# Patient Record
Sex: FEMALE | Race: WHITE | NOT HISPANIC OR LATINO | Employment: FULL TIME | URBAN - METROPOLITAN AREA
[De-identification: names, ages, dates, MRNs, and addresses within clinical notes are randomized per-mention and may not be internally consistent; named-entity substitution may affect disease eponyms.]

---

## 2017-01-03 ENCOUNTER — OFFICE VISIT (OUTPATIENT)
Dept: FAMILY MEDICINE CLINIC | Facility: CLINIC | Age: 22
End: 2017-01-03

## 2017-01-03 VITALS
SYSTOLIC BLOOD PRESSURE: 110 MMHG | HEART RATE: 80 BPM | WEIGHT: 227.4 LBS | BODY MASS INDEX: 38.82 KG/M2 | HEIGHT: 64 IN | TEMPERATURE: 98.3 F | OXYGEN SATURATION: 98 % | DIASTOLIC BLOOD PRESSURE: 86 MMHG

## 2017-01-03 DIAGNOSIS — J01.90 ACUTE NON-RECURRENT SINUSITIS, UNSPECIFIED LOCATION: Primary | ICD-10-CM

## 2017-01-03 PROCEDURE — 99213 OFFICE O/P EST LOW 20 MIN: CPT | Performed by: NURSE PRACTITIONER

## 2017-01-03 RX ORDER — CIPROFLOXACIN 500 MG/1
500 TABLET, FILM COATED ORAL 2 TIMES DAILY
Qty: 14 TABLET | Refills: 0 | Status: SHIPPED | OUTPATIENT
Start: 2017-01-03 | End: 2017-01-10

## 2017-01-03 NOTE — MR AVS SNAPSHOT
Lavonne Rocha   1/3/2017 10:00 AM   Office Visit    Dept Phone:  [phone]   Encounter #:  [id number]    Provider:  BRAD Groves   Department:  [facility] INTERNAL MEDICINE                Your Full Care Plan              Today's Medication Changes          These changes are accurate as of: 1/3/17 10:18 AM.  If you have any questions, ask your nurse or doctor.               New Medication(s)Ordered:     ciprofloxacin 500 MG tablet   Commonly known as:  CIPRO   Take 1 tablet by mouth 2 (Two) Times a Day for 7 days.   Started by:  BRAD Groves         Stop taking medication(s)listed here:     phentermine 37.5 MG capsule   Stopped by:  BRAD Groves           topiramate 50 MG tablet   Commonly known as:  TOPAMAX   Stopped by:  BRAD Groves           traMADol 50 MG tablet   Commonly known as:  ULTRAM   Stopped by:  BRAD Groves                Where to Get Your Medications      These medications were sent to [facility] [address] [place] [phone] [place] [phone]   [address] [facility] [address]     Phone:  [phone]     ciprofloxacin 500 MG tablet                  Your Updated Medication List          This list is accurate as of: 1/3/17 10:18 AM.  Always use your most recent med list.                ciprofloxacin 500 MG tablet   Commonly known as:  CIPRO   Take 1 tablet by mouth 2 (Two) Times a Day for 7 days.       cyclobenzaprine 10 MG tablet   Commonly known as:  FLEXERIL   Take 1 tablet by mouth every night.       fexofenadine 180 MG tablet   Commonly known as:  ALLEGRA   Take 1 tablet by mouth daily.       gabapentin 800 MG tablet   Commonly known as:  NEURONTIN   Take 1 tablet by mouth 3 (three) times a day.       ibuprofen 800 MG tablet   Commonly known as:  ADVIL,MOTRIN   TAKE ONE TABLET BY MOUTH THREE TIMES DAILY       montelukast 10 MG tablet   Commonly known  as:  SINGULAIR   Take 1 tablet by mouth Every Night.       norgestimate-ethinyl estradiol 0.25-35 MG-MCG per tablet   Commonly known as:  ORTHO-CYCLEN (28)   Take 28 tablets by mouth Daily.       omeprazole 40 MG capsule   Commonly known as:  priLOSEC   TAKE ONE CAPSULE BY MOUTH EVERY DAY               You Were Diagnosed With        Codes Comments    Acute non-recurrent sinusitis, unspecified location    -  Primary ICD-10-CM: J01.90  ICD-9-CM: 461.9       Instructions     None    Patient Instructions History      Upcoming Appointments     Visit Type Date Time Department    ACUTE           1/3/2017 10:00 AM [facility] [place]    ANNUAL 1/9/2017  9:30 AM [facility]    FOLLOW UP 1/16/2017  5:00 PM [facility]    FOLLOW UP 3/1/2017  8:45 AM [facility]UROSURG[facility]      MyChart Signup     Our records indicate that you have an active UatsdinScope 5 account.    You can view your After Visit Summary by going to Plutora and logging in with your Starvine username and password.  If you don't have a Starvine username and password but a parent or guardian has access to your record, the parent or guardian should login with their own Starvine username and password and access your record to view the After Visit Summary.    If you have questions, you can email [email or web address] or call [phone] to talk to our Starvine staff.  Remember, Starvine is NOT to be used for urgent needs.  For medical emergencies, dial 911.               Other Info from Your Visit           Your Appointments     Jan 09, 2017  9:30 AM EST   Annual with Ursula Mixon MD   [facility] OB GYN (--)    [address] [address] [phone]            Jan 16, 2017  5:00 PM EST   Follow Up with Nickolas Lr DO   [facility] FAMILY MEDICINE (--)    [address] [facility] [address]  [facility] [address] [phone]           Arrive 15 minutes  "prior to appointment.            Mar 01, 2017  8:45 AM EST   Follow Up with Adalid Levy MD   [facility] NEUROSURGERY (--)    [address]  [facility] [address] [phone]           Arrive 15 minutes prior to appointment.              Allergies     Penicillins  Rash      Reason for Visit     Cough x 2 weeks    URI     Wheezing At night    Shortness of Breath           Vital Signs     Blood Pressure Pulse Temperature Height Weight Oxygen Saturation    110/86 (BP Location: Left arm, Patient Position: Sitting, Cuff Size: Adult) 80 98.3 °F (36.8 °C) (Oral) 64\" (162.6 cm) 227 lb 6.4 oz (103 kg) 98%    Breastfeeding? Body Mass Index Smoking Status             No 39.03 kg/m2 Former Smoker         Problems and Diagnoses Noted     Acute non-recurrent sinusitis, unspecified location    -  Primary        "

## 2017-01-03 NOTE — PROGRESS NOTES
Subjective   Lavonne Rocha is a 21 y.o. female who presents today for:    Cough; URI; Wheezing (At night); and Shortness of Breath    URI    This is a new problem. The current episode started 1 to 4 weeks ago (started 2 weeks ago). The problem has been waxing and waning. There has been no fever. Associated symptoms include congestion (nasal congestion), coughing (worse at night), headaches, rhinorrhea, sinus pain (face and teeth hurt) and wheezing. Pertinent negatives include no plugged ear sensation or sore throat. She has tried NSAIDs and decongestant for the symptoms. The treatment provided mild relief.      I have reviewed the patient's medical history in detail and updated the computerized patient record.    Ms. Rocha  reports that she quit smoking about 6 months ago. Her smoking use included Cigarettes. She started smoking about 7 years ago. She has a 3.00 pack-year smoking history. She has never used smokeless tobacco. She reports that she drinks alcohol. She reports that she does not use illicit drugs.         Current Outpatient Prescriptions:   •  cyclobenzaprine (FLEXERIL) 10 MG tablet, Take 1 tablet by mouth every night., Disp: 30 tablet, Rfl: 2  •  fexofenadine (ALLEGRA) 180 MG tablet, Take 1 tablet by mouth daily., Disp: 30 tablet, Rfl: 5  •  gabapentin (NEURONTIN) 800 MG tablet, Take 1 tablet by mouth 3 (three) times a day., Disp: 90 tablet, Rfl: 2  •  ibuprofen (ADVIL,MOTRIN) 800 MG tablet, TAKE ONE TABLET BY MOUTH THREE TIMES DAILY, Disp: 90 tablet, Rfl: 4  •  montelukast (SINGULAIR) 10 MG tablet, Take 1 tablet by mouth Every Night., Disp: 30 tablet, Rfl: 11  •  omeprazole (PriLOSEC) 40 MG capsule, TAKE ONE CAPSULE BY MOUTH EVERY DAY, Disp: 30 capsule, Rfl: 4  •  norgestimate-ethinyl estradiol (ORTHO-CYCLEN, 28,) 0.25-35 MG-MCG per tablet, Take 28 tablets by mouth Daily., Disp: 1 package, Rfl: 12      The following portions of the patient's history were reviewed and updated as appropriate: allergies,  "current medications, past social history and problem list.    Review of Systems   Constitutional: Positive for fatigue. Negative for chills and fever.   HENT: Positive for congestion (nasal congestion), postnasal drip, rhinorrhea and sinus pressure (c/o headache and her teeth hurt). Negative for sore throat.    Respiratory: Positive for cough (worse at night), shortness of breath and wheezing.    Musculoskeletal: Positive for myalgias. Negative for arthralgias.   Neurological: Positive for headaches.         Objective   Vitals:    01/03/17 0957   BP: 110/86   BP Location: Left arm   Patient Position: Sitting   Cuff Size: Adult   Pulse: 80   Temp: 98.3 °F (36.8 °C)   TempSrc: Oral   SpO2: 98%   Weight: 227 lb 6.4 oz (103 kg)   Height: 64\" (162.6 cm)     Physical Exam   Constitutional: She is oriented to person, place, and time. She appears well-developed and well-nourished.   HENT:   Right Ear: External ear normal.   Left Ear: External ear normal.   Nose: Mucosal edema (turbinates are red and swollen) and rhinorrhea (clear) present. Right sinus exhibits maxillary sinus tenderness and frontal sinus tenderness. Left sinus exhibits maxillary sinus tenderness and frontal sinus tenderness.   Mouth/Throat: Oropharynx is clear and moist.   Neck: Normal range of motion. Neck supple.   Cardiovascular: Normal rate, regular rhythm, normal heart sounds and intact distal pulses.    Pulmonary/Chest: Effort normal and breath sounds normal. No respiratory distress. She has no wheezes. She has no rales.   Lymphadenopathy:     She has no cervical adenopathy.   Neurological: She is alert and oriented to person, place, and time.   Skin: Skin is warm and dry.   Psychiatric:   No acute distress   Vitals reviewed.        Assessment/Plan   Lavonne was seen today for cough, uri, wheezing and shortness of breath.    Diagnoses and all orders for this visit:    Acute non-recurrent sinusitis, unspecified location  -     ciprofloxacin (CIPRO) 500 " MG tablet; Take 1 tablet by mouth 2 (Two) Times a Day for 7 days.    1. Acute sinusitis. Start Ciprofloxacin 500 mg twice a day for 7 days. You may take Mucinex DM for your cough and Sudafed for your sinus congestion.  2. Follow up as needed.

## 2017-01-09 ENCOUNTER — OFFICE VISIT (OUTPATIENT)
Dept: OBSTETRICS AND GYNECOLOGY | Facility: CLINIC | Age: 22
End: 2017-01-09

## 2017-01-09 VITALS
HEIGHT: 64 IN | WEIGHT: 232 LBS | DIASTOLIC BLOOD PRESSURE: 76 MMHG | SYSTOLIC BLOOD PRESSURE: 124 MMHG | BODY MASS INDEX: 39.61 KG/M2

## 2017-01-09 DIAGNOSIS — N94.6 DYSMENORRHEA: ICD-10-CM

## 2017-01-09 DIAGNOSIS — L70.8 OTHER ACNE: ICD-10-CM

## 2017-01-09 DIAGNOSIS — Z13.9 SCREENING: ICD-10-CM

## 2017-01-09 DIAGNOSIS — Z01.419 ENCOUNTER FOR GYNECOLOGICAL EXAMINATION WITHOUT ABNORMAL FINDING: Primary | ICD-10-CM

## 2017-01-09 DIAGNOSIS — Z72.0 TOBACCO USE: ICD-10-CM

## 2017-01-09 DIAGNOSIS — Z71.6 ENCOUNTER FOR COUNSELING FOR TOBACCO USE DISORDER: ICD-10-CM

## 2017-01-09 DIAGNOSIS — Z12.4 ROUTINE CERVICAL SMEAR: ICD-10-CM

## 2017-01-09 DIAGNOSIS — E28.2 PCOS (POLYCYSTIC OVARIAN SYNDROME): ICD-10-CM

## 2017-01-09 LAB
BILIRUB BLD-MCNC: NEGATIVE MG/DL
CLARITY, POC: CLEAR
COLOR UR: YELLOW
GLUCOSE UR STRIP-MCNC: NEGATIVE MG/DL
KETONES UR QL: NEGATIVE
LEUKOCYTE EST, POC: NEGATIVE
NITRITE UR-MCNC: NEGATIVE MG/ML
PH UR: 5.5 [PH] (ref 5–8)
PROT UR STRIP-MCNC: NEGATIVE MG/DL
RBC # UR STRIP: NEGATIVE /UL
SP GR UR: 1.03 (ref 1–1.03)
UROBILINOGEN UR QL: NORMAL

## 2017-01-09 PROCEDURE — 99395 PREV VISIT EST AGE 18-39: CPT | Performed by: OBSTETRICS & GYNECOLOGY

## 2017-01-09 PROCEDURE — 81002 URINALYSIS NONAUTO W/O SCOPE: CPT | Performed by: OBSTETRICS & GYNECOLOGY

## 2017-01-09 NOTE — MR AVS SNAPSHOT
Lavonne Rocha   1/9/2017 9:30 AM   Office Visit    Dept Phone:  [phone]   Encounter #:  [id number]    Provider:  Ursula Mixon MD   Department:  [facility] OB GYN                Your Full Care Plan              Today's Medication Changes          These changes are accurate as of: 1/9/17 10:35 AM.  If you have any questions, ask your nurse or doctor.               Stop taking medication(s)listed here:     cyclobenzaprine 10 MG tablet   Commonly known as:  FLEXERIL   Stopped by:  Ursula Mixon MD                      Your Updated Medication List          This list is accurate as of: 1/9/17 10:35 AM.  Always use your most recent med list.                ciprofloxacin 500 MG tablet   Commonly known as:  CIPRO   Take 1 tablet by mouth 2 (Two) Times a Day for 7 days.       fexofenadine 180 MG tablet   Commonly known as:  ALLEGRA   Take 1 tablet by mouth daily.       gabapentin 800 MG tablet   Commonly known as:  NEURONTIN   Take 1 tablet by mouth 3 (three) times a day.       ibuprofen 800 MG tablet   Commonly known as:  ADVIL,MOTRIN   TAKE ONE TABLET BY MOUTH THREE TIMES DAILY       montelukast 10 MG tablet   Commonly known as:  SINGULAIR   Take 1 tablet by mouth Every Night.       norgestimate-ethinyl estradiol 0.25-35 MG-MCG per tablet   Commonly known as:  ORTHO-CYCLEN (28)   Take 28 tablets by mouth Daily.       omeprazole 40 MG capsule   Commonly known as:  priLOSEC   TAKE ONE CAPSULE BY MOUTH EVERY DAY               We Performed the Following     IGP, Rfx Aptima HPV ASCU     POC Urinalysis Dipstick       You Were Diagnosed With        Codes Comments    Encounter for gynecological examination without abnormal finding    -  Primary ICD-10-CM: Z01.419  ICD-9-CM: V72.31     Routine cervical smear     ICD-10-CM: Z12.4  ICD-9-CM: V76.2     Screening     ICD-10-CM: Z13.9  ICD-9-CM: V82.9     PCOS (polycystic ovarian syndrome)     ICD-10-CM: E28.2  ICD-9-CM: 256.4        Dysmenorrhea     ICD-10-CM: N94.6  ICD-9-CM: 625.3     Other acne     ICD-10-CM: L70.8  ICD-9-CM: 706.1     Tobacco use     ICD-10-CM: Z72.0  ICD-9-CM: 305.1     Encounter for counseling for tobacco use disorder     ICD-10-CM: Z71.6  ICD-9-CM: V65.49       Instructions     None    Patient Instructions History      Upcoming Appointments     Visit Type Date Time Department    ANNUAL 1/9/2017  9:30 AM [facility]    FOLLOW UP 1/16/2017  5:00 PM [facility] [address]    FOLLOW UP 3/1/2017  8:45 AM [facility] NEUROSURGERY [facility]    GYN FOLLOW UP 4/12/2017  9:45 AM [facility]      MyChart Signup     Our records indicate that you have an active SikhSpark Mobile account.    You can view your After Visit Summary by going to Haivision and logging in with your Wochit username and password.  If you don't have a Wochit username and password but a parent or guardian has access to your record, the parent or guardian should login with their own Wochit username and password and access your record to view the After Visit Summary.    If you have questions, you can email [email or web address] or call [phone] to talk to our Wochit staff.  Remember, Wochit is NOT to be used for urgent needs.  For medical emergencies, dial 911.               Other Info from Your Visit           Your Appointments     Jan 16, 2017  5:00 PM EST   Follow Up with Nickolas Lr DO   [facility] FAMILY MEDICINE (--)    [address] [facility] [address]  [facility] [address] [phone]           Arrive 15 minutes prior to appointment.            Mar 01, 2017  8:45 AM EST   Follow Up with Adalid Levy MD   [facility] NEUROSURGERY (--)    [address] [facility] [place] [address] [phone]           Arrive 15 minutes prior to appointment.            Apr 12, 2017  9:45 AM EDT   GYN FOLLOW UP with Ursula Mixon MD   [facility] OB GYN (--)     "[address] [address] [phone]              Allergies     Penicillins  Rash      Reason for Visit     Gynecologic Exam annual/ diagnosed with PCOS recently/states has some questions about PCOS/ last pap 9/24/15    Contraception pt states was given BC by primary care dr but wanted to wait and see if she should take it      Vital Signs     Blood Pressure Height Weight Last Menstrual Period Breastfeeding? Body Mass Index    124/76 64\" (162.6 cm) 232 lb (105 kg) 12/26/2016 No 39.82 kg/m2    Smoking Status                   Former Smoker           Problems and Diagnoses Noted     Painful menstruation    PCOS (polycystic ovarian syndrome)    Encounter for routine gynecological examination    -  Primary    Routine cervical smear        Screening        Other acne        Tobacco use        Encounter for counseling for tobacco use disorder          Results     POC Urinalysis Dipstick      Component Value Standard Range & Units    Color Yellow Yellow, Straw, Dark Yellow, Ce    Clarity, UA Clear Clear    Glucose, UA Negative Negative, 1000 mg/dL (3+) mg/dL    Bilirubin Negative Negative    Ketones, UA Negative Negative    Specific Gravity  1.030 1.005 - 1.030    Blood, UA Negative Negative    pH, Urine 5.5 5.0 - 8.0    Protein, POC Negative Negative mg/dL    Urobilinogen, UA Normal Normal    Leukocytes Negative Negative    Nitrite, UA Negative Negative                    "

## 2017-01-09 NOTE — PROGRESS NOTES
"Subjective   Lavonne Rocha is a 21 y.o. female annual/ diagnosed with PCOS recently/states has some questions about PCOS/ last pap 9/24/15, pt states was given BC by primary care dr but wanted to wait and see if she should take it - encouraged to start Sprintec - reassured about fertility due to regular periods - patient most bothered by excessive hair growth on face and acne -  Reports painful periods and hopes BC will help with these     History of Present Illness    The following portions of the patient's history were reviewed and updated as appropriate: allergies, current medications, past family history, past medical history, past social history, past surgical history and problem list.    Review of Systems   Constitutional: Negative for chills, fever and unexpected weight change.   Gastrointestinal: Negative for abdominal distention and abdominal pain.   Endocrine:        Acne  Facial hair growth    Genitourinary: Positive for menstrual problem. Negative for dysuria, pelvic pain, vaginal bleeding, vaginal discharge and vaginal pain.   All other systems reviewed and are negative.    Visit Vitals   • /76   • Ht 64\" (162.6 cm)   • Wt 232 lb (105 kg)   • LMP 12/26/2016   • Breastfeeding No   • BMI 39.82 kg/m2       Objective   Physical Exam   Constitutional: She is oriented to person, place, and time. She appears well-developed and well-nourished.   Neck: Normal range of motion. Neck supple. No thyromegaly present.   Cardiovascular: Normal rate and regular rhythm.    Pulmonary/Chest: Effort normal and breath sounds normal. Right breast exhibits no mass, no nipple discharge, no skin change and no tenderness. Left breast exhibits no mass, no nipple discharge, no skin change and no tenderness.   Abdominal: Soft. Bowel sounds are normal. She exhibits no distension. There is no tenderness.   Genitourinary: Vagina normal and uterus normal. Pelvic exam was performed with patient supine. Uterus is not tender. Cervix " exhibits no friability. Right adnexum displays no mass and no tenderness. Left adnexum displays no mass and no tenderness. No vaginal discharge found.   Musculoskeletal: Normal range of motion. She exhibits no edema.   Neurological: She is alert and oriented to person, place, and time.   Skin: Skin is warm and dry. No rash noted.   Psychiatric: She has a normal mood and affect. Her behavior is normal.   Nursing note and vitals reviewed.        Assessment/Plan   Lavonne was seen today for gynecologic exam and contraception.    Diagnoses and all orders for this visit:    Encounter for gynecological examination without abnormal finding    Routine cervical smear  -     IGP, Rfx Aptima HPV ASCU    Screening  -     POC Urinalysis Dipstick    PCOS (polycystic ovarian syndrome)    Dysmenorrhea    Other acne    Tobacco use    Encounter for counseling for tobacco use disorder

## 2017-01-10 LAB
CONV .: NORMAL
CYTOLOGIST CVX/VAG CYTO: NORMAL
CYTOLOGY CVX/VAG DOC THIN PREP: NORMAL
DX ICD CODE: NORMAL
HIV 1 & 2 AB SER-IMP: NORMAL
OTHER STN SPEC: NORMAL
PATH REPORT.FINAL DX SPEC: NORMAL
STAT OF ADQ CVX/VAG CYTO-IMP: NORMAL

## 2017-01-13 ENCOUNTER — TELEPHONE (OUTPATIENT)
Dept: OBSTETRICS AND GYNECOLOGY | Facility: CLINIC | Age: 22
End: 2017-01-13

## 2017-01-13 RX ORDER — GABAPENTIN 800 MG/1
TABLET ORAL
Qty: 90 TABLET | Status: CANCELLED | OUTPATIENT
Start: 2017-01-13

## 2017-01-13 RX ORDER — GABAPENTIN 800 MG/1
TABLET ORAL
Qty: 90 TABLET | OUTPATIENT
Start: 2017-01-13

## 2017-01-13 NOTE — TELEPHONE ENCOUNTER
----- Message from Ursula Mixon MD sent at 1/11/2017 12:11 PM EST -----  Please call patient and notify of normal results of pap smear

## 2017-01-17 RX ORDER — GABAPENTIN 800 MG/1
800 TABLET ORAL 3 TIMES DAILY
Qty: 90 TABLET | Refills: 2 | Status: SHIPPED | OUTPATIENT
Start: 2017-01-17 | End: 2017-01-19 | Stop reason: SDUPTHER

## 2017-01-19 RX ORDER — GABAPENTIN 800 MG/1
800 TABLET ORAL 3 TIMES DAILY
Qty: 90 TABLET | Refills: 2 | Status: SHIPPED | OUTPATIENT
Start: 2017-01-19 | End: 2017-08-09 | Stop reason: SDUPTHER

## 2017-02-20 ENCOUNTER — OFFICE VISIT (OUTPATIENT)
Dept: FAMILY MEDICINE CLINIC | Facility: CLINIC | Age: 22
End: 2017-02-20

## 2017-02-20 VITALS
HEIGHT: 64 IN | HEART RATE: 80 BPM | WEIGHT: 232 LBS | DIASTOLIC BLOOD PRESSURE: 82 MMHG | BODY MASS INDEX: 39.61 KG/M2 | SYSTOLIC BLOOD PRESSURE: 126 MMHG | OXYGEN SATURATION: 97 %

## 2017-02-20 DIAGNOSIS — M54.41 ACUTE MIDLINE LOW BACK PAIN WITH BILATERAL SCIATICA: Primary | ICD-10-CM

## 2017-02-20 DIAGNOSIS — M54.42 ACUTE MIDLINE LOW BACK PAIN WITH BILATERAL SCIATICA: Primary | ICD-10-CM

## 2017-02-20 PROCEDURE — 96372 THER/PROPH/DIAG INJ SC/IM: CPT | Performed by: NURSE PRACTITIONER

## 2017-02-20 PROCEDURE — 99214 OFFICE O/P EST MOD 30 MIN: CPT | Performed by: NURSE PRACTITIONER

## 2017-02-20 RX ORDER — HYDROCODONE BITARTRATE AND ACETAMINOPHEN 5; 325 MG/1; MG/1
1 TABLET ORAL EVERY 8 HOURS PRN
Qty: 30 TABLET | Refills: 0 | Status: SHIPPED | OUTPATIENT
Start: 2017-02-20 | End: 2017-03-02

## 2017-02-20 RX ORDER — CYCLOBENZAPRINE HCL 10 MG
10 TABLET ORAL 3 TIMES DAILY PRN
COMMUNITY
End: 2017-04-17 | Stop reason: SDUPTHER

## 2017-02-20 NOTE — PROGRESS NOTES
Subjective   Lavonne Rocha is a 21 y.o. female who presents today for:    Back Pain (Lower back and Hip pain)    HPI Comments: Has appointments in with Dr. Thomas in March concerning her back and Dr. Ware concerning nerve pain. She is having a flare up right now which is affecting her ADLs. She was going to pain management for spinal injections the last one 3 months ago. She has not been back to pain management because she cannot afford the cost of the injections. She also reports that Tramadol did not help with the pain in the past.    Back Pain   This is a new (but this is a chronic issue that started when she was 12 years old) problem. The current episode started yesterday. The problem occurs constantly (has had flare ups in the past last one was about 3 to 4 weeks ago). The problem has been rapidly worsening since onset. The pain is present in the lumbar spine. The quality of the pain is described as stabbing and shooting (a lot of pressure). Radiates to: radiates down both hips and buttocks. The pain is at a severity of 10/10. The pain is severe. The pain is the same all the time. The symptoms are aggravated by bending, standing, sitting, twisting, lying down and position. Associated symptoms include weakness (in both legs). Pertinent negatives include no headaches, numbness, paresis, paresthesias, pelvic pain or tingling. She has tried heat, ice, muscle relaxant and NSAIDs (gabapentin) for the symptoms. The treatment provided no relief.      I have reviewed the patient's medical history in detail and updated the computerized patient record.    Ms. Rocha  reports that she quit smoking about 8 months ago. Her smoking use included Cigarettes. She started smoking about 7 years ago. She has a 3.00 pack-year smoking history. She has never used smokeless tobacco. She reports that she drinks alcohol. She reports that she does not use illicit drugs.         Current Outpatient Prescriptions:   •  cyclobenzaprine  (FLEXERIL) 10 MG tablet, Take 10 mg by mouth 3 (Three) Times a Day As Needed for muscle spasms., Disp: , Rfl:   •  fexofenadine (ALLEGRA) 180 MG tablet, Take 1 tablet by mouth daily., Disp: 30 tablet, Rfl: 5  •  gabapentin (NEURONTIN) 800 MG tablet, Take 1 tablet by mouth 3 (Three) Times a Day., Disp: 90 tablet, Rfl: 2  •  ibuprofen (ADVIL,MOTRIN) 800 MG tablet, TAKE ONE TABLET BY MOUTH THREE TIMES DAILY, Disp: 90 tablet, Rfl: 4  •  montelukast (SINGULAIR) 10 MG tablet, Take 1 tablet by mouth Every Night., Disp: 30 tablet, Rfl: 11  •  norgestimate-ethinyl estradiol (ORTHO-CYCLEN, 28,) 0.25-35 MG-MCG per tablet, Take 28 tablets by mouth Daily., Disp: 1 package, Rfl: 12  •  omeprazole (PriLOSEC) 40 MG capsule, TAKE ONE CAPSULE BY MOUTH EVERY DAY, Disp: 30 capsule, Rfl: 4  •  HYDROcodone-acetaminophen (NORCO) 5-325 MG per tablet, Take 1 tablet by mouth Every 8 (Eight) Hours As Needed for severe pain (7-10) for up to 10 days., Disp: 30 tablet, Rfl: 0  •  PredniSONE (DELTASONE) 10 MG (48) tablet pack, Take as directed, Disp: 1 each, Rfl: 0    Current Facility-Administered Medications:   •  methylPREDNISolone sodium succinate (SOLU-Medrol) injection 40 mg, 40 mg, Intravenous, Once, BRAD Groves      The following portions of the patient's history were reviewed and updated as appropriate: allergies, current medications, past social history and problem list.    Review of Systems   Constitutional: Positive for activity change (is unable to walk).   Respiratory: Negative.    Cardiovascular: Negative.    Genitourinary: Negative for pelvic pain.   Musculoskeletal: Positive for back pain (Lower).   Skin: Negative.    Neurological: Positive for weakness (in both legs). Negative for tingling, numbness, headaches and paresthesias.   Psychiatric/Behavioral: Negative.          Objective   Vitals:    02/20/17 1310   BP: 126/82   BP Location: Right arm   Patient Position: Sitting   Cuff Size: Adult   Pulse: 80   SpO2: 97%  "  Weight: 232 lb (105 kg)   Height: 64\" (162.6 cm)     Physical Exam   Constitutional: She is oriented to person, place, and time. She appears well-developed and well-nourished.   Cardiovascular: Normal rate, regular rhythm, normal heart sounds and intact distal pulses.    Pulmonary/Chest: Effort normal and breath sounds normal.   Musculoskeletal:        Lumbar back: She exhibits decreased range of motion, tenderness, bony tenderness, swelling, edema, pain and spasm.   She is walking bent over at the waist and can not stand up straight.    Neurological: She is alert and oriented to person, place, and time.   Skin: Skin is warm and dry.   Vitals reviewed.        Assessment/Plan   Lavonne was seen today for back pain.    Diagnoses and all orders for this visit:    Acute midline low back pain with bilateral sciatica  -     HYDROcodone-acetaminophen (NORCO) 5-325 MG per tablet; Take 1 tablet by mouth Every 8 (Eight) Hours As Needed for severe pain (7-10) for up to 10 days.  -     [id number] 7+Oxycodone-Bund  -     methylPREDNISolone sodium succinate (SOLU-Medrol) injection 40 mg; Infuse 1 mL into a venous catheter 1 (One) Time.  -     PredniSONE (DELTASONE) 10 MG (48) tablet pack; Take as directed    1. Acute midline low back pain with sciatica. I have reviewed her RAHEEL report, she has signed a controlled substance agreement with me and provided a urine for drug screen. I have prescribed her Hydrocodone/acetaminophen 5/325 mg every eight hours as needed for pain. She also receiving Methylprednisolone 40 mg IM today and is to start a prednisone dose pack.  2. She is to follow up with Dr.s Thomas and Chaz as scheduled in March.   3. Follow up as needed.  "

## 2017-02-21 LAB
AMPHETAMINES UR QL SCN: NEGATIVE NG/ML
BARBITURATES UR QL SCN: NEGATIVE NG/ML
BENZODIAZ UR QL: NEGATIVE NG/ML
BZE UR QL: NEGATIVE NG/ML
CANNABINOIDS UR QL SCN: NEGATIVE NG/ML
OPIATES UR QL SCN: NEGATIVE NG/ML
OXYCODONE+OXYMORPHONE UR QL SCN: NEGATIVE NG/ML
PCP UR QL: NEGATIVE NG/ML

## 2017-02-21 RX ORDER — METHYLPREDNISOLONE SODIUM SUCCINATE 40 MG/ML
40 INJECTION, POWDER, LYOPHILIZED, FOR SOLUTION INTRAMUSCULAR; INTRAVENOUS ONCE
Status: COMPLETED | OUTPATIENT
Start: 2017-02-21 | End: 2017-02-21

## 2017-02-21 RX ORDER — PREDNISONE 10 MG/1
TABLET ORAL
Qty: 1 EACH | Refills: 0 | Status: SHIPPED | OUTPATIENT
Start: 2017-02-21 | End: 2017-04-10

## 2017-02-21 RX ADMIN — METHYLPREDNISOLONE SODIUM SUCCINATE 40 MG: 40 INJECTION, POWDER, LYOPHILIZED, FOR SOLUTION INTRAMUSCULAR; INTRAVENOUS at 09:36

## 2017-03-13 ENCOUNTER — TRANSCRIBE ORDERS (OUTPATIENT)
Dept: ADMINISTRATIVE | Facility: HOSPITAL | Age: 22
End: 2017-03-13

## 2017-03-13 DIAGNOSIS — G89.29 CHRONIC LOW BACK PAIN WITHOUT SCIATICA, UNSPECIFIED BACK PAIN LATERALITY: Primary | ICD-10-CM

## 2017-03-13 DIAGNOSIS — M54.50 CHRONIC LOW BACK PAIN WITHOUT SCIATICA, UNSPECIFIED BACK PAIN LATERALITY: Primary | ICD-10-CM

## 2017-03-14 ENCOUNTER — HOSPITAL ENCOUNTER (OUTPATIENT)
Dept: MRI IMAGING | Facility: HOSPITAL | Age: 22
Discharge: HOME | End: 2017-03-14
Admitting: ORTHOPAEDIC SURGERY

## 2017-03-14 DIAGNOSIS — M54.50 CHRONIC LOW BACK PAIN WITHOUT SCIATICA, UNSPECIFIED BACK PAIN LATERALITY: ICD-10-CM

## 2017-03-14 DIAGNOSIS — G89.29 CHRONIC LOW BACK PAIN WITHOUT SCIATICA, UNSPECIFIED BACK PAIN LATERALITY: ICD-10-CM

## 2017-03-14 PROCEDURE — 72148 MRI LUMBAR SPINE W/O DYE: CPT

## 2017-04-10 ENCOUNTER — OFFICE VISIT (OUTPATIENT)
Dept: FAMILY MEDICINE CLINIC | Facility: CLINIC | Age: 22
End: 2017-04-10

## 2017-04-10 VITALS
HEART RATE: 84 BPM | BODY MASS INDEX: 39.61 KG/M2 | WEIGHT: 232 LBS | TEMPERATURE: 98 F | SYSTOLIC BLOOD PRESSURE: 144 MMHG | OXYGEN SATURATION: 98 % | HEIGHT: 64 IN | DIASTOLIC BLOOD PRESSURE: 70 MMHG

## 2017-04-10 DIAGNOSIS — F41.9 ANXIETY: Primary | ICD-10-CM

## 2017-04-10 PROCEDURE — 99213 OFFICE O/P EST LOW 20 MIN: CPT | Performed by: FAMILY MEDICINE

## 2017-04-10 RX ORDER — HYDROCODONE BITARTRATE AND ACETAMINOPHEN 5; 325 MG/1; MG/1
1 TABLET ORAL EVERY 6 HOURS
COMMUNITY
End: 2017-09-08

## 2017-04-10 NOTE — PROGRESS NOTES
Subjective   Lavonne Rocha is a 21 y.o. female. Presents today for   Chief Complaint   Patient presents with   • Anxiety     pt would like treatment for anxiety.   • Contraception     pt would like to discuss changing her birth control, she feels like it may be causing her anxiety to worsen.       Anxiety   Presents for initial visit. Onset was 1 to 6 months ago (Has always had, but worse past few months.). The problem has been gradually worsening. Symptoms include excessive worry, irritability and nervous/anxious behavior. Patient reports no insomnia or panic. Symptoms occur constantly. The severity of symptoms is moderate. Exacerbated by: 1 cup of coffee in am. The quality of sleep is good. Nighttime awakenings: none.     There are no known risk factors. (No prior treatment for anxiety) Past treatments include nothing. The treatment provided no relief. Compliance with prior treatments has been good.     Was started on BC for PCOS and abnormal heavy periods.  Still has acne and hot flashes, but overall likes OCP.  Saw gyn early January when started.  Likes overall.    Taking gabapentin only at night now.  Doesn't notice difference decreasing dose.    Review of Systems   Constitutional: Positive for irritability.   Psychiatric/Behavioral: The patient is nervous/anxious. The patient does not have insomnia.        The following portions of the patient's history were reviewed and updated as appropriate: allergies, current medications, past medical history and problem list.    Patient Active Problem List   Diagnosis   • Bulging lumbar disc   • Chronic pain   • Gastroesophageal reflux disease   • Hyperesthesia   • Joint stiffness   • Fibromyalgia   • Fatigue   • Dizziness   • Degeneration of intervertebral disc of lumbar region   • Lumbar facet arthropathy   • Lumbar radiculitis   • Chronic bilateral low back pain with sciatica   • PCOS (polycystic ovarian syndrome)   • Dysmenorrhea       Allergies   Allergen Reactions   •  "Penicillins Rash       Current Outpatient Prescriptions on File Prior to Visit   Medication Sig Dispense Refill   • cyclobenzaprine (FLEXERIL) 10 MG tablet Take 10 mg by mouth 3 (Three) Times a Day As Needed for muscle spasms.     • fexofenadine (ALLEGRA) 180 MG tablet Take 1 tablet by mouth daily. 30 tablet 5   • ibuprofen (ADVIL,MOTRIN) 800 MG tablet TAKE ONE TABLET BY MOUTH THREE TIMES DAILY 90 tablet 4   • montelukast (SINGULAIR) 10 MG tablet Take 1 tablet by mouth Every Night. 30 tablet 11   • norgestimate-ethinyl estradiol (ORTHO-CYCLEN, 28,) 0.25-35 MG-MCG per tablet Take 28 tablets by mouth Daily. 1 package 12   • omeprazole (PriLOSEC) 40 MG capsule TAKE ONE CAPSULE BY MOUTH EVERY DAY 30 capsule 4   • [DISCONTINUED] PredniSONE (DELTASONE) 10 MG (48) tablet pack Take as directed 1 each 0   • gabapentin (NEURONTIN) 800 MG tablet Take 1 tablet by mouth 3 (Three) Times a Day. (Patient taking differently: Take 800 mg by mouth Daily.) 90 tablet 2     No current facility-administered medications on file prior to visit.        Objective   Vitals:    04/10/17 1732   BP: 144/70   BP Location: Right arm   Patient Position: Sitting   Cuff Size: Large Adult   Pulse: 84   Temp: 98 °F (36.7 °C)   TempSrc: Oral   SpO2: 98%   Weight: 232 lb (105 kg)   Height: 64\" (162.6 cm)       Physical Exam   Constitutional: She is oriented to person, place, and time. She appears well-developed and well-nourished.   Neurological: She is alert and oriented to person, place, and time.   Skin: Skin is warm and dry.   Psychiatric: She has a normal mood and affect. Her behavior is normal.   Nursing note and vitals reviewed.      Assessment/Plan   Lavonne was seen today for anxiety and contraception.    Diagnoses and all orders for this visit:    Anxiety  -     sertraline (ZOLOFT) 50 MG tablet; 1/2 po daily x 14 days, then 1 po daily    -patient doing well with ocp and d/w continuing.  Will continue for now.  Will try treating anxiety, warned " starting SSRI may notice more anxiety.       -Follow up: 2 months       Current Outpatient Prescriptions:   •  cyclobenzaprine (FLEXERIL) 10 MG tablet, Take 10 mg by mouth 3 (Three) Times a Day As Needed for muscle spasms., Disp: , Rfl:   •  fexofenadine (ALLEGRA) 180 MG tablet, Take 1 tablet by mouth daily., Disp: 30 tablet, Rfl: 5  •  HYDROcodone-acetaminophen (NORCO) 5-325 MG per tablet, Take 1 tablet by mouth Every 6 (Six) Hours., Disp: , Rfl:   •  ibuprofen (ADVIL,MOTRIN) 800 MG tablet, TAKE ONE TABLET BY MOUTH THREE TIMES DAILY, Disp: 90 tablet, Rfl: 4  •  montelukast (SINGULAIR) 10 MG tablet, Take 1 tablet by mouth Every Night., Disp: 30 tablet, Rfl: 11  •  norgestimate-ethinyl estradiol (ORTHO-CYCLEN, 28,) 0.25-35 MG-MCG per tablet, Take 28 tablets by mouth Daily., Disp: 1 package, Rfl: 12  •  omeprazole (PriLOSEC) 40 MG capsule, TAKE ONE CAPSULE BY MOUTH EVERY DAY, Disp: 30 capsule, Rfl: 4  •  gabapentin (NEURONTIN) 800 MG tablet, Take 1 tablet by mouth 3 (Three) Times a Day. (Patient taking differently: Take 800 mg by mouth Daily.), Disp: 90 tablet, Rfl: 2  •  sertraline (ZOLOFT) 50 MG tablet, 1/2 po daily x 14 days, then 1 po daily, Disp: 30 tablet, Rfl: 5

## 2017-04-17 RX ORDER — GABAPENTIN 800 MG/1
TABLET ORAL
Qty: 90 TABLET | Refills: 3 | Status: SHIPPED | OUTPATIENT
Start: 2017-04-17 | End: 2017-08-09

## 2017-04-17 RX ORDER — CYCLOBENZAPRINE HCL 10 MG
TABLET ORAL
Qty: 30 TABLET | Refills: 2 | Status: SHIPPED | OUTPATIENT
Start: 2017-04-17 | End: 2017-07-14 | Stop reason: SDUPTHER

## 2017-05-11 ENCOUNTER — TELEPHONE (OUTPATIENT)
Dept: FAMILY MEDICINE CLINIC | Facility: CLINIC | Age: 22
End: 2017-05-11

## 2017-05-12 RX ORDER — SERTRALINE HYDROCHLORIDE 100 MG/1
100 TABLET, FILM COATED ORAL DAILY
Qty: 30 TABLET | Refills: 5 | Status: SHIPPED | OUTPATIENT
Start: 2017-05-12 | End: 2017-09-20

## 2017-06-15 RX ORDER — IBUPROFEN 800 MG/1
TABLET ORAL
Qty: 90 TABLET | Refills: 0 | Status: SHIPPED | OUTPATIENT
Start: 2017-06-15 | End: 2017-08-09 | Stop reason: SDUPTHER

## 2017-07-14 RX ORDER — CYCLOBENZAPRINE HCL 10 MG
TABLET ORAL
Qty: 30 TABLET | Refills: 1 | Status: SHIPPED | OUTPATIENT
Start: 2017-07-14 | End: 2017-09-07 | Stop reason: SDUPTHER

## 2017-08-08 ENCOUNTER — TELEPHONE (OUTPATIENT)
Dept: FAMILY MEDICINE CLINIC | Facility: CLINIC | Age: 22
End: 2017-08-08

## 2017-08-09 RX ORDER — IBUPROFEN 800 MG/1
TABLET ORAL
Qty: 90 TABLET | Refills: 1 | Status: SHIPPED | OUTPATIENT
Start: 2017-08-09 | End: 2017-09-07 | Stop reason: SDUPTHER

## 2017-08-09 RX ORDER — GABAPENTIN 800 MG/1
800 TABLET ORAL DAILY
Qty: 30 TABLET | Refills: 5 | OUTPATIENT
Start: 2017-08-09 | End: 2017-09-20

## 2017-08-31 ENCOUNTER — TELEPHONE (OUTPATIENT)
Dept: FAMILY MEDICINE CLINIC | Facility: CLINIC | Age: 22
End: 2017-08-31

## 2017-08-31 RX ORDER — NORGESTIMATE AND ETHINYL ESTRADIOL 0.25-0.035
1 KIT ORAL DAILY
Qty: 28 TABLET | Refills: 12 | Status: SHIPPED | OUTPATIENT
Start: 2017-08-31 | End: 2017-09-20

## 2017-09-01 ENCOUNTER — TELEPHONE (OUTPATIENT)
Dept: FAMILY MEDICINE CLINIC | Facility: CLINIC | Age: 22
End: 2017-09-01

## 2017-09-01 DIAGNOSIS — E28.2 PCOS (POLYCYSTIC OVARIAN SYNDROME): Primary | ICD-10-CM

## 2017-09-01 RX ORDER — DESOGESTREL AND ETHINYL ESTRADIOL 21-5 (28)
1 KIT ORAL DAILY
Qty: 28 TABLET | Refills: 12 | Status: SHIPPED | OUTPATIENT
Start: 2017-09-01 | End: 2017-09-20

## 2017-09-01 NOTE — TELEPHONE ENCOUNTER
Pt said she went to get her birth control yesterday and you were supposed to be sending in something different then what she was on.  The pharmacy told her that you sent in the same birth control that she was on.  Pt asked if you will send in something different for her.

## 2017-09-08 ENCOUNTER — OFFICE VISIT (OUTPATIENT)
Dept: FAMILY MEDICINE CLINIC | Facility: CLINIC | Age: 22
End: 2017-09-08

## 2017-09-08 VITALS
BODY MASS INDEX: 40.12 KG/M2 | DIASTOLIC BLOOD PRESSURE: 72 MMHG | HEIGHT: 64 IN | HEART RATE: 89 BPM | TEMPERATURE: 98.4 F | SYSTOLIC BLOOD PRESSURE: 156 MMHG | WEIGHT: 235 LBS | OXYGEN SATURATION: 95 %

## 2017-09-08 DIAGNOSIS — J01.10 ACUTE FRONTAL SINUSITIS, RECURRENCE NOT SPECIFIED: Primary | ICD-10-CM

## 2017-09-08 LAB
EXPIRATION DATE: NORMAL
FLUAV AG NPH QL: NORMAL
FLUBV AG NPH QL: NORMAL
INTERNAL CONTROL: NORMAL
Lab: NORMAL

## 2017-09-08 PROCEDURE — 99213 OFFICE O/P EST LOW 20 MIN: CPT | Performed by: NURSE PRACTITIONER

## 2017-09-08 PROCEDURE — 87804 INFLUENZA ASSAY W/OPTIC: CPT | Performed by: NURSE PRACTITIONER

## 2017-09-08 RX ORDER — LORATADINE 10 MG/1
10 TABLET ORAL DAILY
COMMUNITY
End: 2017-09-20

## 2017-09-08 RX ORDER — IBUPROFEN 800 MG/1
TABLET ORAL
Qty: 90 TABLET | Refills: 1 | Status: SHIPPED | OUTPATIENT
Start: 2017-09-08 | End: 2017-09-20

## 2017-09-08 RX ORDER — AZITHROMYCIN 250 MG/1
TABLET, FILM COATED ORAL
Qty: 6 TABLET | Refills: 0 | Status: SHIPPED | OUTPATIENT
Start: 2017-09-08 | End: 2017-09-20

## 2017-09-08 RX ORDER — FLUTICASONE PROPIONATE 50 MCG
2 SPRAY, SUSPENSION (ML) NASAL DAILY
COMMUNITY
End: 2017-09-20

## 2017-09-08 RX ORDER — CYCLOBENZAPRINE HCL 10 MG
TABLET ORAL
Qty: 30 TABLET | Refills: 1 | Status: SHIPPED | OUTPATIENT
Start: 2017-09-08 | End: 2017-09-20

## 2017-09-08 NOTE — PROGRESS NOTES
Subjective   Lavonne Rocha is a 21 y.o. female. She is here for congestion, SOB, nasal congestion, headache. She started having body aches about one week ago but the nasal congestion, headaches etc started on Wed. She had a low grade temp last night. She does have productive cough of green colored sputum. Greenish nasal drainage.     Sinusitis   This is a new problem. The current episode started in the past 7 days. The problem has been gradually worsening since onset. Associated symptoms include chills, congestion, coughing, headaches, shortness of breath and swollen glands. Pertinent negatives include no ear pain, sneezing or sore throat. Treatments tried: Allergy pill, Flonase, Ibuprofen,         The following portions of the patient's history were reviewed and updated as appropriate: allergies, current medications, past medical history, past social history, past surgical history and problem list.    Review of Systems   Constitutional: Positive for appetite change, chills, fatigue and fever.   HENT: Positive for congestion, postnasal drip and rhinorrhea. Negative for ear pain, sneezing and sore throat.    Respiratory: Positive for cough, chest tightness, shortness of breath and wheezing (occassional).    Cardiovascular: Negative.    Neurological: Positive for headaches.       Objective   Physical Exam   Constitutional: Vital signs are normal. She appears well-developed and well-nourished. She is cooperative.   HENT:   Right Ear: Hearing normal. A middle ear effusion is present.   Left Ear: Hearing normal. A middle ear effusion is present.   Nose: Mucosal edema and rhinorrhea present. Right sinus exhibits frontal sinus tenderness. Left sinus exhibits frontal sinus tenderness.   Mouth/Throat: Uvula is midline and mucous membranes are normal. Normal dentition.   Cardiovascular: Normal rate, regular rhythm, normal heart sounds and intact distal pulses.    Pulmonary/Chest: Effort normal and breath sounds normal.  "  Neurological: She is alert.   Nursing note and vitals reviewed.    Vitals:    09/08/17 1346   BP: 156/72   Pulse: 89   Temp: 98.4 °F (36.9 °C)   TempSrc: Oral   SpO2: 95%   Weight: 235 lb (107 kg)   Height: 64\" (162.6 cm)     Results for orders placed or performed in visit on 09/08/17   POCT Influenza A/B   Result Value Ref Range    Rapid Influenza A Ag neg     Rapid Influenza B Ag neg     Internal Control Passed Passed    Lot Number [id number]     Expiration Date 2018/07/16          Assessment/Plan   Diagnoses and all orders for this visit:    Acute frontal sinusitis, recurrence not specified  -     HYDROcod Polst-CPM Polst ER (TUSSIONEX PENNKINETIC ER) 10-8 MG/5ML ER suspension; Take 5 mL by mouth Every 12 (Twelve) Hours As Needed for Cough.  -     azithromycin (ZITHROMAX) 250 MG tablet; Take 2 tablets the first day, then 1 tablet daily for 4 days.    Will cover with antibiotics now since started running a fever and green drainage.  Lots of fluids  Continue Claritin and Flonase  RTC if not improved           "

## 2017-09-13 ENCOUNTER — TELEPHONE (OUTPATIENT)
Dept: FAMILY MEDICINE CLINIC | Facility: CLINIC | Age: 22
End: 2017-09-13

## 2017-09-13 NOTE — TELEPHONE ENCOUNTER
She just found out she is pregnant, she does not want to take the tessalon perles.     I can her the information about Zoloft, she now wants to know about Gabapentin.

## 2017-09-13 NOTE — TELEPHONE ENCOUNTER
Change sertraline to 50mg take 1 po daily x 7 days then 1/2 po daily x 7 days, then stop.  Should get off quickly, but let me know if not doing well.

## 2017-09-13 NOTE — TELEPHONE ENCOUNTER
No, do not take the Tessalon Perles. She can still take the plain antihistamines but nothing with pseudoephedrine in it.

## 2017-09-13 NOTE — TELEPHONE ENCOUNTER
Check with Trupti, I gave her instructions to tell patient as follows:    Change sertraline to 50mg take 1 po daily x 7 days then 1/2 po daily x 7 days, then stop.  Should get off quickly, but let me know if not doing well.

## 2017-09-20 ENCOUNTER — PROCEDURE VISIT (OUTPATIENT)
Dept: OBSTETRICS AND GYNECOLOGY | Facility: CLINIC | Age: 22
End: 2017-09-20

## 2017-09-20 ENCOUNTER — OFFICE VISIT (OUTPATIENT)
Dept: OBSTETRICS AND GYNECOLOGY | Facility: CLINIC | Age: 22
End: 2017-09-20

## 2017-09-20 VITALS
WEIGHT: 236.6 LBS | BODY MASS INDEX: 40.39 KG/M2 | DIASTOLIC BLOOD PRESSURE: 92 MMHG | SYSTOLIC BLOOD PRESSURE: 178 MMHG | HEIGHT: 64 IN

## 2017-09-20 DIAGNOSIS — E66.9 OBESITY, UNSPECIFIED OBESITY SEVERITY, UNSPECIFIED OBESITY TYPE: ICD-10-CM

## 2017-09-20 DIAGNOSIS — B96.1 KLEBSIELLA CYSTITIS: ICD-10-CM

## 2017-09-20 DIAGNOSIS — N30.90 KLEBSIELLA CYSTITIS: ICD-10-CM

## 2017-09-20 DIAGNOSIS — O36.80X0 ENCOUNTER TO DETERMINE FETAL VIABILITY OF PREGNANCY, NOT APPLICABLE OR UNSPECIFIED FETUS: Primary | ICD-10-CM

## 2017-09-20 DIAGNOSIS — Z13.9 SCREENING: ICD-10-CM

## 2017-09-20 DIAGNOSIS — Z32.00 ENCOUNTER FOR CONFIRMATION OF PREGNANCY TEST RESULT WITH PHYSICAL EXAMINATION: Primary | ICD-10-CM

## 2017-09-20 LAB
BILIRUB BLD-MCNC: NEGATIVE MG/DL
CLARITY, POC: CLEAR
COLOR UR: YELLOW
GLUCOSE UR STRIP-MCNC: NEGATIVE MG/DL
KETONES UR QL: NEGATIVE
LEUKOCYTE EST, POC: NEGATIVE
NITRITE UR-MCNC: NEGATIVE MG/ML
PH UR: 6.5 [PH] (ref 5–8)
PROT UR STRIP-MCNC: NEGATIVE MG/DL
RBC # UR STRIP: NEGATIVE /UL
SP GR UR: 1.02 (ref 1–1.03)
UROBILINOGEN UR QL: NORMAL

## 2017-09-20 PROCEDURE — 81002 URINALYSIS NONAUTO W/O SCOPE: CPT | Performed by: OBSTETRICS & GYNECOLOGY

## 2017-09-20 PROCEDURE — 76817 TRANSVAGINAL US OBSTETRIC: CPT | Performed by: OBSTETRICS & GYNECOLOGY

## 2017-09-20 PROCEDURE — 99213 OFFICE O/P EST LOW 20 MIN: CPT | Performed by: OBSTETRICS & GYNECOLOGY

## 2017-09-20 RX ORDER — RANITIDINE 150 MG/1
150 TABLET ORAL 2 TIMES DAILY
COMMUNITY
End: 2017-10-18

## 2017-09-20 RX ORDER — PRENATAL VIT/IRON FUM/FOLIC AC 27MG-0.8MG
1 TABLET ORAL DAILY
COMMUNITY
End: 2017-10-18

## 2017-09-20 NOTE — PROGRESS NOTES
"Subjective   Lavonne Rocha is a 22 y.o. female who presents for confirmation of pregnancy -  +hpt, lmp 7/7/17 eben 04/13/2018. Per ultrasound today eben 05/12/2018 6w4d. Pt is up to date on pap 01/09/2017 Negative  History of Present Illness    The following portions of the patient's history were reviewed and updated as appropriate: allergies, current medications, past family history, past medical history, past social history, past surgical history and problem list.    Review of Systems   Constitutional: Negative for chills, fatigue and fever.   Gastrointestinal: Negative for abdominal distention and abdominal pain.   Genitourinary: Negative for dyspareunia, dysuria, menstrual problem, pelvic pain, vaginal bleeding, vaginal discharge and vaginal pain.   All other systems reviewed and are negative.  /92  Ht 64\" (162.6 cm)  Wt 236 lb 9.6 oz (107 kg)  LMP 07/07/2017 (Approximate)  Breastfeeding? No  BMI 40.61 kg/m2      Objective   Physical Exam   Constitutional: She is oriented to person, place, and time. She appears well-developed and well-nourished.   HENT:   Head: Normocephalic and atraumatic.   Pulmonary/Chest: Effort normal.   Abdominal: Soft. She exhibits no distension. There is no tenderness.   Genitourinary: Vagina normal and uterus normal. Pelvic exam was performed with patient supine. Uterus is not enlarged and not tender. Cervix exhibits no discharge and no friability. Right adnexum displays no mass and no tenderness. Left adnexum displays no mass and no tenderness. No vaginal discharge found.   Neurological: She is alert and oriented to person, place, and time.   Skin: Skin is warm and dry.   Psychiatric: She has a normal mood and affect. Her behavior is normal.   Nursing note and vitals reviewed.        Assessment/Plan   Lavonne was seen today for follow-up.    Diagnoses and all orders for this visit:    Encounter for confirmation of pregnancy test result with physical examination  -     ABO / Rh  -  "    Antibody Screen  -     CBC & Differential  -     Hgb. Frac. Profile  -     Hepatitis B Surface Antigen  -     Hepatitis C Antibody  -     HIV-1 / O / 2 Ag / Antibody 4th Generation  -     RPR  -     Rubella Antibody, IgG  -     Urine Culture  -     Varicella Zoster Antibody, IgG  -     NuSwab VG+    Screening  -     POC Urinalysis Dipstick

## 2017-09-23 LAB
ABO GROUP BLD: NORMAL
BACTERIA UR CULT: ABNORMAL
BACTERIA UR CULT: ABNORMAL
BASOPHILS # BLD AUTO: 0.02 10*3/MM3 (ref 0–0.2)
BASOPHILS NFR BLD AUTO: 0.2 % (ref 0–1.5)
BLD GP AB SCN SERPL QL: NEGATIVE
EOSINOPHIL # BLD AUTO: 0.37 10*3/MM3 (ref 0–0.7)
EOSINOPHIL NFR BLD AUTO: 3.3 % (ref 0.3–6.2)
ERYTHROCYTE [DISTWIDTH] IN BLOOD BY AUTOMATED COUNT: 13 % (ref 11.7–13)
HBV SURFACE AG SERPL QL IA: NEGATIVE
HCT VFR BLD AUTO: 40.2 % (ref 35.6–45.5)
HCV AB S/CO SERPL IA: <0.1 S/CO RATIO (ref 0–0.9)
HGB A MFR BLD: 97.8 % (ref 94–98)
HGB A2 MFR BLD COLUMN CHROM: 2.2 % (ref 0.7–3.1)
HGB BLD-MCNC: 13.8 G/DL (ref 11.9–15.5)
HGB C MFR BLD: 0 %
HGB F MFR BLD: 0 % (ref 0–2)
HGB FRACT BLD-IMP: NORMAL
HGB S BLD QL SOLY: NEGATIVE
HGB S MFR BLD: 0 %
HIV 1+2 AB+HIV1 P24 AG SERPL QL IA: NON REACTIVE
IMM GRANULOCYTES # BLD: 0.03 10*3/MM3 (ref 0–0.03)
IMM GRANULOCYTES NFR BLD: 0.3 % (ref 0–0.5)
LYMPHOCYTES # BLD AUTO: 2.35 10*3/MM3 (ref 0.9–4.8)
LYMPHOCYTES NFR BLD AUTO: 20.8 % (ref 19.6–45.3)
MCH RBC QN AUTO: 29.8 PG (ref 26.9–32)
MCHC RBC AUTO-ENTMCNC: 34.3 G/DL (ref 32.4–36.3)
MCV RBC AUTO: 86.8 FL (ref 80.5–98.2)
MONOCYTES # BLD AUTO: 0.9 10*3/MM3 (ref 0.2–1.2)
MONOCYTES NFR BLD AUTO: 8 % (ref 5–12)
NEUTROPHILS # BLD AUTO: 7.65 10*3/MM3 (ref 1.9–8.1)
NEUTROPHILS NFR BLD AUTO: 67.4 % (ref 42.7–76)
OTHER ANTIBIOTIC SUSC ISLT: ABNORMAL
PLATELET # BLD AUTO: 176 10*3/MM3 (ref 140–500)
RBC # BLD AUTO: 4.63 10*6/MM3 (ref 3.9–5.2)
RH BLD: POSITIVE
RPR SER QL: NORMAL
RUBV IGG SERPL IA-ACNC: 3.28 INDEX
VZV IGG SER IA-ACNC: 840 INDEX
WBC # BLD AUTO: 11.32 10*3/MM3 (ref 4.5–10.7)

## 2017-09-25 ENCOUNTER — TELEPHONE (OUTPATIENT)
Dept: OBSTETRICS AND GYNECOLOGY | Facility: CLINIC | Age: 22
End: 2017-09-25

## 2017-09-25 PROBLEM — B96.1 KLEBSIELLA CYSTITIS: Status: ACTIVE | Noted: 2017-09-25

## 2017-09-25 PROBLEM — N30.90 KLEBSIELLA CYSTITIS: Status: ACTIVE | Noted: 2017-09-25

## 2017-09-25 LAB
A VAGINAE DNA VAG QL NAA+PROBE: NORMAL SCORE
BVAB2 DNA VAG QL NAA+PROBE: NORMAL SCORE
C ALBICANS DNA VAG QL NAA+PROBE: NEGATIVE
C GLABRATA DNA VAG QL NAA+PROBE: NEGATIVE
C TRACH RRNA SPEC QL NAA+PROBE: NEGATIVE
MEGA1 DNA VAG QL NAA+PROBE: NORMAL SCORE
N GONORRHOEA RRNA SPEC QL NAA+PROBE: NEGATIVE
T VAGINALIS RRNA SPEC QL NAA+PROBE: NEGATIVE

## 2017-09-25 RX ORDER — CEPHALEXIN 500 MG/1
500 CAPSULE ORAL 4 TIMES DAILY
Qty: 28 CAPSULE | Refills: 0 | Status: SHIPPED | OUTPATIENT
Start: 2017-09-25 | End: 2017-10-10

## 2017-09-25 NOTE — TELEPHONE ENCOUNTER
Pt Just saw urine culture results on my chart and is concerned ,also states she has vaginal itching now and has not had that before. Please advise

## 2017-09-25 NOTE — PROGRESS NOTES
Please call patient and notify of normal results of prenatal panel, except for bacteria in urine  -rx sent to pharmacy

## 2017-09-25 NOTE — TELEPHONE ENCOUNTER
----- Message from Ursula Mixon MD sent at 9/25/2017  1:15 PM EDT -----  Please call patient and notify of normal results of prenatal panel, except for bacteria in urine  -rx sent to pharmacy

## 2017-10-04 ENCOUNTER — HOSPITAL ENCOUNTER (EMERGENCY)
Facility: HOSPITAL | Age: 22
Discharge: HOME | End: 2017-10-04
Attending: EMERGENCY MEDICINE | Admitting: EMERGENCY MEDICINE

## 2017-10-04 VITALS
RESPIRATION RATE: 16 BRPM | SYSTOLIC BLOOD PRESSURE: 123 MMHG | HEART RATE: 73 BPM | DIASTOLIC BLOOD PRESSURE: 75 MMHG | TEMPERATURE: 99.1 F | WEIGHT: 230 LBS | BODY MASS INDEX: 39.27 KG/M2 | OXYGEN SATURATION: 100 % | HEIGHT: 64 IN

## 2017-10-04 DIAGNOSIS — R10.2 SUPRAPUBIC ABDOMINAL PAIN: Primary | ICD-10-CM

## 2017-10-04 DIAGNOSIS — Z34.91 FIRST TRIMESTER PREGNANCY: ICD-10-CM

## 2017-10-04 LAB
CLUE CELLS SPEC QL WET PREP: NORMAL
HYDATID CYST SPEC WET PREP: NORMAL
KOH PREP NAIL: NORMAL
T VAGINALIS SPEC QL WET PREP: NORMAL
WBC SPEC QL WET PREP: NORMAL
YEAST GENITAL QL WET PREP: NORMAL

## 2017-10-04 PROCEDURE — 87220 TISSUE EXAM FOR FUNGI: CPT | Performed by: EMERGENCY MEDICINE

## 2017-10-04 PROCEDURE — 99284 EMERGENCY DEPT VISIT MOD MDM: CPT

## 2017-10-04 PROCEDURE — 87491 CHLMYD TRACH DNA AMP PROBE: CPT | Performed by: EMERGENCY MEDICINE

## 2017-10-04 PROCEDURE — 87210 SMEAR WET MOUNT SALINE/INK: CPT | Performed by: EMERGENCY MEDICINE

## 2017-10-04 PROCEDURE — 87591 N.GONORRHOEAE DNA AMP PROB: CPT | Performed by: EMERGENCY MEDICINE

## 2017-10-04 NOTE — ED NOTES
Pt to ED for pelvic cramping and cloudy vaginal discharge onset last night, states 8weeks pregnant, advised by OBGYN Dr. Mixon to come to ED. Pt denies abd pain or urinary complaints. States she was recently dx with a UTI and last dose of Keflex is due today. Pt in NAD, skin p/w/d     Irina Nielson RN  10/04/17 0909

## 2017-10-04 NOTE — ED TRIAGE NOTES
Pt reports lower abd pain with vaginal discharge that started last night. 8 weeks pregnant and advised by OB to be seen in ER.

## 2017-10-04 NOTE — ED PROVIDER NOTES
EMERGENCY DEPARTMENT ENCOUNTER    CHIEF COMPLAINT  Chief Complaint: abd cramping  History given by: patient  History limited by: nothing  Room Number:   PMD: Nickolas Lr DO  OB/GYN- Dr. Mixon    HPI:  Pt is a 22 y.o. pregnant female who presents complaining of suprapubic abd pain cramping, which began last night. Pt states that she developed a sharp, severe cramping and vaginal discharge this morning. Pt denies urinary symptoms and vaginal bleeding or itching. Pt states that she called her OB/GYN and was told to come to the ED for further evaluation. Pt states that she is currently taking Keflex for an UTI and states that she will be 9 weeks pregnant on 10/7/17. Pt states that she has had a transvaginal US that showed an IUP. LNMP- 2017 and  Ab0    Duration:  One day  Onset: gradual  Timing: intermittent  Location: suprapubic abdomen  Radiation: none  Quality: original cramping but became sharp today  Intensity/Severity: moderate  Progression: worsening  Associated Symptoms: vaginal discharge  Aggravating Factors: none  Alleviating Factors: none  Previous Episodes: Pt denies similar symptoms previously.  Treatment before arrival: Pt states that she called her OB/GYN and was told to come to the ED for further evaluation.     PAST MEDICAL HISTORY  Active Ambulatory Problems     Diagnosis Date Noted   • Bulging lumbar disc 02/15/2016   • Chronic pain 02/15/2016   • Gastroesophageal reflux disease 02/15/2016   • Hyperesthesia 02/15/2016   • Joint stiffness 02/15/2016   • Fibromyalgia 02/15/2016   • Fatigue 02/15/2016   • Dizziness 02/15/2016   • Degeneration of intervertebral disc of lumbar region 02/15/2016   • Lumbar facet arthropathy 2016   • Lumbar radiculitis 2016   • Chronic bilateral low back pain with sciatica 10/26/2016   • PCOS (polycystic ovarian syndrome) 2017   • Dysmenorrhea 2017   • Klebsiella cystitis 2017     Resolved Ambulatory Problems     Diagnosis  Date Noted   • Acute otitis media 02/15/2016   • Depression 02/15/2016   • Tick bite 02/15/2016   • Vertigo 02/15/2016   • Diplopia 02/15/2016     Past Medical History:   Diagnosis Date   • Arthritis    • Chronic bilateral low back pain with sciatica    • GERD (gastroesophageal reflux disease)    • Klebsiella cystitis 9/25/2017   • Neuromuscular disorder    • Ovarian cyst    • Polycystic ovary syndrome        PAST SURGICAL HISTORY  Past Surgical History:   Procedure Laterality Date   • WISDOM TOOTH EXTRACTION         FAMILY HISTORY  Family History   Problem Relation Age of Onset   • Diabetes Maternal Grandfather    • Diabetes Paternal Grandmother    • Arthritis Paternal Grandfather    • Breast cancer Neg Hx    • Ovarian cancer Neg Hx    • Uterine cancer Neg Hx    • Colon cancer Neg Hx    • Deep vein thrombosis Neg Hx    • Pulmonary embolism Neg Hx        SOCIAL HISTORY  Social History     Social History   • Marital status: Single     Spouse name: N/A   • Number of children: N/A   • Years of education: N/A     Occupational History   • Not on file.     Social History Main Topics   • Smoking status: Former Smoker     Packs/day: 0.50     Years: 6.00     Types: Cigarettes     Start date: 2010     Quit date: 6/7/2016   • Smokeless tobacco: Never Used   • Alcohol use No      Comment: OCCASIONAL   • Drug use: No   • Sexual activity: Yes     Partners: Male     Birth control/ protection: None     Other Topics Concern   • Not on file     Social History Narrative    GYN patient 2015       ALLERGIES  Penicillins    REVIEW OF SYSTEMS  Review of Systems   Constitutional: Negative for chills and fever.   HENT: Negative for sore throat.    Respiratory: Negative for cough.    Gastrointestinal: Positive for abdominal pain (suprapubic). Negative for nausea and vomiting.   Genitourinary: Positive for vaginal discharge. Negative for dysuria and vaginal bleeding.   Musculoskeletal: Negative for back pain.   Psychiatric/Behavioral: The  patient is not nervous/anxious.        PHYSICAL EXAM  ED Triage Vitals   Temp Heart Rate Resp BP SpO2   10/04/17 0901 10/04/17 0901 10/04/17 0901 10/04/17 0926 10/04/17 0901   99.1 °F (37.3 °C) 91 17 139/70 100 %      Temp src Heart Rate Source Patient Position BP Location FiO2 (%)   10/04/17 0901 10/04/17 0901 10/04/17 0926 -- --   Tympanic Monitor Lying         Physical Exam   Constitutional: She is oriented to person, place, and time and well-developed, well-nourished, and in no distress.   Eyes: EOM are normal.   Neck: Normal range of motion.   Cardiovascular: Normal rate and regular rhythm.    Pulmonary/Chest: Effort normal and breath sounds normal. No respiratory distress.   Abdominal: There is no tenderness. There is no rebound, no guarding and no CVA tenderness.   obese   Genitourinary: Vaginal discharge (small amount of white discharge) found.   Genitourinary Comments: Female chaperone present for  exam.    Neurological: She is alert and oriented to person, place, and time. She has normal sensation and normal strength.   Skin: Skin is warm and dry.   Psychiatric: Affect normal.   Nursing note and vitals reviewed.      LAB RESULTS  Lab Results (last 24 hours)     Procedure Component Value Units Date/Time    JENNIFER Prep - Swab, Vagina [[id number]]  (Normal) Collected:  10/04/17 1042    Specimen:  Swab from Vagina Updated:  10/04/17 1054     KOH Prep No yeast or hyphal elements seen    Wet Prep, Genital - Swab, Vagina [[id number]]  (Normal) Collected:  10/04/17 1042    Specimen:  Swab from Vagina Updated:  10/04/17 1054     YEAST No yeast seen     HYPHAL ELEMENTS No Hyphal elements seen     WBC'S No WBC's seen     Clue Cells, Wet Prep No Clue cells seen     Trichomonas, Wet Prep No Trichomonas seen    Chlamydia trachomatis, Neisseria gonorrhoeae, PCR - Swab, Vagina [[id number]] Collected:  10/04/17 1043    Specimen:  Swab from Vagina Updated:  10/04/17 1046          I ordered the above labs and reviewed the  results    PROCEDURES  Procedures      PROGRESS AND CONSULTS  ED Course     1002- D/w pt and family that the pt's pain could be due to her uterus expanding as her pregnancy progresses but that I will perform a pelvic exam and bedside abd US for further evaluation. Pt and family agree with the plan and all questions were addressed.    1006- Ordered vaginal swabs for further evaluation.    1036- Rechecked pt. Pt is resting comfortably. Pt's NMF=962 on bedside US. Performed pelvic exam with female chaperone present.    1123- Rechecked pt. Pt is resting comfortably. Notified pt of her unremarkable lab results and that she will receive a call if her vaginal cultures are abnormal. D/w pt that her pain is most likely due to round ligament pain from her pregnancy. Discussed the plan to discharge the pt home with instructions to follow up with her OB/GYN. Pt agrees with the plan and all questions were addressed.    MEDICAL DECISION MAKING  Results were reviewed/discussed with the patient and they were also made aware of online access. Pt also made aware that some labs, such as cultures, will not be resulted during ER visit and follow up with PMD is necessary.     MDM  Number of Diagnoses or Management Options     Amount and/or Complexity of Data Reviewed  Clinical lab tests: ordered and reviewed (Pt's KOH prep and wet prep are unremarkable)  Decide to obtain previous medical records or to obtain history from someone other than the patient: yes  Review and summarize past medical records: yes (Pt's urine culture from 9-20-17 grew out Klebsiella pneumoniae)    Patient Progress  Patient progress: stable         DIAGNOSIS  Final diagnoses:   Suprapubic abdominal pain   First trimester pregnancy       DISPOSITION  DISCHARGE    Patient discharged in stable condition.    Reviewed implications of results, diagnosis, meds, responsibility to follow up, warning signs and symptoms of possible worsening, potential complications and  reasons to return to ER, including fever, worsening pain or any concerns.    Patient/Family voiced understanding of above instructions.    Discussed plan for discharge, as there is no emergent indication for admission.  Pt/family is agreeable and understands need for follow up and repeat testing.  Pt is aware that discharge does not mean that nothing is wrong but it indicates no emergency is present that requires admission and they must continue care with follow-up as given below or physician of their choice.     FOLLOW-UP  Ursula Mixon MD  [address] [place], [place] [id number] [phone]    Schedule an appointment as soon as possible for a visit           Medication List      Notice     No changes were made to your prescriptions during this visit.            Latest Documented Vital Signs:  As of 11:41 AM  BP- 139/70 HR- 91 Temp- 99.1 °F (37.3 °C) (Tympanic) O2 sat- 100%    --  Documentation assistance provided by slim Patton for Dr. Chang.  Information recorded by the scribe was done at my direction and has been verified and validated by me.     Padmaja Patton  10/04/17 1141       Shahzad Chang MD  10/05/17 4423

## 2017-10-05 ENCOUNTER — TELEPHONE (OUTPATIENT)
Dept: SOCIAL WORK | Facility: HOSPITAL | Age: 22
End: 2017-10-05

## 2017-10-05 LAB
C TRACH RRNA SPEC DONR QL NAA+PROBE: NEGATIVE
N GONORRHOEA DNA SPEC QL NAA+PROBE: NEGATIVE

## 2017-10-05 NOTE — TELEPHONE ENCOUNTER
ED f/u phone call: states she is feeling better, and has f/u maikol't w/ OB/GYN next week. No questions/concerns

## 2017-10-10 ENCOUNTER — PROCEDURE VISIT (OUTPATIENT)
Dept: OBSTETRICS AND GYNECOLOGY | Facility: CLINIC | Age: 22
End: 2017-10-10

## 2017-10-10 ENCOUNTER — INITIAL PRENATAL (OUTPATIENT)
Dept: OBSTETRICS AND GYNECOLOGY | Facility: CLINIC | Age: 22
End: 2017-10-10

## 2017-10-10 VITALS — BODY MASS INDEX: 40.34 KG/M2 | WEIGHT: 235 LBS | DIASTOLIC BLOOD PRESSURE: 84 MMHG | SYSTOLIC BLOOD PRESSURE: 126 MMHG

## 2017-10-10 DIAGNOSIS — K21.9 GASTROESOPHAGEAL REFLUX DISEASE WITHOUT ESOPHAGITIS: ICD-10-CM

## 2017-10-10 DIAGNOSIS — O02.1 MISSED AB: ICD-10-CM

## 2017-10-10 DIAGNOSIS — N30.90 KLEBSIELLA CYSTITIS: ICD-10-CM

## 2017-10-10 DIAGNOSIS — M51.36 DEGENERATION OF INTERVERTEBRAL DISC OF LUMBAR REGION: ICD-10-CM

## 2017-10-10 DIAGNOSIS — B96.1 KLEBSIELLA CYSTITIS: ICD-10-CM

## 2017-10-10 DIAGNOSIS — O36.80X0 ENCOUNTER TO DETERMINE FETAL VIABILITY OF PREGNANCY, SINGLE OR UNSPECIFIED FETUS: Primary | ICD-10-CM

## 2017-10-10 DIAGNOSIS — Z13.89 SCREENING FOR BLOOD OR PROTEIN IN URINE: ICD-10-CM

## 2017-10-10 DIAGNOSIS — M54.40 CHRONIC BILATERAL LOW BACK PAIN WITH SCIATICA, SCIATICA LATERALITY UNSPECIFIED: ICD-10-CM

## 2017-10-10 DIAGNOSIS — Z34.01 ENCOUNTER FOR PRENATAL CARE IN FIRST TRIMESTER OF FIRST PREGNANCY: Primary | ICD-10-CM

## 2017-10-10 DIAGNOSIS — E28.2 PCOS (POLYCYSTIC OVARIAN SYNDROME): ICD-10-CM

## 2017-10-10 DIAGNOSIS — G89.29 CHRONIC BILATERAL LOW BACK PAIN WITH SCIATICA, SCIATICA LATERALITY UNSPECIFIED: ICD-10-CM

## 2017-10-10 PROBLEM — N94.6 DYSMENORRHEA: Status: RESOLVED | Noted: 2017-01-09 | Resolved: 2017-10-10

## 2017-10-10 LAB
BILIRUB BLD-MCNC: NEGATIVE MG/DL
CLARITY, POC: CLEAR
COLOR UR: YELLOW
GLUCOSE UR STRIP-MCNC: NEGATIVE MG/DL
KETONES UR QL: NEGATIVE
LEUKOCYTE EST, POC: NEGATIVE
NITRITE UR-MCNC: NEGATIVE MG/ML
PH UR: 7 [PH] (ref 5–8)
PROT UR STRIP-MCNC: ABNORMAL MG/DL
RBC # UR STRIP: NEGATIVE /UL
SP GR UR: 1.02 (ref 1–1.03)
UROBILINOGEN UR QL: NORMAL

## 2017-10-10 PROCEDURE — 76817 TRANSVAGINAL US OBSTETRIC: CPT | Performed by: OBSTETRICS & GYNECOLOGY

## 2017-10-10 PROCEDURE — 81002 URINALYSIS NONAUTO W/O SCOPE: CPT | Performed by: OBSTETRICS & GYNECOLOGY

## 2017-10-10 PROCEDURE — 99213 OFFICE O/P EST LOW 20 MIN: CPT | Performed by: OBSTETRICS & GYNECOLOGY

## 2017-10-10 RX ORDER — SODIUM CHLORIDE 0.9 % (FLUSH) 0.9 %
1-10 SYRINGE (ML) INJECTION AS NEEDED
Status: CANCELLED | OUTPATIENT
Start: 2017-10-12

## 2017-10-10 NOTE — PROGRESS NOTES
Cc:  No c/o.  Will get flu vaccine at work. Went to [facility] last week for cramping, U/S and pelvic exam normal.rlr  OB intake done - prenatal labs reviewed   Klebsiella UTI - s/p tx - resend urine culture   Missed AB on US today   All options given including medical mgmt, observation or D&C   Patient opts for D&C - R/B/A discussed including risks of perforation, infection, bleeding   Strict bleeding/fever warnings given to patient

## 2017-10-11 ENCOUNTER — OFFICE VISIT (OUTPATIENT)
Dept: OBSTETRICS AND GYNECOLOGY | Facility: CLINIC | Age: 22
End: 2017-10-11

## 2017-10-11 ENCOUNTER — PROCEDURE VISIT (OUTPATIENT)
Dept: OBSTETRICS AND GYNECOLOGY | Facility: CLINIC | Age: 22
End: 2017-10-11

## 2017-10-11 VITALS
WEIGHT: 235 LBS | SYSTOLIC BLOOD PRESSURE: 134 MMHG | BODY MASS INDEX: 40.12 KG/M2 | HEIGHT: 64 IN | DIASTOLIC BLOOD PRESSURE: 88 MMHG

## 2017-10-11 DIAGNOSIS — O02.1 MISSED ABORTION: Primary | ICD-10-CM

## 2017-10-11 DIAGNOSIS — O02.1 MISSED AB: ICD-10-CM

## 2017-10-11 DIAGNOSIS — Z30.011 ENCOUNTER FOR INITIAL PRESCRIPTION OF CONTRACEPTIVE PILLS: ICD-10-CM

## 2017-10-11 PROCEDURE — 99213 OFFICE O/P EST LOW 20 MIN: CPT | Performed by: OBSTETRICS & GYNECOLOGY

## 2017-10-11 PROCEDURE — 76817 TRANSVAGINAL US OBSTETRIC: CPT | Performed by: OBSTETRICS & GYNECOLOGY

## 2017-10-11 NOTE — H&P
[facility]  History & Physical    10/11/2017    Patient: Lavonne Rocha          MR#:[id number]    Chief complaint:  Missed ab    Subjective     Patient is a 22 y.o. female  at 10w1d presents D & C after being diagnosed with a missed Ab with  second confirmation US.  The patient reports no bleeding or cramping.     Prenatal care complicated by items listed in the problem list below    Patient Active Problem List   Diagnosis   • Bulging lumbar disc   • Chronic pain   • Hyperesthesia   • Joint stiffness   • Fibromyalgia   • Degeneration of intervertebral disc of lumbar region   • Lumbar facet arthropathy   • Lumbar radiculitis   • Chronic bilateral low back pain with sciatica   • PCOS (polycystic ovarian syndrome)   • Klebsiella cystitis   • Missed    • Missed ab       Past Medical History:   Diagnosis Date   • Arthritis    • Chronic bilateral low back pain with sciatica    • GERD (gastroesophageal reflux disease)    • Klebsiella cystitis 2017   • Ovarian cyst    • Polycystic ovary syndrome        Past Surgical History:   Procedure Laterality Date   • WISDOM TOOTH EXTRACTION         Obstetric History:  OB History      Para Term  AB Living    1 0 0 0 0 0    SAB TAB Ectopic Multiple Live Births    0 0 0 0         Obstetric Comments    9.20.17 - Viable IUP at 6-4 weeks - EVA 18 - [facility]   10.10.17 - Missed AB at 9-3 weeks - [facility]          Menstrual History:     Patient's last menstrual period was 2017 (lmp unknown).       #: 1, Date: None, Sex: None, Weight: None, GA: None, Delivery: None, Apgar1: None, Apgar5: None, Living: None, Birth Comments: None      Family History   Problem Relation Age of Onset   • Diabetes Maternal Grandfather    • Diabetes Paternal Grandmother    • Arthritis Paternal Grandfather    • Breast cancer Neg Hx    • Ovarian cancer Neg Hx    • Uterine cancer Neg Hx    • Colon cancer Neg Hx    • Deep vein thrombosis Neg Hx    • Pulmonary embolism Neg  Hx    • Malig Hyperthermia Neg Hx        Social History   Substance Use Topics   • Smoking status: Former Smoker     Packs/day: 0.50     Years: 6.00     Types: Cigarettes     Start date: 2010     Quit date: 6/7/2016   • Smokeless tobacco: Never Used   • Alcohol use No       Penicillins    No current facility-administered medications for this encounter.     Current Outpatient Prescriptions:   •  Acetaminophen (TYLENOL 8 HOUR PO), Take 1 tablet by mouth As Needed., Disp: , Rfl:   •  DiphenhydrAMINE HCl (BENADRYL ALLERGY PO), Take 25 mg by mouth As Needed., Disp: , Rfl:   •  Prenatal Vit-Fe Fumarate-FA (PRENATAL VITAMIN 27-0.8) 27-0.8 MG tablet tablet, Take 1 tablet by mouth Daily., Disp: , Rfl:   •  raNITIdine (ZANTAC) 150 MG tablet, Take 150 mg by mouth 2 (Two) Times a Day., Disp: , Rfl:     Review of Systems  Review of Systems   Constitutional: Negative.    Respiratory: Negative.    Cardiovascular: Negative.    Gastrointestinal: Negative.    Genitourinary: Negative.    Psychiatric/Behavioral: Negative.        Objective     Vital Signs  BP: (134)/(88) 134/88    Physical Exam:  Physical Exam   Constitutional: She is oriented to person, place, and time. She appears well-developed and well-nourished.   Cardiovascular: Normal rate and regular rhythm.    Pulmonary/Chest: Effort normal and breath sounds normal.   Abdominal: Soft. Bowel sounds are normal. She exhibits no distension and no mass. There is no tenderness.   Genitourinary: Vagina normal and uterus normal.   Neurological: She is alert and oriented to person, place, and time.   Psychiatric: She has a normal mood and affect. Her behavior is normal. Judgment and thought content normal.   Nursing note and vitals reviewed.      Labs:  pending    Assessment/Plan     1. Intrauterine Pregnancy at 10w1d  2.  Missed AB  3.  Rh positive         Principal Problem:    Missed ab      Reviewed the surgical procedure with patient.  I discussed the risks including but not limited  to bleeding, infection and damage to internal organs.  Understanding of the procedure is voiced.     Yomi Telles MD  10/11/17  7:40 PM      Patient Care Team:  Nickolas Lr DO as PCP - General  Nickolas Lr DO as PCP - Family Medicine  Jimmie Thomas MD as Consulting Physician (Orthopedic Surgery)  Jason Brady MD PhD as Consulting Physician (Neurosurgery)

## 2017-10-11 NOTE — PROGRESS NOTES
[facility]  Ultrasound Note     10/11/2017    Patient:  Lavonne Rocha      MR#:[id number]    22 y.o.      Patient Active Problem List   Diagnosis   • Bulging lumbar disc   • Chronic pain   • Hyperesthesia   • Joint stiffness   • Fibromyalgia   • Degeneration of intervertebral disc of lumbar region   • Lumbar facet arthropathy   • Lumbar radiculitis   • Chronic bilateral low back pain with sciatica   • PCOS (polycystic ovarian syndrome)   • Klebsiella cystitis   • Missed    • Missed ab       [See the scanned report in the media tab for more details]    Impression    1.  Intrauterine pregnancy at 10w1d  2.  Nonviable fetus-missed AB    Relevant comparison data available:  [x]            Yomi Telles MD  10/11/2017 3:27 PM

## 2017-10-11 NOTE — PROGRESS NOTES
[facility]     10/12/2017      Patient:  Lavonne Rocha   MR#:[id number]    Office note    CC: Missed AB follow-up    Subjective     History of Present Illness  22 y.o. female  at 9w4d presents for confirmation of missed AB diagnosed on previous ultrasound.  The patient is scheduled tomorrow for dilatation and curettage and has concerns about proceeding.  Ultrasound is repeated today and shows an intrauterine gestation measuring 10 weeks and 1 day with no cardiac motion.    Discussed with the couple at length that miscarriage is quite common in pregnancy as high as 20% and her particular presentation is most common.  Advised that there is no urgency in proceeding with the dilatation and curettage but the symptoms when occur can be quite prominent.  These entities are placed low on the triage list for emergent evaluation as well.        Patient Active Problem List   Diagnosis   • Bulging lumbar disc   • Chronic pain   • Hyperesthesia   • Joint stiffness   • Fibromyalgia   • Degeneration of intervertebral disc of lumbar region   • Lumbar facet arthropathy   • Lumbar radiculitis   • Chronic bilateral low back pain with sciatica   • PCOS (polycystic ovarian syndrome)   • Klebsiella cystitis   • Missed    • Missed ab       Past Medical History:   Diagnosis Date   • Arthritis    • Chronic bilateral low back pain with sciatica    • GERD (gastroesophageal reflux disease)    • Klebsiella cystitis 2017   • Polycystic ovary syndrome        Past Surgical History:   Procedure Laterality Date   • WISDOM TOOTH EXTRACTION         Obstetric History:  OB History      Para Term  AB Living    1 0 0 0 0 0    SAB TAB Ectopic Multiple Live Births    0 0 0 0         Obstetric Comments    9.20.17 - Viable IUP at 6-4 weeks - EVA 18 - [facility]   10.10.17 - Missed AB at 9-3 weeks - [facility]          Menstrual History:     Patient's last menstrual period was 2017 (lmp unknown).       #: 1, Date:  None, Sex: None, Weight: None, GA: None, Delivery: None, Apgar1: None, Apgar5: None, Living: None, Birth Comments: None      Family History   Problem Relation Age of Onset   • Diabetes Maternal Grandfather    • Diabetes Paternal Grandmother    • Arthritis Paternal Grandfather    • Breast cancer Neg Hx    • Ovarian cancer Neg Hx    • Uterine cancer Neg Hx    • Colon cancer Neg Hx    • Deep vein thrombosis Neg Hx    • Pulmonary embolism Neg Hx    • Malig Hyperthermia Neg Hx        Social History   Substance Use Topics   • Smoking status: Former Smoker     Packs/day: 0.50     Years: 6.00     Types: Cigarettes     Start date: 2010     Quit date: 6/7/2016   • Smokeless tobacco: Never Used   • Alcohol use No       Penicillins    No current facility-administered medications for this visit.     Current Outpatient Prescriptions:   •  norethindrone-ethinyl estradiol FE (MICROGESTIN FE 1/20) 1-20 MG-MCG per tablet, Take 1 tablet by mouth Daily., Disp: 28 tablet, Rfl: 12    Facility-Administered Medications Ordered in Other Visits:   •  lactated ringers infusion, 9 mL/hr, Intravenous, Continuous, Karina Jama MD, Last Rate: 9 mL/hr at 10/12/17 0658, 9 mL/hr at 10/12/17 0658  •  midazolam (VERSED) injection 1 mg, 1 mg, Intravenous, Q5 Min PRN **OR** midazolam (VERSED) injection 2 mg, 2 mg, Intravenous, Q5 Min PRN, Karina Jama MD, 2 mg at 10/12/17 0658  •  sodium chloride 0.9 % flush 1-10 mL, 1-10 mL, Intravenous, PRN, Ursula Mixon MD  •  sodium chloride 0.9 % flush 1-10 mL, 1-10 mL, Intravenous, PRN, Karina Jama MD    The following portions of the patient's history were reviewed and updated as appropriate: allergies, current medications, past family history, past medical history, past social history, past surgical history and problem list.    Review of Systems    BP Readings from Last 3 Encounters:   10/12/17 142/90   10/11/17 134/88   10/10/17 126/84      Wt Readings from Last 3 Encounters:   10/12/17 234 lb 1  "oz (106 kg)   10/11/17 235 lb (107 kg)   10/10/17 235 lb (107 kg)      BMI: Estimated body mass index is 40.34 kg/(m^2) as calculated from the following:    Height as of this encounter: 64\" (162.6 cm).    Weight as of this encounter: 235 lb (107 kg).  BSA: Estimated body surface area is 2.1 meters squared as calculated from the following:    Height as of this encounter: 64\" (162.6 cm).    Weight as of this encounter: 235 lb (107 kg).    Objective   Physical Exam   Constitutional: She is oriented to person, place, and time. She appears well-developed and well-nourished.   Cardiovascular: Normal rate and regular rhythm.    Pulmonary/Chest: Effort normal and breath sounds normal.   Abdominal: Soft. Bowel sounds are normal. She exhibits no distension and no mass. There is no tenderness.   Genitourinary: Vagina normal and uterus normal.   Genitourinary Comments: No bleeding    Neurological: She is alert and oriented to person, place, and time.   Psychiatric: She has a normal mood and affect. Her behavior is normal. Judgment and thought content normal.   Nursing note and vitals reviewed.        Assessment/Plan     Lavonne was seen today for follow-up.    Diagnoses and all orders for this visit:    Missed   - D & C scheduled for tomorrow  - O Positive     Encounter for initial prescription of contraceptive pills    Other orders  -     norethindrone-ethinyl estradiol FE (MICROGESTIN FE ) 1-20 MG-MCG per tablet; Take 1 tablet by mouth Daily.        Return in about 1 week (around 10/18/2017), or postop check .        Yomi Telles MD   10/12/2017 7:16 AM  "

## 2017-10-12 ENCOUNTER — ANESTHESIA (OUTPATIENT)
Dept: PERIOP | Facility: HOSPITAL | Age: 22
End: 2017-10-12

## 2017-10-12 ENCOUNTER — ANESTHESIA EVENT (OUTPATIENT)
Dept: PERIOP | Facility: HOSPITAL | Age: 22
End: 2017-10-12

## 2017-10-12 ENCOUNTER — HOSPITAL ENCOUNTER (OUTPATIENT)
Facility: HOSPITAL | Age: 22
Discharge: HOME | End: 2017-10-12
Attending: OBSTETRICS & GYNECOLOGY | Admitting: OBSTETRICS & GYNECOLOGY

## 2017-10-12 VITALS
RESPIRATION RATE: 16 BRPM | DIASTOLIC BLOOD PRESSURE: 83 MMHG | TEMPERATURE: 98 F | BODY MASS INDEX: 39.96 KG/M2 | HEART RATE: 79 BPM | HEIGHT: 64 IN | OXYGEN SATURATION: 97 % | SYSTOLIC BLOOD PRESSURE: 146 MMHG | WEIGHT: 234.06 LBS

## 2017-10-12 DIAGNOSIS — O02.1 MISSED AB: ICD-10-CM

## 2017-10-12 LAB
ABO GROUP BLD: NORMAL
BACTERIA UR CULT: NO GROWTH
BACTERIA UR CULT: NORMAL
BLD GP AB SCN SERPL QL: NEGATIVE
DEPRECATED RDW RBC AUTO: 41.6 FL (ref 37–54)
ERYTHROCYTE [DISTWIDTH] IN BLOOD BY AUTOMATED COUNT: 13 % (ref 11.7–13)
HCT VFR BLD AUTO: 39.6 % (ref 35.6–45.5)
HGB BLD-MCNC: 13.2 G/DL (ref 11.9–15.5)
MCH RBC QN AUTO: 29.5 PG (ref 26.9–32)
MCHC RBC AUTO-ENTMCNC: 33.3 G/DL (ref 32.4–36.3)
MCV RBC AUTO: 88.6 FL (ref 80.5–98.2)
PLATELET # BLD AUTO: 181 10*3/MM3 (ref 140–500)
PMV BLD AUTO: 13.1 FL (ref 6–12)
RBC # BLD AUTO: 4.47 10*6/MM3 (ref 3.9–5.2)
RH BLD: POSITIVE
WBC NRBC COR # BLD: 9.8 10*3/MM3 (ref 4.5–10.7)

## 2017-10-12 PROCEDURE — 25010000002 KETOROLAC TROMETHAMINE PER 15 MG: Performed by: NURSE ANESTHETIST, CERTIFIED REGISTERED

## 2017-10-12 PROCEDURE — 25010000002 FENTANYL CITRATE (PF) 100 MCG/2ML SOLUTION: Performed by: NURSE ANESTHETIST, CERTIFIED REGISTERED

## 2017-10-12 PROCEDURE — 25010000002 ONDANSETRON PER 1 MG: Performed by: NURSE ANESTHETIST, CERTIFIED REGISTERED

## 2017-10-12 PROCEDURE — 85027 COMPLETE CBC AUTOMATED: CPT | Performed by: OBSTETRICS & GYNECOLOGY

## 2017-10-12 PROCEDURE — 25010000002 PROPOFOL 10 MG/ML EMULSION: Performed by: NURSE ANESTHETIST, CERTIFIED REGISTERED

## 2017-10-12 PROCEDURE — 86901 BLOOD TYPING SEROLOGIC RH(D): CPT | Performed by: OBSTETRICS & GYNECOLOGY

## 2017-10-12 PROCEDURE — 88305 TISSUE EXAM BY PATHOLOGIST: CPT | Performed by: OBSTETRICS & GYNECOLOGY

## 2017-10-12 PROCEDURE — 25010000002 MIDAZOLAM PER 1 MG: Performed by: ANESTHESIOLOGY

## 2017-10-12 PROCEDURE — 86900 BLOOD TYPING SEROLOGIC ABO: CPT | Performed by: OBSTETRICS & GYNECOLOGY

## 2017-10-12 PROCEDURE — 25010000002 MIDAZOLAM PER 1 MG: Performed by: NURSE ANESTHETIST, CERTIFIED REGISTERED

## 2017-10-12 PROCEDURE — 25010000002 DEXAMETHASONE PER 1 MG: Performed by: NURSE ANESTHETIST, CERTIFIED REGISTERED

## 2017-10-12 PROCEDURE — 59820 CARE OF MISCARRIAGE: CPT | Performed by: OBSTETRICS & GYNECOLOGY

## 2017-10-12 PROCEDURE — 86850 RBC ANTIBODY SCREEN: CPT | Performed by: OBSTETRICS & GYNECOLOGY

## 2017-10-12 RX ORDER — PROPOFOL 10 MG/ML
VIAL (ML) INTRAVENOUS AS NEEDED
Status: DISCONTINUED | OUTPATIENT
Start: 2017-10-12 | End: 2017-10-12 | Stop reason: SURG

## 2017-10-12 RX ORDER — HYDROMORPHONE HYDROCHLORIDE 1 MG/ML
0.5 INJECTION, SOLUTION INTRAMUSCULAR; INTRAVENOUS; SUBCUTANEOUS
Status: DISCONTINUED | OUTPATIENT
Start: 2017-10-12 | End: 2017-10-12 | Stop reason: HOSPADM

## 2017-10-12 RX ORDER — DIPHENHYDRAMINE HYDROCHLORIDE 50 MG/ML
12.5 INJECTION INTRAMUSCULAR; INTRAVENOUS
Status: DISCONTINUED | OUTPATIENT
Start: 2017-10-12 | End: 2017-10-12 | Stop reason: HOSPADM

## 2017-10-12 RX ORDER — FENTANYL CITRATE 50 UG/ML
INJECTION, SOLUTION INTRAMUSCULAR; INTRAVENOUS AS NEEDED
Status: DISCONTINUED | OUTPATIENT
Start: 2017-10-12 | End: 2017-10-12 | Stop reason: SURG

## 2017-10-12 RX ORDER — EPHEDRINE SULFATE 50 MG/ML
5 INJECTION, SOLUTION INTRAVENOUS ONCE AS NEEDED
Status: DISCONTINUED | OUTPATIENT
Start: 2017-10-12 | End: 2017-10-12 | Stop reason: HOSPADM

## 2017-10-12 RX ORDER — LIDOCAINE HYDROCHLORIDE 20 MG/ML
INJECTION, SOLUTION INFILTRATION; PERINEURAL AS NEEDED
Status: DISCONTINUED | OUTPATIENT
Start: 2017-10-12 | End: 2017-10-12 | Stop reason: SURG

## 2017-10-12 RX ORDER — PROMETHAZINE HYDROCHLORIDE 25 MG/ML
12.5 INJECTION, SOLUTION INTRAMUSCULAR; INTRAVENOUS ONCE AS NEEDED
Status: DISCONTINUED | OUTPATIENT
Start: 2017-10-12 | End: 2017-10-12 | Stop reason: HOSPADM

## 2017-10-12 RX ORDER — NORETHINDRONE ACETATE AND ETHINYL ESTRADIOL 1MG-20(21)
1 KIT ORAL DAILY
Qty: 28 TABLET | Refills: 12 | Status: SHIPPED | OUTPATIENT
Start: 2017-10-12 | End: 2018-01-29

## 2017-10-12 RX ORDER — SODIUM CHLORIDE 0.9 % (FLUSH) 0.9 %
1-10 SYRINGE (ML) INJECTION AS NEEDED
Status: DISCONTINUED | OUTPATIENT
Start: 2017-10-12 | End: 2017-10-12 | Stop reason: HOSPADM

## 2017-10-12 RX ORDER — DEXAMETHASONE SODIUM PHOSPHATE 10 MG/ML
INJECTION INTRAMUSCULAR; INTRAVENOUS AS NEEDED
Status: DISCONTINUED | OUTPATIENT
Start: 2017-10-12 | End: 2017-10-12 | Stop reason: SURG

## 2017-10-12 RX ORDER — HYDROCODONE BITARTRATE AND ACETAMINOPHEN 7.5; 325 MG/1; MG/1
1 TABLET ORAL ONCE AS NEEDED
Status: DISCONTINUED | OUTPATIENT
Start: 2017-10-12 | End: 2017-10-12 | Stop reason: HOSPADM

## 2017-10-12 RX ORDER — SODIUM CHLORIDE, SODIUM LACTATE, POTASSIUM CHLORIDE, CALCIUM CHLORIDE 600; 310; 30; 20 MG/100ML; MG/100ML; MG/100ML; MG/100ML
9 INJECTION, SOLUTION INTRAVENOUS CONTINUOUS
Status: DISCONTINUED | OUTPATIENT
Start: 2017-10-12 | End: 2017-10-12 | Stop reason: HOSPADM

## 2017-10-12 RX ORDER — FLUMAZENIL 0.1 MG/ML
0.2 INJECTION INTRAVENOUS AS NEEDED
Status: DISCONTINUED | OUTPATIENT
Start: 2017-10-12 | End: 2017-10-12 | Stop reason: HOSPADM

## 2017-10-12 RX ORDER — ONDANSETRON 2 MG/ML
4 INJECTION INTRAMUSCULAR; INTRAVENOUS ONCE AS NEEDED
Status: DISCONTINUED | OUTPATIENT
Start: 2017-10-12 | End: 2017-10-12 | Stop reason: HOSPADM

## 2017-10-12 RX ORDER — PROMETHAZINE HYDROCHLORIDE 25 MG/1
25 TABLET ORAL ONCE AS NEEDED
Status: DISCONTINUED | OUTPATIENT
Start: 2017-10-12 | End: 2017-10-12 | Stop reason: HOSPADM

## 2017-10-12 RX ORDER — HYDRALAZINE HYDROCHLORIDE 20 MG/ML
5 INJECTION INTRAMUSCULAR; INTRAVENOUS
Status: DISCONTINUED | OUTPATIENT
Start: 2017-10-12 | End: 2017-10-12 | Stop reason: HOSPADM

## 2017-10-12 RX ORDER — OXYCODONE AND ACETAMINOPHEN 7.5; 325 MG/1; MG/1
1 TABLET ORAL ONCE AS NEEDED
Status: COMPLETED | OUTPATIENT
Start: 2017-10-12 | End: 2017-10-12

## 2017-10-12 RX ORDER — FENTANYL CITRATE 50 UG/ML
50 INJECTION, SOLUTION INTRAMUSCULAR; INTRAVENOUS
Status: DISCONTINUED | OUTPATIENT
Start: 2017-10-12 | End: 2017-10-12 | Stop reason: HOSPADM

## 2017-10-12 RX ORDER — OXYCODONE HYDROCHLORIDE AND ACETAMINOPHEN 5; 325 MG/1; MG/1
1-2 TABLET ORAL EVERY 4 HOURS PRN
Qty: 20 TABLET | Refills: 0 | Status: SHIPPED | OUTPATIENT
Start: 2017-10-12 | End: 2017-10-18

## 2017-10-12 RX ORDER — ONDANSETRON 2 MG/ML
INJECTION INTRAMUSCULAR; INTRAVENOUS AS NEEDED
Status: DISCONTINUED | OUTPATIENT
Start: 2017-10-12 | End: 2017-10-12 | Stop reason: SURG

## 2017-10-12 RX ORDER — LABETALOL HYDROCHLORIDE 5 MG/ML
5 INJECTION, SOLUTION INTRAVENOUS
Status: DISCONTINUED | OUTPATIENT
Start: 2017-10-12 | End: 2017-10-12 | Stop reason: HOSPADM

## 2017-10-12 RX ORDER — FAMOTIDINE 10 MG/ML
20 INJECTION, SOLUTION INTRAVENOUS ONCE
Status: COMPLETED | OUTPATIENT
Start: 2017-10-12 | End: 2017-10-12

## 2017-10-12 RX ORDER — IBUPROFEN 800 MG/1
800 TABLET ORAL EVERY 8 HOURS PRN
Qty: 30 TABLET | Refills: 3 | Status: SHIPPED | OUTPATIENT
Start: 2017-10-12 | End: 2017-11-21

## 2017-10-12 RX ORDER — MIDAZOLAM HYDROCHLORIDE 1 MG/ML
2 INJECTION INTRAMUSCULAR; INTRAVENOUS
Status: DISCONTINUED | OUTPATIENT
Start: 2017-10-12 | End: 2017-10-12 | Stop reason: HOSPADM

## 2017-10-12 RX ORDER — PROMETHAZINE HYDROCHLORIDE 25 MG/1
25 SUPPOSITORY RECTAL ONCE AS NEEDED
Status: DISCONTINUED | OUTPATIENT
Start: 2017-10-12 | End: 2017-10-12 | Stop reason: HOSPADM

## 2017-10-12 RX ORDER — MIDAZOLAM HYDROCHLORIDE 1 MG/ML
1 INJECTION INTRAMUSCULAR; INTRAVENOUS
Status: DISCONTINUED | OUTPATIENT
Start: 2017-10-12 | End: 2017-10-12 | Stop reason: HOSPADM

## 2017-10-12 RX ORDER — NALOXONE HCL 0.4 MG/ML
0.2 VIAL (ML) INJECTION AS NEEDED
Status: DISCONTINUED | OUTPATIENT
Start: 2017-10-12 | End: 2017-10-12 | Stop reason: HOSPADM

## 2017-10-12 RX ORDER — MIDAZOLAM HYDROCHLORIDE 1 MG/ML
INJECTION INTRAMUSCULAR; INTRAVENOUS AS NEEDED
Status: DISCONTINUED | OUTPATIENT
Start: 2017-10-12 | End: 2017-10-12 | Stop reason: SURG

## 2017-10-12 RX ORDER — PROMETHAZINE HYDROCHLORIDE 25 MG/1
12.5 TABLET ORAL ONCE AS NEEDED
Status: DISCONTINUED | OUTPATIENT
Start: 2017-10-12 | End: 2017-10-12 | Stop reason: HOSPADM

## 2017-10-12 RX ORDER — KETOROLAC TROMETHAMINE 30 MG/ML
INJECTION, SOLUTION INTRAMUSCULAR; INTRAVENOUS AS NEEDED
Status: DISCONTINUED | OUTPATIENT
Start: 2017-10-12 | End: 2017-10-12 | Stop reason: SURG

## 2017-10-12 RX ADMIN — KETOROLAC TROMETHAMINE 30 MG: 30 INJECTION, SOLUTION INTRAMUSCULAR; INTRAVENOUS at 07:43

## 2017-10-12 RX ADMIN — MIDAZOLAM 2 MG: 1 INJECTION INTRAMUSCULAR; INTRAVENOUS at 06:58

## 2017-10-12 RX ADMIN — MIDAZOLAM HYDROCHLORIDE 2 MG: 1 INJECTION, SOLUTION INTRAMUSCULAR; INTRAVENOUS at 07:18

## 2017-10-12 RX ADMIN — PROPOFOL 200 MG: 10 INJECTION, EMULSION INTRAVENOUS at 07:24

## 2017-10-12 RX ADMIN — FENTANYL CITRATE 50 MCG: 50 INJECTION INTRAMUSCULAR; INTRAVENOUS at 07:18

## 2017-10-12 RX ADMIN — DEXAMETHASONE SODIUM PHOSPHATE 8 MG: 10 INJECTION INTRAMUSCULAR; INTRAVENOUS at 07:27

## 2017-10-12 RX ADMIN — SODIUM CHLORIDE, POTASSIUM CHLORIDE, SODIUM LACTATE AND CALCIUM CHLORIDE 9 ML/HR: 600; 310; 30; 20 INJECTION, SOLUTION INTRAVENOUS at 06:58

## 2017-10-12 RX ADMIN — FAMOTIDINE 20 MG: 10 INJECTION INTRAVENOUS at 06:58

## 2017-10-12 RX ADMIN — FENTANYL CITRATE 50 MCG: 50 INJECTION INTRAMUSCULAR; INTRAVENOUS at 08:30

## 2017-10-12 RX ADMIN — FENTANYL CITRATE 50 MCG: 50 INJECTION INTRAMUSCULAR; INTRAVENOUS at 07:37

## 2017-10-12 RX ADMIN — ONDANSETRON 4 MG: 2 INJECTION INTRAMUSCULAR; INTRAVENOUS at 07:27

## 2017-10-12 RX ADMIN — LIDOCAINE HYDROCHLORIDE 60 MG: 20 INJECTION, SOLUTION INFILTRATION; PERINEURAL at 07:24

## 2017-10-12 RX ADMIN — FENTANYL CITRATE 50 MCG: 50 INJECTION INTRAMUSCULAR; INTRAVENOUS at 07:27

## 2017-10-12 RX ADMIN — OXYCODONE HYDROCHLORIDE AND ACETAMINOPHEN 1 TABLET: 7.5; 325 TABLET ORAL at 09:04

## 2017-10-12 NOTE — PLAN OF CARE
Problem: Patient Care Overview (Adult)  Goal: Plan of Care Review  Outcome: Ongoing (interventions implemented as appropriate)    10/12/17 0603   Coping/Psychosocial Response Interventions   Plan Of Care Reviewed With patient   Patient Care Overview   Progress no change       Goal: Adult Individualization and Mutuality  Outcome: Ongoing (interventions implemented as appropriate)    10/12/17 0603   Individualization   Patient Specific Preferences Lavonne       Goal: Discharge Needs Assessment  Outcome: Ongoing (interventions implemented as appropriate)    Problem: Perioperative Period (Adult)  Goal: Signs and Symptoms of Listed Potential Problems Will be Absent or Manageable (Perioperative Period)  Outcome: Ongoing (interventions implemented as appropriate)    10/12/17 0603   Perioperative Period   Problems Assessed (Perioperative Period) all   Problems Present (Perioperative Period) none

## 2017-10-12 NOTE — ANESTHESIA POSTPROCEDURE EVALUATION
"Patient: Lavonne Rocha    Procedure Summary     Date Anesthesia Start Anesthesia Stop Room / Location    10/12/17 0716 0800  [facility] / [facility]       Procedure Diagnosis Surgeon Provider    DILATATION AND CURETTAGE WITH SUCTION (N/A Vagina) Missed ab  (Missed ab [O02.1]) MD Karina Murillo MD          Anesthesia Type: general  Last vitals  BP   144/71 (10/12/17 0854)    Temp   36.7 °C (98 °F) (10/12/17 0844)    Pulse   86 (10/12/17 0854)   Resp   16 (10/12/17 0854)    SpO2   98 % (10/12/17 0854)      Post Anesthesia Care and Evaluation    Patient location during evaluation: PACU  Patient participation: complete - patient participated  Level of consciousness: awake and alert  Pain management: adequate  Airway patency: patent  Anesthetic complications: No anesthetic complications    Cardiovascular status: acceptable  Respiratory status: acceptable  Hydration status: acceptable    Comments: /71 (BP Location: Right arm, Patient Position: Lying)  Pulse 86  Temp 36.7 °C (98 °F) (Oral)   Resp 16  Ht 64\" (162.6 cm)  Wt 234 lb 1 oz (106 kg)  LMP 07/07/2017 (LMP Unknown)  SpO2 98%  BMI 40.18 kg/m2      "

## 2017-10-12 NOTE — ANESTHESIA PREPROCEDURE EVALUATION
Anesthesia Evaluation     no history of anesthetic complications:         Airway   Mallampati: I  TM distance: >3 FB  Neck ROM: full  Dental - normal exam     Pulmonary    (+) a smoker Former Abstained day of surgery,   (-) asthma, shortness of breath, recent URI  Cardiovascular     (-) hypertension, dysrhythmias, angina, hyperlipidemia      Neuro/Psych  GI/Hepatic/Renal/Endo    (+) obesity,  GERD,     Musculoskeletal     Abdominal    Substance History      OB/GYN      Comment: poycystic ovary      Other   (+) arthritis       Other Comment: fibromyalgia                                  Anesthesia Plan    ASA 2     general     intravenous induction   Anesthetic plan and risks discussed with patient.

## 2017-10-12 NOTE — OP NOTE
DILATATION AND CURETTAGE WITH SUCTION  Progress Note    Lavonne Rocha  10/12/2017    Pre-op Diagnosis:   Missed ab [O02.1]       Post-Op Diagnosis Codes:     * Missed ab [O02.1]    Procedure/CPT® Codes:      Procedure(s):  DILATATION AND CURETTAGE WITH SUCTION    Surgeon(s):  Yomi Telles MD    Anesthesia: General    Staff:   Circulator: Helen Frankel RN  Scrub Person: Gibson Woods    Estimated Blood Loss: 200ml    Urine Voided: * No values recorded between 10/12/2017  7:16 AM and 10/12/2017  7:45 AM *    Specimens:                  ID Type Source Tests Collected by Time Destination   A :  Tissue Products of Conception TISSUE EXAM Yomi Telles MD 10/12/2017 0655          Drains:           Findings: Consistent with products of conception    Complications: None noted.    Description of the procedure:    Patient's taken to the operating room and placed in supine position.  General anesthesia was administered.  The patient's legs are placed in candycane stirrups and she is prepped and draped in the usual sterile fashion.    Weighted speculum was placed in the vagina the cervix is grasped anteriorly with a single-tooth tenaculum.  Sequential dilators were used to dilate the cervix to a maximum of 16 Romanian.  An 8 mm suction cannula was introduced into the uterus to evacuate products of conception.  Approximately 3 methodic passes were made.  One pass was made with the sharp curette systematically covering the entirety of the uterine cavity.  One final pass was made with the suction curet.  The patient was awakened and taken the recovery room in stable condition    The patient's blood type is O+ an art RhoGAM was not indicated.        Summary: Uncomplicated suction D&C        Yomi Telles MD     Date: 10/12/2017  Time: 7:48 AM

## 2017-10-12 NOTE — DISCHARGE INSTRUCTIONS
You were given Percocet (pain pills) today at 9:04 am.  You may have the next dose at 1:05 pm today.    You may have the first dose of Ibuprofen at 1:45 pm today.

## 2017-10-12 NOTE — ANESTHESIA PROCEDURE NOTES
Airway  Urgency: elective    Date/Time: 10/12/2017 7:25 AM  Airway not difficult    General Information and Staff    Patient location during procedure: OR  Anesthesiologist: MANI NEGRO  CRNA: KAROL SEGURA    Indications and Patient Condition  Indications for airway management: airway protection    Preoxygenated: yes  Mask difficulty assessment: 0 - not attempted    Final Airway Details  Final airway type: supraglottic airway      Successful airway: classic  Size 5    Number of attempts at approach: 1    Additional Comments  Atraumatic. No dental damage noted.

## 2017-10-12 NOTE — PLAN OF CARE
Problem: Patient Care Overview (Adult)  Goal: Plan of Care Review  Outcome: Outcome(s) achieved Date Met:  10/12/17    10/12/17 0932   Coping/Psychosocial Response Interventions   Plan Of Care Reviewed With patient;family;significant other   Patient Care Overview   Progress improving   Outcome Evaluation   Outcome Summary/Follow up Plan ready for discharge       Goal: Adult Individualization and Mutuality  Outcome: Outcome(s) achieved Date Met:  10/12/17  Goal: Discharge Needs Assessment  Outcome: Outcome(s) achieved Date Met:  10/12/17    Problem: Perioperative Period (Adult)  Goal: Signs and Symptoms of Listed Potential Problems Will be Absent or Manageable (Perioperative Period)  Outcome: Outcome(s) achieved Date Met:  10/12/17

## 2017-10-13 ENCOUNTER — TELEPHONE (OUTPATIENT)
Dept: OBSTETRICS AND GYNECOLOGY | Facility: CLINIC | Age: 22
End: 2017-10-13

## 2017-10-13 LAB
CYTO UR: NORMAL
LAB AP CASE REPORT: NORMAL
Lab: NORMAL
PATH REPORT.FINAL DX SPEC: NORMAL
PATH REPORT.GROSS SPEC: NORMAL

## 2017-10-13 NOTE — TELEPHONE ENCOUNTER
----- Message from Ursula Mixon MD sent at 10/13/2017 10:23 AM EDT -----  Please call patient and notify of normal results of urine culture

## 2017-10-16 ENCOUNTER — TELEPHONE (OUTPATIENT)
Dept: OBSTETRICS AND GYNECOLOGY | Facility: CLINIC | Age: 22
End: 2017-10-16

## 2017-10-16 ENCOUNTER — APPOINTMENT (OUTPATIENT)
Dept: ULTRASOUND IMAGING | Facility: HOSPITAL | Age: 22
End: 2017-10-16

## 2017-10-16 ENCOUNTER — HOSPITAL ENCOUNTER (EMERGENCY)
Facility: HOSPITAL | Age: 22
Discharge: HOME | End: 2017-10-17
Attending: EMERGENCY MEDICINE | Admitting: EMERGENCY MEDICINE

## 2017-10-16 DIAGNOSIS — N93.9 ABNORMAL VAGINAL BLEEDING: Primary | ICD-10-CM

## 2017-10-16 LAB
ALBUMIN SERPL-MCNC: 4.3 G/DL (ref 3.5–5.2)
ALBUMIN/GLOB SERPL: 1.5 G/DL
ALP SERPL-CCNC: 81 U/L (ref 39–117)
ALT SERPL W P-5'-P-CCNC: 18 U/L (ref 1–33)
ANION GAP SERPL CALCULATED.3IONS-SCNC: 11 MMOL/L
ANION GAP SERPL CALCULATED.3IONS-SCNC: 9.9 MMOL/L
AST SERPL-CCNC: 12 U/L (ref 1–32)
BACTERIA UR QL AUTO: ABNORMAL /HPF
BASOPHILS # BLD AUTO: 0.03 10*3/MM3 (ref 0–0.2)
BASOPHILS NFR BLD AUTO: 0.3 % (ref 0–1.5)
BILIRUB SERPL-MCNC: <0.2 MG/DL (ref 0.1–1.2)
BILIRUB UR QL STRIP: NEGATIVE
BUN BLD-MCNC: 9 MG/DL (ref 6–20)
BUN BLD-MCNC: 9 MG/DL (ref 6–20)
BUN/CREAT SERPL: 13.6 (ref 7–25)
BUN/CREAT SERPL: 13.8 (ref 7–25)
CALCIUM SPEC-SCNC: 9.3 MG/DL (ref 8.6–10.5)
CALCIUM SPEC-SCNC: 9.4 MG/DL (ref 8.6–10.5)
CHLORIDE SERPL-SCNC: 103 MMOL/L (ref 98–107)
CHLORIDE SERPL-SCNC: 105 MMOL/L (ref 98–107)
CLARITY UR: CLEAR
CO2 SERPL-SCNC: 27 MMOL/L (ref 22–29)
CO2 SERPL-SCNC: 27.1 MMOL/L (ref 22–29)
COLOR UR: ABNORMAL
CREAT BLD-MCNC: 0.65 MG/DL (ref 0.57–1)
CREAT BLD-MCNC: 0.66 MG/DL (ref 0.57–1)
DEPRECATED RDW RBC AUTO: 42.9 FL (ref 37–54)
EOSINOPHIL # BLD AUTO: 0.38 10*3/MM3 (ref 0–0.7)
EOSINOPHIL NFR BLD AUTO: 3.7 % (ref 0.3–6.2)
ERYTHROCYTE [DISTWIDTH] IN BLOOD BY AUTOMATED COUNT: 13.2 % (ref 11.7–13)
GFR SERPL CREATININE-BSD FRML MDRD: 110 ML/MIN/1.73
GFR SERPL CREATININE-BSD FRML MDRD: 112 ML/MIN/1.73
GLOBULIN UR ELPH-MCNC: 2.9 GM/DL
GLUCOSE BLD-MCNC: 100 MG/DL (ref 65–99)
GLUCOSE BLD-MCNC: 98 MG/DL (ref 65–99)
GLUCOSE UR STRIP-MCNC: NEGATIVE MG/DL
HCT VFR BLD AUTO: 38.6 % (ref 35.6–45.5)
HGB BLD-MCNC: 12.7 G/DL (ref 11.9–15.5)
HGB UR QL STRIP.AUTO: ABNORMAL
HOLD SPECIMEN: NORMAL
HYALINE CASTS UR QL AUTO: ABNORMAL /LPF
IMM GRANULOCYTES # BLD: 0.03 10*3/MM3 (ref 0–0.03)
IMM GRANULOCYTES NFR BLD: 0.3 % (ref 0–0.5)
KETONES UR QL STRIP: NEGATIVE
LEUKOCYTE ESTERASE UR QL STRIP.AUTO: ABNORMAL
LIPASE SERPL-CCNC: 21 U/L (ref 13–60)
LYMPHOCYTES # BLD AUTO: 2.82 10*3/MM3 (ref 0.9–4.8)
LYMPHOCYTES NFR BLD AUTO: 27.5 % (ref 19.6–45.3)
MCH RBC QN AUTO: 29.4 PG (ref 26.9–32)
MCHC RBC AUTO-ENTMCNC: 32.9 G/DL (ref 32.4–36.3)
MCV RBC AUTO: 89.4 FL (ref 80.5–98.2)
MONOCYTES # BLD AUTO: 0.65 10*3/MM3 (ref 0.2–1.2)
MONOCYTES NFR BLD AUTO: 6.3 % (ref 5–12)
NEUTROPHILS # BLD AUTO: 6.36 10*3/MM3 (ref 1.9–8.1)
NEUTROPHILS NFR BLD AUTO: 61.9 % (ref 42.7–76)
NITRITE UR QL STRIP: NEGATIVE
PH UR STRIP.AUTO: 6 [PH] (ref 5–8)
PLATELET # BLD AUTO: 191 10*3/MM3 (ref 140–500)
PMV BLD AUTO: 12.7 FL (ref 6–12)
POTASSIUM BLD-SCNC: 3.6 MMOL/L (ref 3.5–5.2)
POTASSIUM BLD-SCNC: 3.6 MMOL/L (ref 3.5–5.2)
PROT SERPL-MCNC: 7.2 G/DL (ref 6–8.5)
PROT UR QL STRIP: NEGATIVE
RBC # BLD AUTO: 4.32 10*6/MM3 (ref 3.9–5.2)
RBC # UR: ABNORMAL /HPF
REF LAB TEST METHOD: ABNORMAL
SODIUM BLD-SCNC: 140 MMOL/L (ref 136–145)
SODIUM BLD-SCNC: 143 MMOL/L (ref 136–145)
SP GR UR STRIP: 1.02 (ref 1–1.03)
SQUAMOUS #/AREA URNS HPF: ABNORMAL /HPF
UROBILINOGEN UR QL STRIP: ABNORMAL
WBC NRBC COR # BLD: 10.27 10*3/MM3 (ref 4.5–10.7)
WBC UR QL AUTO: ABNORMAL /HPF
WHOLE BLOOD HOLD SPECIMEN: NORMAL
WHOLE BLOOD HOLD SPECIMEN: NORMAL

## 2017-10-16 PROCEDURE — 83690 ASSAY OF LIPASE: CPT | Performed by: EMERGENCY MEDICINE

## 2017-10-16 PROCEDURE — 76856 US EXAM PELVIC COMPLETE: CPT

## 2017-10-16 PROCEDURE — 80048 BASIC METABOLIC PNL TOTAL CA: CPT | Performed by: EMERGENCY MEDICINE

## 2017-10-16 PROCEDURE — 80053 COMPREHEN METABOLIC PANEL: CPT | Performed by: EMERGENCY MEDICINE

## 2017-10-16 PROCEDURE — 99283 EMERGENCY DEPT VISIT LOW MDM: CPT

## 2017-10-16 PROCEDURE — 85025 COMPLETE CBC W/AUTO DIFF WBC: CPT | Performed by: EMERGENCY MEDICINE

## 2017-10-16 PROCEDURE — 81001 URINALYSIS AUTO W/SCOPE: CPT | Performed by: EMERGENCY MEDICINE

## 2017-10-16 PROCEDURE — 76830 TRANSVAGINAL US NON-OB: CPT

## 2017-10-16 PROCEDURE — 36415 COLL VENOUS BLD VENIPUNCTURE: CPT | Performed by: EMERGENCY MEDICINE

## 2017-10-16 RX ORDER — SODIUM CHLORIDE 0.9 % (FLUSH) 0.9 %
10 SYRINGE (ML) INJECTION AS NEEDED
Status: DISCONTINUED | OUTPATIENT
Start: 2017-10-16 | End: 2017-10-17 | Stop reason: HOSPADM

## 2017-10-16 NOTE — ED NOTES
"Pt reports passing blood clots and \"spasms\" of abdominal pain since the procedure on Thursday, but since today at 1400, pt reports the spasms have gotten worse of lower abdomen with lower back pain, and \"larger blood clots\" than before which are intermittent. She denies active vaginal bleeding at this time.     Kerry Rivera RN  10/16/17 1941    "

## 2017-10-16 NOTE — TELEPHONE ENCOUNTER
Pt called, she had D&C last Thursday. States that her RTW statement says in 2 weeks. Pt was asking if she could go back sooner. I advised pt that I didn't think it would be an issue for her to return earlier, but that I would send a note to  for advisement. She was asking several questions as to why that long, I advised due to the circumstances that the doctor may have felt that she would need more time for the emotional effect. Pt states that she appriciates that but financially she wishes could do 2 weeks but can't.   Pt states that she has been bleeding since the procedure. Passing a few clots, nothing bigger than a quarter. States bleeding pretty heavy, denies saturating more than a pad an hour. Wants to make sure this is all normal and ask how long bleeding will continue.  Pt asking if bleeding slows she would like to RTW later this week or return on Monday.   Please advise

## 2017-10-17 VITALS
RESPIRATION RATE: 18 BRPM | OXYGEN SATURATION: 100 % | WEIGHT: 234 LBS | TEMPERATURE: 98.4 F | HEART RATE: 81 BPM | DIASTOLIC BLOOD PRESSURE: 83 MMHG | BODY MASS INDEX: 39.95 KG/M2 | HEIGHT: 64 IN | SYSTOLIC BLOOD PRESSURE: 131 MMHG

## 2017-10-17 NOTE — ED NOTES
Pt had an D&C Thursday, today bleeding is heavier on and off and now passing blood clots      Marleen Ruvalcaba RN  10/16/17 3540

## 2017-10-17 NOTE — TELEPHONE ENCOUNTER
Can you advise on how long the bleeding will last. When she should expect it to slow or stop.   Thanks

## 2017-10-17 NOTE — ED PROVIDER NOTES
EMERGENCY DEPARTMENT ENCOUNTER    CHIEF COMPLAINT  Chief Complaint: hematuria   History given by: pt   History limited by: none  Room Number:   PMD: Nickolas Lr DO      HPI:  Pt is a 22 y.o. female who presents complaining of vaginal bleeding s/p D&C with Dr. Telles 4 days ago with mild clots. Pt reports that her clots have increased since 1400 today as well as increasing mild intermittent abd cramping, and dysuria this AM. Pt denies fever, HA, sore throat, CP, SOA, or changed back pain.     Duration/Onset/Timing: for 4 days, constant   Location: vaginal   Radiation: none   Quality: bleeding   Intensity/Severity: moderate  Associated Symptoms: abd cramping, dysuria  Aggravating or Alleviating Factors: none  Previous Episodes: none      PAST MEDICAL HISTORY  Active Ambulatory Problems     Diagnosis Date Noted   • Bulging lumbar disc 02/15/2016   • Chronic pain 02/15/2016   • Hyperesthesia 02/15/2016   • Joint stiffness 02/15/2016   • Fibromyalgia 02/15/2016   • Degeneration of intervertebral disc of lumbar region 02/15/2016   • Lumbar facet arthropathy 2016   • Lumbar radiculitis 2016   • Chronic bilateral low back pain with sciatica 10/26/2016   • PCOS (polycystic ovarian syndrome) 2017   • Klebsiella cystitis 2017   • Missed  10/10/2017   • Missed ab 10/10/2017     Resolved Ambulatory Problems     Diagnosis Date Noted   • Acute otitis media 02/15/2016   • Depression 02/15/2016   • Gastroesophageal reflux disease 02/15/2016   • Tick bite 02/15/2016   • Vertigo 02/15/2016   • Fatigue 02/15/2016   • Dizziness 02/15/2016   • Diplopia 02/15/2016   • Dysmenorrhea 2017     Past Medical History:   Diagnosis Date   • Arthritis    • Chronic bilateral low back pain with sciatica    • GERD (gastroesophageal reflux disease)    • Klebsiella cystitis 2017   • Polycystic ovary syndrome        PAST SURGICAL HISTORY  Past Surgical History:   Procedure Laterality Date   • D&C  WITH SUCTION N/A 10/12/2017    Procedure: DILATATION AND CURETTAGE WITH SUCTION;  Surgeon: Yomi Telles MD;  Location: [facility] MAIN OR;  Service:    • WISDOM TOOTH EXTRACTION         FAMILY HISTORY  Family History   Problem Relation Age of Onset   • Diabetes Maternal Grandfather    • Diabetes Paternal Grandmother    • Arthritis Paternal Grandfather    • Breast cancer Neg Hx    • Ovarian cancer Neg Hx    • Uterine cancer Neg Hx    • Colon cancer Neg Hx    • Deep vein thrombosis Neg Hx    • Pulmonary embolism Neg Hx    • Malig Hyperthermia Neg Hx        SOCIAL HISTORY  Social History     Social History   • Marital status: Single     Spouse name: N/A   • Number of children: 0   • Years of education: N/A     Occupational History   • Not on file.     Social History Main Topics   • Smoking status: Former Smoker     Packs/day: 0.50     Years: 6.00     Types: Cigarettes     Start date: 2010     Quit date: 6/7/2016   • Smokeless tobacco: Never Used   • Alcohol use No   • Drug use: No   • Sexual activity: Defer     Other Topics Concern   • Not on file     Social History Narrative    GYN patient 2015       ALLERGIES  Penicillins    REVIEW OF SYSTEMS  Review of Systems   Constitutional: Negative.  Negative for chills and fever.   HENT: Negative.  Negative for sore throat.    Eyes: Negative.    Respiratory: Negative.  Negative for cough.    Cardiovascular: Negative.  Negative for chest pain.   Gastrointestinal: Positive for abdominal pain (cramping).   Genitourinary: Positive for dysuria (this AM) and vaginal bleeding (with clots).   Musculoskeletal: Negative.  Negative for back pain.   Skin: Negative.  Negative for rash.   Neurological: Negative.  Negative for headaches.       PHYSICAL EXAM  ED Triage Vitals   Temp Heart Rate Resp BP SpO2   10/16/17 1922 10/16/17 1922 10/16/17 1922 10/16/17 1937 10/16/17 1922   98.4 °F (36.9 °C) 109 18 183/100 100 %      Temp src Heart Rate Source Patient Position BP Location FiO2 (%)    10/16/17 1922 10/16/17 1922 10/16/17 1937 10/16/17 1937 --   Tympanic Monitor Sitting Left arm        Physical Exam   Constitutional: No distress.   HENT:   Head: Normocephalic and atraumatic.   Mouth/Throat: Oropharynx is clear and moist.   Eyes:   Unremarkable   Cardiovascular: Normal rate and regular rhythm.    Murmur heard.   Systolic (mild) murmur is present   Pulmonary/Chest: Breath sounds normal. No respiratory distress.   Abdominal: There is no tenderness.   Musculoskeletal: She exhibits no edema or tenderness.   Neurological: She is alert.   Skin: No rash noted.   Nursing note and vitals reviewed.      LAB RESULTS  Lab Results (last 24 hours)     Procedure Component Value Units Date/Time    CBC & Differential [[id number]] Collected:  10/16/17 2005    Specimen:  Blood Updated:  10/16/17 2035    Narrative:       The following orders were created for panel order CBC & Differential.  Procedure                               Abnormality         Status                     ---------                               -----------         ------                     CBC Auto Differential[[id number]]        Abnormal            Final result                 Please view results for these tests on the individual orders.    Basic Metabolic Panel [[id number]] Collected:  10/16/17 2005    Specimen:  Blood Updated:  10/16/17 2122     Glucose 98 mg/dL      BUN 9 mg/dL      Creatinine 0.66 mg/dL      Sodium 140 mmol/L      Potassium 3.6 mmol/L      Chloride 103 mmol/L      CO2 27.1 mmol/L      Calcium 9.3 mg/dL      eGFR Non African Amer 110 mL/min/1.73      BUN/Creatinine Ratio 13.6     Anion Gap 9.9 mmol/L     Narrative:       GFR Normal >60  Chronic Kidney Disease <60  Kidney Failure <15    CBC Auto Differential [[id number]]  (Abnormal) Collected:  10/16/17 2005    Specimen:  Blood Updated:  10/16/17 2035     WBC 10.27 10*3/mm3      RBC 4.32 10*6/mm3      Hemoglobin 12.7 g/dL      Hematocrit 38.6 %      MCV 89.4 fL      MCH 29.4 pg       MCHC 32.9 g/dL      RDW 13.2 (H) %      RDW-SD 42.9 fl      MPV 12.7 (H) fL      Platelets 191 10*3/mm3      Neutrophil % 61.9 %      Lymphocyte % 27.5 %      Monocyte % 6.3 %      Eosinophil % 3.7 %      Basophil % 0.3 %      Immature Grans % 0.3 %      Neutrophils, Absolute 6.36 10*3/mm3      Lymphocytes, Absolute 2.82 10*3/mm3      Monocytes, Absolute 0.65 10*3/mm3      Eosinophils, Absolute 0.38 10*3/mm3      Basophils, Absolute 0.03 10*3/mm3      Immature Grans, Absolute 0.03 10*3/mm3     Comprehensive Metabolic Panel [[id number]]  (Abnormal) Collected:  10/16/17 2005    Specimen:  Blood Updated:  10/16/17 2101     Glucose 100 (H) mg/dL      BUN 9 mg/dL      Creatinine 0.65 mg/dL      Sodium 143 mmol/L      Potassium 3.6 mmol/L      Chloride 105 mmol/L      CO2 27.0 mmol/L      Calcium 9.4 mg/dL      Total Protein 7.2 g/dL      Albumin 4.30 g/dL      ALT (SGPT) 18 U/L      AST (SGOT) 12 U/L      Alkaline Phosphatase 81 U/L      Total Bilirubin <0.2 mg/dL      eGFR Non African Amer 112 mL/min/1.73      Globulin 2.9 gm/dL      A/G Ratio 1.5 g/dL      BUN/Creatinine Ratio 13.8     Anion Gap 11.0 mmol/L     Lipase [[id number]]  (Normal) Collected:  10/16/17 2005    Specimen:  Blood Updated:  10/16/17 2055     Lipase 21 U/L     Urinalysis With / Culture If Indicated - Urine, Clean Catch [[id number]]  (Abnormal) Collected:  10/16/17 2015    Specimen:  Urine from Urine, Clean Catch Updated:  10/16/17 2049     Color, UA Dark Yellow (A)     Appearance, UA Clear     pH, UA 6.0     Specific Gravity, UA 1.022     Glucose, UA Negative     Ketones, UA Negative     Bilirubin, UA Negative     Blood, UA Large (3+) (A)     Protein, UA Negative     Leuk Esterase, UA Small (1+) (A)     Nitrite, UA Negative     Urobilinogen, UA 0.2 E.U./dL    Urinalysis, Microscopic Only - Urine, Clean Catch [[id number]]  (Abnormal) Collected:  10/16/17 2015    Specimen:  Urine from Urine, Clean Catch Updated:  10/16/17 2102     RBC, UA Too  Numerous to Count (A) /HPF      WBC, UA 3-5 (A) /HPF      Bacteria, UA None Seen /HPF      Squamous Epithelial Cells, UA 0-2 /HPF      Hyaline Casts, UA 0-2 /LPF      Methodology Automated Microscopy          I ordered the above labs and reviewed the results    RADIOLOGY  US Pelvis Complete   Preliminary Result   Centimeters stripe is abnormally thickened to 1.8 cm. Differential   diagnoses includes hyperplasia, polyp or neoplasm. Clinical correlation   is recommended, gynecological consult may be obtained.           US Non-ob Transvaginal    (Results Pending)        I ordered the above noted radiological studies. Interpreted by radiologist. Reviewed by me in PACS.       PROCEDURES  Procedures      PROGRESS AND CONSULTS  ED Course   1942  Ordered labs for further evaluation.   2048  Ordered urinalysis for further evaluation.   2153  Ordered US pelvis for further evaluation.   2259  Ordered US transvaginal for further evaluation.   2343  Ordered consult to OB/GYN.   2349  Rechecked pt, who is resting comfortably. Discussed the pt's negative US results. Plan to consult with GYN. Pt understands and agrees with the plan, and all questions were answered.   1216  Received a call from Dr. Medina and discussed pt's case. Dr. Medina agreed with plan to d/c the pt and said to have her f/u with Dr. Telles for continuing symptoms.      MEDICAL DECISION MAKING  Results were reviewed/discussed with the patient and they were also made aware of online access. Pt also made aware that some labs, such as cultures, will not be resulted during ER visit and follow up with PMD is necessary.     MDM  Number of Diagnoses or Management Options  Abnormal vaginal bleeding:      Amount and/or Complexity of Data Reviewed  Clinical lab tests: reviewed and ordered (Blood U 3+, Bacteria U: negative, BUN 6, Creatine 0.65)  Tests in the radiology section of CPT®: reviewed and ordered (US pelvis: centimeters stripe thickened to 1.8cm)  Discuss the  patient with other providers: yes (Dr. Medina)    Patient Progress  Patient progress: stable         DIAGNOSIS  Final diagnoses:   Abnormal vaginal bleeding       DISPOSITION  DISCHARGE    Patient discharged in stable condition.    Reviewed implications of results, diagnosis, meds, responsibility to follow up, warning signs and symptoms of possible worsening, potential complications and reasons to return to ER.    Patient/Family voiced understanding of above instructions.    Discussed plan for discharge, as there is no emergent indication for admission.  Pt/family is agreeable and understands need for follow up and repeat testing.  Pt is aware that discharge does not mean that nothing is wrong but it indicates no emergency is present that requires admission and they must continue care with follow-up as given below or physician of their choice.     FOLLOW-UP  Yomi Telles MD  [address]  [place] [id number] [phone]    In 3 days  If Not Better         Medication List      Notice     No changes were made to your prescriptions during this visit.            Latest Documented Vital Signs:  As of 12:20 AM  BP- 159/92 HR- 109 Temp- 98.4 °F (36.9 °C) (Tympanic) O2 sat- 100%    --  Documentation assistance provided by [facility] for Dr. Valverde.  Information recorded by the [employer] was done at my direction and has been verified and validated by me.     Joseluis West  10/17/17 0020       Suman Valverde MD  10/17/17 0021

## 2017-10-17 NOTE — TELEPHONE ENCOUNTER
Pt called states she was seen in ER last night and was advised that needed to be seen this week with Dr. Telles per on call dr. Horan was scheduled in [place] for tomorrow 10/18. Pt was advised that she could have note to return to work when she wanted to go back. Pt states she will discuss with Dr. Telles in office and get a note before leaving.

## 2017-10-17 NOTE — ED NOTES
Pt stated that her legs have been shaking on and off, like a problem with nerves      Marleen Ruvalcaba, KIMMY  10/16/17 0771

## 2017-10-18 ENCOUNTER — TELEPHONE (OUTPATIENT)
Dept: SOCIAL WORK | Facility: HOSPITAL | Age: 22
End: 2017-10-18

## 2017-10-18 ENCOUNTER — OFFICE VISIT (OUTPATIENT)
Dept: OBSTETRICS AND GYNECOLOGY | Facility: CLINIC | Age: 22
End: 2017-10-18

## 2017-10-18 DIAGNOSIS — Z13.89 SCREENING FOR BLOOD OR PROTEIN IN URINE: ICD-10-CM

## 2017-10-18 DIAGNOSIS — Z09 POSTOP CHECK: Primary | ICD-10-CM

## 2017-10-18 PROBLEM — O02.1 MISSED AB: Status: RESOLVED | Noted: 2017-10-10 | Resolved: 2017-10-18

## 2017-10-18 PROBLEM — B96.1 KLEBSIELLA CYSTITIS: Status: RESOLVED | Noted: 2017-09-25 | Resolved: 2017-10-18

## 2017-10-18 PROBLEM — O02.1 MISSED ABORTION: Status: RESOLVED | Noted: 2017-10-10 | Resolved: 2017-10-18

## 2017-10-18 PROBLEM — R03.0 BORDERLINE HYPERTENSION: Status: ACTIVE | Noted: 2017-10-18

## 2017-10-18 PROBLEM — N30.90 KLEBSIELLA CYSTITIS: Status: RESOLVED | Noted: 2017-09-25 | Resolved: 2017-10-18

## 2017-10-18 LAB
BILIRUB BLD-MCNC: NEGATIVE MG/DL
CLARITY, POC: CLEAR
COLOR UR: YELLOW
GLUCOSE UR STRIP-MCNC: NEGATIVE MG/DL
KETONES UR QL: NEGATIVE
LEUKOCYTE EST, POC: NEGATIVE
NITRITE UR-MCNC: NEGATIVE MG/ML
PH UR: 6 [PH] (ref 5–8)
PROT UR STRIP-MCNC: NEGATIVE MG/DL
RBC # UR STRIP: ABNORMAL /UL
SP GR UR: 1.02 (ref 1–1.03)
UROBILINOGEN UR QL: NORMAL

## 2017-10-18 PROCEDURE — 99024 POSTOP FOLLOW-UP VISIT: CPT | Performed by: OBSTETRICS & GYNECOLOGY

## 2017-10-18 NOTE — PROGRESS NOTES
[facility]  Postop Visit    10/18/2017    Patient: Lavonne Rocha          MR#:[id number]    History of Present Illness    22 y.o. female  status is D&C for missed AB.  The procedure well without complications.  The patient was seen in the emergency room 10/16/17 with a complaint of heavier bleeding and passing clots.  ER evaluation was essentially stable.  Since being discharged the patient reports extremely scant bleeding and resolution of the severe cramps.       ________________________________________  Patient Active Problem List   Diagnosis   • Bulging lumbar disc   • Chronic pain   • Hyperesthesia   • Joint stiffness   • Fibromyalgia   • Degeneration of intervertebral disc of lumbar region   • Lumbar facet arthropathy   • Lumbar radiculitis   • Chronic bilateral low back pain with sciatica   • PCOS (polycystic ovarian syndrome)   • Klebsiella cystitis   • Missed    • Missed ab       Past Medical History:   Diagnosis Date   • Arthritis    • Chronic bilateral low back pain with sciatica    • GERD (gastroesophageal reflux disease)    • Klebsiella cystitis 2017   • Polycystic ovary syndrome        Past Surgical History:   Procedure Laterality Date   • D&C WITH SUCTION N/A 10/12/2017    Procedure: DILATATION AND CURETTAGE WITH SUCTION;  Surgeon: Yomi Telles MD;  Location: [facility];  Service:    • WISDOM TOOTH EXTRACTION         History   Smoking Status   • Former Smoker   • Packs/day: 0.50   • Years: 6.00   • Types: Cigarettes   • Start date:    • Quit date: 2016   Smokeless Tobacco   • Never Used       has a current medication list which includes the following prescription(s): ibuprofen and norethindrone-ethinyl estradiol fe.  ________________________________________  Review of Systems   Constitutional: Negative.    Respiratory: Negative.    Cardiovascular: Negative.    Gastrointestinal: Negative.    Genitourinary: Positive for vaginal bleeding.  "  Psychiatric/Behavioral: Negative.             Objective       LMP 07/07/2017 (LMP Unknown)   BP Readings from Last 3 Encounters:   10/17/17 131/83   10/12/17 146/83   10/11/17 134/88      Wt Readings from Last 3 Encounters:   10/16/17 234 lb (106 kg)   10/12/17 234 lb 1 oz (106 kg)   10/11/17 235 lb (107 kg)      BMI: Estimated body mass index is 40.17 kg/(m^2) as calculated from the following:    Height as of 10/16/17: 64\" (162.6 cm).    Weight as of 10/16/17: 234 lb (106 kg).    EXAM     General:     Patient appears well in NAD  Abdomen: Soft, NT, +BS, no acute findings  Pelvic:  Very scant spotting  Incision: None  Ext:  No cyanosis, edema 1-2    Assessment:    1. Normal postop check - D & C      Plan:  On OCPs for contraception, return for annual, return to work without restriction       Yomi Telles MD  10/18/2017 8:09 AM    "

## 2017-12-14 ENCOUNTER — OFFICE VISIT (OUTPATIENT)
Dept: FAMILY MEDICINE CLINIC | Facility: CLINIC | Age: 22
End: 2017-12-14

## 2017-12-14 VITALS
HEIGHT: 64 IN | HEART RATE: 93 BPM | WEIGHT: 234 LBS | SYSTOLIC BLOOD PRESSURE: 152 MMHG | DIASTOLIC BLOOD PRESSURE: 76 MMHG | OXYGEN SATURATION: 98 % | BODY MASS INDEX: 39.95 KG/M2 | TEMPERATURE: 98.2 F

## 2017-12-14 DIAGNOSIS — F41.9 ANXIETY AND DEPRESSION: ICD-10-CM

## 2017-12-14 DIAGNOSIS — F32.A ANXIETY AND DEPRESSION: ICD-10-CM

## 2017-12-14 DIAGNOSIS — R00.2 HEART PALPITATIONS: Primary | ICD-10-CM

## 2017-12-14 DIAGNOSIS — R53.83 FATIGUE, UNSPECIFIED TYPE: ICD-10-CM

## 2017-12-14 PROCEDURE — 99214 OFFICE O/P EST MOD 30 MIN: CPT | Performed by: NURSE PRACTITIONER

## 2017-12-14 PROCEDURE — 93000 ELECTROCARDIOGRAM COMPLETE: CPT | Performed by: NURSE PRACTITIONER

## 2017-12-14 RX ORDER — CYCLOBENZAPRINE HCL 10 MG
10 TABLET ORAL AS NEEDED
Refills: 1 | COMMUNITY
Start: 2017-11-16 | End: 2018-09-28

## 2017-12-14 RX ORDER — IBUPROFEN 200 MG
200 TABLET ORAL EVERY 6 HOURS PRN
COMMUNITY
End: 2018-01-29

## 2017-12-14 RX ORDER — BUSPIRONE HYDROCHLORIDE 5 MG/1
TABLET ORAL
Qty: 60 TABLET | Refills: 0 | Status: SHIPPED | OUTPATIENT
Start: 2017-12-14 | End: 2017-12-20 | Stop reason: SINTOL

## 2017-12-14 NOTE — PROGRESS NOTES
Subjective   Lavonne Rocha is a 22 y.o. female. Heart flutters, she had a miscarriage in October and was told she had a heart murmur. Started about one month ago but lately it's been more frequent. She feels short of breath when it happens. She does consume one cup of coffee each morning but not excessive. She does drink soft drinks about one per day but not every day. She denies energy drinks. She has been taking plain Claritin. She has been under a lot of stress recently with the miscarriage in Oct and trying to buy a house. She does not want to be on anything regularly for anxiety but thinks maybe she needs a little something for a few months.   She does feel very tired lately especially since her miscarriage. She takes naps the whole weekend. She is not sure if this is from the palpitation, the anxiety/depression or if something else is wrong.     Palpitations    This is a new problem. The current episode started more than 1 month ago. The problem occurs intermittently. The problem has been gradually worsening. Exacerbated by: movement. Associated symptoms include anxiety. Pertinent negatives include no chest pain. Treatments tried: Vagal maneuvers. The treatment provided no relief. There are no known risk factors. Her past medical history is significant for anxiety.   Anxiety   Presents for initial visit. Onset was 1 to 6 months ago. The problem has been gradually worsening. Symptoms include excessive worry, insomnia, nervous/anxious behavior and palpitations. Patient reports no chest pain. Symptoms occur most days. The severity of symptoms is moderate. The symptoms are aggravated by family issues. The quality of sleep is fair. Nighttime awakenings: several.     Risk factors include a major life event. Her past medical history is significant for anxiety/panic attacks and depression. Past treatments include SSRIs. Compliance with prior treatments: no longer taking.   Fatigue   This is a new problem. The current  "episode started more than 1 month ago. The problem occurs constantly. The problem has been gradually worsening. Associated symptoms include fatigue. Pertinent negatives include no chest pain. The symptoms are aggravated by stress. She has tried rest and relaxation for the symptoms. The treatment provided mild relief.        The following portions of the patient's history were reviewed and updated as appropriate: allergies, current medications, past family history, past medical history, past social history, past surgical history and problem list.    Review of Systems   Constitutional: Positive for fatigue.   Respiratory: Negative.    Cardiovascular: Positive for palpitations. Negative for chest pain.   Psychiatric/Behavioral: Positive for dysphoric mood. The patient is nervous/anxious and has insomnia.        Objective     Physical Exam   Constitutional: Vital signs are normal. She appears well-developed and well-nourished. She is cooperative.   Cardiovascular: Normal rate, regular rhythm, normal heart sounds and intact distal pulses.    Pulmonary/Chest: Effort normal and breath sounds normal.   Neurological: She is alert.   Psychiatric: She has a normal mood and affect. Her speech is normal and behavior is normal. Judgment and thought content normal. Cognition and memory are normal.   Tearful when talking about her miscarriage   Nursing note and vitals reviewed.    Vitals:    12/14/17 1406   BP: 152/76   Pulse: 93   Temp: 98.2 °F (36.8 °C)   TempSrc: Oral   SpO2: 98%   Weight: 106 kg (234 lb)   Height: 162.6 cm (64.02\")       ECG 12 Lead  Date/Time: 12/14/2017 2:22 PM  Performed by: KRYSTLE SANCHEZ  Authorized by: KRYSTLE SANCHEZ   Comparison: not compared with previous ECG   Rhythm: sinus rhythm  Rate: normal  BPM: 95  Conduction: conduction normal  ST Segments: ST segments normal  T Waves: T waves normal  QRS axis: normal  Clinical impression: normal ECG  Comments: Compared to rhythm strip in chart from " 12/12/17. No change          Assessment/Plan   Diagnoses and all orders for this visit:    Heart palpitations  -     ECG 12 Lead  -     Adult Transthoracic Echo Complete W/ Cont if Necessary Per Protocol  -     Holter monitor - 48 hour  -     Comprehensive Metabolic Panel    Fatigue, unspecified type  -     Adult Transthoracic Echo Complete W/ Cont if Necessary Per Protocol  -     TSH  -     Vitamin B12  -     Vitamin D 25 Hydroxy    Anxiety and depression  -     busPIRone (BUSPAR) 5 MG tablet; Take tablet twice daily as needed.      Palpitations: Will check Echo and holter monitor. Check electrolyte levels as as well as thyroid. Will call with the results.   Fatigue: Will check thyroid and Vitamin D and B12 levels.    Anxiety: Will try some as needed buspar to use up to twice daily as needed. Follow up in 30 days for recheck.

## 2017-12-15 ENCOUNTER — TELEPHONE (OUTPATIENT)
Dept: FAMILY MEDICINE CLINIC | Facility: CLINIC | Age: 22
End: 2017-12-15

## 2017-12-15 LAB
25(OH)D3+25(OH)D2 SERPL-MCNC: 19.2 NG/ML (ref 30–100)
ALBUMIN SERPL-MCNC: 4.5 G/DL (ref 3.5–5.5)
ALBUMIN/GLOB SERPL: 1.8 {RATIO} (ref 1.2–2.2)
ALP SERPL-CCNC: 113 IU/L (ref 39–117)
ALT SERPL-CCNC: 17 IU/L (ref 0–32)
AST SERPL-CCNC: 13 IU/L (ref 0–40)
BILIRUB SERPL-MCNC: <0.2 MG/DL (ref 0–1.2)
BUN SERPL-MCNC: 9 MG/DL (ref 6–20)
BUN/CREAT SERPL: 14 (ref 9–23)
CALCIUM SERPL-MCNC: 9.1 MG/DL (ref 8.7–10.2)
CHLORIDE SERPL-SCNC: 101 MMOL/L (ref 96–106)
CO2 SERPL-SCNC: 24 MMOL/L (ref 18–29)
CREAT SERPL-MCNC: 0.66 MG/DL (ref 0.57–1)
GFR SERPLBLD CREATININE-BSD FMLA CKD-EPI: 124 ML/MIN/1.73
GFR SERPLBLD CREATININE-BSD FMLA CKD-EPI: 143 ML/MIN/1.73
GLOBULIN SER CALC-MCNC: 2.5 G/DL (ref 1.5–4.5)
GLUCOSE SERPL-MCNC: 83 MG/DL (ref 65–99)
POTASSIUM SERPL-SCNC: 3.9 MMOL/L (ref 3.5–5.2)
PROT SERPL-MCNC: 7 G/DL (ref 6–8.5)
SODIUM SERPL-SCNC: 140 MMOL/L (ref 134–144)
TSH SERPL DL<=0.005 MIU/L-ACNC: 1.16 UIU/ML (ref 0.45–4.5)
VIT B12 SERPL-MCNC: 317 PG/ML (ref 232–1245)

## 2017-12-15 NOTE — TELEPHONE ENCOUNTER
----- Message from BRAD Reynoso sent at 12/15/2017  8:32 AM EST -----  Call the patient on her lab results. Vitamin D level is low at 19. Needs to take a supplement. At least 2000 units daily. Vitamin B12 is on the lower side of normal. Consider B-complex vitamin daily. Should help with energy levels. Everything else is ok.

## 2017-12-20 ENCOUNTER — TELEPHONE (OUTPATIENT)
Dept: FAMILY MEDICINE CLINIC | Facility: CLINIC | Age: 22
End: 2017-12-20

## 2017-12-20 DIAGNOSIS — F41.9 ANXIETY: Primary | ICD-10-CM

## 2017-12-20 RX ORDER — HYDROXYZINE HYDROCHLORIDE 10 MG/1
10 TABLET, FILM COATED ORAL EVERY 8 HOURS PRN
Qty: 45 TABLET | Refills: 0 | Status: SHIPPED | OUTPATIENT
Start: 2017-12-20 | End: 2018-03-23

## 2017-12-20 NOTE — TELEPHONE ENCOUNTER
Stop the Buspar, I sent in script for Hydroxyzine every 8 hours as needed for anxiety. This will also help clear up the rash caused by the Buspar. Let me know if this helps. Thanks

## 2017-12-20 NOTE — TELEPHONE ENCOUNTER
Spoke with patient and she states every time she takes medication her face gets red and blotchy and she gets itchy.  Please advise

## 2017-12-28 ENCOUNTER — TELEPHONE (OUTPATIENT)
Dept: FAMILY MEDICINE CLINIC | Facility: CLINIC | Age: 22
End: 2017-12-28

## 2018-01-29 ENCOUNTER — OFFICE VISIT (OUTPATIENT)
Dept: OBSTETRICS AND GYNECOLOGY | Age: 23
End: 2018-01-29

## 2018-01-29 VITALS
HEIGHT: 64 IN | DIASTOLIC BLOOD PRESSURE: 78 MMHG | BODY MASS INDEX: 40.12 KG/M2 | SYSTOLIC BLOOD PRESSURE: 128 MMHG | WEIGHT: 235 LBS

## 2018-01-29 DIAGNOSIS — Z13.89 SCREENING FOR BLOOD OR PROTEIN IN URINE: ICD-10-CM

## 2018-01-29 DIAGNOSIS — Z13.9 SPECIAL SCREENING: ICD-10-CM

## 2018-01-29 DIAGNOSIS — Z31.69 ENCOUNTER FOR PRECONCEPTION CONSULTATION: Primary | ICD-10-CM

## 2018-01-29 LAB

## 2018-01-29 PROCEDURE — 81025 URINE PREGNANCY TEST: CPT | Performed by: OBSTETRICS & GYNECOLOGY

## 2018-01-29 PROCEDURE — 81002 URINALYSIS NONAUTO W/O SCOPE: CPT | Performed by: OBSTETRICS & GYNECOLOGY

## 2018-01-29 PROCEDURE — 99213 OFFICE O/P EST LOW 20 MIN: CPT | Performed by: OBSTETRICS & GYNECOLOGY

## 2018-01-29 RX ORDER — IBUPROFEN 800 MG/1
TABLET ORAL
Refills: 3 | COMMUNITY
Start: 2018-01-04 | End: 2018-01-29

## 2018-01-29 NOTE — PROGRESS NOTES
"Subjective   Lavonne Rocha is a 22 y.o. female DISCUSS CONCEPTION . D&C 10/12/17 at 9 weeks with missed AB - discussed stopping all medicines including NSAIDS, except PNV.       History of Present Illness    The following portions of the patient's history were reviewed and updated as appropriate: allergies, current medications, past family history, past medical history, past social history, past surgical history and problem list.    Review of Systems   Constitutional: Negative for chills and fever.   Gastrointestinal: Negative for abdominal distention and abdominal pain.   Genitourinary: Negative for dyspareunia, dysuria, menstrual problem, pelvic pain, vaginal bleeding, vaginal discharge and vaginal pain.   All other systems reviewed and are negative.  /78  Ht 162.6 cm (64\")  Wt 107 kg (235 lb)  LMP 01/15/2018  BMI 40.34 kg/m2      Objective   Physical Exam   Constitutional: She is oriented to person, place, and time. She appears well-developed and well-nourished.   Pulmonary/Chest: Effort normal.   Neurological: She is alert and oriented to person, place, and time.   Skin: Skin is warm and dry.   Psychiatric: She has a normal mood and affect. Her behavior is normal.   Nursing note and vitals reviewed.      Assessment/Plan   Lavonne was seen today for follow-up.    Diagnoses and all orders for this visit:    Encounter for preconception consultation    Screening for blood or protein in urine  -     POC Urinalysis Dipstick    Special screening  -     POC Pregnancy, Urine       Counseling was given to patient for the following topics: risk factor reductions, prognosis and patient and family education . Total time of the encounter was 20 minutes and 15 minutes was spend counseling.    Call after + pregnancy test and will start progesterone        "

## 2018-03-15 ENCOUNTER — TELEPHONE (OUTPATIENT)
Dept: OBSTETRICS AND GYNECOLOGY | Age: 23
End: 2018-03-15

## 2018-03-15 DIAGNOSIS — Z32.01 POSITIVE PREGNANCY TEST: Primary | ICD-10-CM

## 2018-03-15 NOTE — TELEPHONE ENCOUNTER
Pt is wanting a order for HCG levels. Pt got +UPT and wants to do the blood work at her work.     Pt is wanting progesterone to be sent in due to M/C in Oct.

## 2018-03-16 ENCOUNTER — TELEPHONE (OUTPATIENT)
Dept: OBSTETRICS AND GYNECOLOGY | Age: 23
End: 2018-03-16

## 2018-03-23 ENCOUNTER — OFFICE VISIT (OUTPATIENT)
Dept: FAMILY MEDICINE CLINIC | Facility: CLINIC | Age: 23
End: 2018-03-23

## 2018-03-23 VITALS
BODY MASS INDEX: 39.57 KG/M2 | HEART RATE: 83 BPM | HEIGHT: 64 IN | OXYGEN SATURATION: 99 % | DIASTOLIC BLOOD PRESSURE: 76 MMHG | SYSTOLIC BLOOD PRESSURE: 120 MMHG | WEIGHT: 231.8 LBS | TEMPERATURE: 98.1 F

## 2018-03-23 DIAGNOSIS — J30.9 ACUTE ALLERGIC RHINITIS, UNSPECIFIED SEASONALITY, UNSPECIFIED TRIGGER: Primary | ICD-10-CM

## 2018-03-23 PROCEDURE — 99213 OFFICE O/P EST LOW 20 MIN: CPT | Performed by: NURSE PRACTITIONER

## 2018-03-23 NOTE — PROGRESS NOTES
"Subjective   Lavonne Rocha is a 22 y.o. female who presents today for:    Earache (Lt ear Deep intermittent pain)    Ms. Rocha presents today with complaints of hissing noise in her left ear x 24 hours. She states that she hears the sound constantly and it is making her dizzy at times and she is nauseated. She has tried peroxide in her ear, oils and allergy medications without relief. She denies fever, chills or body aches. She states she has intermittent sharp pain behind her left ear.        I have reviewed the patient's medical history in detail and updated the computerized patient record.    Ms. Rocha  reports that she has been smoking Cigarettes.  She started smoking about 8 years ago. She has a 3.00 pack-year smoking history. She has never used smokeless tobacco. She reports that she drinks alcohol. She reports that she does not use drugs.     Allergies   Allergen Reactions   • Penicillins Rash       Current Outpatient Prescriptions:   •  cyclobenzaprine (FLEXERIL) 10 MG tablet, Take 10 mg by mouth As Needed., Disp: , Rfl: 1  •  progesterone (PROMETRIUM) 200 MG capsule, Take 1 capsule by mouth Daily., Disp: 30 capsule, Rfl: 1      Review of Systems   HENT: Positive for ear pain (intermittent behind her left ear) and hearing loss (muffled hearing in left ear). Negative for congestion, postnasal drip, rhinorrhea, sinus pain and sinus pressure. Tinnitus: hissing sound in left ear.    Respiratory: Negative.    Cardiovascular: Negative.    Gastrointestinal: Positive for nausea.   Neurological: Positive for dizziness (at times). Negative for syncope and light-headedness.         Objective   Vitals:    03/23/18 0854   BP: 120/76   BP Location: Left arm   Patient Position: Sitting   Cuff Size: Adult   Pulse: 83   Temp: 98.1 °F (36.7 °C)   TempSrc: Oral   SpO2: 99%   Weight: 105 kg (231 lb 12.8 oz)   Height: 162.6 cm (64.02\")     Physical Exam   Constitutional: She is oriented to person, place, and time. She appears " well-developed and well-nourished.   HENT:   Right Ear: Hearing, tympanic membrane, external ear and ear canal normal.   Left Ear: Hearing, tympanic membrane, external ear and ear canal normal.   Nose: Nose normal.   Mouth/Throat: Oropharynx is clear and moist.   Cardiovascular: Normal rate, regular rhythm and normal heart sounds.    Pulmonary/Chest: Effort normal and breath sounds normal.   Neurological: She is alert and oriented to person, place, and time.   Skin: Skin is warm and dry.   Psychiatric:   No acute distress   Vitals reviewed.            Lavonne was seen today for earache.    Diagnoses and all orders for this visit:    Acute allergic rhinitis, unspecified seasonality, unspecified trigger    You have been diagnosed with allergic rhinitis. Symptomatic treatment is best.  You may take Mucinex D for relieving congestion and cough.  If you have high blood pressure, do not take Mucinex D, instead opting for plain Mucinex and Coricidin HBP. Oral antihistamine, such as Allegra, Zyrtec or Claritin may help reduce ear pressure and relieve some nasal symptoms.  A saline nasal spray may be used to keep nose clear from discharge.  Be sure that you are increasing your intake of clear to decaffeinated fluids and get plenty of rest.  If your symptoms worsen or persist follow up as needed.

## 2018-04-19 ENCOUNTER — OFFICE VISIT (OUTPATIENT)
Dept: FAMILY MEDICINE CLINIC | Facility: CLINIC | Age: 23
End: 2018-04-19

## 2018-04-19 VITALS
HEART RATE: 80 BPM | DIASTOLIC BLOOD PRESSURE: 72 MMHG | BODY MASS INDEX: 39.63 KG/M2 | SYSTOLIC BLOOD PRESSURE: 120 MMHG | TEMPERATURE: 98.2 F | WEIGHT: 231 LBS | OXYGEN SATURATION: 98 %

## 2018-04-19 DIAGNOSIS — G57.00 PIRIFORMIS SYNDROME, UNSPECIFIED LATERALITY: ICD-10-CM

## 2018-04-19 DIAGNOSIS — M54.40 CHRONIC BILATERAL LOW BACK PAIN WITH SCIATICA, SCIATICA LATERALITY UNSPECIFIED: Primary | ICD-10-CM

## 2018-04-19 DIAGNOSIS — G89.29 CHRONIC BILATERAL LOW BACK PAIN WITH SCIATICA, SCIATICA LATERALITY UNSPECIFIED: Primary | ICD-10-CM

## 2018-04-19 PROCEDURE — 99213 OFFICE O/P EST LOW 20 MIN: CPT | Performed by: FAMILY MEDICINE

## 2018-04-19 NOTE — PATIENT INSTRUCTIONS
Piriformis Syndrome Rehab  Ask your health care provider which exercises are safe for you. Do exercises exactly as told by your health care provider and adjust them as directed. It is normal to feel mild stretching, pulling, tightness, or discomfort as you do these exercises, but you should stop right away if you feel sudden pain or your pain gets worse. Do not begin these exercises until told by your health care provider.  Stretching and range of motion exercises  These exercises warm up your muscles and joints and improve the movement and flexibility of your hip and pelvis. These exercises also help to relieve pain, numbness, and tingling.  Exercise A: Hip rotators     1. Lie on your back on a firm surface.  2. Pull your left / right knee toward your same shoulder with your left / right hand until your knee is pointing toward the ceiling. Hold your left / right ankle with your other hand.  3. Keeping your knee steady, gently pull your left / right ankle toward your other shoulder until you feel a stretch in your buttocks.  4. Hold this position for __________ seconds.  Repeat __________ times. Complete this stretch __________ times a day.  Exercise B: Hip extensors   1. Lie on your back on a firm surface. Both of your legs should be straight.  2. Pull your left / right knee to your chest. Hold your leg in this position by holding onto the back of your thigh or the front of your knee.  3. Hold this position for __________ seconds.  4. Slowly return to the starting position.  Repeat __________ times. Complete this stretch __________ times a day.  Strengthening exercises  These exercises build strength and endurance in your hip and thigh muscles. Endurance is the ability to use your muscles for a long time, even after they get tired.  Exercise C: Straight leg raises (   hip abductors)  1. Lie on your side with your left / right leg in the top position. Lie so your head, shoulder, knee, and hip line up. Bend your  bottom knee to help you balance.  2. Lift your top leg up 4-6 inches (10-15 cm), keeping your toes pointed straight ahead.  3. Hold this position for __________ seconds.  4. Slowly lower your leg to the starting position. Let your muscles relax completely.  Repeat __________ times. Complete this exercise__________ times a day.  Exercise D: Hip abductors and rotators, quadruped    1. Get on your hands and knees on a firm, lightly padded surface. Your hands should be directly below your shoulders, and your knees should be directly below your hips.  2. Lift your left / right knee out to the side. Keep your knee bent. Do not twist your body.  3. Hold this position for __________ seconds.  4. Slowly lower your leg.  Repeat __________ times. Complete this exercise__________ times a day.  Exercise E: Straight leg raises (  hip extensors)  1. Lie on your abdomen on a bed or a firm surface with a pillow under your hips.  2. Squeeze your buttock muscles and lift your left / right thigh off the bed. Do not let your back arch.  3. Hold this position for __________ seconds.  4. Slowly return to the starting position. Let your muscles relax completely before doing another repetition.  Repeat __________ times. Complete this exercise__________ times a day.  This information is not intended to replace advice given to you by your health care provider. Make sure you discuss any questions you have with your health care provider.  Document Released: 12/18/2006 Document Revised: 08/22/2017 Document Reviewed: 11/29/2016  Elsevier Interactive Patient Education © 2017 [employer]

## 2018-04-19 NOTE — PROGRESS NOTES
Subjective   Lavonne Rocha is a 22 y.o. female. Presents today for   Chief Complaint   Patient presents with   • Back Pain     having back pain and tail bone pain x 1 month getting worse.  She is going to physical therapy x 1.5 months       Back Pain   This is a chronic problem. The current episode started more than 1 month ago. The problem occurs constantly. The problem has been waxing and waning since onset. The pain is present in the sacro-iliac and lumbar spine (New is tailbone pain and having pain neck.). The quality of the pain is described as aching. Radiates to: into legs. The pain is moderate. Exacerbated by: Tail bone pain constant, nothing makes worse or better. Treatments tried: Currently going through PT but no relief tail bone pain;  Saw pain mgmt in past, but didn't help back.  Doesn't want medications as trying to conceive. The treatment provided mild relief.       Review of Systems   Musculoskeletal: Positive for back pain.       The following portions of the patient's history were reviewed and updated as appropriate: allergies, current medications, past medical history and problem list.    Patient Active Problem List   Diagnosis   • Bulging lumbar disc   • Hyperesthesia   • Degeneration of intervertebral disc of lumbar region   • Lumbar facet arthropathy   • Lumbar radiculitis   • Chronic bilateral low back pain with sciatica   • PCOS (polycystic ovarian syndrome)   • Borderline hypertension       Allergies   Allergen Reactions   • Penicillins Rash       Current Outpatient Prescriptions on File Prior to Visit   Medication Sig Dispense Refill   • cyclobenzaprine (FLEXERIL) 10 MG tablet Take 10 mg by mouth As Needed.  1   • progesterone (PROMETRIUM) 200 MG capsule Take 1 capsule by mouth Daily. 30 capsule 1     No current facility-administered medications on file prior to visit.        Objective   Vitals:    04/19/18 1406   BP: 120/72   Pulse: 80   Temp: 98.2 °F (36.8 °C)   SpO2: 98%   Weight: 105 kg  (231 lb)       Physical Exam   Constitutional: She is oriented to person, place, and time. She appears well-developed and well-nourished.   Musculoskeletal:        Lumbar back: She exhibits decreased range of motion, tenderness and spasm. She exhibits no bony tenderness, no deformity, no pain and normal pulse.   Pain over SI joint and coccygeal pain;  Left tight piriformis, sacral torsion and lumbar SD, tx with ME;  Minimal relief but improvement in findings.   Neurological: She is alert and oriented to person, place, and time.   Skin: Skin is warm and dry.   Psychiatric: She has a normal mood and affect. Her behavior is normal.   Nursing note and vitals reviewed.      Assessment/Plan   Lavonne was seen today for back pain.    Diagnoses and all orders for this visit:    Chronic bilateral low back pain with sciatica, sciatica laterality unspecified    patient trying to conceive and doesn't want any medications.  Recommend continue PT as doing, heat as needed pain and gave exercises to perform at home.         -Follow up: Prn - RTC if worse or no improvement.          Current Outpatient Prescriptions:   •  cyclobenzaprine (FLEXERIL) 10 MG tablet, Take 10 mg by mouth As Needed., Disp: , Rfl: 1  •  progesterone (PROMETRIUM) 200 MG capsule, Take 1 capsule by mouth Daily., Disp: 30 capsule, Rfl: 1

## 2018-04-26 ENCOUNTER — CLINICAL SUPPORT (OUTPATIENT)
Dept: FAMILY MEDICINE CLINIC | Facility: CLINIC | Age: 23
End: 2018-04-26

## 2018-04-26 DIAGNOSIS — Z23 NEED FOR HEPATITIS VACCINATION: Primary | ICD-10-CM

## 2018-04-26 PROCEDURE — 90632 HEPA VACCINE ADULT IM: CPT | Performed by: NURSE PRACTITIONER

## 2018-04-26 PROCEDURE — 90471 IMMUNIZATION ADMIN: CPT | Performed by: NURSE PRACTITIONER

## 2018-05-14 ENCOUNTER — TELEPHONE (OUTPATIENT)
Dept: OBSTETRICS AND GYNECOLOGY | Age: 23
End: 2018-05-14

## 2018-05-14 DIAGNOSIS — Z32.01 POSITIVE PREGNANCY TEST: Primary | ICD-10-CM

## 2018-05-14 NOTE — TELEPHONE ENCOUNTER
I placed the order, she should be able to pull it up in her chart.  I refilled her progesterone, she can start it now or after the quant hcg result is back and confirmatory.

## 2018-05-14 NOTE — TELEPHONE ENCOUNTER
Pt got a +preg test. Pt is suppose to take progesterone when she finds out she is pregnant. Pt is wondering when exactly should she start this?     Pt is also wanting to do a blood test. Pt works at [employer]. Pt is requesting to do it there.

## 2018-05-15 DIAGNOSIS — Z32.01 POSITIVE PREGNANCY TEST: Primary | ICD-10-CM

## 2018-05-15 LAB — HCG INTACT+B SERPL-ACNC: 24 MIU/ML

## 2018-05-16 LAB — HCG INTACT+B SERPL-ACNC: 73.12 MIU/ML

## 2018-05-17 ENCOUNTER — TELEPHONE (OUTPATIENT)
Dept: OBSTETRICS AND GYNECOLOGY | Age: 23
End: 2018-05-17

## 2018-05-24 ENCOUNTER — TELEPHONE (OUTPATIENT)
Dept: OBSTETRICS AND GYNECOLOGY | Age: 23
End: 2018-05-24

## 2018-05-24 ENCOUNTER — OFFICE VISIT (OUTPATIENT)
Dept: OBSTETRICS AND GYNECOLOGY | Age: 23
End: 2018-05-24

## 2018-05-24 ENCOUNTER — PROCEDURE VISIT (OUTPATIENT)
Dept: OBSTETRICS AND GYNECOLOGY | Age: 23
End: 2018-05-24

## 2018-05-24 VITALS
DIASTOLIC BLOOD PRESSURE: 76 MMHG | WEIGHT: 233 LBS | SYSTOLIC BLOOD PRESSURE: 126 MMHG | HEIGHT: 64 IN | BODY MASS INDEX: 39.78 KG/M2

## 2018-05-24 DIAGNOSIS — O36.80X0 ENCOUNTER TO DETERMINE FETAL VIABILITY OF PREGNANCY, SINGLE OR UNSPECIFIED FETUS: Primary | ICD-10-CM

## 2018-05-24 DIAGNOSIS — R10.9 ABDOMINAL PAIN AFFECTING PREGNANCY: Primary | ICD-10-CM

## 2018-05-24 DIAGNOSIS — O26.899 ABDOMINAL PAIN AFFECTING PREGNANCY: Primary | ICD-10-CM

## 2018-05-24 DIAGNOSIS — Z11.3 SCREEN FOR STD (SEXUALLY TRANSMITTED DISEASE): ICD-10-CM

## 2018-05-24 DIAGNOSIS — N89.8 VAGINAL ITCHING: ICD-10-CM

## 2018-05-24 LAB
B-HCG UR QL: POSITIVE
BILIRUB BLD-MCNC: NEGATIVE MG/DL
CLARITY, POC: CLEAR
COLOR UR: YELLOW
GLUCOSE UR STRIP-MCNC: NEGATIVE MG/DL
INTERNAL NEGATIVE CONTROL: NEGATIVE
INTERNAL POSITIVE CONTROL: POSITIVE
KETONES UR QL: NEGATIVE
LEUKOCYTE EST, POC: NEGATIVE
Lab: ABNORMAL
NITRITE UR-MCNC: NEGATIVE MG/ML
PH UR: 5.5 [PH] (ref 5–8)
PROT UR STRIP-MCNC: NEGATIVE MG/DL
RBC # UR STRIP: NEGATIVE /UL
SP GR UR: 1 (ref 1–1.03)
UROBILINOGEN UR QL: NORMAL

## 2018-05-24 PROCEDURE — 81002 URINALYSIS NONAUTO W/O SCOPE: CPT | Performed by: PHYSICIAN ASSISTANT

## 2018-05-24 PROCEDURE — 81025 URINE PREGNANCY TEST: CPT | Performed by: PHYSICIAN ASSISTANT

## 2018-05-24 PROCEDURE — 76817 TRANSVAGINAL US OBSTETRIC: CPT | Performed by: PHYSICIAN ASSISTANT

## 2018-05-24 PROCEDURE — 99213 OFFICE O/P EST LOW 20 MIN: CPT | Performed by: PHYSICIAN ASSISTANT

## 2018-05-24 RX ORDER — PRENATAL VIT NO.126/IRON/FOLIC 28MG-0.8MG
1 TABLET ORAL DAILY
COMMUNITY
End: 2019-05-01

## 2018-05-24 NOTE — TELEPHONE ENCOUNTER
Dr Mixon pt, is almost 6 weeks preg, has conf next Wednesday. Pt states R side cramping on and off for last couple weeks. Pt states yesterday R side cramping so bad she was stopped in tracks and could not stand up straight. Pt did start monistat 7 last night, but is using appropriately for pregnancy. Pt denies any bleeding.    Pt # [phone]

## 2018-05-24 NOTE — PROGRESS NOTES
"Subjective     Chief Complaint   Patient presents with   • Threatened Miscarriage     c/o early ob, right side abdominal pain       Lavonne Rocha is a 22 y.o.  whose LMP is Patient's last menstrual period was 2018 (exact date). presents with right sided pelvic pain.  She denies vaginal bleeding. She is taking progesterone.      She is having right lower quadrant pain that was pretty acute yesterday, 8 of 10 that had her doubled over. It resolved and she now only notes mild cramping. This is her second pregnancy, she miscarried with her first one at 9 wks.   She is here today with her , Markel  She denies STD risk. Denies bladder issues. Does note mild constipation.     No Additional Complaints Reported    The following portions of the patient's history were reviewed and updated as appropriate:vital signs, allergies, current medications, past family history, past medical history, past social history, past surgical history and problem list      Review of Systems   A comprehensive review of systems was negative except for: Genitourinary: positive for right lower quadrant pain     Objective      /76   Ht 162.6 cm (64.02\")   Wt 106 kg (233 lb)   LMP 2018 (Exact Date)   BMI 39.97 kg/m²     Physical Exam    General:   alert, comfortable and no distress   Heart: Not performed today   Lungs: Not performed today.   Breast: Not performed today   Neck: na   Abdomen: {Not performed today   CVA: Not performed today   Pelvis: External genitalia: normal general appearance  Vaginal: discharge, white and scant  Cervix: normal appearance  Adnexa: normal bimanual exam  Uterus: normal single, nontender   Extremities: Not performed today   Neurologic: negative   Psychiatric: Normal affect, judgement, and mood       Lab Review   Labs: Urinalysis - with micro     Imaging   Ultrasound - Pelvic Vaginal IUP with sac, no yolk sac or pole seen. Ovaries wnl    Assessment/Plan     ASSESSMENT  1. Abdominal pain " affecting pregnancy    2. Vaginal itching    3. Screen for STD (sexually transmitted disease)        PLAN  1.   Orders Placed This Encounter   Procedures   • NuSwab VG+ - Swab, Vagina   • HCG, B-subunit, Quantitative   • POC Urinalysis Dipstick, Multipro   • POC Pregnancy, Urine       2. Disc findings with pt and . Overall reassuring. I suspect she is too early to see yolk sac (last quant was 73.12 on the 16th May, so she would be approximately 600 today if she doubled q 48 hours). Cautious reassurance given. Plan repeat quants today and keep appt for next Wednesday with Dr Mixon.  Enc ER if acute pain resumes and does not resolve or is accompanied by fever and n/vomiting. Cultures done today to r/o STD and BV/yeast          Follow up: 7 day(s)    SALUD Orantes  5/24/2018

## 2018-05-25 ENCOUNTER — TELEPHONE (OUTPATIENT)
Dept: OBSTETRICS AND GYNECOLOGY | Age: 23
End: 2018-05-25

## 2018-05-25 LAB — HCG INTACT+B SERPL-ACNC: NORMAL MIU/ML

## 2018-05-25 NOTE — TELEPHONE ENCOUNTER
Returned call to patient - saw gestational sac yesterday on US with kelby in the 3000's - reassured patient that the picture fits and probably represents early IUP - has appt with me Wednesday with US

## 2018-05-25 NOTE — TELEPHONE ENCOUNTER
Pt saw Bianca Nunn yesterday for R side cramping, US showed gestation sac @ 5 wks, pts HCG was 3354. Pt thinks she was advised they would only be concerned if her hcg was over 600. Pt is very concerned now. Please advise.    Pt # [phone]

## 2018-05-26 LAB
BACTERIA UR CULT: NORMAL
BACTERIA UR CULT: NORMAL

## 2018-05-30 ENCOUNTER — OFFICE VISIT (OUTPATIENT)
Dept: OBSTETRICS AND GYNECOLOGY | Age: 23
End: 2018-05-30

## 2018-05-30 ENCOUNTER — PROCEDURE VISIT (OUTPATIENT)
Dept: OBSTETRICS AND GYNECOLOGY | Age: 23
End: 2018-05-30

## 2018-05-30 VITALS
WEIGHT: 234 LBS | DIASTOLIC BLOOD PRESSURE: 64 MMHG | BODY MASS INDEX: 39.95 KG/M2 | SYSTOLIC BLOOD PRESSURE: 112 MMHG | HEIGHT: 64 IN

## 2018-05-30 DIAGNOSIS — Z01.89 ENCOUNTER FOR IMAGING TO ASSESS MYOCARDIAL VIABILITY: Primary | ICD-10-CM

## 2018-05-30 DIAGNOSIS — O99.210 OBESITY IN PREGNANCY: ICD-10-CM

## 2018-05-30 DIAGNOSIS — G89.29 CHRONIC BILATERAL LOW BACK PAIN WITH SCIATICA, SCIATICA LATERALITY UNSPECIFIED: ICD-10-CM

## 2018-05-30 DIAGNOSIS — E28.2 PCOS (POLYCYSTIC OVARIAN SYNDROME): ICD-10-CM

## 2018-05-30 DIAGNOSIS — Z13.9 SPECIAL SCREENING: ICD-10-CM

## 2018-05-30 DIAGNOSIS — M54.40 CHRONIC BILATERAL LOW BACK PAIN WITH SCIATICA, SCIATICA LATERALITY UNSPECIFIED: ICD-10-CM

## 2018-05-30 DIAGNOSIS — Z32.00 VISIT FOR CONFIRMATION OF PREGNANCY TEST RESULT WITH PHYSICAL EXAM: Primary | ICD-10-CM

## 2018-05-30 PROBLEM — R03.0 BORDERLINE HYPERTENSION: Status: RESOLVED | Noted: 2017-10-18 | Resolved: 2018-05-30

## 2018-05-30 LAB
A VAGINAE DNA VAG QL NAA+PROBE: NORMAL SCORE
BILIRUB BLD-MCNC: NEGATIVE MG/DL
BVAB2 DNA VAG QL NAA+PROBE: NORMAL SCORE
C ALBICANS DNA VAG QL NAA+PROBE: NEGATIVE
C GLABRATA DNA VAG QL NAA+PROBE: NEGATIVE
C TRACH RRNA SPEC QL NAA+PROBE: NEGATIVE
CLARITY, POC: CLEAR
COLOR UR: YELLOW
GLUCOSE UR STRIP-MCNC: NEGATIVE MG/DL
KETONES UR QL: NEGATIVE
LEUKOCYTE EST, POC: NEGATIVE
MEGA1 DNA VAG QL NAA+PROBE: NORMAL SCORE
N GONORRHOEA RRNA SPEC QL NAA+PROBE: NEGATIVE
NITRITE UR-MCNC: NEGATIVE MG/ML
PH UR: 7 [PH] (ref 5–8)
PROT UR STRIP-MCNC: NEGATIVE MG/DL
RBC # UR STRIP: NEGATIVE /UL
SP GR UR: 1.02 (ref 1–1.03)
T VAGINALIS RRNA SPEC QL NAA+PROBE: NEGATIVE
UROBILINOGEN UR QL: NORMAL

## 2018-05-30 PROCEDURE — 99213 OFFICE O/P EST LOW 20 MIN: CPT | Performed by: OBSTETRICS & GYNECOLOGY

## 2018-05-30 PROCEDURE — 76817 TRANSVAGINAL US OBSTETRIC: CPT | Performed by: OBSTETRICS & GYNECOLOGY

## 2018-05-30 PROCEDURE — 81002 URINALYSIS NONAUTO W/O SCOPE: CPT | Performed by: OBSTETRICS & GYNECOLOGY

## 2018-05-30 NOTE — PROGRESS NOTES
"Subjective   Lavonne Rocha is a 22 y.o. female Pregnancy confirmation LMP 04/17/2018 - US today c/w LMP - EVA 1/22/19. Some nausea- declines meds.  Continue progesterone until 10 weeks gestation.   Discussed weight gain and nutrition in pregnancy.    History of Present Illness    The following portions of the patient's history were reviewed and updated as appropriate: allergies, current medications, past family history, past medical history, past social history, past surgical history and problem list.    Review of Systems   Constitutional: Negative for chills, fatigue and fever.   Gastrointestinal: Positive for nausea. Negative for abdominal distention, abdominal pain and vomiting.   Genitourinary: Negative for dysuria, pelvic pain, vaginal bleeding, vaginal discharge and vaginal pain.   All other systems reviewed and are negative.    /64   Ht 162.6 cm (64\")   Wt 106 kg (234 lb)   LMP 04/17/2018 (Exact Date)   Breastfeeding? No   BMI 40.17 kg/m²     Objective   Physical Exam   Constitutional: She is oriented to person, place, and time. She appears well-developed and well-nourished.   Pulmonary/Chest: Effort normal.   Neurological: She is alert and oriented to person, place, and time.   Skin: Skin is warm and dry.   Psychiatric: She has a normal mood and affect. Her behavior is normal.   Nursing note and vitals reviewed.        Assessment/Plan   Lavonne was seen today for follow-up.    Diagnoses and all orders for this visit:    Visit for confirmation of pregnancy test result with physical exam  -     ABO / Rh  -     Antibody Screen  -     CBC & Differential  -     Hgb. Frac. Profile  -     Hepatitis B Surface Antigen  -     Hepatitis C Antibody  -     HIV-1 / O / 2 Ag / Antibody 4th Generation  -     RPR  -     Rubella Antibody, IgG  -     Varicella Zoster Antibody, IgG    Special screening  -     POC Urinalysis Dipstick    PCOS (polycystic ovarian syndrome)    Chronic bilateral low back pain with sciatica, " sciatica laterality unspecified    Obesity in pregnancy      Return 3 weeks ob intake

## 2018-05-31 LAB
ABO GROUP BLD: NORMAL
BASOPHILS # BLD AUTO: 0.02 10*3/MM3 (ref 0–0.2)
BASOPHILS NFR BLD AUTO: 0.2 % (ref 0–1.5)
BLD GP AB SCN SERPL QL: NEGATIVE
EOSINOPHIL # BLD AUTO: 0.3 10*3/MM3 (ref 0–0.7)
EOSINOPHIL NFR BLD AUTO: 3.7 % (ref 0.3–6.2)
ERYTHROCYTE [DISTWIDTH] IN BLOOD BY AUTOMATED COUNT: 13.5 % (ref 11.7–13)
HBV SURFACE AG SERPL QL IA: NEGATIVE
HCT VFR BLD AUTO: 42.2 % (ref 35.6–45.5)
HCV AB S/CO SERPL IA: <0.1 S/CO RATIO (ref 0–0.9)
HGB A MFR BLD: 97.8 % (ref 96.4–98.8)
HGB A2 MFR BLD COLUMN CHROM: 2.2 % (ref 1.8–3.2)
HGB BLD-MCNC: 14.1 G/DL (ref 11.9–15.5)
HGB C MFR BLD: 0 %
HGB F MFR BLD: 0 % (ref 0–2)
HGB FRACT BLD-IMP: NORMAL
HGB S BLD QL SOLY: NEGATIVE
HGB S MFR BLD: 0 %
HIV 1+2 AB+HIV1 P24 AG SERPL QL IA: NON REACTIVE
IMM GRANULOCYTES # BLD: 0.02 10*3/MM3 (ref 0–0.03)
IMM GRANULOCYTES NFR BLD: 0.2 % (ref 0–0.5)
LYMPHOCYTES # BLD AUTO: 1.63 10*3/MM3 (ref 0.9–4.8)
LYMPHOCYTES NFR BLD AUTO: 20.2 % (ref 19.6–45.3)
MCH RBC QN AUTO: 29.1 PG (ref 26.9–32)
MCHC RBC AUTO-ENTMCNC: 33.4 G/DL (ref 32.4–36.3)
MCV RBC AUTO: 87.2 FL (ref 80.5–98.2)
MONOCYTES # BLD AUTO: 0.68 10*3/MM3 (ref 0.2–1.2)
MONOCYTES NFR BLD AUTO: 8.4 % (ref 5–12)
NEUTROPHILS # BLD AUTO: 5.44 10*3/MM3 (ref 1.9–8.1)
NEUTROPHILS NFR BLD AUTO: 67.5 % (ref 42.7–76)
PLATELET # BLD AUTO: 158 10*3/MM3 (ref 140–500)
RBC # BLD AUTO: 4.84 10*6/MM3 (ref 3.9–5.2)
RH BLD: POSITIVE
RPR SER QL: NORMAL
RUBV IGG SERPL IA-ACNC: 3.36 INDEX
VZV IGG SER IA-ACNC: 2752 INDEX
WBC # BLD AUTO: 8.07 10*3/MM3 (ref 4.5–10.7)

## 2018-06-01 ENCOUNTER — TELEPHONE (OUTPATIENT)
Dept: OBSTETRICS AND GYNECOLOGY | Age: 23
End: 2018-06-01

## 2018-06-01 NOTE — TELEPHONE ENCOUNTER
----- Message from Ursula Mixon MD sent at 5/31/2018  5:47 PM EDT -----  Please call patient and notify of normal results of prenatal labs

## 2018-06-04 ENCOUNTER — TELEPHONE (OUTPATIENT)
Dept: OBSTETRICS AND GYNECOLOGY | Age: 23
End: 2018-06-04

## 2018-06-04 NOTE — TELEPHONE ENCOUNTER
Aware you out til next week.    Pt has appt sched for 06/22/2018 with US. Does pt need to keep US? Pt states she has already had 2. Please advise.    Pt # [phone]

## 2018-06-22 ENCOUNTER — ROUTINE PRENATAL (OUTPATIENT)
Dept: OBSTETRICS AND GYNECOLOGY | Age: 23
End: 2018-06-22

## 2018-06-22 VITALS — SYSTOLIC BLOOD PRESSURE: 110 MMHG | WEIGHT: 237 LBS | BODY MASS INDEX: 40.68 KG/M2 | DIASTOLIC BLOOD PRESSURE: 75 MMHG

## 2018-06-22 DIAGNOSIS — M54.40 CHRONIC BILATERAL LOW BACK PAIN WITH SCIATICA, SCIATICA LATERALITY UNSPECIFIED: ICD-10-CM

## 2018-06-22 DIAGNOSIS — Z13.89 SCREENING FOR BLOOD OR PROTEIN IN URINE: ICD-10-CM

## 2018-06-22 DIAGNOSIS — Z34.01 ENCOUNTER FOR PRENATAL CARE IN FIRST TRIMESTER OF FIRST PREGNANCY: Primary | ICD-10-CM

## 2018-06-22 DIAGNOSIS — O99.210 OBESITY IN PREGNANCY: ICD-10-CM

## 2018-06-22 DIAGNOSIS — O21.9 NAUSEA AND VOMITING OF PREGNANCY, ANTEPARTUM: ICD-10-CM

## 2018-06-22 DIAGNOSIS — E28.2 PCOS (POLYCYSTIC OVARIAN SYNDROME): ICD-10-CM

## 2018-06-22 DIAGNOSIS — G89.29 CHRONIC BILATERAL LOW BACK PAIN WITH SCIATICA, SCIATICA LATERALITY UNSPECIFIED: ICD-10-CM

## 2018-06-22 LAB
BILIRUB BLD-MCNC: NEGATIVE MG/DL
CLARITY, POC: CLEAR
COLOR UR: YELLOW
GLUCOSE UR STRIP-MCNC: NEGATIVE MG/DL
KETONES UR QL: NEGATIVE
LEUKOCYTE EST, POC: NEGATIVE
NITRITE UR-MCNC: NEGATIVE MG/ML
PH UR: 7.5 [PH] (ref 5–8)
PROT UR STRIP-MCNC: NEGATIVE MG/DL
RBC # UR STRIP: ABNORMAL /UL
SP GR UR: 1.01 (ref 1–1.03)
UROBILINOGEN UR QL: NORMAL

## 2018-06-22 PROCEDURE — 81002 URINALYSIS NONAUTO W/O SCOPE: CPT | Performed by: OBSTETRICS & GYNECOLOGY

## 2018-06-22 PROCEDURE — 0502F SUBSEQUENT PRENATAL CARE: CPT | Performed by: OBSTETRICS & GYNECOLOGY

## 2018-06-22 NOTE — PROGRESS NOTES
OB intake done   Prenatal labs reviewed   Nausea - bonjesta  SAB warnings   CfDNA next Friday  Discussed diet/weight at length

## 2018-06-26 ENCOUNTER — TELEPHONE (OUTPATIENT)
Dept: OBSTETRICS AND GYNECOLOGY | Age: 23
End: 2018-06-26

## 2018-06-29 ENCOUNTER — TELEPHONE (OUTPATIENT)
Dept: OBSTETRICS AND GYNECOLOGY | Age: 23
End: 2018-06-29

## 2018-07-03 RX ORDER — PROMETHAZINE HYDROCHLORIDE 12.5 MG/1
12.5 TABLET ORAL EVERY 6 HOURS PRN
Qty: 20 TABLET | Refills: 0 | Status: SHIPPED | OUTPATIENT
Start: 2018-07-03 | End: 2018-12-20 | Stop reason: HOSPADM

## 2018-07-09 ENCOUNTER — TELEPHONE (OUTPATIENT)
Dept: OBSTETRICS AND GYNECOLOGY | Age: 23
End: 2018-07-09

## 2018-07-10 ENCOUNTER — TELEPHONE (OUTPATIENT)
Dept: OBSTETRICS AND GYNECOLOGY | Age: 23
End: 2018-07-10

## 2018-07-10 NOTE — TELEPHONE ENCOUNTER
----- Message from Ursula Mixon MD sent at 7/9/2018 10:15 AM EDT -----  Please call patient and notify of normal results of genetic screening and it's a girl

## 2018-07-10 NOTE — TELEPHONE ENCOUNTER
Pt notified of normal results of generic screening . Pt wanted to know the gender of the baby ,she said she did  changed her mind since she spoke last time with the office.

## 2018-07-18 ENCOUNTER — ROUTINE PRENATAL (OUTPATIENT)
Dept: OBSTETRICS AND GYNECOLOGY | Age: 23
End: 2018-07-18

## 2018-07-18 VITALS — SYSTOLIC BLOOD PRESSURE: 142 MMHG | WEIGHT: 240.6 LBS | DIASTOLIC BLOOD PRESSURE: 68 MMHG | BODY MASS INDEX: 41.3 KG/M2

## 2018-07-18 DIAGNOSIS — E28.2 PCOS (POLYCYSTIC OVARIAN SYNDROME): ICD-10-CM

## 2018-07-18 DIAGNOSIS — Z13.89 SCREENING FOR BLOOD OR PROTEIN IN URINE: ICD-10-CM

## 2018-07-18 DIAGNOSIS — O21.9 NAUSEA AND VOMITING OF PREGNANCY, ANTEPARTUM: ICD-10-CM

## 2018-07-18 DIAGNOSIS — O99.210 OBESITY IN PREGNANCY: ICD-10-CM

## 2018-07-18 DIAGNOSIS — Z34.01 ENCOUNTER FOR PRENATAL CARE IN FIRST TRIMESTER OF FIRST PREGNANCY: Primary | ICD-10-CM

## 2018-07-18 LAB
BILIRUB BLD-MCNC: NEGATIVE MG/DL
CLARITY, POC: CLEAR
COLOR UR: YELLOW
GLUCOSE UR STRIP-MCNC: NEGATIVE MG/DL
KETONES UR QL: NEGATIVE
LEUKOCYTE EST, POC: NEGATIVE
NITRITE UR-MCNC: NEGATIVE MG/ML
PH UR: 7 [PH] (ref 5–8)
PROT UR STRIP-MCNC: NEGATIVE MG/DL
RBC # UR STRIP: NEGATIVE /UL
SP GR UR: 1.01 (ref 1–1.03)
UROBILINOGEN UR QL: NORMAL

## 2018-07-18 PROCEDURE — 81002 URINALYSIS NONAUTO W/O SCOPE: CPT | Performed by: OBSTETRICS & GYNECOLOGY

## 2018-07-18 PROCEDURE — 0502F SUBSEQUENT PRENATAL CARE: CPT | Performed by: OBSTETRICS & GYNECOLOGY

## 2018-08-08 ENCOUNTER — ROUTINE PRENATAL (OUTPATIENT)
Dept: OBSTETRICS AND GYNECOLOGY | Age: 23
End: 2018-08-08

## 2018-08-08 VITALS — SYSTOLIC BLOOD PRESSURE: 150 MMHG | BODY MASS INDEX: 41.2 KG/M2 | WEIGHT: 240 LBS | DIASTOLIC BLOOD PRESSURE: 80 MMHG

## 2018-08-08 DIAGNOSIS — Z36.9 ANTENATAL SCREENING ENCOUNTER: ICD-10-CM

## 2018-08-08 DIAGNOSIS — Z34.82 PRENATAL CARE, SUBSEQUENT PREGNANCY, SECOND TRIMESTER: ICD-10-CM

## 2018-08-08 DIAGNOSIS — O99.210 OBESITY IN PREGNANCY: ICD-10-CM

## 2018-08-08 DIAGNOSIS — Z34.02 ENCOUNTER FOR PRENATAL CARE IN SECOND TRIMESTER OF FIRST PREGNANCY: Primary | ICD-10-CM

## 2018-08-08 DIAGNOSIS — O21.9 NAUSEA AND VOMITING OF PREGNANCY, ANTEPARTUM: ICD-10-CM

## 2018-08-08 DIAGNOSIS — E28.2 PCOS (POLYCYSTIC OVARIAN SYNDROME): ICD-10-CM

## 2018-08-08 LAB
BILIRUB BLD-MCNC: NEGATIVE MG/DL
CLARITY, POC: CLEAR
COLOR UR: YELLOW
GLUCOSE UR STRIP-MCNC: NEGATIVE MG/DL
KETONES UR QL: NEGATIVE
LEUKOCYTE EST, POC: NEGATIVE
NITRITE UR-MCNC: NEGATIVE MG/ML
PH UR: 6.5 [PH] (ref 5–8)
PROT UR STRIP-MCNC: NEGATIVE MG/DL
RBC # UR STRIP: NEGATIVE /UL
SP GR UR: 1.01 (ref 1–1.03)
UROBILINOGEN UR QL: NORMAL

## 2018-08-08 PROCEDURE — 81002 URINALYSIS NONAUTO W/O SCOPE: CPT | Performed by: OBSTETRICS & GYNECOLOGY

## 2018-08-08 PROCEDURE — 0502F SUBSEQUENT PRENATAL CARE: CPT | Performed by: OBSTETRICS & GYNECOLOGY

## 2018-08-08 RX ORDER — PROMETHAZINE HYDROCHLORIDE 25 MG/1
25 TABLET ORAL EVERY 6 HOURS PRN
Refills: 0 | COMMUNITY
Start: 2018-07-03 | End: 2019-01-05 | Stop reason: HOSPADM

## 2018-08-08 NOTE — PROGRESS NOTES
Nausea improved   Desires AFP today   Anatomy Us 3 weeks   Elevated BP - will monitor - monitors at work   SAB warnings

## 2018-08-11 LAB
AFP ADJ MOM SERPL: 0.52
AFP INTERP SERPL-IMP: NORMAL
AFP INTERP SERPL-IMP: NORMAL
AFP SERPL-MCNC: 13 NG/ML
AGE AT DELIVERY: 25.3 YR
GA METHOD: NORMAL
GA: 16.1 WEEKS
IDDM PATIENT QL: NO
LABORATORY COMMENT REPORT: NORMAL
MULTIPLE PREGNANCY: NO
NEURAL TUBE DEFECT RISK FETUS: NORMAL %
RESULT: NORMAL

## 2018-08-13 ENCOUNTER — TELEPHONE (OUTPATIENT)
Dept: OBSTETRICS AND GYNECOLOGY | Age: 23
End: 2018-08-13

## 2018-08-13 NOTE — TELEPHONE ENCOUNTER
----- Message from Ursula Mixon MD sent at 8/13/2018  8:27 AM EDT -----  Please call patient and notify of normal results of screening for spina bifida

## 2018-08-20 ENCOUNTER — TELEPHONE (OUTPATIENT)
Dept: OBSTETRICS AND GYNECOLOGY | Age: 23
End: 2018-08-20

## 2018-08-20 NOTE — TELEPHONE ENCOUNTER
It might be vertigo and unrelated to pregnancy.  Has she tried any benadryl?  It works to help with vertigo sx's and is safe while pregnant. She can also try eating something with protein.  If she wants to r/o anemia and or some other cause, I can see her today as well

## 2018-08-20 NOTE — TELEPHONE ENCOUNTER
Having serve dizziness and is not able to get out of bed without getting sick.  Patient called the on call doctor and was told it was normal. Bp has been high. 124/66 yesterday morning. But patient states she is not able to do anything standing up because she starts getting dizzy and getting sick,

## 2018-08-24 ENCOUNTER — OFFICE VISIT (OUTPATIENT)
Dept: FAMILY MEDICINE CLINIC | Facility: CLINIC | Age: 23
End: 2018-08-24

## 2018-08-24 VITALS
OXYGEN SATURATION: 95 % | BODY MASS INDEX: 40.8 KG/M2 | TEMPERATURE: 98.7 F | HEART RATE: 73 BPM | WEIGHT: 239 LBS | DIASTOLIC BLOOD PRESSURE: 82 MMHG | HEIGHT: 64 IN | SYSTOLIC BLOOD PRESSURE: 138 MMHG

## 2018-08-24 DIAGNOSIS — J01.90 ACUTE NON-RECURRENT SINUSITIS, UNSPECIFIED LOCATION: Primary | ICD-10-CM

## 2018-08-24 PROCEDURE — 99213 OFFICE O/P EST LOW 20 MIN: CPT | Performed by: NURSE PRACTITIONER

## 2018-08-24 RX ORDER — AZITHROMYCIN 250 MG/1
TABLET, FILM COATED ORAL
Qty: 6 TABLET | Refills: 0 | Status: SHIPPED | OUTPATIENT
Start: 2018-08-24 | End: 2018-09-28

## 2018-08-24 RX ORDER — DIPHENHYDRAMINE HCL 25 MG
25 CAPSULE ORAL EVERY 6 HOURS PRN
COMMUNITY
End: 2018-09-28

## 2018-08-24 NOTE — PROGRESS NOTES
Subjective   Lavonne Rocha is a 22 y.o. female. Vertigo, nausea x 6 days. Is pregnant. Had similar about 2 yrs with Vertigo, lasted about 5 days or so. This feels very much like that one. On Sat, dizziness in the shower, resolved. On Sun, started with the profound dizziness, vomiting. This lasted until Mon. She is pregnant and was told she could take Benadryl, thought was helping, now today is worse again. She is having a lot of nasal congestion, ear fullness and pressure in the ears. Wondering if it's the ears and not vertigo.      Dizziness   This is a new problem. The current episode started in the past 7 days. The problem occurs constantly. Associated symptoms include congestion, coughing and nausea. Pertinent negatives include no chills, fever or sore throat. The symptoms are aggravated by walking. Treatments tried: Benadryl,  The treatment provided no relief.   URI    This is a new problem. The current episode started in the past 7 days. The problem has been gradually worsening. There has been no fever. Associated symptoms include congestion, coughing, ear pain, nausea, a plugged ear sensation and rhinorrhea. Pertinent negatives include no sore throat. She has tried antihistamine for the symptoms. The treatment provided moderate relief.        The following portions of the patient's history were reviewed and updated as appropriate: allergies, current medications, past family history, past medical history, past social history, past surgical history and problem list.    Review of Systems   Constitutional: Negative for chills and fever.   HENT: Positive for congestion, ear discharge, ear pain, facial swelling, postnasal drip, rhinorrhea and sinus pressure. Negative for sore throat.    Respiratory: Positive for cough.    Cardiovascular: Negative.    Gastrointestinal: Positive for nausea.   Neurological: Positive for dizziness.       Objective   Physical Exam   Constitutional: Vital signs are normal. She appears  "well-developed and well-nourished. She is cooperative.   HENT:   Right Ear: Hearing normal. A middle ear effusion is present.   Left Ear: Hearing normal. Tympanic membrane is bulging. A middle ear effusion is present.   Nose: Mucosal edema, rhinorrhea and congestion present. Right sinus exhibits maxillary sinus tenderness and frontal sinus tenderness. Left sinus exhibits maxillary sinus tenderness and frontal sinus tenderness.   Mouth/Throat: Uvula is midline and mucous membranes are normal. Posterior oropharyngeal erythema present.   Cardiovascular: Normal rate, regular rhythm and normal heart sounds.    Pulmonary/Chest: Effort normal and breath sounds normal.   Neurological: She is alert.   Nursing note and vitals reviewed.    Vitals:    08/24/18 1555   BP: 138/82   Pulse: 73   Temp: 98.7 °F (37.1 °C)   TempSrc: Oral   SpO2: 95%   Weight: 108 kg (239 lb)   Height: 162.6 cm (64\")       Assessment/Plan   Diagnoses and all orders for this visit:    Acute non-recurrent sinusitis, unspecified location  -     azithromycin (ZITHROMAX) 250 MG tablet; Take 2 tablets the first day, then 1 tablet daily for 4 days.    Claritin  Phenergan as needed for nausea  Push fluids             "

## 2018-08-30 ENCOUNTER — TELEPHONE (OUTPATIENT)
Dept: FAMILY MEDICINE CLINIC | Facility: CLINIC | Age: 23
End: 2018-08-30

## 2018-08-31 ENCOUNTER — PROCEDURE VISIT (OUTPATIENT)
Dept: OBSTETRICS AND GYNECOLOGY | Age: 23
End: 2018-08-31

## 2018-08-31 ENCOUNTER — ROUTINE PRENATAL (OUTPATIENT)
Dept: OBSTETRICS AND GYNECOLOGY | Age: 23
End: 2018-08-31

## 2018-08-31 VITALS — WEIGHT: 240 LBS | DIASTOLIC BLOOD PRESSURE: 68 MMHG | SYSTOLIC BLOOD PRESSURE: 146 MMHG | BODY MASS INDEX: 41.2 KG/M2

## 2018-08-31 DIAGNOSIS — O99.210 OBESITY IN PREGNANCY: ICD-10-CM

## 2018-08-31 DIAGNOSIS — O21.9 NAUSEA AND VOMITING OF PREGNANCY, ANTEPARTUM: ICD-10-CM

## 2018-08-31 DIAGNOSIS — Z34.92 SECOND TRIMESTER FETUS: Primary | ICD-10-CM

## 2018-08-31 DIAGNOSIS — E28.2 PCOS (POLYCYSTIC OVARIAN SYNDROME): ICD-10-CM

## 2018-08-31 DIAGNOSIS — Z34.02 ENCOUNTER FOR PRENATAL CARE IN SECOND TRIMESTER OF FIRST PREGNANCY: Primary | ICD-10-CM

## 2018-08-31 DIAGNOSIS — Z13.89 SCREENING FOR BLOOD OR PROTEIN IN URINE: ICD-10-CM

## 2018-08-31 DIAGNOSIS — IMO0002 EVALUATE ANATOMY NOT SEEN ON PRIOR SONOGRAM: ICD-10-CM

## 2018-08-31 PROCEDURE — 0502F SUBSEQUENT PRENATAL CARE: CPT | Performed by: OBSTETRICS & GYNECOLOGY

## 2018-08-31 PROCEDURE — 76805 OB US >/= 14 WKS SNGL FETUS: CPT | Performed by: OBSTETRICS & GYNECOLOGY

## 2018-08-31 PROCEDURE — 81002 URINALYSIS NONAUTO W/O SCOPE: CPT | Performed by: OBSTETRICS & GYNECOLOGY

## 2018-09-28 ENCOUNTER — ROUTINE PRENATAL (OUTPATIENT)
Dept: OBSTETRICS AND GYNECOLOGY | Age: 23
End: 2018-09-28

## 2018-09-28 ENCOUNTER — PROCEDURE VISIT (OUTPATIENT)
Dept: OBSTETRICS AND GYNECOLOGY | Age: 23
End: 2018-09-28

## 2018-09-28 VITALS — DIASTOLIC BLOOD PRESSURE: 82 MMHG | SYSTOLIC BLOOD PRESSURE: 136 MMHG | BODY MASS INDEX: 41.54 KG/M2 | WEIGHT: 242 LBS

## 2018-09-28 DIAGNOSIS — Z23 ENCOUNTER FOR ADMINISTRATION OF VACCINE: ICD-10-CM

## 2018-09-28 DIAGNOSIS — O21.9 NAUSEA AND VOMITING OF PREGNANCY, ANTEPARTUM: ICD-10-CM

## 2018-09-28 DIAGNOSIS — Z13.89 SCREENING FOR BLOOD OR PROTEIN IN URINE: ICD-10-CM

## 2018-09-28 DIAGNOSIS — K21.9 GERD WITHOUT ESOPHAGITIS: ICD-10-CM

## 2018-09-28 DIAGNOSIS — O99.612 CONSTIPATION DURING PREGNANCY IN SECOND TRIMESTER: ICD-10-CM

## 2018-09-28 DIAGNOSIS — Z34.02 ENCOUNTER FOR PRENATAL CARE IN SECOND TRIMESTER OF FIRST PREGNANCY: Primary | ICD-10-CM

## 2018-09-28 DIAGNOSIS — O99.210 OBESITY IN PREGNANCY: ICD-10-CM

## 2018-09-28 DIAGNOSIS — O10.019 HYPERTENSION IN PREGNANCY, ESSENTIAL, ANTEPARTUM: ICD-10-CM

## 2018-09-28 DIAGNOSIS — IMO0002 EVALUATE ANATOMY NOT SEEN ON PRIOR SONOGRAM: ICD-10-CM

## 2018-09-28 DIAGNOSIS — O22.42 HEMORRHOIDS DURING PREGNANCY IN SECOND TRIMESTER: ICD-10-CM

## 2018-09-28 DIAGNOSIS — K59.00 CONSTIPATION DURING PREGNANCY IN SECOND TRIMESTER: ICD-10-CM

## 2018-09-28 DIAGNOSIS — IMO0002 EVALUATE ANATOMY NOT SEEN ON PRIOR SONOGRAM: Primary | ICD-10-CM

## 2018-09-28 LAB
BILIRUB BLD-MCNC: NEGATIVE MG/DL
GLUCOSE UR STRIP-MCNC: NEGATIVE MG/DL
KETONES UR QL: NEGATIVE
LEUKOCYTE EST, POC: NEGATIVE
NITRITE UR-MCNC: NEGATIVE MG/ML
PH UR: 7 [PH] (ref 5–8)
PROT UR STRIP-MCNC: NEGATIVE MG/DL
RBC # UR STRIP: NEGATIVE /UL
SP GR UR: 1.01 (ref 1–1.03)
UROBILINOGEN UR QL: NORMAL

## 2018-09-28 PROCEDURE — 76815 OB US LIMITED FETUS(S): CPT | Performed by: OBSTETRICS & GYNECOLOGY

## 2018-09-28 PROCEDURE — 0502F SUBSEQUENT PRENATAL CARE: CPT | Performed by: OBSTETRICS & GYNECOLOGY

## 2018-09-28 PROCEDURE — 81002 URINALYSIS NONAUTO W/O SCOPE: CPT | Performed by: OBSTETRICS & GYNECOLOGY

## 2018-09-28 RX ORDER — FLUTICASONE PROPIONATE 50 MCG
2 SPRAY, SUSPENSION (ML) NASAL DAILY
COMMUNITY
End: 2018-10-26 | Stop reason: SDUPTHER

## 2018-09-28 RX ORDER — FAMOTIDINE 20 MG/1
20 TABLET, FILM COATED ORAL DAILY
Qty: 60 TABLET | Refills: 2 | Status: SHIPPED | OUTPATIENT
Start: 2018-09-28 | End: 2018-10-26 | Stop reason: SDUPTHER

## 2018-09-28 RX ORDER — SENNA AND DOCUSATE SODIUM 50; 8.6 MG/1; MG/1
2 TABLET, FILM COATED ORAL DAILY
Qty: 60 TABLET | Refills: 2 | Status: SHIPPED | OUTPATIENT
Start: 2018-09-28 | End: 2019-05-01

## 2018-09-28 NOTE — PROGRESS NOTES
Denies complaints   Completed anatomy today  Elevated BP - start ASA - cont to monitor   Flu vacc today   Check growth next visit   PTL warnings

## 2018-10-24 ENCOUNTER — TELEPHONE (OUTPATIENT)
Dept: OBSTETRICS AND GYNECOLOGY | Age: 23
End: 2018-10-24

## 2018-10-24 ENCOUNTER — HOSPITAL ENCOUNTER (OUTPATIENT)
Facility: HOSPITAL | Age: 23
Setting detail: OBSERVATION
Discharge: HOME | End: 2018-10-24
Attending: OBSTETRICS & GYNECOLOGY | Admitting: OBSTETRICS & GYNECOLOGY

## 2018-10-24 VITALS
DIASTOLIC BLOOD PRESSURE: 61 MMHG | WEIGHT: 244 LBS | HEART RATE: 75 BPM | SYSTOLIC BLOOD PRESSURE: 139 MMHG | BODY MASS INDEX: 41.66 KG/M2 | RESPIRATION RATE: 16 BRPM | TEMPERATURE: 98.5 F | HEIGHT: 64 IN

## 2018-10-24 PROBLEM — R10.9 CRAMPING AFFECTING PREGNANCY, ANTEPARTUM: Status: ACTIVE | Noted: 2018-10-24

## 2018-10-24 PROBLEM — D69.6 THROMBOCYTOPENIA AFFECTING PREGNANCY: Status: ACTIVE | Noted: 2018-10-24

## 2018-10-24 PROBLEM — O26.899 CRAMPING AFFECTING PREGNANCY, ANTEPARTUM: Status: ACTIVE | Noted: 2018-10-24

## 2018-10-24 PROBLEM — O99.119 THROMBOCYTOPENIA AFFECTING PREGNANCY: Status: ACTIVE | Noted: 2018-10-24

## 2018-10-24 PROBLEM — Z34.90 PREGNANCY: Status: ACTIVE | Noted: 2018-10-24

## 2018-10-24 LAB
A1 MICROGLOB PLACENTAL VAG QL: NEGATIVE
ALBUMIN SERPL-MCNC: 3.3 G/DL (ref 3.5–5.2)
ALBUMIN/GLOB SERPL: 1 G/DL
ALP SERPL-CCNC: 100 U/L (ref 39–117)
ALT SERPL W P-5'-P-CCNC: 7 U/L (ref 1–33)
ANION GAP SERPL CALCULATED.3IONS-SCNC: 10.3 MMOL/L
AST SERPL-CCNC: 6 U/L (ref 1–32)
BASOPHILS # BLD AUTO: 0.02 10*3/MM3 (ref 0–0.2)
BASOPHILS NFR BLD AUTO: 0.2 % (ref 0–1.5)
BILIRUB SERPL-MCNC: <0.2 MG/DL (ref 0.1–1.2)
BLOODY SPECIMEN?: NO
BUN BLD-MCNC: 7 MG/DL (ref 6–20)
BUN/CREAT SERPL: 12.5 (ref 7–25)
CALCIUM SPEC-SCNC: 8.9 MG/DL (ref 8.6–10.5)
CHLORIDE SERPL-SCNC: 104 MMOL/L (ref 98–107)
CO2 SERPL-SCNC: 21.7 MMOL/L (ref 22–29)
CREAT BLD-MCNC: 0.56 MG/DL (ref 0.57–1)
DEPRECATED RDW RBC AUTO: 40.6 FL (ref 37–54)
EOSINOPHIL # BLD AUTO: 0.12 10*3/MM3 (ref 0–0.7)
EOSINOPHIL NFR BLD AUTO: 1.3 % (ref 0.3–6.2)
ERYTHROCYTE [DISTWIDTH] IN BLOOD BY AUTOMATED COUNT: 13.2 % (ref 11.7–13)
EXPIRATION DATE: NORMAL
FIBRONECTIN FETAL VAG QL: NEGATIVE
GFR SERPL CREATININE-BSD FRML MDRD: 132 ML/MIN/1.73
GLOBULIN UR ELPH-MCNC: 3.3 GM/DL
GLUCOSE BLD-MCNC: 80 MG/DL (ref 65–99)
HCT VFR BLD AUTO: 34.8 % (ref 35.6–45.5)
HGB BLD-MCNC: 11.6 G/DL (ref 11.9–15.5)
IMM GRANULOCYTES # BLD: 0.05 10*3/MM3 (ref 0–0.03)
IMM GRANULOCYTES NFR BLD: 0.5 % (ref 0–0.5)
LYMPHOCYTES # BLD AUTO: 1.39 10*3/MM3 (ref 0.9–4.8)
LYMPHOCYTES NFR BLD AUTO: 14.7 % (ref 19.6–45.3)
Lab: NORMAL
MCH RBC QN AUTO: 28.2 PG (ref 26.9–32)
MCHC RBC AUTO-ENTMCNC: 33.3 G/DL (ref 32.4–36.3)
MCV RBC AUTO: 84.7 FL (ref 80.5–98.2)
MONOCYTES # BLD AUTO: 0.7 10*3/MM3 (ref 0.2–1.2)
MONOCYTES NFR BLD AUTO: 7.4 % (ref 5–12)
NEUTROPHILS # BLD AUTO: 7.25 10*3/MM3 (ref 1.9–8.1)
NEUTROPHILS NFR BLD AUTO: 76.4 % (ref 42.7–76)
PLATELET # BLD AUTO: 133 10*3/MM3 (ref 140–500)
PMV BLD AUTO: 13.2 FL (ref 6–12)
POTASSIUM BLD-SCNC: 4.1 MMOL/L (ref 3.5–5.2)
PROT SERPL-MCNC: 6.6 G/DL (ref 6–8.5)
PROT UR STRIP-MCNC: NEGATIVE MG/DL
RBC # BLD AUTO: 4.11 10*6/MM3 (ref 3.9–5.2)
SODIUM BLD-SCNC: 136 MMOL/L (ref 136–145)
WBC NRBC COR # BLD: 9.48 10*3/MM3 (ref 4.5–10.7)

## 2018-10-24 PROCEDURE — 82731 ASSAY OF FETAL FIBRONECTIN: CPT | Performed by: OBSTETRICS & GYNECOLOGY

## 2018-10-24 PROCEDURE — 85025 COMPLETE CBC W/AUTO DIFF WBC: CPT | Performed by: OBSTETRICS & GYNECOLOGY

## 2018-10-24 PROCEDURE — 59025 FETAL NON-STRESS TEST: CPT

## 2018-10-24 PROCEDURE — 84112 EVAL AMNIOTIC FLUID PROTEIN: CPT | Performed by: OBSTETRICS & GYNECOLOGY

## 2018-10-24 PROCEDURE — 81002 URINALYSIS NONAUTO W/O SCOPE: CPT | Performed by: OBSTETRICS & GYNECOLOGY

## 2018-10-24 PROCEDURE — G0378 HOSPITAL OBSERVATION PER HR: HCPCS

## 2018-10-24 PROCEDURE — 59025 FETAL NON-STRESS TEST: CPT | Performed by: OBSTETRICS & GYNECOLOGY

## 2018-10-24 PROCEDURE — 80053 COMPREHEN METABOLIC PANEL: CPT | Performed by: OBSTETRICS & GYNECOLOGY

## 2018-10-24 NOTE — NON STRESS TEST
Lavonne Rocha, a  at 27w1d with an EVA of 2019, by Last Menstrual Period, was seen at [facility] for a nonstress test.    Chief Complaint   Patient presents with   • Rupture of Membranes       Patient Active Problem List   Diagnosis   • Bulging lumbar disc   • Hyperesthesia   • Degeneration of intervertebral disc of lumbar region   • Lumbar facet arthropathy   • Lumbar radiculitis   • Chronic bilateral low back pain with sciatica   • PCOS (polycystic ovarian syndrome)   • Obesity in pregnancy   • Nausea and vomiting of pregnancy, antepartum   • Hypertension in pregnancy, essential, antepartum   • GERD without esophagitis   • Hemorrhoids during pregnancy in second trimester   • Constipation during pregnancy in second trimester   • Pregnancy   • Thrombocytopenia affecting pregnancy (CMS/HCC)   • Cramping affecting pregnancy, antepartum       Start Time: 1152  Stop Time: 1400    Interpretation A  Nonstress Test Interpretation A: Reactive (10/24/18 1510 : Dada Mckeon, RN)

## 2018-10-26 ENCOUNTER — PROCEDURE VISIT (OUTPATIENT)
Dept: OBSTETRICS AND GYNECOLOGY | Age: 23
End: 2018-10-26

## 2018-10-26 ENCOUNTER — TELEPHONE (OUTPATIENT)
Dept: OBSTETRICS AND GYNECOLOGY | Age: 23
End: 2018-10-26

## 2018-10-26 ENCOUNTER — ROUTINE PRENATAL (OUTPATIENT)
Dept: OBSTETRICS AND GYNECOLOGY | Age: 23
End: 2018-10-26

## 2018-10-26 VITALS — WEIGHT: 244 LBS | SYSTOLIC BLOOD PRESSURE: 140 MMHG | DIASTOLIC BLOOD PRESSURE: 80 MMHG | BODY MASS INDEX: 41.88 KG/M2

## 2018-10-26 DIAGNOSIS — O99.119 THROMBOCYTOPENIA AFFECTING PREGNANCY (HCC): ICD-10-CM

## 2018-10-26 DIAGNOSIS — Z13.0 SCREENING FOR IRON DEFICIENCY ANEMIA: ICD-10-CM

## 2018-10-26 DIAGNOSIS — Z34.02 ENCOUNTER FOR PRENATAL CARE IN SECOND TRIMESTER OF FIRST PREGNANCY: Primary | ICD-10-CM

## 2018-10-26 DIAGNOSIS — O99.210 OBESITY IN PREGNANCY: ICD-10-CM

## 2018-10-26 DIAGNOSIS — O99.612 CONSTIPATION DURING PREGNANCY IN SECOND TRIMESTER: ICD-10-CM

## 2018-10-26 DIAGNOSIS — K21.9 GERD WITHOUT ESOPHAGITIS: ICD-10-CM

## 2018-10-26 DIAGNOSIS — D69.6 THROMBOCYTOPENIA AFFECTING PREGNANCY (HCC): ICD-10-CM

## 2018-10-26 DIAGNOSIS — Z13.1 SCREENING FOR DIABETES MELLITUS: ICD-10-CM

## 2018-10-26 DIAGNOSIS — O10.019 HYPERTENSION IN PREGNANCY, ESSENTIAL, ANTEPARTUM: ICD-10-CM

## 2018-10-26 DIAGNOSIS — Z13.89 SCREENING FOR BLOOD OR PROTEIN IN URINE: ICD-10-CM

## 2018-10-26 DIAGNOSIS — K59.00 CONSTIPATION DURING PREGNANCY IN SECOND TRIMESTER: ICD-10-CM

## 2018-10-26 DIAGNOSIS — Z23 ENCOUNTER FOR ADMINISTRATION OF VACCINE: ICD-10-CM

## 2018-10-26 LAB
BASOPHILS # BLD AUTO: 0.02 10*3/MM3 (ref 0–0.2)
BASOPHILS NFR BLD AUTO: 0.2 % (ref 0–1.5)
BILIRUB BLD-MCNC: NEGATIVE MG/DL
EOSINOPHIL # BLD AUTO: 0.14 10*3/MM3 (ref 0–0.7)
EOSINOPHIL NFR BLD AUTO: 1.6 % (ref 0.3–6.2)
ERYTHROCYTE [DISTWIDTH] IN BLOOD BY AUTOMATED COUNT: 13.2 % (ref 11.7–13)
GLUCOSE 1H P 50 G GLC PO SERPL-MCNC: 115 MG/DL (ref 65–139)
GLUCOSE UR STRIP-MCNC: NEGATIVE MG/DL
HCT VFR BLD AUTO: 37.9 % (ref 35.6–45.5)
HGB BLD-MCNC: 11.8 G/DL (ref 11.9–15.5)
IMM GRANULOCYTES # BLD: 0.02 10*3/MM3 (ref 0–0.03)
IMM GRANULOCYTES NFR BLD: 0.2 % (ref 0–0.5)
KETONES UR QL: NEGATIVE
LEUKOCYTE EST, POC: ABNORMAL
LYMPHOCYTES # BLD AUTO: 1.2 10*3/MM3 (ref 0.9–4.8)
LYMPHOCYTES NFR BLD AUTO: 13.7 % (ref 19.6–45.3)
MCH RBC QN AUTO: 27.1 PG (ref 26.9–32)
MCHC RBC AUTO-ENTMCNC: 31.1 G/DL (ref 32.4–36.3)
MCV RBC AUTO: 87.1 FL (ref 80.5–98.2)
MONOCYTES # BLD AUTO: 0.44 10*3/MM3 (ref 0.2–1.2)
MONOCYTES NFR BLD AUTO: 5 % (ref 5–12)
NEUTROPHILS # BLD AUTO: 6.93 10*3/MM3 (ref 1.9–8.1)
NEUTROPHILS NFR BLD AUTO: 79.3 % (ref 42.7–76)
NITRITE UR-MCNC: NEGATIVE MG/ML
PH UR: 7.5 [PH] (ref 5–8)
PLATELET # BLD AUTO: 152 10*3/MM3 (ref 140–500)
PROT UR STRIP-MCNC: NEGATIVE MG/DL
RBC # BLD AUTO: 4.35 10*6/MM3 (ref 3.9–5.2)
RBC # UR STRIP: NEGATIVE /UL
SP GR UR: 1.01 (ref 1–1.03)
UROBILINOGEN UR QL: NORMAL
WBC # BLD AUTO: 8.75 10*3/MM3 (ref 4.5–10.7)

## 2018-10-26 PROCEDURE — 0502F SUBSEQUENT PRENATAL CARE: CPT | Performed by: OBSTETRICS & GYNECOLOGY

## 2018-10-26 PROCEDURE — 81002 URINALYSIS NONAUTO W/O SCOPE: CPT | Performed by: OBSTETRICS & GYNECOLOGY

## 2018-10-26 PROCEDURE — 90715 TDAP VACCINE 7 YRS/> IM: CPT | Performed by: OBSTETRICS & GYNECOLOGY

## 2018-10-26 PROCEDURE — 90471 IMMUNIZATION ADMIN: CPT | Performed by: OBSTETRICS & GYNECOLOGY

## 2018-10-26 PROCEDURE — 76816 OB US FOLLOW-UP PER FETUS: CPT | Performed by: OBSTETRICS & GYNECOLOGY

## 2018-10-26 RX ORDER — LABETALOL 100 MG/1
100 TABLET, FILM COATED ORAL EVERY 12 HOURS SCHEDULED
Qty: 60 TABLET | Refills: 1 | Status: SHIPPED | OUTPATIENT
Start: 2018-10-26 | End: 2018-11-09 | Stop reason: SINTOL

## 2018-10-26 RX ORDER — FAMOTIDINE 20 MG/1
20 TABLET, FILM COATED ORAL 2 TIMES DAILY
Qty: 60 TABLET | Refills: 2 | Status: SHIPPED | OUTPATIENT
Start: 2018-10-26 | End: 2018-11-09 | Stop reason: CLARIF

## 2018-10-26 RX ORDER — FLUTICASONE PROPIONATE 50 MCG
2 SPRAY, SUSPENSION (ML) NASAL DAILY
Qty: 9.9 ML | Refills: 1 | Status: ON HOLD | OUTPATIENT
Start: 2018-10-26 | End: 2019-01-04 | Stop reason: SDUPTHER

## 2018-10-26 NOTE — TELEPHONE ENCOUNTER
Patient is wondering if she is needing to continue her Asprin due to her platelets being low now.      Patient is also needing her Flonase  Sent into the pharamcy. Also needing pepcid refilled to change to taking it 2 times a day

## 2018-10-26 NOTE — PROGRESS NOTES
Denies complaints   EFW today 15 % - to GIULIANO for growth US/consult   Increased BP - will start Labetalol 100 BID - cont ASA      Thrombocytopenia - recheck next visit  PTL warnings   Stop BP meds if BP < 120/70 at work

## 2018-10-27 ENCOUNTER — TELEPHONE (OUTPATIENT)
Dept: OBSTETRICS AND GYNECOLOGY | Age: 23
End: 2018-10-27

## 2018-10-27 NOTE — TELEPHONE ENCOUNTER
Called pt back after she paged MD on call. Pt started her BP medication today but noted that her BP is low today with most recent value of 106/55. She denies lightheadedness or weakness. She denies any vaginal bleeding or regular ctx. She notes active fetal movement. Reviewed recent OB noted. Dr. Mixon documented she wanted pt to hold BP medication if BP < 120/70 so recommended she hold BP medication at this time. Pt understands and will hold medication and monitor values. Advised her to call with any symptoms of preeclampsia or any other concerns.

## 2018-10-29 ENCOUNTER — TELEPHONE (OUTPATIENT)
Dept: OBSTETRICS AND GYNECOLOGY | Age: 23
End: 2018-10-29

## 2018-10-29 NOTE — TELEPHONE ENCOUNTER
----- Message from Ursula Mixon MD sent at 10/28/2018  3:52 PM EDT -----  Please call patient and notify of normal results of one-hour gtt and platelets improved

## 2018-11-01 ENCOUNTER — ROUTINE PRENATAL (OUTPATIENT)
Dept: OBSTETRICS AND GYNECOLOGY | Age: 23
End: 2018-11-01

## 2018-11-01 VITALS — SYSTOLIC BLOOD PRESSURE: 132 MMHG | BODY MASS INDEX: 41.71 KG/M2 | WEIGHT: 243 LBS | DIASTOLIC BLOOD PRESSURE: 74 MMHG

## 2018-11-01 DIAGNOSIS — D69.6 THROMBOCYTOPENIA AFFECTING PREGNANCY (HCC): ICD-10-CM

## 2018-11-01 DIAGNOSIS — O10.019 HYPERTENSION IN PREGNANCY, ESSENTIAL, ANTEPARTUM: ICD-10-CM

## 2018-11-01 DIAGNOSIS — Z3A.28 28 WEEKS GESTATION OF PREGNANCY: ICD-10-CM

## 2018-11-01 DIAGNOSIS — O99.119 THROMBOCYTOPENIA AFFECTING PREGNANCY (HCC): ICD-10-CM

## 2018-11-01 PROCEDURE — 0502F SUBSEQUENT PRENATAL CARE: CPT | Performed by: PHYSICIAN ASSISTANT

## 2018-11-01 RX ORDER — CETIRIZINE HYDROCHLORIDE 10 MG/1
TABLET ORAL
COMMUNITY
Start: 2018-10-06 | End: 2018-12-07 | Stop reason: HOSPADM

## 2018-11-01 NOTE — PROGRESS NOTES
Pregnant pt here for routine ob visit  She is back today since she was started on meds for BP  She did start the labetalol but was getting low readings so she dc'd it  She has been checking her BP with a large cuff at work and all her BP readings are <140/90  She denies HA/vision changes or swelling  Has good FM  Neg protein and recent labs wnl  Has appt with MFM but not for a month  Messaged Tia to see if she could get in earlier  Today's BP reading is stable  Monitor BP and r/s labetalol if BP >140/90   If pt notes HA/vision changes or acutely elevated BP, to L&D  Pt aware and reassured that present reading is stable  She is concerned regarding growth of baby  Reassured that we are monitoring closely   F/u with Ford next week

## 2018-11-09 ENCOUNTER — HOSPITAL ENCOUNTER (OUTPATIENT)
Dept: ULTRASOUND IMAGING | Facility: HOSPITAL | Age: 23
Discharge: HOME | End: 2018-11-09
Attending: OBSTETRICS & GYNECOLOGY | Admitting: OBSTETRICS & GYNECOLOGY

## 2018-11-09 ENCOUNTER — ROUTINE PRENATAL (OUTPATIENT)
Dept: OBSTETRICS AND GYNECOLOGY | Age: 23
End: 2018-11-09

## 2018-11-09 ENCOUNTER — TRANSCRIBE ORDERS (OUTPATIENT)
Dept: ADMINISTRATIVE | Facility: HOSPITAL | Age: 23
End: 2018-11-09

## 2018-11-09 VITALS — WEIGHT: 244 LBS | SYSTOLIC BLOOD PRESSURE: 138 MMHG | DIASTOLIC BLOOD PRESSURE: 80 MMHG | BODY MASS INDEX: 41.88 KG/M2

## 2018-11-09 DIAGNOSIS — K21.9 GERD WITHOUT ESOPHAGITIS: ICD-10-CM

## 2018-11-09 DIAGNOSIS — Z34.03 ENCOUNTER FOR PRENATAL CARE IN THIRD TRIMESTER OF FIRST PREGNANCY: Primary | ICD-10-CM

## 2018-11-09 DIAGNOSIS — D69.6 THROMBOCYTOPENIA AFFECTING PREGNANCY (HCC): ICD-10-CM

## 2018-11-09 DIAGNOSIS — K59.00 CONSTIPATION DURING PREGNANCY IN SECOND TRIMESTER: ICD-10-CM

## 2018-11-09 DIAGNOSIS — O21.9 NAUSEA AND VOMITING OF PREGNANCY, ANTEPARTUM: ICD-10-CM

## 2018-11-09 DIAGNOSIS — O10.019 HYPERTENSION IN PREGNANCY, ESSENTIAL, ANTEPARTUM: ICD-10-CM

## 2018-11-09 DIAGNOSIS — O22.42 HEMORRHOIDS DURING PREGNANCY IN SECOND TRIMESTER: ICD-10-CM

## 2018-11-09 DIAGNOSIS — O99.119 THROMBOCYTOPENIA AFFECTING PREGNANCY (HCC): ICD-10-CM

## 2018-11-09 DIAGNOSIS — O99.210 OBESITY IN PREGNANCY: ICD-10-CM

## 2018-11-09 DIAGNOSIS — O99.612 CONSTIPATION DURING PREGNANCY IN SECOND TRIMESTER: ICD-10-CM

## 2018-11-09 DIAGNOSIS — Z13.89 SCREENING FOR BLOOD OR PROTEIN IN URINE: ICD-10-CM

## 2018-11-09 LAB
BILIRUB BLD-MCNC: NEGATIVE MG/DL
GLUCOSE UR STRIP-MCNC: NEGATIVE MG/DL
KETONES UR QL: NEGATIVE
LEUKOCYTE EST, POC: ABNORMAL
NITRITE UR-MCNC: NEGATIVE MG/ML
PH UR: 7 [PH] (ref 5–8)
PROT UR STRIP-MCNC: NEGATIVE MG/DL
RBC # UR STRIP: NEGATIVE /UL
SP GR UR: 1.02 (ref 1–1.03)
UROBILINOGEN UR QL: NORMAL

## 2018-11-09 PROCEDURE — 76820 UMBILICAL ARTERY ECHO: CPT

## 2018-11-09 PROCEDURE — 76819 FETAL BIOPHYS PROFIL W/O NST: CPT

## 2018-11-09 PROCEDURE — 76805 OB US >/= 14 WKS SNGL FETUS: CPT

## 2018-11-09 PROCEDURE — 81002 URINALYSIS NONAUTO W/O SCOPE: CPT | Performed by: OBSTETRICS & GYNECOLOGY

## 2018-11-09 PROCEDURE — 0502F SUBSEQUENT PRENATAL CARE: CPT | Performed by: OBSTETRICS & GYNECOLOGY

## 2018-11-09 RX ORDER — OMEPRAZOLE 20 MG/1
20 CAPSULE, DELAYED RELEASE ORAL DAILY
Qty: 30 CAPSULE | Refills: 2 | Status: SHIPPED | OUTPATIENT
Start: 2018-11-09 | End: 2019-01-05 | Stop reason: HOSPADM

## 2018-11-09 NOTE — PROGRESS NOTES
RYANNE - has consult and US at [facility] today   CHTN - did not cont Labetalol bc made BP too low - mostly running 130's/80's at home   Denies complaints today   PTL warnings   2 weeks

## 2018-11-09 NOTE — CONSULTS
[facility]  Maternal-Fetal Medicine Consult Note    Dear Dr. Mixon:     I saw the above named patient for consultation upon your request due to suspected SGA.     I was able to view her prenatal record in EPIC.     There is asymmetric fetal growth with the EFW at < 10th %tile but the BPP and S/D ratio are normal.     Weekly BPP alternating between your office and the [facility].  Daily Fetal Movement Monitoring is also suggested.     A repeat BPP, Doppler, and growth sonogram is SCHEDULED in the [facility] in 2 weeks.  However, the appointment can be canceled if you desire to make other arrangements.  BUT, please notify us if you make other arrangements.     Due to maternal obesity, because of the increased risk of unexplained, late pregnancy stillbirth, I recommend initiating weekly BPP along with daily Fetal Movement Monitoring and delivery at 39-40 completed weeks, or earlier if medically or obstetrically indicated.  I recommend a TSH.     Obese women should gain about 10 pounds for the entire pregnancy.  Recommend: 1) consultation with the [facility] nutritionist 2) maximum daily caloric intake depends on pre-pregnancy BMI; no further caloric/food intake once that number is reached  3) use an Bridget (eg My Fitness Pal) as a dietary journal to track total calories.  Consider Sleep Apnea study if BMI > 40.     Fetal Movement Monitoring instruction sheet describing the technique, rationale, and instructions was provided to your patient.     ACOG and USPSTF advise low dose aspirin (81 mg daily) after 12 weeks for history of preeclampsia, multifetal gestation, CHTN, Type I or II DM, renal disease, SLE or APLAS.  Those with multiple risks including obesity, nulliparity, low socioeconomic status, age > 35, or AA race may also be considered candidates.      DVT Prophylaxis: SCDs during any hospital care, particularly peripartum.  If C/S  delivery is required, recommend SCDs and antibiotic prophylaxis plus early ambulation, and postpartum  Lovenox 60 mg daily beginning 12-24 hrs post-op and continued to hospital discharge.     For billing purposes, 15 min spent face-to-face with the patient of which > 50% devoted to patient counseling and coordination of care, exclusive of ultrasound exam.     If you have any questions about the results of this sonogram or my recommendations, please feel free to contact me at any time.     Thank you for referring this patient to the [facility] at [facility].  If you have any questions about the results of this examination or my recommendations, please feel free to contact me at your convenience.     Sincerely yours,    Kang Hampton MD  11/9/2018  2:22 PM

## 2018-11-10 PROBLEM — O36.5930 POOR FETAL GROWTH AFFECTING MANAGEMENT OF MOTHER IN THIRD TRIMESTER: Status: ACTIVE | Noted: 2018-11-10

## 2018-11-12 ENCOUNTER — TELEPHONE (OUTPATIENT)
Dept: OBSTETRICS AND GYNECOLOGY | Age: 23
End: 2018-11-12

## 2018-11-12 NOTE — TELEPHONE ENCOUNTER
Patient did her BP at work and states it was 166/74. Patient was going to try to calm down and try again but patient cant remember what she was told to do if her BP was high.

## 2018-11-15 ENCOUNTER — PROCEDURE VISIT (OUTPATIENT)
Dept: OBSTETRICS AND GYNECOLOGY | Age: 23
End: 2018-11-15

## 2018-11-15 ENCOUNTER — ROUTINE PRENATAL (OUTPATIENT)
Dept: OBSTETRICS AND GYNECOLOGY | Age: 23
End: 2018-11-15

## 2018-11-15 VITALS — WEIGHT: 244 LBS | BODY MASS INDEX: 41.88 KG/M2 | SYSTOLIC BLOOD PRESSURE: 128 MMHG | DIASTOLIC BLOOD PRESSURE: 72 MMHG

## 2018-11-15 DIAGNOSIS — Z3A.30 30 WEEKS GESTATION OF PREGNANCY: Primary | ICD-10-CM

## 2018-11-15 DIAGNOSIS — O10.019 HYPERTENSION IN PREGNANCY, ESSENTIAL, ANTEPARTUM: ICD-10-CM

## 2018-11-15 LAB — TSH SERPL DL<=0.005 MIU/L-ACNC: 0.78 MIU/ML (ref 0.27–4.2)

## 2018-11-15 PROCEDURE — 0502F SUBSEQUENT PRENATAL CARE: CPT | Performed by: PHYSICIAN ASSISTANT

## 2018-11-15 PROCEDURE — 76819 FETAL BIOPHYS PROFIL W/O NST: CPT | Performed by: PHYSICIAN ASSISTANT

## 2018-11-15 RX ORDER — AZITHROMYCIN 250 MG/1
TABLET, FILM COATED ORAL
Qty: 6 TABLET | Refills: 0 | Status: SHIPPED | OUTPATIENT
Start: 2018-11-15 | End: 2018-11-20

## 2018-11-15 NOTE — PROGRESS NOTES
Pregnant pt here for routine ob visit  She had a BPP today-8/8, SOWMYA was 13.20  She is taking her BP meds routinely  She had a visit with Dr Calero and it left her rather alarmed   There was a low EFW and discussion in regards to early delivery  She has a f/u with Dr Mixon and is doing weekly BPP's  Report from Dr Calero was reviewed and discussed with pt as well  It was recommended that she have a TSH level drawn so we will do that today  Last hgb was slightly low so enc inc iron and/or iron supplement, platelets had normalized  F/u as indicated and call for any problems

## 2018-11-21 ENCOUNTER — PROCEDURE VISIT (OUTPATIENT)
Dept: OBSTETRICS AND GYNECOLOGY | Age: 23
End: 2018-11-21

## 2018-11-21 ENCOUNTER — ROUTINE PRENATAL (OUTPATIENT)
Dept: OBSTETRICS AND GYNECOLOGY | Age: 23
End: 2018-11-21

## 2018-11-21 VITALS — DIASTOLIC BLOOD PRESSURE: 85 MMHG | BODY MASS INDEX: 42.4 KG/M2 | SYSTOLIC BLOOD PRESSURE: 140 MMHG | WEIGHT: 247 LBS

## 2018-11-21 DIAGNOSIS — Z34.03 ENCOUNTER FOR PRENATAL CARE IN THIRD TRIMESTER OF FIRST PREGNANCY: Primary | ICD-10-CM

## 2018-11-21 DIAGNOSIS — O99.119 THROMBOCYTOPENIA AFFECTING PREGNANCY (HCC): ICD-10-CM

## 2018-11-21 DIAGNOSIS — D69.6 THROMBOCYTOPENIA AFFECTING PREGNANCY (HCC): ICD-10-CM

## 2018-11-21 DIAGNOSIS — K21.9 GERD WITHOUT ESOPHAGITIS: ICD-10-CM

## 2018-11-21 DIAGNOSIS — O36.5930 POOR FETAL GROWTH AFFECTING MANAGEMENT OF MOTHER IN THIRD TRIMESTER, SINGLE OR UNSPECIFIED FETUS: ICD-10-CM

## 2018-11-21 DIAGNOSIS — Z13.89 SCREENING FOR BLOOD OR PROTEIN IN URINE: ICD-10-CM

## 2018-11-21 DIAGNOSIS — O99.210 OBESITY IN PREGNANCY: ICD-10-CM

## 2018-11-21 DIAGNOSIS — O36.5930 POOR FETAL GROWTH AFFECTING MANAGEMENT OF MOTHER IN THIRD TRIMESTER, SINGLE OR UNSPECIFIED FETUS: Primary | ICD-10-CM

## 2018-11-21 DIAGNOSIS — O10.019 HYPERTENSION IN PREGNANCY, ESSENTIAL, ANTEPARTUM: ICD-10-CM

## 2018-11-21 PROCEDURE — 76819 FETAL BIOPHYS PROFIL W/O NST: CPT | Performed by: OBSTETRICS & GYNECOLOGY

## 2018-11-21 PROCEDURE — 81002 URINALYSIS NONAUTO W/O SCOPE: CPT | Performed by: OBSTETRICS & GYNECOLOGY

## 2018-11-21 PROCEDURE — 0502F SUBSEQUENT PRENATAL CARE: CPT | Performed by: OBSTETRICS & GYNECOLOGY

## 2018-11-21 NOTE — PROGRESS NOTES
Patient c/o back pain   CHTN - patient states not taking her BP med - advised patient to resume Labetalol 100 BID   Cont ASA   BPP today 8/8   Obesity 13 lb weight gain   IUGR -  Repeat growth US/dopplers at [facility] Monday   PTL/PreE warnings   Return Monday   Back pain - rec pelvic girdle

## 2018-11-26 ENCOUNTER — ROUTINE PRENATAL (OUTPATIENT)
Dept: OBSTETRICS AND GYNECOLOGY | Age: 23
End: 2018-11-26

## 2018-11-26 ENCOUNTER — HOSPITAL ENCOUNTER (OUTPATIENT)
Dept: ULTRASOUND IMAGING | Facility: HOSPITAL | Age: 23
Discharge: HOME | End: 2018-11-26
Attending: OBSTETRICS & GYNECOLOGY | Admitting: OBSTETRICS & GYNECOLOGY

## 2018-11-26 ENCOUNTER — TRANSCRIBE ORDERS (OUTPATIENT)
Dept: ADMINISTRATIVE | Facility: HOSPITAL | Age: 23
End: 2018-11-26

## 2018-11-26 VITALS — SYSTOLIC BLOOD PRESSURE: 138 MMHG | WEIGHT: 246 LBS | DIASTOLIC BLOOD PRESSURE: 85 MMHG | BODY MASS INDEX: 42.23 KG/M2

## 2018-11-26 DIAGNOSIS — Z34.03 ENCOUNTER FOR PRENATAL CARE IN THIRD TRIMESTER OF FIRST PREGNANCY: Primary | ICD-10-CM

## 2018-11-26 DIAGNOSIS — D69.6 THROMBOCYTOPENIA AFFECTING PREGNANCY (HCC): ICD-10-CM

## 2018-11-26 DIAGNOSIS — O99.210 OBESITY IN PREGNANCY: ICD-10-CM

## 2018-11-26 DIAGNOSIS — O10.019 HYPERTENSION IN PREGNANCY, ESSENTIAL, ANTEPARTUM: ICD-10-CM

## 2018-11-26 DIAGNOSIS — K21.9 GERD WITHOUT ESOPHAGITIS: ICD-10-CM

## 2018-11-26 DIAGNOSIS — O99.612 CONSTIPATION DURING PREGNANCY IN SECOND TRIMESTER: ICD-10-CM

## 2018-11-26 DIAGNOSIS — K59.00 CONSTIPATION DURING PREGNANCY IN SECOND TRIMESTER: ICD-10-CM

## 2018-11-26 DIAGNOSIS — R35.0 URINARY FREQUENCY: ICD-10-CM

## 2018-11-26 DIAGNOSIS — Z13.89 SCREENING FOR BLOOD OR PROTEIN IN URINE: ICD-10-CM

## 2018-11-26 DIAGNOSIS — O36.5930 POOR FETAL GROWTH AFFECTING MANAGEMENT OF MOTHER IN THIRD TRIMESTER, SINGLE OR UNSPECIFIED FETUS: ICD-10-CM

## 2018-11-26 DIAGNOSIS — O99.119 THROMBOCYTOPENIA AFFECTING PREGNANCY (HCC): ICD-10-CM

## 2018-11-26 LAB
BILIRUB BLD-MCNC: NEGATIVE MG/DL
GLUCOSE UR STRIP-MCNC: NEGATIVE MG/DL
KETONES UR QL: NEGATIVE
LEUKOCYTE EST, POC: NEGATIVE
NITRITE UR-MCNC: NEGATIVE MG/ML
PH UR: 7 [PH] (ref 5–8)
PROT UR STRIP-MCNC: NEGATIVE MG/DL
RBC # UR STRIP: NEGATIVE /UL
SP GR UR: 1.02 (ref 1–1.03)
UROBILINOGEN UR QL: NORMAL

## 2018-11-26 PROCEDURE — 81002 URINALYSIS NONAUTO W/O SCOPE: CPT | Performed by: OBSTETRICS & GYNECOLOGY

## 2018-11-26 PROCEDURE — 76816 OB US FOLLOW-UP PER FETUS: CPT

## 2018-11-26 PROCEDURE — 0502F SUBSEQUENT PRENATAL CARE: CPT | Performed by: OBSTETRICS & GYNECOLOGY

## 2018-11-26 PROCEDURE — 76820 UMBILICAL ARTERY ECHO: CPT

## 2018-11-26 PROCEDURE — 76821 MIDDLE CEREBRAL ARTERY ECHO: CPT

## 2018-11-26 PROCEDURE — 76819 FETAL BIOPHYS PROFIL W/O NST: CPT

## 2018-11-26 NOTE — PROGRESS NOTES
CHTN - cannot tolerate BP meds - cont ASA   Strict preE warnings   Fetal Growth improved today on [facility] US - EFW 28% - repeat in 4 weeks  Start BPPs next week   Thrombocytopenia - check CBC today   PTL warnings   1 week        Urinary freguency - send urine culture

## 2018-11-26 NOTE — CONSULTS
Ms. Rocha is referred by Dr Mixon for MFM consultation on indication of   She is unaccompanied during today's exam and consultation.      Prenatal course is significant for:  Dr. Mixon started labetalol 100mg twice daily at last prenatal appointment.   Pt reports orthostatic symptoms (occasionally light headed, dizzy upon standing up).     PMH  Obstetric History       T0      L0     SAB1   TAB0   Ectopic0   Molar0   Multiple0   Live Births0       # Outcome Date GA Lbr Efe/2nd Weight Sex Delivery Anes PTL Lv   2 Current            1 SAB 10/2017 9w3d             Obstetric Comments   5.24.18 - GS on US c/w early IUP - [facility]    8.31.18 - Normal but incomplete anatomy - [facility]    9.28.18 - Completed anatomy at 23-3 weeks - [facility]    10.26.18 - 27- 3 weeks - EFW 15%, AC 27% - [facility]    11.9.18 -[facility] 29-4 weeks - EFW < 10%, AC 13% - [facility]         Past Medical History:   Diagnosis Date   • Anxiety    • Arthritis    • Chronic bilateral low back pain with sciatica    • GERD (gastroesophageal reflux disease)    • Klebsiella cystitis 2017   • PCOS (polycystic ovarian syndrome)    • Polycystic ovary syndrome        Allergies   Allergen Reactions   • Buspar [Buspirone] Rash   • Penicillins Rash       Past Surgical History:   Procedure Laterality Date   • DILATATION AND CURETTAGE  10/2017   • WISDOM TOOTH EXTRACTION             Current Outpatient Medications:   •  aspirin 81 MG tablet, Take 1 tablet by mouth Daily., Disp: 90 tablet, Rfl: 2  •  cetirizine (zyrTEC) 10 MG tablet, , Disp: , Rfl:   •  fluticasone (FLONASE) 50 MCG/ACT nasal spray, 2 sprays into the nostril(s) as directed by provider Daily., Disp: 9.9 mL, Rfl: 1  •  hydrocortisone (ANUSOL-HC) 2.5 % rectal cream, Apply rectally 2 times daily, Disp: 30 g, Rfl: 1  •  IRON-FOLIC ACID PO, Take  by mouth., Disp: , Rfl:   •  Loratadine (CLARITIN PO), Take  by mouth., Disp: , Rfl:   •  omeprazole (PRILOSEC) 20 MG capsule, Take 1 capsule by mouth Daily., Disp: 30 capsule, Rfl:  2  •  Prenatal Vit-Fe Fumarate-FA (PRENATAL, CLASSIC, VITAMIN) 28-0.8 MG tablet tablet, Take 1 tablet by mouth Daily., Disp: , Rfl:   •  promethazine (PHENERGAN) 12.5 MG tablet, Take 1 tablet by mouth Every 6 (Six) Hours As Needed for Nausea or Vomiting., Disp: 20 tablet, Rfl: 0  •  promethazine (PHENERGAN) 25 MG tablet, Take 25 mg by mouth Every 6 (Six) Hours As Needed for Nausea or Vomiting., Disp: , Rfl: 0  •  sennosides-docusate sodium (SENOKOT-S) 8.6-50 MG tablet, Take 2 tablets by mouth Daily., Disp: 60 tablet, Rfl: 2      Family history is negative for mental retardation, trisomy 21, congenital heart disease, spina bifida, cystic fibrosis, [place] ancestry.   The patient has a family history of: no birth defects.     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    See US report  EFW is at 28th centile.   AC is at the 36th centile      Impression:    31 2/7 per EVA 1/22/19  Normal fetal growth  BMI 42; 13# weight gain thus far (per pt).  Possible chronic hypertension: on labetalol 100 BID; orthostatic symptoms noted      Recommendations:  Labetalol mgmt as per Dr. Mixon.     Initiate weekly BPP by 32 weeks per medication required for hypertension.     Continue daily self BP monitoring at home.  Promptly go to hospital if BPs 160s/100s.     Keep weight steady; additional weight gain will not improve pregnancy outcome.     Evaluate interval growth in [facility] weeks at [facility].     Check plt count monthly per history of thrombocytopenia.     For billing purposes, duration of face to face consultation was approximately 15 minutes of which > 50% was devoted to patient counseling and coordination of care, exclusive of US exam.

## 2018-11-27 LAB
BASOPHILS # BLD AUTO: 0.01 10*3/MM3 (ref 0–0.2)
BASOPHILS NFR BLD AUTO: 0.1 % (ref 0–1.5)
EOSINOPHIL # BLD AUTO: 0.17 10*3/MM3 (ref 0–0.7)
EOSINOPHIL NFR BLD AUTO: 1.8 % (ref 0.3–6.2)
ERYTHROCYTE [DISTWIDTH] IN BLOOD BY AUTOMATED COUNT: 13.7 % (ref 11.7–13)
HCT VFR BLD AUTO: 36.7 % (ref 35.6–45.5)
HGB BLD-MCNC: 11.4 G/DL (ref 11.9–15.5)
IMM GRANULOCYTES # BLD: 0.04 10*3/MM3 (ref 0–0.03)
IMM GRANULOCYTES NFR BLD: 0.4 % (ref 0–0.5)
LYMPHOCYTES # BLD AUTO: 1.18 10*3/MM3 (ref 0.9–4.8)
LYMPHOCYTES NFR BLD AUTO: 12.8 % (ref 19.6–45.3)
MCH RBC QN AUTO: 27.4 PG (ref 26.9–32)
MCHC RBC AUTO-ENTMCNC: 31.1 G/DL (ref 32.4–36.3)
MCV RBC AUTO: 88.2 FL (ref 80.5–98.2)
MONOCYTES # BLD AUTO: 0.59 10*3/MM3 (ref 0.2–1.2)
MONOCYTES NFR BLD AUTO: 6.4 % (ref 5–12)
NEUTROPHILS # BLD AUTO: 7.23 10*3/MM3 (ref 1.9–8.1)
NEUTROPHILS NFR BLD AUTO: 78.5 % (ref 42.7–76)
PLATELET # BLD AUTO: 144 10*3/MM3 (ref 140–500)
RBC # BLD AUTO: 4.16 10*6/MM3 (ref 3.9–5.2)
WBC # BLD AUTO: 9.22 10*3/MM3 (ref 4.5–10.7)

## 2018-11-28 ENCOUNTER — TELEPHONE (OUTPATIENT)
Dept: OBSTETRICS AND GYNECOLOGY | Age: 23
End: 2018-11-28

## 2018-11-28 LAB
BACTERIA UR CULT: NO GROWTH
BACTERIA UR CULT: NORMAL

## 2018-11-28 NOTE — TELEPHONE ENCOUNTER
----- Message from Ursula Mixon MD sent at 11/28/2018  8:34 AM EST -----  Please call patient and notify of stable platelets and negative urine culture

## 2018-12-03 ENCOUNTER — ROUTINE PRENATAL (OUTPATIENT)
Dept: OBSTETRICS AND GYNECOLOGY | Age: 23
End: 2018-12-03

## 2018-12-03 ENCOUNTER — PROCEDURE VISIT (OUTPATIENT)
Dept: OBSTETRICS AND GYNECOLOGY | Age: 23
End: 2018-12-03

## 2018-12-03 VITALS — DIASTOLIC BLOOD PRESSURE: 85 MMHG | SYSTOLIC BLOOD PRESSURE: 142 MMHG | BODY MASS INDEX: 42.74 KG/M2 | WEIGHT: 249 LBS

## 2018-12-03 DIAGNOSIS — K21.9 GERD WITHOUT ESOPHAGITIS: ICD-10-CM

## 2018-12-03 DIAGNOSIS — O99.210 OBESITY IN PREGNANCY: ICD-10-CM

## 2018-12-03 DIAGNOSIS — O99.119 THROMBOCYTOPENIA AFFECTING PREGNANCY (HCC): ICD-10-CM

## 2018-12-03 DIAGNOSIS — O10.019 HYPERTENSION IN PREGNANCY, ESSENTIAL, ANTEPARTUM: ICD-10-CM

## 2018-12-03 DIAGNOSIS — D69.6 THROMBOCYTOPENIA AFFECTING PREGNANCY (HCC): ICD-10-CM

## 2018-12-03 DIAGNOSIS — I10 CHRONIC HYPERTENSION: Primary | ICD-10-CM

## 2018-12-03 DIAGNOSIS — Z34.03 ENCOUNTER FOR PRENATAL CARE IN THIRD TRIMESTER OF FIRST PREGNANCY: Primary | ICD-10-CM

## 2018-12-03 DIAGNOSIS — O36.5930 POOR FETAL GROWTH AFFECTING MANAGEMENT OF MOTHER IN THIRD TRIMESTER, SINGLE OR UNSPECIFIED FETUS: ICD-10-CM

## 2018-12-03 DIAGNOSIS — Z13.89 SCREENING FOR BLOOD OR PROTEIN IN URINE: ICD-10-CM

## 2018-12-03 LAB
ALBUMIN SERPL-MCNC: 3.6 G/DL (ref 3.5–5.2)
ALBUMIN/GLOB SERPL: 1.4 G/DL
ALP SERPL-CCNC: 132 U/L (ref 39–117)
ALT SERPL-CCNC: 9 U/L (ref 1–33)
AST SERPL-CCNC: 9 U/L (ref 1–32)
BASOPHILS # BLD AUTO: 0.01 10*3/MM3 (ref 0–0.2)
BASOPHILS NFR BLD AUTO: 0.1 % (ref 0–1.5)
BILIRUB BLD-MCNC: NEGATIVE MG/DL
BILIRUB SERPL-MCNC: <0.2 MG/DL (ref 0.1–1.2)
BUN SERPL-MCNC: 5 MG/DL (ref 6–20)
BUN/CREAT SERPL: 10.6 (ref 7–25)
CALCIUM SERPL-MCNC: 9 MG/DL (ref 8.6–10.5)
CHLORIDE SERPL-SCNC: 105 MMOL/L (ref 98–107)
CO2 SERPL-SCNC: 21.6 MMOL/L (ref 22–29)
CREAT SERPL-MCNC: 0.47 MG/DL (ref 0.57–1)
EOSINOPHIL # BLD AUTO: 0.1 10*3/MM3 (ref 0–0.7)
EOSINOPHIL NFR BLD AUTO: 1.2 % (ref 0.3–6.2)
ERYTHROCYTE [DISTWIDTH] IN BLOOD BY AUTOMATED COUNT: 13.7 % (ref 11.7–13)
GLOBULIN SER CALC-MCNC: 2.6 GM/DL
GLUCOSE SERPL-MCNC: 78 MG/DL (ref 65–99)
GLUCOSE UR STRIP-MCNC: NEGATIVE MG/DL
HCT VFR BLD AUTO: 34.9 % (ref 35.6–45.5)
HGB BLD-MCNC: 11.7 G/DL (ref 11.9–15.5)
IMM GRANULOCYTES # BLD: 0.03 10*3/MM3 (ref 0–0.03)
IMM GRANULOCYTES NFR BLD: 0.4 % (ref 0–0.5)
KETONES UR QL: NEGATIVE
LEUKOCYTE EST, POC: NEGATIVE
LYMPHOCYTES # BLD AUTO: 1.23 10*3/MM3 (ref 0.9–4.8)
LYMPHOCYTES NFR BLD AUTO: 14.7 % (ref 19.6–45.3)
MCH RBC QN AUTO: 27.8 PG (ref 26.9–32)
MCHC RBC AUTO-ENTMCNC: 33.5 G/DL (ref 32.4–36.3)
MCV RBC AUTO: 82.9 FL (ref 80.5–98.2)
MONOCYTES # BLD AUTO: 0.49 10*3/MM3 (ref 0.2–1.2)
MONOCYTES NFR BLD AUTO: 5.9 % (ref 5–12)
NEUTROPHILS # BLD AUTO: 6.52 10*3/MM3 (ref 1.9–8.1)
NEUTROPHILS NFR BLD AUTO: 78.1 % (ref 42.7–76)
NITRITE UR-MCNC: NEGATIVE MG/ML
PH UR: 7 [PH] (ref 5–8)
PLATELET # BLD AUTO: 141 10*3/MM3 (ref 140–500)
POTASSIUM SERPL-SCNC: 4.2 MMOL/L (ref 3.5–5.2)
PROT SERPL-MCNC: 6.2 G/DL (ref 6–8.5)
PROT UR STRIP-MCNC: NEGATIVE MG/DL
RBC # BLD AUTO: 4.21 10*6/MM3 (ref 3.9–5.2)
RBC # UR STRIP: NEGATIVE /UL
SODIUM SERPL-SCNC: 137 MMOL/L (ref 136–145)
SP GR UR: 1.01 (ref 1–1.03)
UROBILINOGEN UR QL: NORMAL
WBC # BLD AUTO: 8.35 10*3/MM3 (ref 4.5–10.7)

## 2018-12-03 PROCEDURE — 0502F SUBSEQUENT PRENATAL CARE: CPT | Performed by: OBSTETRICS & GYNECOLOGY

## 2018-12-03 PROCEDURE — 81002 URINALYSIS NONAUTO W/O SCOPE: CPT | Performed by: OBSTETRICS & GYNECOLOGY

## 2018-12-03 PROCEDURE — 76819 FETAL BIOPHYS PROFIL W/O NST: CPT | Performed by: OBSTETRICS & GYNECOLOGY

## 2018-12-03 NOTE — PROGRESS NOTES
Patient denies complaints   Denies h/a, RUQ pain or visual changes   CHTN - BP mild range - cannot tolerate BP meds - cont ASA   BPP today 8/8   Normal growth at [facility] on 11/26 - repeat 1/2  Thrombocytopenia- recheck CBC today   PTL warnings/Pree warnings   Weekly BPP

## 2018-12-04 ENCOUNTER — TELEPHONE (OUTPATIENT)
Dept: OBSTETRICS AND GYNECOLOGY | Age: 23
End: 2018-12-04

## 2018-12-05 ENCOUNTER — TELEPHONE (OUTPATIENT)
Dept: OBSTETRICS AND GYNECOLOGY | Age: 23
End: 2018-12-05

## 2018-12-05 ENCOUNTER — HOSPITAL ENCOUNTER (OUTPATIENT)
Facility: HOSPITAL | Age: 23
Setting detail: OBSERVATION
Discharge: HOME | End: 2018-12-07
Attending: OBSTETRICS & GYNECOLOGY | Admitting: OBSTETRICS & GYNECOLOGY

## 2018-12-05 PROCEDURE — G0378 HOSPITAL OBSERVATION PER HR: HCPCS

## 2018-12-05 PROCEDURE — 80053 COMPREHEN METABOLIC PANEL: CPT | Performed by: OBSTETRICS & GYNECOLOGY

## 2018-12-05 PROCEDURE — 84156 ASSAY OF PROTEIN URINE: CPT | Performed by: OBSTETRICS & GYNECOLOGY

## 2018-12-05 PROCEDURE — 82570 ASSAY OF URINE CREATININE: CPT | Performed by: OBSTETRICS & GYNECOLOGY

## 2018-12-05 PROCEDURE — 81003 URINALYSIS AUTO W/O SCOPE: CPT | Performed by: OBSTETRICS & GYNECOLOGY

## 2018-12-05 PROCEDURE — 85027 COMPLETE CBC AUTOMATED: CPT | Performed by: OBSTETRICS & GYNECOLOGY

## 2018-12-05 RX ORDER — FERROUS SULFATE 325(65) MG
325 TABLET ORAL
COMMUNITY
End: 2020-09-15 | Stop reason: SDDI

## 2018-12-05 NOTE — TELEPHONE ENCOUNTER
----- Message from Ursula Mixon MD sent at 12/4/2018  1:18 PM EST -----  Please call patient and notify of normal lab results - platelets are stable

## 2018-12-05 NOTE — TELEPHONE ENCOUNTER
Pt was notified of normal lab results, but she looked  in her chart and noticed her liver enzymes were a little elevated ?? With her BP being higher she wants to check with you if she shouldn't be concerned of anything?  She said she monitor her BP at home past couple of  days and maintain the same . 130/140/80  Thank you

## 2018-12-06 LAB
ALBUMIN SERPL-MCNC: 3.4 G/DL (ref 3.5–5.2)
ALBUMIN/GLOB SERPL: 1.2 G/DL
ALP SERPL-CCNC: 119 U/L (ref 39–117)
ALT SERPL W P-5'-P-CCNC: 8 U/L (ref 1–33)
ANION GAP SERPL CALCULATED.3IONS-SCNC: 12.4 MMOL/L
AST SERPL-CCNC: 9 U/L (ref 1–32)
BASOPHILS # BLD AUTO: 0.01 10*3/MM3 (ref 0–0.2)
BASOPHILS NFR BLD AUTO: 0.1 % (ref 0–1.5)
BILIRUB SERPL-MCNC: <0.2 MG/DL (ref 0.1–1.2)
BILIRUB UR QL STRIP: NEGATIVE
BUN BLD-MCNC: 6 MG/DL (ref 6–20)
BUN/CREAT SERPL: 14 (ref 7–25)
CALCIUM SPEC-SCNC: 8.8 MG/DL (ref 8.6–10.5)
CHLORIDE SERPL-SCNC: 107 MMOL/L (ref 98–107)
CLARITY UR: CLEAR
CO2 SERPL-SCNC: 20.6 MMOL/L (ref 22–29)
COLOR UR: YELLOW
CREAT BLD-MCNC: 0.43 MG/DL (ref 0.57–1)
CREAT UR-MCNC: 125.8 MG/DL
DEPRECATED RDW RBC AUTO: 42.4 FL (ref 37–54)
EOSINOPHIL # BLD AUTO: 0.14 10*3/MM3 (ref 0–0.7)
EOSINOPHIL NFR BLD AUTO: 1.4 % (ref 0.3–6.2)
ERYTHROCYTE [DISTWIDTH] IN BLOOD BY AUTOMATED COUNT: 13.6 % (ref 11.7–13)
GFR SERPL CREATININE-BSD FRML MDRD: >150 ML/MIN/1.73
GLOBULIN UR ELPH-MCNC: 2.8 GM/DL
GLUCOSE BLD-MCNC: 90 MG/DL (ref 65–99)
GLUCOSE UR STRIP-MCNC: NEGATIVE MG/DL
HCT VFR BLD AUTO: 33.6 % (ref 35.6–45.5)
HGB BLD-MCNC: 10.6 G/DL (ref 11.9–15.5)
HGB UR QL STRIP.AUTO: NEGATIVE
IMM GRANULOCYTES # BLD: 0.03 10*3/MM3 (ref 0–0.03)
IMM GRANULOCYTES NFR BLD: 0.3 % (ref 0–0.5)
KETONES UR QL STRIP: NEGATIVE
LEUKOCYTE ESTERASE UR QL STRIP.AUTO: NEGATIVE
LYMPHOCYTES # BLD AUTO: 1.52 10*3/MM3 (ref 0.9–4.8)
LYMPHOCYTES NFR BLD AUTO: 15.2 % (ref 19.6–45.3)
MCH RBC QN AUTO: 27.2 PG (ref 26.9–32)
MCHC RBC AUTO-ENTMCNC: 31.5 G/DL (ref 32.4–36.3)
MCV RBC AUTO: 86.2 FL (ref 80.5–98.2)
MONOCYTES # BLD AUTO: 0.73 10*3/MM3 (ref 0.2–1.2)
MONOCYTES NFR BLD AUTO: 7.3 % (ref 5–12)
NEUTROPHILS # BLD AUTO: 7.56 10*3/MM3 (ref 1.9–8.1)
NEUTROPHILS NFR BLD AUTO: 75.7 % (ref 42.7–76)
NITRITE UR QL STRIP: NEGATIVE
PH UR STRIP.AUTO: 7.5 [PH] (ref 5–8)
PLATELET # BLD AUTO: 146 10*3/MM3 (ref 140–500)
PMV BLD AUTO: 13 FL (ref 6–12)
POTASSIUM BLD-SCNC: 3.9 MMOL/L (ref 3.5–5.2)
PROT SERPL-MCNC: 6.2 G/DL (ref 6–8.5)
PROT UR QL STRIP: NEGATIVE
PROT UR-MCNC: 9 MG/DL
PROT/CREAT UR: 71.5 MG/G CREA (ref 0–200)
RBC # BLD AUTO: 3.9 10*6/MM3 (ref 3.9–5.2)
SODIUM BLD-SCNC: 140 MMOL/L (ref 136–145)
SP GR UR STRIP: 1.02 (ref 1–1.03)
UROBILINOGEN UR QL STRIP: NORMAL
WBC NRBC COR # BLD: 9.99 10*3/MM3 (ref 4.5–10.7)

## 2018-12-06 PROCEDURE — 59025 FETAL NON-STRESS TEST: CPT | Performed by: OBSTETRICS & GYNECOLOGY

## 2018-12-06 PROCEDURE — 59025 FETAL NON-STRESS TEST: CPT

## 2018-12-06 PROCEDURE — G0378 HOSPITAL OBSERVATION PER HR: HCPCS

## 2018-12-06 PROCEDURE — 99218 PR INITIAL OBSERVATION CARE/DAY 30 MINUTES: CPT | Performed by: OBSTETRICS & GYNECOLOGY

## 2018-12-06 PROCEDURE — 99225 PR SBSQ OBSERVATION CARE/DAY 25 MINUTES: CPT | Performed by: OBSTETRICS & GYNECOLOGY

## 2018-12-06 RX ORDER — FERROUS SULFATE 325(65) MG
325 TABLET ORAL
Status: DISCONTINUED | OUTPATIENT
Start: 2018-12-06 | End: 2018-12-07 | Stop reason: HOSPADM

## 2018-12-06 RX ORDER — ASPIRIN 81 MG/1
81 TABLET, CHEWABLE ORAL DAILY
Status: DISCONTINUED | OUTPATIENT
Start: 2018-12-06 | End: 2018-12-07 | Stop reason: HOSPADM

## 2018-12-06 RX ORDER — PRENATAL VIT NO.126/IRON/FOLIC 28MG-0.8MG
1 TABLET ORAL DAILY
Status: DISCONTINUED | OUTPATIENT
Start: 2018-12-06 | End: 2018-12-07 | Stop reason: HOSPADM

## 2018-12-06 RX ORDER — OXYCODONE HYDROCHLORIDE AND ACETAMINOPHEN 5; 325 MG/1; MG/1
1 TABLET ORAL ONCE
Status: COMPLETED | OUTPATIENT
Start: 2018-12-06 | End: 2018-12-06

## 2018-12-06 RX ORDER — ACETAMINOPHEN 325 MG/1
650 TABLET ORAL EVERY 4 HOURS PRN
Status: DISCONTINUED | OUTPATIENT
Start: 2018-12-06 | End: 2018-12-07 | Stop reason: HOSPADM

## 2018-12-06 RX ADMIN — ACETAMINOPHEN 650 MG: 325 TABLET, FILM COATED ORAL at 08:30

## 2018-12-06 RX ADMIN — OXYCODONE AND ACETAMINOPHEN 1 TABLET: 5; 325 TABLET ORAL at 11:40

## 2018-12-06 RX ADMIN — Medication 1 TABLET: at 08:30

## 2018-12-06 RX ADMIN — Medication 81 MG: at 08:30

## 2018-12-06 NOTE — H&P
[facility]  Obstetric History and Physical    Chief Complaint   Patient presents with   • Elevated Blood Pressure     ? chronic htn, elevated tonight 172/94 at home       Subjective     Patient is a 23 y.o. female  currently at 33w2d, who presents with elevated BP at home. Pt notes she came home from work and noted mild headache and increased BP. She checked her BP and it was elevated. Initially 150's systolic but worsened up to 170's with monitoring over the next 2 hours. She denies vision changes or abdominal pain. She notes normal fetal movement and denies regular ctx, vaginal bleeding or leaking fluid.     Her prenatal care is complicated by .  Patient Active Problem List   Diagnosis   • Bulging lumbar disc   • Hyperesthesia   • Degeneration of intervertebral disc of lumbar region   • Lumbar facet arthropathy   • Lumbar radiculitis   • Chronic bilateral low back pain with sciatica   • PCOS (polycystic ovarian syndrome)   • Obesity in pregnancy   • Nausea and vomiting of pregnancy, antepartum   • Hypertension in pregnancy, essential, antepartum   • GERD without esophagitis   • Hemorrhoids during pregnancy in second trimester   • Constipation during pregnancy in second trimester   • Pregnancy   • Thrombocytopenia affecting pregnancy (CMS/HCC)   • Cramping affecting pregnancy, antepartum   • SGA (small for gestational age)   • Poor fetal growth affecting management of mother in third trimester     Her previous obstetric/gynecological history is not contributory.    The following portions of the patients history were reviewed and updated as appropriate: current medications, allergies, past medical history, past surgical history, past family history, past social history and problem list .       Prenatal Information:  Prenatal Results     Initial Prenatal Labs     Test Value Reference Range Date Time    Hemoglobin 14.1 g/dL 11.9 - 15.5 g/dL 18 1336    Hematocrit 42.2 % 35.6 - 45.5 % 18 1336     Platelets 146 10*3/mm3 140 - 500 10*3/mm3 12/05/18 2344    Rubella IgG 3.36 index Immune >0.99 index 05/30/18 1336    Hepatitis B SAg Negative  Negative 05/30/18 1336    Hepatitis C Ab <0.1 s/co ratio 0.0 - 0.9 s/co ratio 05/30/18 1336    RPR Comment  Non-Reactive 05/30/18 1336    ABO O   05/30/18 1336    Rh Positive   05/30/18 1336    Antibody Screen Negative  Negative 05/30/18 1336    HIV Non Reactive  Non Reactive 05/30/18 1336    Urine Culture Final report   11/26/18 1423    Gonorrhea Negative  Negative 05/24/18 1412    Chlamydia Negative  Negative 05/24/18 1412    TSH 0.777 mIU/mL 0.270 - 4.200 mIU/mL 11/15/18           2nd and 3rd Trimester     Test Value Reference Range Date Time    Hemoglobin (repeated) 10.6 g/dL 11.9 - 15.5 g/dL 12/05/18 2344    Hematocrit (repeated) 33.6 % 35.6 - 45.5 % 12/05/18 2344     mg/dL 65 - 139 mg/dL 10/26/18 1145    Antibody Screen (repeated)        GTT Fasting        GTT 1 Hr        GTT 2 Hr        GTT 3 Hr        Group B Strep              Drug Screening     Test Value Reference Range Date Time    Amphetamine Screen        Barbiturate Screen        Benzodiazepine Screen        Methadone Screen        Phencyclidine Screen        Opiates Screen        THC Screen        Cocaine Screen        Propoxyphene Screen        Buprenorphine Screen        Methamphetamine Screen        Oxycodone Screen        Tryicyclic Antidepressants Screen              Other (Risk screening)     Test Value Reference Range Date Time    Varicella IgG 2,752 index Immune >165 index 05/30/18 1336    Parvovirus IgG        CMV IgG        Cystic Fibrosis        Hemoglobin electrophoresis NORMAL   05/30/18     NIPT        MSAFP-4        AFP (for NTD only) *Screen Negative*   08/08/18 1449              External Prenatal Results     Pregnancy Outside Results - Transcribed From Office Records - See Scanned Records For Details     Test Value Date Time    Hgb 10.6 g/dL 12/05/18 2344    Hct 33.6 % 12/05/18 2344     ABO O  18 1336    Rh Positive  18 1336    Antibody Screen Negative  18 1336    Glucose Fasting GTT       Glucose Tolerance Test 1 hour       Glucose Tolerance Test 3 hour       Gonorrhea (discrete) Negative  18 1412    Chlamydia (discrete) Negative  18 1412    RPR Comment  18 1336    VDRL       Syphilis Antibody       Rubella 3.36 index 18 1336    HBsAg Negative  18 1336    Herpes Simplex Virus PCR       Herpes Simplex VIrus Culture       HIV Non Reactive  18 1336    Hep C RNA Quant PCR       Hep C Antibody <0.1 s/co ratio 18 1336    AFP 13.0 ng/mL 18 1449    Group B Strep       GBS Susceptibility to Clindamycin       GBS Susceptibility to Erythromycin       Fetal Fibronectin Negative  10/24/18 1200    Genetic Testing, Maternal Blood             Drug Screening     Test Value Date Time    Urine Drug Screen       Amphetamine Screen Negative ng/mL 17 1432    Barbiturate Screen Negative ng/mL 17 1432    Benzodiazepine Screen Negative ng/mL 17 1432    Methadone Screen       Phencyclidine Screen Negative ng/mL 17 1432    Opiates Screen Negative ng/mL 17 1432    THC Screen       Cocaine Screen       Propoxyphene Screen       Buprenorphine Screen       Methamphetamine Screen       Oxycodone Screen       Tricyclic Antidepressants Screen                    Past OB History:     Obstetric History       T0      L0     SAB1   TAB0   Ectopic0   Molar0   Multiple0   Live Births0       # Outcome Date GA Lbr Efe/2nd Weight Sex Delivery Anes PTL Lv   2 Current            1 SAB 10/2017 9w3d             Obstetric Comments   5..18 - GS on US c/w early IUP - [facility]    8.18 - Normal but incomplete anatomy - [facility]    9.18 - Completed anatomy at 23-3 weeks - [facility]    10..18 - 27- 3 weeks - EFW 15%, AC 27% - [facility]    11..18 -[facility] 29-4 weeks - EFW < 10%, AC 13% - [facility]     11..18 - [facility] US - 31-6 weeks - EFW 28%, AC 36% (3 lbs 13 oz)-  [facility]        Past Medical History: Past Medical History:   Diagnosis Date   • Anxiety    • Arthritis    • Chronic bilateral low back pain with sciatica    • Depression    • GERD (gastroesophageal reflux disease)    • Gestational hypertension    • Klebsiella cystitis 9/25/2017   • PCOS (polycystic ovarian syndrome)    • Polycystic ovary syndrome       Past Surgical History Past Surgical History:   Procedure Laterality Date   • DILATATION AND CURETTAGE  10/2017   • WISDOM TOOTH EXTRACTION        Family History: Family History   Problem Relation Age of Onset   • Diabetes Maternal Grandfather    • Diabetes Paternal Grandmother    • Arthritis Paternal Grandfather    • Breast cancer Neg Hx    • Ovarian cancer Neg Hx    • Uterine cancer Neg Hx    • Colon cancer Neg Hx    • Deep vein thrombosis Neg Hx    • Pulmonary embolism Neg Hx    • Malig Hyperthermia Neg Hx       Social History:  reports that she quit smoking about 7 months ago. Her smoking use included cigarettes. She started smoking about 8 years ago. She has a 3.00 pack-year smoking history. she has never used smokeless tobacco.   reports that she does not drink alcohol.   reports that she does not use drugs.        General ROS: .Review of Systems - General ROS: negative  Ophthalmic ROS: neg for vision change  Respiratory ROS: no cough, shortness of breath, or wheezing  Cardiovascular ROS: no chest pain, positive for swelling  Gastrointestinal ROS: pos for occ nausea, neg for emesis or abdominal pain  Genito-Urinary ROS: neg for vaginal bleeding , ctx or leaking fluid  Neurological ROS: pos for mild headache  Dermatological ROS: negative    Objective       Vital Signs Range for the last 24 hours  Temperature: Temp:  [98.2 °F (36.8 °C)] 98.2 °F (36.8 °C)   Temp Source: Temp src: Oral   BP: BP: (128-148)/(59-71) 128/61   Pulse: Heart Rate:  [] 82   Respirations: Resp:  [18] 18   SPO2:     O2 Amount (l/min):     O2 Devices     Weight: Weight:  [113 kg (250 lb)] 113  kg (250 lb)     Physical Examination: General appearance - alert, well appearing, and in no distress  Mental status - alert, oriented to person, place, and time  Neck - supple, no significant adenopathy  Chest - clear to auscultation, no wheezes, rales or rhonchi, symmetric air entry  Heart - normal rate and regular rhythm  Abdomen - gravid, soft, no tenderness   Neurological - alert, oriented, normal speech, no focal findings or movement disorder noted, DTR's normal and symmetric  Extremities - bilateral lower ext with 1+ edema, no cords or tenderness; 2+ DTR's  Skin - normal coloration and turgor    Presentation: def   Cervix: Exam by:     Dilation:     Effacement:     Station:         Fetal Heart Rate Assessment   Method: Fetal HR Assessment Method: external   Beats/min: Fetal HR (beats/min): 140   Baseline: Fetal Heart Baseline Rate: normal range   Variability: Fetal HR Variability: moderate (amplitude range 6 to 25 bpm)   Accels: Fetal HR Accelerations: greater than/equal to 15 bpm, lasting at least 15 seconds   Decels: Fetal HR Decelerations: absent   Tracing Category:       Uterine Assessment   Method: Method: palpation, per patient report, external tocotransducer   Frequency (min): Contraction Frequency (Minutes): 0   Ctx Count in 10 min: Contractions in 10 Minutes: 0   Duration:     Intensity: Contraction Intensity: no contractions   Intensity by IUPC:     Resting Tone: Uterine Resting Tone: soft by palpation   Resting Tone by IUPC:     Williamson Units:       Laboratory Results:   Lab Results   Component Value Date    WBC 9.99 12/05/2018    HGB 10.6 (L) 12/05/2018    HCT 33.6 (L) 12/05/2018    MCV 86.2 12/05/2018     12/05/2018     Lab Results   Component Value Date    GLUCOSE 90 12/05/2018    BUN 6 12/05/2018    CREATININE 0.43 (L) 12/05/2018    EGFRIFNONA >150 12/05/2018    EGFRIFAFRI >150 12/03/2018    BCR 14.0 12/05/2018    K 3.9 12/05/2018    CO2 20.6 (L) 12/05/2018    CALCIUM 8.8 12/05/2018     PROTENTOTREF 6.2 12/03/2018    ALBUMIN 3.40 (L) 12/05/2018    LABIL2 1.4 12/03/2018    AST 9 12/05/2018    ALT 8 12/05/2018     Brief Urine Lab Results  (Last result in the past 365 days)      Color   Clarity   Blood   Leuk Est   Nitrite   Protein   CREAT   Urine HCG        12/05/18 2351             125.8       12/05/18 2351 Yellow Clear Negative Negative Negative Negative             Assessment/Plan       Obesity in pregnancy    Hypertension in pregnancy, essential, antepartum    Pregnancy    Poor fetal growth affecting management of mother in third trimester        Assessment:  1.  Intrauterine pregnancy at 33w2d gestation with reactive fetal status.    2.  CHTN in pregnancy: pt notes increased BP at home over baseline with mild headache and lower ext swelling. Reviewed reassuring BP's with monitoring. Labs are reassuring as well with normal AST/ALT, platelets and prot/creat ratio. Patient and family remain concerned given her symptoms and BP's at home. Pt notes she has been monitoring regularly with normal values in past. She also has been followed with weekly u/s evaluations for poor fetal growth. Most recent growth at [facility] was more reassuring at 28 % on 11/26 but prior estimates with MFM < 10 %. BPP within week on 12/3 was 8/8 with normal jason.    Plan:  1. Admit for observation, serial BP's and will proceed with 24 hour urine.   2. Plan of care has been reviewed with patient and family  3.  Risks, benefits of treatment plan have been discussed.  4.  All questions have been answered.        Georgette Jackson MD  12/6/2018  12:46 AM

## 2018-12-06 NOTE — PLAN OF CARE
Problem: Patient Care Overview  Goal: Plan of Care Review  Outcome: Ongoing (interventions implemented as appropriate)   18   Coping/Psychosocial   Plan of Care Reviewed With patient;spouse   Plan of Care Review   Progress no change   OTHER   Outcome Summary Pt admitted & oriented to antepartum. VSS. BP WNL. 24 hour urine began @ 0010. +FM. Denies LOF, VB, CTXs.      Goal: Individualization and Mutuality  Outcome: Ongoing (interventions implemented as appropriate)   18   Individualization   Patient Specific Goals (Include Timeframe) BP WNL. Maintain pregnancy to term.    Patient Specific Interventions BP monitored. NST BID. 24hour urine started.    Mutuality/Individual Preferences   What Anxieties, Fears, Concerns, or Questions Do You Have About Your Care? High BPs.  delivery.    What Information Would Help Us Give You More Personalized Care? SO Markel   Mutuality/Individual Preferences   How to Address Anxieties/Fears Keep informed of POC.      Goal: Discharge Needs Assessment  Outcome: Ongoing (interventions implemented as appropriate)   18   Discharge Needs Assessment   Readmission Within the Last 30 Days no previous admission in last 30 days   Concerns to be Addressed no discharge needs identified   Patient/Family Anticipates Transition to home with family   Transportation Concerns car, none   Transportation Anticipated car, drives self;family or friend will provide   Anticipated Changes Related to Illness none   Equipment Needed After Discharge none   Offered/Gave Vendor List no   Disability   Equipment Currently Used at Home none     Goal: Interprofessional Rounds/Family Conf  Outcome: Ongoing (interventions implemented as appropriate)   18   Interdisciplinary Rounds/Family Conf   Participants nursing;patient;pharmacy;physician       Problem: Prenatal Patient, Hospitalized (Adult,Obstetrics,Pediatric)  Goal: Signs and Symptoms of Listed Potential Problems Will  be Absent, Minimized or Managed (Prenatal Patient, Hospitalized)  Outcome: Ongoing (interventions implemented as appropriate)   12/06/18 0309   Goal/Outcome Evaluation   Problems Assessed (Prenatal Patient) all   Problems Present (Prenatal Patient) hypertensive disorders       Problem: Hypertensive Disorders in Pregnancy (Adult,Obstetrics,Pediatric)  Goal: Signs and Symptoms of Listed Potential Problems Will be Absent, Minimized or Managed (Hypertensive Disorders in Pregnancy)  Outcome: Ongoing (interventions implemented as appropriate)   12/06/18 0309   Goal/Outcome Evaluation   Problems Assessed (Hypertensive Disorders in Pregnancy) all   Problems Present (Hypertensive/in PG) none

## 2018-12-06 NOTE — NON STRESS TEST
Lavonne Rocha, a  at 33w2d with an EVA of 2019, by Last Menstrual Period, was seen at [facility]TEPART[facility] UN[facility] for a nonstress test.    Chief Complaint   Patient presents with   • Elevated Blood Pressure     ? chronic htn, elevated tonight 172/94 at home       Patient Active Problem List   Diagnosis   • Bulging lumbar disc   • Hyperesthesia   • Degeneration of intervertebral disc of lumbar region   • Lumbar facet arthropathy   • Lumbar radiculitis   • Chronic bilateral low back pain with sciatica   • PCOS (polycystic ovarian syndrome)   • Obesity in pregnancy   • Nausea and vomiting of pregnancy, antepartum   • Hypertension in pregnancy, essential, antepartum   • GERD without esophagitis   • Hemorrhoids during pregnancy in second trimester   • Constipation during pregnancy in second trimester   • Pregnancy   • Thrombocytopenia affecting pregnancy (CMS/HCC)   • Cramping affecting pregnancy, antepartum   • SGA (small for gestational age)   • Poor fetal growth affecting management of mother in third trimester       Start Time: 952   Stop Time:     Interpretation A  Nonstress Test Interpretation A: Reactive (18 1037 : Alejandra Aponte RN)      NST was reviewed:    Patient Active Problem List   Diagnosis   • Bulging lumbar disc   • Hyperesthesia   • Degeneration of intervertebral disc of lumbar region   • Lumbar facet arthropathy   • Lumbar radiculitis   • Chronic bilateral low back pain with sciatica   • PCOS (polycystic ovarian syndrome)   • Obesity in pregnancy   • Nausea and vomiting of pregnancy, antepartum   • Hypertension in pregnancy, essential, antepartum   • GERD without esophagitis   • Hemorrhoids during pregnancy in second trimester   • Constipation during pregnancy in second trimester   • Pregnancy   • Thrombocytopenia affecting pregnancy (CMS/HCC)   • Cramping affecting pregnancy, antepartum   • SGA (small for gestational age)   • Poor fetal growth affecting  management of mother in third trimester         33w2d    Indication for hypertension in pregnancy    NST is reassuring, Cat 1 tracing.      Recommendation:  Continue monitoring twice daily while inpatient    Reviewed by Annette Gomez MD    3:47 PM

## 2018-12-06 NOTE — PROGRESS NOTES
"[facility]  Obstetric Progress Note    Chief Complaint   Patient presents with   • Elevated Blood Pressure     ? chronic htn, elevated tonight 172/94 at home       Subjective     Patient:    The patient feels headache low level, tylenol and one percocet no help.  She notices sudden onset of increased swelling especially in her hands.  No visual changes or RUQ pain..       Review of Systems - Negative except headache  ROS:  No fever or chills, no nausea or vomiting, no contractions, no leg pain, no LE edema, no leaking fluid, no bleeding, no dysuria       Objective     Vital Signs Range for the last 24 hours  Temp:  [97.5 °F (36.4 °C)-98.5 °F (36.9 °C)] 97.5 °F (36.4 °C)   Temp src: Oral   BP: (100-148)/(55-71) 100/55   Heart Rate:  [] 75   Resp:  [16-18] 16   SpO2:  [99 %] 99 %       Device (Oxygen Therapy): room air   Weight:  [113 kg (250 lb)] 113 kg (250 lb)       Flowsheet Rows      First Filed Value   Admission Height  162.6 cm (64\") Documented at 12/05/2018 2309   Admission Weight  113 kg (250 lb) Documented at 12/05/2018 2309          Intake/Output last 24 hours:      Intake/Output Summary (Last 24 hours) at 12/6/2018 1407  Last data filed at 12/5/2018 2309  Gross per 24 hour   Intake --   Output 200 ml   Net -200 ml       Intake/Output this shift:    No intake/output data recorded.    Physical Exam:  General: Patient is comfortable, well appearing and in no acute distress   Heart CVS exam: normal rate and regular rhythm.   Lungs Chest: no tachypnea, retractions or cyanosis.     Abdomen Abdominal exam: soft, nontender, nondistended, no masses or organomegaly.   Extremities Exam of extremities: peripheral pulses normal,, no clubbing or cyanosis, pedal edema tr +, hands appear trace edema     Presentation: vtx   Cervix: Exam by:     Dilation:     Effacement:     Station:           Fetal Heart Rate Assessment   Method: Fetal HR Assessment Method: external   Beats/min: Fetal HR (beats/min): 145 "   Baseline: Fetal Heart Baseline Rate: normal range   Varibility: Fetal HR Variability: moderate (amplitude range 6 to 25 bpm)   Accels: Fetal HR Accelerations: greater than/equal to 15 bpm, lasting at least 15 seconds   Decels: Fetal HR Decelerations: absent   Tracing Category:       Uterine Assessment   Method: Method: external tocotransducer   Frequency (min): Contraction Frequency (Minutes): none   Ctx Count in 10 min:     Duration:     Intensity: Contraction Intensity: no contractions   Intensity by IUPC:     Resting Tone: Uterine Resting Tone: soft by palpation   Resting Tone by IUPC:     [place] Units:       Lab Results (last 24 hours)     Procedure Component Value Units Date/Time    Comprehensive Metabolic Panel [[id number]]  (Abnormal) Collected:  12/05/18 2344    Specimen:  Blood from Arm, Left Updated:  12/06/18 0032     Glucose 90 mg/dL      BUN 6 mg/dL      Creatinine 0.43 mg/dL      Sodium 140 mmol/L      Potassium 3.9 mmol/L      Chloride 107 mmol/L      CO2 20.6 mmol/L      Calcium 8.8 mg/dL      Total Protein 6.2 g/dL      Albumin 3.40 g/dL      ALT (SGPT) 8 U/L      AST (SGOT) 9 U/L      Alkaline Phosphatase 119 U/L      Total Bilirubin <0.2 mg/dL      eGFR Non African Amer >150 mL/min/1.73      Globulin 2.8 gm/dL      A/G Ratio 1.2 g/dL      BUN/Creatinine Ratio 14.0     Anion Gap 12.4 mmol/L     Protein / Creatinine Ratio, Urine - Urine, Clean Catch [[id number]] Collected:  12/05/18 2351    Specimen:  Urine, Clean Catch Updated:  12/06/18 0025     Protein/Creatinine Ratio, Urine 71.5 mg/G Crea      Creatinine, Urine 125.8 mg/dL      Total Protein, Urine 9.0 mg/dL     Urinalysis With Microscopic If Indicated (No Culture) - Urine, Clean Catch [[id number]]  (Normal) Collected:  12/05/18 2351    Specimen:  Urine, Clean Catch Updated:  12/06/18 0011     Color, UA Yellow     Appearance, UA Clear     pH, UA 7.5     Specific Gravity, UA 1.024     Glucose, UA Negative     Ketones, UA Negative      Bilirubin, UA Negative     Blood, UA Negative     Protein, UA Negative     Leuk Esterase, UA Negative     Nitrite, UA Negative     Urobilinogen, UA 0.2 E.U./dL    Narrative:       Urine microscopic not indicated.    CBC & Differential [[id number]] Collected:  12/05/18 2344    Specimen:  Blood Updated:  12/06/18 0010    Narrative:       The following orders were created for panel order CBC & Differential.  Procedure                               Abnormality         Status                     ---------                               -----------         ------                     Manual Differential[[id number]]                                                         CBC Auto Differential[[id number]]        Abnormal            Final result                 Please view results for these tests on the individual orders.    CBC Auto Differential [[id number]]  (Abnormal) Collected:  12/05/18 2344    Specimen:  Blood from Arm, Left Updated:  12/06/18 0010     WBC 9.99 10*3/mm3      RBC 3.90 10*6/mm3      Hemoglobin 10.6 g/dL      Hematocrit 33.6 %      MCV 86.2 fL      MCH 27.2 pg      MCHC 31.5 g/dL      RDW 13.6 %      RDW-SD 42.4 fl      MPV 13.0 fL      Platelets 146 10*3/mm3      Neutrophil % 75.7 %      Lymphocyte % 15.2 %      Monocyte % 7.3 %      Eosinophil % 1.4 %      Basophil % 0.1 %      Immature Grans % 0.3 %      Neutrophils, Absolute 7.56 10*3/mm3      Lymphocytes, Absolute 1.52 10*3/mm3      Monocytes, Absolute 0.73 10*3/mm3      Eosinophils, Absolute 0.14 10*3/mm3      Basophils, Absolute 0.01 10*3/mm3      Immature Grans, Absolute 0.03 10*3/mm3               Assessment/Plan       Obesity in pregnancy    Hypertension in pregnancy, essential, antepartum    Pregnancy    Poor fetal growth affecting management of mother in third trimester        Assessment:  1.  Intrauterine pregnancy at 33w2d weeks gestation with reactive fetal status.    2.  Labile hypertension, chronic, r/o preeclampsia  3.  Obstetrical history  significant for is non-contributory.      Plan:  1. 24 hour urine in progress, most BPs in normal range, await 24 hour urine result  2. Plan of care has been reviewed with patient and family  3.  Risks, benefits of treatment plan have been discussed.  4.  All questions have been answered.  5.  Consider steroids for possible need to deliver prior to 37 weeks  With hypertensive issues.       Annette Gomez MD 12/6/2018 2:07 PM

## 2018-12-06 NOTE — NON STRESS TEST
Lavonen Rocha, a  at 33w2d with an EVA of 2019, by Last Menstrual Period, was seen at [facility]TE[facility] UN[facility] for a nonstress test.    Chief Complaint   Patient presents with   • Elevated Blood Pressure     ? chronic htn, elevated tonight 172/94 at home       Patient Active Problem List   Diagnosis   • Bulging lumbar disc   • Hyperesthesia   • Degeneration of intervertebral disc of lumbar region   • Lumbar facet arthropathy   • Lumbar radiculitis   • Chronic bilateral low back pain with sciatica   • PCOS (polycystic ovarian syndrome)   • Obesity in pregnancy   • Nausea and vomiting of pregnancy, antepartum   • Hypertension in pregnancy, essential, antepartum   • GERD without esophagitis   • Hemorrhoids during pregnancy in second trimester   • Constipation during pregnancy in second trimester   • Pregnancy   • Thrombocytopenia affecting pregnancy (CMS/HCC)   • Cramping affecting pregnancy, antepartum   • SGA (small for gestational age)   • Poor fetal growth affecting management of mother in third trimester       Start Time:18 1746  Stop Time: 34

## 2018-12-06 NOTE — TELEPHONE ENCOUNTER
Please assure patient her AST and ALT are not elevated. Her alkaline phosphatase was elevated which is normalin pregnancy

## 2018-12-07 VITALS
BODY MASS INDEX: 42.72 KG/M2 | WEIGHT: 250.2 LBS | TEMPERATURE: 97.7 F | HEART RATE: 70 BPM | DIASTOLIC BLOOD PRESSURE: 75 MMHG | HEIGHT: 64 IN | RESPIRATION RATE: 18 BRPM | SYSTOLIC BLOOD PRESSURE: 130 MMHG | OXYGEN SATURATION: 100 %

## 2018-12-07 PROBLEM — O99.013 ANEMIA AFFECTING PREGNANCY IN THIRD TRIMESTER: Status: ACTIVE | Noted: 2018-12-07

## 2018-12-07 LAB
COLLECT DURATION TIME UR: 24 HRS
PROT 24H UR-MRATE: 144 MG/24HOURS (ref 0–150)
PROT UR-MCNC: 6 MG/DL
SPECIMEN VOL 24H UR: 2400 ML

## 2018-12-07 PROCEDURE — 59025 FETAL NON-STRESS TEST: CPT | Performed by: OBSTETRICS & GYNECOLOGY

## 2018-12-07 PROCEDURE — 99217 PR OBSERVATION CARE DISCHARGE MANAGEMENT: CPT | Performed by: OBSTETRICS & GYNECOLOGY

## 2018-12-07 PROCEDURE — 59025 FETAL NON-STRESS TEST: CPT

## 2018-12-07 PROCEDURE — 81050 URINALYSIS VOLUME MEASURE: CPT | Performed by: OBSTETRICS & GYNECOLOGY

## 2018-12-07 PROCEDURE — 84156 ASSAY OF PROTEIN URINE: CPT | Performed by: OBSTETRICS & GYNECOLOGY

## 2018-12-07 PROCEDURE — G0378 HOSPITAL OBSERVATION PER HR: HCPCS

## 2018-12-07 RX ADMIN — Medication 81 MG: at 10:02

## 2018-12-07 RX ADMIN — ACETAMINOPHEN 650 MG: 325 TABLET, FILM COATED ORAL at 00:09

## 2018-12-07 RX ADMIN — Medication 1 TABLET: at 10:02

## 2018-12-07 NOTE — DISCHARGE SUMMARY
Discharge Summary    Date of Admission: 2018  Date of Discharge:  2018      Patient: Lavonne Rocha      MR#:[id number]    Primary OB: Ursula Mixon MD     Discharge Diagnosis: 1. IUP at  33w3d  2. Chronic hypertension in pregnancy   3. Obesity in pregnancy   4. Anemia affecting pregnancy      Presenting Problem/History of Present Illness  Pregnancy [Z34.90]  Pregnancy [Z34.90]  Pregnancy [Z34.90]     Patient Active Problem List   Diagnosis   • Bulging lumbar disc   • Hyperesthesia   • Degeneration of intervertebral disc of lumbar region   • Lumbar facet arthropathy   • Lumbar radiculitis   • Chronic bilateral low back pain with sciatica   • PCOS (polycystic ovarian syndrome)   • Obesity in pregnancy   • Nausea and vomiting of pregnancy, antepartum   • Hypertension in pregnancy, essential, antepartum   • GERD without esophagitis   • Hemorrhoids during pregnancy in second trimester   • Constipation during pregnancy in second trimester   • Pregnancy   • Thrombocytopenia affecting pregnancy (CMS/HCC)   • Cramping affecting pregnancy, antepartum   • SGA (small for gestational age)   • Poor fetal growth affecting management of mother in third trimester   • Anemia affecting pregnancy in third trimester       Hospital Course  Patient is a 23 y.o. female  at 33w3d admitted for preeclampsia workup after reported elevated blood pressures at home. Blood pressures during her stay were all within normal limits. 24-hour urine returned 144 mg of protein. It was deemed that patient was stable for discharge home.    I    Condition on Discharge:  Stable    Vital Signs  Temp:  [97.6 °F (36.4 °C)-97.7 °F (36.5 °C)] 97.7 °F (36.5 °C)  Heart Rate:  [69-95] 70  Resp:  [18-20] 18  BP: (115-136)/(62-82) 130/75    Lab Results   Component Value Date    WBC 9.99 2018    HGB 10.6 (L) 2018    HCT 33.6 (L) 2018    MCV 86.2 2018     2018       Discharge Disposition  Home or Self  Care    Discharge Medications     Discharge Medications      Continue These Medications      Instructions Start Date   aspirin 81 MG tablet   81 mg, Oral, Daily      CLARITIN PO   Oral      ferrous sulfate 325 (65 FE) MG tablet   325 mg, Oral, Daily With Breakfast      fluticasone 50 MCG/ACT nasal spray  Commonly known as:  FLONASE   2 sprays, Nasal, Daily      hydrocortisone 2.5 % rectal cream  Commonly known as:  ANUSOL-HC   Apply rectally 2 times daily      IRON-FOLIC ACID PO   Oral      omeprazole 20 MG capsule  Commonly known as:  PRILOSEC   20 mg, Oral, Daily      prenatal (CLASSIC) vitamin 28-0.8 MG tablet tablet   1 tablet, Oral, Daily      promethazine 12.5 MG tablet  Commonly known as:  PHENERGAN   12.5 mg, Oral, Every 6 Hours PRN      promethazine 25 MG tablet  Commonly known as:  PHENERGAN   25 mg, Oral, Every 6 Hours PRN      sennosides-docusate sodium 8.6-50 MG tablet  Commonly known as:  SENOKOT-S   2 tablets, Oral, Daily         Stop These Medications    cetirizine 10 MG tablet  Commonly known as:  zyrTEC            Discharge Diet: Regular    Follow-up Appointments  Future Appointments   Date Time Provider Department Center   12/14/2018  8:30 AM ULTRASOUND2 PIWH CASSANDRA [facility] [place] [facility] None   12/14/2018  8:45 AM Ursula Mixon MD [facility] [place] [facility] None   12/19/2018  2:30 PM ULTRASOUND2 PIWH CASSANDRA [facility] None   12/19/2018  2:45 PM Ursula Mixon MD [facility] [place] [facility] None   1/2/2019  2:00 PM ZENON GIULIANO US 1 BH ZENON US RI ZENON     Additional Instructions for the Follow-ups that You Need to Schedule     Call MD for problems / concerns.   As directed      Instructions: Call for temp>101, vaginal bleeding greater than one pad/hour, severe pain or other concerns.    Order Comments:  Instructions: Call for temp>101, vaginal bleeding greater than one pad/hour, severe pain or other concerns.          Discharge Follow-up with Specialty: DR. Mixon; 1 Week   As directed      Specialty:  DR. Mixon    Follow Up:  1  Week                    Ursula Mixon MD  12/7/2018  11:05 AM

## 2018-12-07 NOTE — NON STRESS TEST
Lavonne Rocha, a  at 33w3d with an EVA of 2019, by Last Menstrual Period, was seen at [facility]TEPART[facility] UN[facility] for a nonstress test.    Chief Complaint   Patient presents with   • Elevated Blood Pressure     ? chronic htn, elevated tonight 172/94 at home       Patient Active Problem List   Diagnosis   • Bulging lumbar disc   • Hyperesthesia   • Degeneration of intervertebral disc of lumbar region   • Lumbar facet arthropathy   • Lumbar radiculitis   • Chronic bilateral low back pain with sciatica   • PCOS (polycystic ovarian syndrome)   • Obesity in pregnancy   • Nausea and vomiting of pregnancy, antepartum   • Hypertension in pregnancy, essential, antepartum   • GERD without esophagitis   • Hemorrhoids during pregnancy in second trimester   • Constipation during pregnancy in second trimester   • Pregnancy   • Thrombocytopenia affecting pregnancy (CMS/HCC)   • Cramping affecting pregnancy, antepartum   • SGA (small for gestational age)   • Poor fetal growth affecting management of mother in third trimester       Start Time: 922  Stop Time: 959    Interpretation A  Nonstress Test Interpretation A: Reactive (18 0959 : Alejandra Aponte RN)

## 2018-12-07 NOTE — PLAN OF CARE
Problem: Patient Care Overview  Goal: Plan of Care Review  Outcome: Outcome(s) achieved Date Met: 12/07/18 12/07/18 1145   Coping/Psychosocial   Plan of Care Reviewed With patient   OTHER   Outcome Summary patient discharged home today     Goal: Individualization and Mutuality  Outcome: Outcome(s) achieved Date Met: 12/07/18 12/07/18 1145   Individualization   Patient Specific Interventions patient discharged home today, patient will monitor bp at home with her own cuff. Patient will see Dr. Mixon on Friday on 12/14       Problem: Prenatal Patient, Hospitalized (Adult,Obstetrics,Pediatric)  Goal: Signs and Symptoms of Listed Potential Problems Will be Absent, Minimized or Managed (Prenatal Patient, Hospitalized)  Outcome: Outcome(s) achieved Date Met: 12/07/18 12/07/18 1145   Goal/Outcome Evaluation   Problems Assessed (Prenatal Patient) all   Problems Present (Prenatal Patient) none       Problem: Hypertensive Disorders in Pregnancy (Adult,Obstetrics,Pediatric)  Goal: Signs and Symptoms of Listed Potential Problems Will be Absent, Minimized or Managed (Hypertensive Disorders in Pregnancy)  Outcome: Outcome(s) achieved Date Met: 12/07/18 12/07/18 1145   Goal/Outcome Evaluation   Problems Assessed (Hypertensive Disorders in Pregnancy) all   Problems Present (Hypertensive/in PG) none

## 2018-12-07 NOTE — PLAN OF CARE
Problem: Patient Care Overview  Goal: Plan of Care Review  Outcome: Ongoing (interventions implemented as appropriate)   12/07/18 0545   Coping/Psychosocial   Plan of Care Reviewed With patient   Plan of Care Review   Progress improving   OTHER   Outcome Summary RNST; tiff VB, LOF, ctx. 24 hr urine completed-Total Protein-144. Labile BPs. Tylenol given for HA.       Problem: Prenatal Patient, Hospitalized (Adult,Obstetrics,Pediatric)  Goal: Signs and Symptoms of Listed Potential Problems Will be Absent, Minimized or Managed (Prenatal Patient, Hospitalized)  Outcome: Ongoing (interventions implemented as appropriate)   12/07/18 0544   Goal/Outcome Evaluation   Problems Assessed (Prenatal Patient) all   Problems Present (Prenatal Patient) hypertensive disorders       Problem: Hypertensive Disorders in Pregnancy (Adult,Obstetrics,Pediatric)  Goal: Signs and Symptoms of Listed Potential Problems Will be Absent, Minimized or Managed (Hypertensive Disorders in Pregnancy)  Outcome: Ongoing (interventions implemented as appropriate)   12/07/18 0545   Goal/Outcome Evaluation   Problems Assessed (Hypertensive Disorders in Pregnancy) all   Problems Present (Hypertensive/in PG) none

## 2018-12-07 NOTE — NON STRESS TEST
Lavonne Rocha, a  at 33w2d with an EVA of 2019, by Last Menstrual Period, was seen at [facility] for a nonstress test.    Chief Complaint   Patient presents with   • Elevated Blood Pressure     ? chronic htn, elevated tonight 172/94 at home       Patient Active Problem List   Diagnosis   • Bulging lumbar disc   • Hyperesthesia   • Degeneration of intervertebral disc of lumbar region   • Lumbar facet arthropathy   • Lumbar radiculitis   • Chronic bilateral low back pain with sciatica   • PCOS (polycystic ovarian syndrome)   • Obesity in pregnancy   • Nausea and vomiting of pregnancy, antepartum   • Hypertension in pregnancy, essential, antepartum   • GERD without esophagitis   • Hemorrhoids during pregnancy in second trimester   • Constipation during pregnancy in second trimester   • Pregnancy   • Thrombocytopenia affecting pregnancy (CMS/HCC)   • Cramping affecting pregnancy, antepartum   • SGA (small for gestational age)   • Poor fetal growth affecting management of mother in third trimester       Start Time:   Stop Time:     Interpretation A  Nonstress Test Interpretation A: Reactive (18 : Fanny Wong, RN)      NST was reviewed:    Patient Active Problem List   Diagnosis   • Bulging lumbar disc   • Hyperesthesia   • Degeneration of intervertebral disc of lumbar region   • Lumbar facet arthropathy   • Lumbar radiculitis   • Chronic bilateral low back pain with sciatica   • PCOS (polycystic ovarian syndrome)   • Obesity in pregnancy   • Nausea and vomiting of pregnancy, antepartum   • Hypertension in pregnancy, essential, antepartum   • GERD without esophagitis   • Hemorrhoids during pregnancy in second trimester   • Constipation during pregnancy in second trimester   • Pregnancy   • Thrombocytopenia affecting pregnancy (CMS/HCC)   • Cramping affecting pregnancy, antepartum   • SGA (small for gestational age)   • Poor fetal growth affecting management  of mother in third trimester         33w3d    Indication for hypertension in pregnancy    NST is reassuring, Cat 1 tracing.      Recommendation: continue monitoring inpatient     Reviewed by Annette Gomez MD    7:40 AM

## 2018-12-07 NOTE — PROGRESS NOTES
Name:  Lavonne Rocha        MR#:  [id number]      Progress Note    Brief note:  23 y.o.  at 33w3d admitted with labile blood pressures and preeclampsia workup.  Denies headache, visual changes or RUQ pain.  Denies ctx, loss of fluid, vaginal bleeding. +FM        Patient Active Problem List   Diagnosis   • Bulging lumbar disc   • Hyperesthesia   • Degeneration of intervertebral disc of lumbar region   • Lumbar facet arthropathy   • Lumbar radiculitis   • Chronic bilateral low back pain with sciatica   • PCOS (polycystic ovarian syndrome)   • Obesity in pregnancy   • Nausea and vomiting of pregnancy, antepartum   • Hypertension in pregnancy, essential, antepartum   • GERD without esophagitis   • Hemorrhoids during pregnancy in second trimester   • Constipation during pregnancy in second trimester   • Pregnancy   • Thrombocytopenia affecting pregnancy (CMS/HCC)   • Cramping affecting pregnancy, antepartum   • SGA (small for gestational age)   • Poor fetal growth affecting management of mother in third trimester   • Anemia affecting pregnancy in third trimester       OB History    Para Term  AB Living   2 0 0 0 1 0   SAB TAB Ectopic Molar Multiple Live Births   1 0 0 0 0 0      # Outcome Date GA Lbr Efe/2nd Weight Sex Delivery Anes PTL Lv   2 Current            1 SAB 10/2017 9w3d             Obstetric Comments   5.24.18 - GS on US c/w early IUP - [facility]    8.31.18 - Normal but incomplete anatomy - [facility]    9.28.18 - Completed anatomy at 23-3 weeks - [facility]    10.26.18 - 27- 3 weeks - EFW 15%, AC 27% - [facility]    11.9.18 -GIULIANO 29-4 weeks - EFW < 10%, AC 13% - [facility]     11.26.18 - GIULIANO US - 31-6 weeks - EFW 28%, AC 36% (3 lbs 13 oz)- [facility]      #: 1, Date: 10/2017, Sex: None, Weight: None, GA: 9w3d, Delivery: None, Apgar1: None, Apgar5: None, Living: None, Birth Comments: None    #: 2, Date: None, Sex: None, Weight: None, GA: None, Delivery: None, Apgar1: None, Apgar5: None, Living: None, Birth Comments:  None      Review of Systems          Vitals:  Temperature: Temp:  [97.6 °F (36.4 °C)-97.7 °F (36.5 °C)] 97.7 °F (36.5 °C)  Temp Source: Temp src: Oral  BP:  BP: (115-136)/(62-82) 130/75  Pulse:  Heart Rate:  [69-95] 70  Respirations: Resp:  [18-20] 18     Physical Examination: General appearance - alert, well appearing, and in no distress  Chest - clear to auscultation, no wheezes, rales or rhonchi, symmetric air entry  Heart - normal rate, regular rhythm, normal S1, S2, no murmurs, rubs, clicks or gallops  Abdomen - soft, nontender, nondistended, no masses or organomegaly  Musculoskeletal - no joint tenderness, deformity or swelling  Extremities - peripheral pulses normal, no pedal edema, no clubbing or cyanosis, no edema, redness or tenderness in the calves or thighs, Bel's sign negative bilaterally, 2+ DTR's, no clonus  Skin - normal coloration and turgor, no rashes, no suspicious skin lesions noted    LABS:   Lab Results   Component Value Date    WBC 9.99 12/05/2018    HGB 10.6 (L) 12/05/2018    HCT 33.6 (L) 12/05/2018    MCV 86.2 12/05/2018     12/05/2018     Results from last 7 days   Lab Units  12/05/18   2344   SODIUM mmol/L  140   POTASSIUM mmol/L  3.9   CHLORIDE mmol/L  107   CO2 mmol/L  20.6*   BUN mg/dL  6   CREATININE mg/dL  0.43*   CALCIUM mg/dL  8.8   BILIRUBIN mg/dL  <0.2   ALK PHOS U/L  119*   ALT (SGPT) U/L  8   AST (SGOT) U/L  9   GLUCOSE mg/dL  90         Non-stress test:  Cat 1         1. IUP @ 33w3d  2. Hypertension - CHTN vs GHTN - 24 hour urine - 144 mg - labile pressures - patient cannot tolerate BP meds.   3. Plan now for IOL at 37 weeks after steroids  4. Anemia- cont PNV/Iron   Strict PreE warnings given   Discharge home       Ursula Mixon MD  12/7/2018 10:58 AM

## 2018-12-10 ENCOUNTER — TELEPHONE (OUTPATIENT)
Dept: OBSTETRICS AND GYNECOLOGY | Age: 23
End: 2018-12-10

## 2018-12-14 ENCOUNTER — ROUTINE PRENATAL (OUTPATIENT)
Dept: OBSTETRICS AND GYNECOLOGY | Age: 23
End: 2018-12-14

## 2018-12-14 ENCOUNTER — APPOINTMENT (OUTPATIENT)
Dept: ULTRASOUND IMAGING | Facility: HOSPITAL | Age: 23
End: 2018-12-14

## 2018-12-14 ENCOUNTER — PROCEDURE VISIT (OUTPATIENT)
Dept: OBSTETRICS AND GYNECOLOGY | Age: 23
End: 2018-12-14

## 2018-12-14 ENCOUNTER — HOSPITAL ENCOUNTER (OUTPATIENT)
Facility: HOSPITAL | Age: 23
Setting detail: OBSERVATION
Discharge: HOME | End: 2018-12-14
Attending: OBSTETRICS & GYNECOLOGY | Admitting: OBSTETRICS & GYNECOLOGY

## 2018-12-14 VITALS — WEIGHT: 248 LBS | SYSTOLIC BLOOD PRESSURE: 158 MMHG | BODY MASS INDEX: 42.57 KG/M2 | DIASTOLIC BLOOD PRESSURE: 90 MMHG

## 2018-12-14 VITALS
HEIGHT: 64 IN | DIASTOLIC BLOOD PRESSURE: 70 MMHG | SYSTOLIC BLOOD PRESSURE: 121 MMHG | WEIGHT: 248 LBS | RESPIRATION RATE: 16 BRPM | HEART RATE: 91 BPM | TEMPERATURE: 98.4 F | BODY MASS INDEX: 42.34 KG/M2

## 2018-12-14 DIAGNOSIS — O10.919 HTN IN PREGNANCY, CHRONIC: Primary | ICD-10-CM

## 2018-12-14 DIAGNOSIS — O99.013 ANEMIA AFFECTING PREGNANCY IN THIRD TRIMESTER: ICD-10-CM

## 2018-12-14 DIAGNOSIS — O99.119 THROMBOCYTOPENIA AFFECTING PREGNANCY (HCC): ICD-10-CM

## 2018-12-14 DIAGNOSIS — E28.2 PCOS (POLYCYSTIC OVARIAN SYNDROME): ICD-10-CM

## 2018-12-14 DIAGNOSIS — D69.6 THROMBOCYTOPENIA AFFECTING PREGNANCY (HCC): ICD-10-CM

## 2018-12-14 DIAGNOSIS — Z34.03 ENCOUNTER FOR PRENATAL CARE IN THIRD TRIMESTER OF FIRST PREGNANCY: Primary | ICD-10-CM

## 2018-12-14 DIAGNOSIS — O99.210 OBESITY IN PREGNANCY: ICD-10-CM

## 2018-12-14 DIAGNOSIS — Z13.89 SCREENING FOR BLOOD OR PROTEIN IN URINE: ICD-10-CM

## 2018-12-14 DIAGNOSIS — O10.019 HYPERTENSION IN PREGNANCY, ESSENTIAL, ANTEPARTUM: ICD-10-CM

## 2018-12-14 LAB
ALBUMIN SERPL-MCNC: 3.3 G/DL (ref 3.5–5.2)
ALBUMIN/GLOB SERPL: 1 G/DL
ALP SERPL-CCNC: 132 U/L (ref 39–117)
ALT SERPL W P-5'-P-CCNC: 10 U/L (ref 1–33)
ANION GAP SERPL CALCULATED.3IONS-SCNC: 12.9 MMOL/L
AST SERPL-CCNC: 9 U/L (ref 1–32)
BILIRUB BLD-MCNC: NEGATIVE MG/DL
BILIRUB SERPL-MCNC: 0.2 MG/DL (ref 0.1–1.2)
BUN BLD-MCNC: 5 MG/DL (ref 6–20)
BUN/CREAT SERPL: 10.9 (ref 7–25)
CALCIUM SPEC-SCNC: 8.8 MG/DL (ref 8.6–10.5)
CHLORIDE SERPL-SCNC: 107 MMOL/L (ref 98–107)
CO2 SERPL-SCNC: 20.1 MMOL/L (ref 22–29)
CREAT BLD-MCNC: 0.46 MG/DL (ref 0.57–1)
CREAT UR-MCNC: 16.8 MG/DL
DEPRECATED RDW RBC AUTO: 42.8 FL (ref 37–54)
ERYTHROCYTE [DISTWIDTH] IN BLOOD BY AUTOMATED COUNT: 13.7 % (ref 11.7–13)
EXPIRATION DATE: NORMAL
GFR SERPL CREATININE-BSD FRML MDRD: >150 ML/MIN/1.73
GLOBULIN UR ELPH-MCNC: 3.3 GM/DL
GLUCOSE BLD-MCNC: 80 MG/DL (ref 65–99)
GLUCOSE UR STRIP-MCNC: NEGATIVE MG/DL
HCT VFR BLD AUTO: 37 % (ref 35.6–45.5)
HGB BLD-MCNC: 11.6 G/DL (ref 11.9–15.5)
KETONES UR QL: NEGATIVE
LEUKOCYTE EST, POC: ABNORMAL
Lab: NORMAL
MCH RBC QN AUTO: 27 PG (ref 26.9–32)
MCHC RBC AUTO-ENTMCNC: 31.4 G/DL (ref 32.4–36.3)
MCV RBC AUTO: 86.2 FL (ref 80.5–98.2)
NITRITE UR-MCNC: NEGATIVE MG/ML
PH UR: 7 [PH] (ref 5–8)
PLATELET # BLD AUTO: 141 10*3/MM3 (ref 140–500)
PMV BLD AUTO: 12.2 FL (ref 6–12)
POTASSIUM BLD-SCNC: 4.1 MMOL/L (ref 3.5–5.2)
PROT SERPL-MCNC: 6.6 G/DL (ref 6–8.5)
PROT UR STRIP-MCNC: NEGATIVE MG/DL
PROT UR STRIP-MCNC: NEGATIVE MG/DL
PROT UR-MCNC: <4 MG/DL
PROT/CREAT UR: NORMAL MG/G CREA (ref 0–200)
RBC # BLD AUTO: 4.29 10*6/MM3 (ref 3.9–5.2)
RBC # UR STRIP: NEGATIVE /UL
SODIUM BLD-SCNC: 140 MMOL/L (ref 136–145)
SP GR UR: 1.01 (ref 1–1.03)
UROBILINOGEN UR QL: NORMAL
WBC NRBC COR # BLD: 8.77 10*3/MM3 (ref 4.5–10.7)

## 2018-12-14 PROCEDURE — 82570 ASSAY OF URINE CREATININE: CPT | Performed by: OBSTETRICS & GYNECOLOGY

## 2018-12-14 PROCEDURE — 59025 FETAL NON-STRESS TEST: CPT | Performed by: OBSTETRICS & GYNECOLOGY

## 2018-12-14 PROCEDURE — 81002 URINALYSIS NONAUTO W/O SCOPE: CPT | Performed by: OBSTETRICS & GYNECOLOGY

## 2018-12-14 PROCEDURE — 0502F SUBSEQUENT PRENATAL CARE: CPT | Performed by: OBSTETRICS & GYNECOLOGY

## 2018-12-14 PROCEDURE — 76816 OB US FOLLOW-UP PER FETUS: CPT

## 2018-12-14 PROCEDURE — 85027 COMPLETE CBC AUTOMATED: CPT | Performed by: OBSTETRICS & GYNECOLOGY

## 2018-12-14 PROCEDURE — 59025 FETAL NON-STRESS TEST: CPT

## 2018-12-14 PROCEDURE — 84156 ASSAY OF PROTEIN URINE: CPT | Performed by: OBSTETRICS & GYNECOLOGY

## 2018-12-14 PROCEDURE — G0378 HOSPITAL OBSERVATION PER HR: HCPCS

## 2018-12-14 PROCEDURE — 76819 FETAL BIOPHYS PROFIL W/O NST: CPT | Performed by: OBSTETRICS & GYNECOLOGY

## 2018-12-14 PROCEDURE — 80053 COMPREHEN METABOLIC PANEL: CPT | Performed by: OBSTETRICS & GYNECOLOGY

## 2018-12-14 NOTE — PROGRESS NOTES
CHTN vs GHTN- BPP today 8/8   To L&D for labs/serial BP   Plan for delivery at 37 weeks    PreE/Labor warnings/FKC   Return Wednesday    mg - 12/7/18

## 2018-12-14 NOTE — NURSING NOTE
Discharge instructions reviewed with patient.  Discussed fetal kick counts, leaking of fluids, vaginal bleeding, s/s pre-eclampsia.  Patient to continue to go to all regularly scheduled appointments.  Patient to return to L&D or call on call MD if any questions or concerns.

## 2018-12-14 NOTE — NON STRESS TEST
Lavonne Rocha, a  at 34w3d with an EVA of 2019, by Last Menstrual Period, was seen at [facility] LABOR DELIVERY for a nonstress test.    Chief Complaint   Patient presents with   • Blood Pressure Check     Patient has had increased blood pressures in the office today.  Here for labs, NST, and US growth.       Patient Active Problem List   Diagnosis   • Bulging lumbar disc   • Hyperesthesia   • Degeneration of intervertebral disc of lumbar region   • Lumbar facet arthropathy   • Lumbar radiculitis   • Chronic bilateral low back pain with sciatica   • PCOS (polycystic ovarian syndrome)   • Obesity in pregnancy   • Nausea and vomiting of pregnancy, antepartum   • Hypertension in pregnancy, essential, antepartum   • GERD without esophagitis   • Hemorrhoids during pregnancy in second trimester   • Constipation during pregnancy in second trimester   • Pregnancy   • Thrombocytopenia affecting pregnancy (CMS/HCC)   • Cramping affecting pregnancy, antepartum   • SGA (small for gestational age)   • Poor fetal growth affecting management of mother in third trimester   • Anemia affecting pregnancy in third trimester       Start Time: 6938  Stop Time: 2516

## 2018-12-19 ENCOUNTER — PROCEDURE VISIT (OUTPATIENT)
Dept: OBSTETRICS AND GYNECOLOGY | Age: 23
End: 2018-12-19

## 2018-12-19 ENCOUNTER — ROUTINE PRENATAL (OUTPATIENT)
Dept: OBSTETRICS AND GYNECOLOGY | Age: 23
End: 2018-12-19

## 2018-12-19 ENCOUNTER — HOSPITAL ENCOUNTER (OUTPATIENT)
Facility: HOSPITAL | Age: 23
Setting detail: OBSERVATION
Discharge: HOME | End: 2018-12-20
Attending: OBSTETRICS & GYNECOLOGY | Admitting: OBSTETRICS & GYNECOLOGY

## 2018-12-19 VITALS — WEIGHT: 252 LBS | BODY MASS INDEX: 43.26 KG/M2 | SYSTOLIC BLOOD PRESSURE: 160 MMHG | DIASTOLIC BLOOD PRESSURE: 90 MMHG

## 2018-12-19 DIAGNOSIS — G89.29 CHRONIC BILATERAL LOW BACK PAIN WITH SCIATICA, SCIATICA LATERALITY UNSPECIFIED: ICD-10-CM

## 2018-12-19 DIAGNOSIS — O10.919 HTN IN PREGNANCY, CHRONIC: Primary | ICD-10-CM

## 2018-12-19 DIAGNOSIS — D69.6 THROMBOCYTOPENIA AFFECTING PREGNANCY (HCC): ICD-10-CM

## 2018-12-19 DIAGNOSIS — Z36.85 ANTENATAL SCREENING FOR STREPTOCOCCUS B: ICD-10-CM

## 2018-12-19 DIAGNOSIS — O99.119 THROMBOCYTOPENIA AFFECTING PREGNANCY (HCC): ICD-10-CM

## 2018-12-19 DIAGNOSIS — O99.013 ANEMIA AFFECTING PREGNANCY IN THIRD TRIMESTER: ICD-10-CM

## 2018-12-19 DIAGNOSIS — M54.40 CHRONIC BILATERAL LOW BACK PAIN WITH SCIATICA, SCIATICA LATERALITY UNSPECIFIED: ICD-10-CM

## 2018-12-19 DIAGNOSIS — O22.42 HEMORRHOIDS DURING PREGNANCY IN SECOND TRIMESTER: ICD-10-CM

## 2018-12-19 DIAGNOSIS — K21.9 GERD WITHOUT ESOPHAGITIS: ICD-10-CM

## 2018-12-19 DIAGNOSIS — Z34.03 ENCOUNTER FOR PRENATAL CARE IN THIRD TRIMESTER OF FIRST PREGNANCY: Primary | ICD-10-CM

## 2018-12-19 DIAGNOSIS — O99.210 OBESITY IN PREGNANCY: ICD-10-CM

## 2018-12-19 DIAGNOSIS — Z13.89 SCREENING FOR BLOOD OR PROTEIN IN URINE: ICD-10-CM

## 2018-12-19 DIAGNOSIS — O10.019 HYPERTENSION IN PREGNANCY, ESSENTIAL, ANTEPARTUM: ICD-10-CM

## 2018-12-19 PROBLEM — O16.9 HYPERTENSION AFFECTING PREGNANCY: Status: ACTIVE | Noted: 2018-12-19

## 2018-12-19 LAB
ALBUMIN SERPL-MCNC: 3.4 G/DL (ref 3.5–5.2)
ALBUMIN/GLOB SERPL: 1.1 G/DL
ALP SERPL-CCNC: 133 U/L (ref 39–117)
ALT SERPL W P-5'-P-CCNC: 8 U/L (ref 1–33)
ANION GAP SERPL CALCULATED.3IONS-SCNC: 9.4 MMOL/L
AST SERPL-CCNC: 9 U/L (ref 1–32)
BASOPHILS # BLD AUTO: 0.02 10*3/MM3 (ref 0–0.2)
BASOPHILS NFR BLD AUTO: 0.2 % (ref 0–1.5)
BILIRUB BLD-MCNC: NEGATIVE MG/DL
BILIRUB SERPL-MCNC: <0.2 MG/DL (ref 0.1–1.2)
BUN BLD-MCNC: 7 MG/DL (ref 6–20)
BUN/CREAT SERPL: 13.2 (ref 7–25)
CALCIUM SPEC-SCNC: 8.6 MG/DL (ref 8.6–10.5)
CHLORIDE SERPL-SCNC: 107 MMOL/L (ref 98–107)
CO2 SERPL-SCNC: 20.6 MMOL/L (ref 22–29)
CREAT BLD-MCNC: 0.53 MG/DL (ref 0.57–1)
CREAT UR-MCNC: 35.6 MG/DL
DEPRECATED RDW RBC AUTO: 41.4 FL (ref 37–54)
EOSINOPHIL # BLD AUTO: 0.08 10*3/MM3 (ref 0–0.7)
EOSINOPHIL NFR BLD AUTO: 0.9 % (ref 0.3–6.2)
ERYTHROCYTE [DISTWIDTH] IN BLOOD BY AUTOMATED COUNT: 13.7 % (ref 11.7–13)
EXPIRATION DATE: NORMAL
GFR SERPL CREATININE-BSD FRML MDRD: 141 ML/MIN/1.73
GLOBULIN UR ELPH-MCNC: 3.1 GM/DL
GLUCOSE BLD-MCNC: 85 MG/DL (ref 65–99)
GLUCOSE UR STRIP-MCNC: NEGATIVE MG/DL
HCT VFR BLD AUTO: 33.3 % (ref 35.6–45.5)
HGB BLD-MCNC: 11.1 G/DL (ref 11.9–15.5)
IMM GRANULOCYTES # BLD: 0.03 10*3/MM3 (ref 0–0.03)
IMM GRANULOCYTES NFR BLD: 0.3 % (ref 0–0.5)
KETONES UR QL: ABNORMAL
LEUKOCYTE EST, POC: NEGATIVE
LYMPHOCYTES # BLD AUTO: 1.54 10*3/MM3 (ref 0.9–4.8)
LYMPHOCYTES NFR BLD AUTO: 16.6 % (ref 19.6–45.3)
Lab: NORMAL
MCH RBC QN AUTO: 27.4 PG (ref 26.9–32)
MCHC RBC AUTO-ENTMCNC: 33.3 G/DL (ref 32.4–36.3)
MCV RBC AUTO: 82.2 FL (ref 80.5–98.2)
MONOCYTES # BLD AUTO: 0.62 10*3/MM3 (ref 0.2–1.2)
MONOCYTES NFR BLD AUTO: 6.7 % (ref 5–12)
NEUTROPHILS # BLD AUTO: 6.99 10*3/MM3 (ref 1.9–8.1)
NEUTROPHILS NFR BLD AUTO: 75.6 % (ref 42.7–76)
NITRITE UR-MCNC: NEGATIVE MG/ML
PH UR: 6.5 [PH] (ref 5–8)
PLATELET # BLD AUTO: 144 10*3/MM3 (ref 140–500)
PMV BLD AUTO: 13 FL (ref 6–12)
POTASSIUM BLD-SCNC: 3.9 MMOL/L (ref 3.5–5.2)
PROT SERPL-MCNC: 6.5 G/DL (ref 6–8.5)
PROT UR STRIP-MCNC: ABNORMAL MG/DL
PROT UR STRIP-MCNC: NEGATIVE MG/DL
PROT UR-MCNC: 4 MG/DL
PROT/CREAT UR: 112.4 MG/G CREA (ref 0–200)
RBC # BLD AUTO: 4.05 10*6/MM3 (ref 3.9–5.2)
RBC # UR STRIP: NEGATIVE /UL
SODIUM BLD-SCNC: 137 MMOL/L (ref 136–145)
SP GR UR: 1.03 (ref 1–1.03)
UROBILINOGEN UR QL: NORMAL
WBC NRBC COR # BLD: 9.25 10*3/MM3 (ref 4.5–10.7)

## 2018-12-19 PROCEDURE — 99219 PR INITIAL OBSERVATION CARE/DAY 50 MINUTES: CPT | Performed by: OBSTETRICS & GYNECOLOGY

## 2018-12-19 PROCEDURE — 81002 URINALYSIS NONAUTO W/O SCOPE: CPT | Performed by: OBSTETRICS & GYNECOLOGY

## 2018-12-19 PROCEDURE — 84156 ASSAY OF PROTEIN URINE: CPT | Performed by: OBSTETRICS & GYNECOLOGY

## 2018-12-19 PROCEDURE — 59025 FETAL NON-STRESS TEST: CPT

## 2018-12-19 PROCEDURE — G0378 HOSPITAL OBSERVATION PER HR: HCPCS

## 2018-12-19 PROCEDURE — 82570 ASSAY OF URINE CREATININE: CPT | Performed by: OBSTETRICS & GYNECOLOGY

## 2018-12-19 PROCEDURE — 59025 FETAL NON-STRESS TEST: CPT | Performed by: OBSTETRICS & GYNECOLOGY

## 2018-12-19 PROCEDURE — 76819 FETAL BIOPHYS PROFIL W/O NST: CPT | Performed by: OBSTETRICS & GYNECOLOGY

## 2018-12-19 PROCEDURE — 76816 OB US FOLLOW-UP PER FETUS: CPT | Performed by: OBSTETRICS & GYNECOLOGY

## 2018-12-19 PROCEDURE — G0379 DIRECT REFER HOSPITAL OBSERV: HCPCS

## 2018-12-19 PROCEDURE — 0502F SUBSEQUENT PRENATAL CARE: CPT | Performed by: OBSTETRICS & GYNECOLOGY

## 2018-12-19 PROCEDURE — 80053 COMPREHEN METABOLIC PANEL: CPT | Performed by: OBSTETRICS & GYNECOLOGY

## 2018-12-19 PROCEDURE — 85025 COMPLETE CBC W/AUTO DIFF WBC: CPT | Performed by: OBSTETRICS & GYNECOLOGY

## 2018-12-19 RX ORDER — ASPIRIN 81 MG/1
81 TABLET, CHEWABLE ORAL DAILY
Status: DISCONTINUED | OUTPATIENT
Start: 2018-12-20 | End: 2018-12-21 | Stop reason: HOSPADM

## 2018-12-19 RX ORDER — ACETAMINOPHEN 325 MG/1
650 TABLET ORAL EVERY 8 HOURS PRN
Status: DISCONTINUED | OUTPATIENT
Start: 2018-12-19 | End: 2018-12-21 | Stop reason: HOSPADM

## 2018-12-19 NOTE — PROGRESS NOTES
Elevated BP today - to L&D for eval - D/W Dr. Jackson  Today BPP 8/8 and normal growth at 43%  Labor warnings/PreE warnings  GBS collected

## 2018-12-19 NOTE — H&P
[facility]  Obstetric History and Physical    CC: high BP in pregnancy    Subjective     Patient is a 23 y.o. female  currently at 35w1d, who is sent for evaluation after BP elevated in office and new onset proteinuria noted. Patient denies headache, vision changes or abdominal pain. She does note significant increase in swelling over the last week.     Her prenatal care is complicated by .  Patient Active Problem List   Diagnosis   • Bulging lumbar disc   • Hyperesthesia   • Degeneration of intervertebral disc of lumbar region   • Lumbar facet arthropathy   • Lumbar radiculitis   • Chronic bilateral low back pain with sciatica   • PCOS (polycystic ovarian syndrome)   • Obesity in pregnancy   • Nausea and vomiting of pregnancy, antepartum   • Hypertension in pregnancy, essential, antepartum   • GERD without esophagitis   • Hemorrhoids during pregnancy in second trimester   • Constipation during pregnancy in second trimester   • Pregnancy   • Thrombocytopenia affecting pregnancy (CMS/HCC)   • Cramping affecting pregnancy, antepartum   • SGA (small for gestational age)   • Poor fetal growth affecting management of mother in third trimester   • Anemia affecting pregnancy in third trimester   • Hypertension affecting pregnancy    Her previous obstetric/gynecological history is not contributory.    The following portions of the patients history were reviewed and updated as appropriate: current medications, allergies, past medical history, past surgical history, past family history, past social history and problem list .       Prenatal Information:  Prenatal Results     Initial Prenatal Labs     Test Value Reference Range Date Time    Hemoglobin 14.1 g/dL 11.9 - 15.5 g/dL 18 1336    Hematocrit 42.2 % 35.6 - 45.5 % 18 1336    Platelets 144 10*3/mm3 140 - 500 10*3/mm3 18 1618    Rubella IgG 3.36 index Immune >0.99 index 18 1336    Hepatitis B SAg Negative  Negative 18 1336     Hepatitis C Ab <0.1 s/co ratio 0.0 - 0.9 s/co ratio 05/30/18 1336    RPR Comment  Non-Reactive 05/30/18 1336    ABO O   05/30/18 1336    Rh Positive   05/30/18 1336    Antibody Screen Negative  Negative 05/30/18 1336    HIV Non Reactive  Non Reactive 05/30/18 1336    Urine Culture Final report   11/26/18 1423    Gonorrhea Negative  Negative 05/24/18 1412    Chlamydia Negative  Negative 05/24/18 1412    TSH 0.777 mIU/mL 0.270 - 4.200 mIU/mL 11/15/18           2nd and 3rd Trimester     Test Value Reference Range Date Time    Hemoglobin (repeated) 11.1 g/dL 11.9 - 15.5 g/dL 12/19/18 1618    Hematocrit (repeated) 33.3 % 35.6 - 45.5 % 12/19/18 1618     mg/dL 65 - 139 mg/dL 10/26/18 1145    Antibody Screen (repeated)        GTT Fasting        GTT 1 Hr        GTT 2 Hr        GTT 3 Hr        Group B Strep              Drug Screening     Test Value Reference Range Date Time    Amphetamine Screen        Barbiturate Screen        Benzodiazepine Screen        Methadone Screen        Phencyclidine Screen        Opiates Screen        THC Screen        Cocaine Screen        Propoxyphene Screen        Buprenorphine Screen        Methamphetamine Screen        Oxycodone Screen        Tricyclic Antidepressants Screen              Other (Risk screening)     Test Value Reference Range Date Time    Varicella IgG 2,752 index Immune >165 index 05/30/18 1336    Parvovirus IgG        CMV IgG        Cystic Fibrosis        Hemoglobin electrophoresis NORMAL   05/30/18     NIPT        MSAFP-4        AFP (for NTD only) *Screen Negative*   08/08/18 1449              External Prenatal Results     Pregnancy Outside Results - Transcribed From Office Records - See Scanned Records For Details     Test Value Date Time    Hgb 11.1 g/dL 12/19/18 1618    Hct 33.3 % 12/19/18 1618    ABO O  05/30/18 1336    Rh Positive  05/30/18 1336    Antibody Screen Negative  05/30/18 1336    Glucose Fasting GTT       Glucose Tolerance Test 1 hour       Glucose  Tolerance Test 3 hour       Gonorrhea (discrete) Negative  18 1412    Chlamydia (discrete) Negative  18 1412    RPR Comment  18 1336    VDRL       Syphilis Antibody       Rubella 3.36 index 18 1336    HBsAg Negative  18 1336    Herpes Simplex Virus PCR       Herpes Simplex VIrus Culture       HIV Non Reactive  18 1336    Hep C RNA Quant PCR       Hep C Antibody <0.1 s/co ratio 18 1336    AFP 13.0 ng/mL 18 1449    Group B Strep       GBS Susceptibility to Clindamycin       GBS Susceptibility to Erythromycin       Fetal Fibronectin Negative  10/24/18 1200    Genetic Testing, Maternal Blood             Drug Screening     Test Value Date Time    Urine Drug Screen       Amphetamine Screen Negative ng/mL 17 1432    Barbiturate Screen Negative ng/mL 17 1432    Benzodiazepine Screen Negative ng/mL 17 1432    Methadone Screen       Phencyclidine Screen Negative ng/mL 17 1432    Opiates Screen Negative ng/mL 17 1432    THC Screen       Cocaine Screen       Propoxyphene Screen       Buprenorphine Screen       Methamphetamine Screen       Oxycodone Screen       Tricyclic Antidepressants Screen                    Past OB History:     Obstetric History       T0      L0     SAB1   TAB0   Ectopic0   Molar0   Multiple0   Live Births0       # Outcome Date GA Lbr Efe/2nd Weight Sex Delivery Anes PTL Lv   2 Current            1 SAB 10/2017 9w3d             Obstetric Comments   5.18 - GS on US c/w early IUP - [facility]    8.18 - Normal but incomplete anatomy - [facility]    9.18 - Completed anatomy at 23-3 weeks - [facility]    10..18 - 27- 3 weeks - EFW 15%, AC 27% - [facility]    11..18 -GIULIANO 29-4 weeks - EFW < 10%, AC 13% - [facility]     11..18 - GIULIANO US - 31-6 weeks - EFW 28%, AC 36% (3 lbs 13 oz)- [facility]        Past Medical History: Past Medical History:   Diagnosis Date   • Anxiety    • Arthritis    • Chronic bilateral low back pain with sciatica    • Depression     • GERD (gastroesophageal reflux disease)    • Gestational hypertension    • Klebsiella cystitis 9/25/2017   • PCOS (polycystic ovarian syndrome)    • Polycystic ovary syndrome       Past Surgical History Past Surgical History:   Procedure Laterality Date   • D&C WITH SUCTION N/A 10/12/2017    Procedure: DILATATION AND CURETTAGE WITH SUCTION;  Surgeon: Yomi Telles MD;  Location: [facility];  Service:    • DILATATION AND CURETTAGE  10/2017   • WISDOM TOOTH EXTRACTION        Family History: Family History   Problem Relation Age of Onset   • Diabetes Maternal Grandfather    • Diabetes Paternal Grandmother    • Arthritis Paternal Grandfather    • Breast cancer Neg Hx    • Ovarian cancer Neg Hx    • Uterine cancer Neg Hx    • Colon cancer Neg Hx    • Deep vein thrombosis Neg Hx    • Pulmonary embolism Neg Hx    • Malig Hyperthermia Neg Hx       Social History:  reports that she quit smoking about 7 months ago. Her smoking use included cigarettes. She started smoking about 8 years ago. She has a 3.00 pack-year smoking history. she has never used smokeless tobacco.   reports that she does not drink alcohol.   reports that she does not use drugs.        General ROS: .Review of Systems - General ROS: negative for fever  Ophthalmic ROS: negative  Hematological and Lymphatic ROS: negative  Respiratory ROS: no cough, shortness of breath, or wheezing  Cardiovascular ROS: negative for chest pain; positive for increased swelling  Gastrointestinal ROS: negative for abdominal pain, n/v  Genito-Urinary ROS: negative for vaginal bleeding, leaking fluid or regular ctx  Neurological ROS: negative for headache  Dermatological ROS: negative    Objective       Vital Signs Range for the last 24 hours  Temperature: Temp:  [98.4 °F (36.9 °C)] 98.4 °F (36.9 °C)   Temp Source: Temp src: Oral   BP: BP: (105-160)/(50-90) 105/62   Pulse: Heart Rate:  [88-98] 94   Respirations: Resp:  [16] 16   SPO2:     O2 Amount (l/min):     O2 Devices      Weight: Weight:  [114 kg (252 lb)] 114 kg (252 lb)     Physical Examination: General appearance - alert, well appearing, and in no distress  Mental status - alert, oriented to person, place, and time  Neck - supple, no significant adenopathy  Chest - clear to auscultation, no wheezes, rales or rhonchi, symmetric air entry  Heart - normal rate and regular rhythm  Abdomen - gravid, soft, nontender  Neurological - alert, oriented, normal speech, no focal findings or movement disorder noted, DTR's normal and symmetric  Extremities - 1+ bilateral lower ext edema, 2+ DTR's  Skin - normal coloration and turgor                            Fetal Heart Rate Assessment   Method: Fetal HR Assessment Method: external   Beats/min: Fetal HR (beats/min): 145   Baseline: Fetal Heart Baseline Rate: normal range   Variability: Fetal HR Variability: moderate (amplitude range 6 to 25 bpm)   Accels: Fetal HR Accelerations: greater than/equal to 15 bpm, lasting at least 15 seconds   Decels: Fetal HR Decelerations: absent   Tracing Category:       Uterine Assessment   Method: Method: external tocotransducer   Frequency (min):     Ctx Count in 10 min:     Duration:     Intensity: Contraction Intensity: no contractions   Intensity by IUPC:     Resting Tone: Uterine Resting Tone: soft by palpation   Resting Tone by IUPC:     [place] Units:       Laboratory Results:   Lab Results   Component Value Date    WBC 9.25 12/19/2018    HGB 11.1 (L) 12/19/2018    HCT 33.3 (L) 12/19/2018    MCV 82.2 12/19/2018     12/19/2018     Lab Results   Component Value Date    GLUCOSE 85 12/19/2018    BUN 7 12/19/2018    CREATININE 0.53 (L) 12/19/2018    EGFRIFNONA 141 12/19/2018    EGFRIFAFRI >150 12/03/2018    BCR 13.2 12/19/2018    K 3.9 12/19/2018    CO2 20.6 (L) 12/19/2018    CALCIUM 8.6 12/19/2018    PROTENTOTREF 6.2 12/03/2018    ALBUMIN 3.40 (L) 12/19/2018    LABIL2 1.4 12/03/2018    AST 9 12/19/2018    ALT 8 12/19/2018         Radiology Review:  office u/s today: BPP 8/8 per primary OB; growth u/s prior week done at [facility] and normal 39%      Assessment/Plan       Chronic bilateral low back pain with sciatica    Obesity in pregnancy    Hypertension in pregnancy, essential, antepartum    Pregnancy    Hypertension affecting pregnancy        Assessment:  1.  Intrauterine pregnancy at 35w1d gestation with reactive fetal status.    2.  Hypertension affecting pregnancy: elevated BP with new proteinuria in office today. BP's on L&D reassuring but increase in office BP, edema and UA in office concerning for evolving preeclampsia  3.  Obstetrical history is not contributory      Plan:  1. Admit for serial BP's and 24 hour urine.   2. Plan of care has been reviewed with patient and spouse  3.  Risks, benefits of treatment plan have been discussed.  4.  All questions have been answered.        Georgette Jackson MD  12/19/2018  5:27 PM

## 2018-12-19 NOTE — NON STRESS TEST
Lavonne Rocha, a  at 35w1d with an EVA of 2019, by Last Menstrual Period, was seen at [facility] LABOR DELIVERY for a nonstress test.    No chief complaint on file.      Patient Active Problem List   Diagnosis   • Bulging lumbar disc   • Hyperesthesia   • Degeneration of intervertebral disc of lumbar region   • Lumbar facet arthropathy   • Lumbar radiculitis   • Chronic bilateral low back pain with sciatica   • PCOS (polycystic ovarian syndrome)   • Obesity in pregnancy   • Nausea and vomiting of pregnancy, antepartum   • Hypertension in pregnancy, essential, antepartum   • GERD without esophagitis   • Hemorrhoids during pregnancy in second trimester   • Constipation during pregnancy in second trimester   • Pregnancy   • Thrombocytopenia affecting pregnancy (CMS/HCC)   • Cramping affecting pregnancy, antepartum   • SGA (small for gestational age)   • Poor fetal growth affecting management of mother in third trimester   • Anemia affecting pregnancy in third trimester       Start Time: 1603  Stop Time: 1637    Interpretation A  Nonstress Test Interpretation A: Reactive (18 163 : Kelli Lr, KIMMY)

## 2018-12-20 ENCOUNTER — TELEPHONE (OUTPATIENT)
Dept: OBSTETRICS AND GYNECOLOGY | Age: 23
End: 2018-12-20

## 2018-12-20 VITALS
DIASTOLIC BLOOD PRESSURE: 91 MMHG | RESPIRATION RATE: 18 BRPM | BODY MASS INDEX: 42.85 KG/M2 | OXYGEN SATURATION: 99 % | SYSTOLIC BLOOD PRESSURE: 147 MMHG | WEIGHT: 251 LBS | TEMPERATURE: 98.1 F | HEART RATE: 92 BPM | HEIGHT: 64 IN

## 2018-12-20 LAB
COLLECT DURATION TIME UR: 24 HRS
COLLECT DURATION TIME UR: 24 HRS
CREAT CL 24H UR+SERPL-VRATE: 179.2 ML/MIN (ref 88–128)
CREAT CL 24H UR+SERPL-VRATE: 258 L/24 HR (ref 126.7–184.3)
CREAT UR-MCNC: 77.6 MG/DL
CREATINE 24H UR-MRATE: 1.71 G/24 HR (ref 0.7–1.6)
PROT 24H UR-MRATE: 176 MG/24HOURS (ref 0–150)
PROT UR-MCNC: 8 MG/DL
SPECIMEN VOL 24H UR: 2200 ML

## 2018-12-20 PROCEDURE — 59025 FETAL NON-STRESS TEST: CPT | Performed by: OBSTETRICS & GYNECOLOGY

## 2018-12-20 PROCEDURE — G0378 HOSPITAL OBSERVATION PER HR: HCPCS

## 2018-12-20 PROCEDURE — 99217 PR OBSERVATION CARE DISCHARGE MANAGEMENT: CPT | Performed by: OBSTETRICS & GYNECOLOGY

## 2018-12-20 PROCEDURE — 59025 FETAL NON-STRESS TEST: CPT

## 2018-12-20 PROCEDURE — 84156 ASSAY OF PROTEIN URINE: CPT | Performed by: OBSTETRICS & GYNECOLOGY

## 2018-12-20 PROCEDURE — 81050 URINALYSIS VOLUME MEASURE: CPT | Performed by: OBSTETRICS & GYNECOLOGY

## 2018-12-20 PROCEDURE — 82575 CREATININE CLEARANCE TEST: CPT | Performed by: OBSTETRICS & GYNECOLOGY

## 2018-12-20 RX ADMIN — Medication 81 MG: at 08:24

## 2018-12-20 NOTE — NON STRESS TEST
Lavonne Rocha, a  at 35w2d with an EVA of 2019, by Last Menstrual Period, was seen at [facility] ANTEPARTUM UNIT for a nonstress test.    No chief complaint on file.      Patient Active Problem List   Diagnosis   • Bulging lumbar disc   • Hyperesthesia   • Degeneration of intervertebral disc of lumbar region   • Lumbar facet arthropathy   • Lumbar radiculitis   • Chronic bilateral low back pain with sciatica   • PCOS (polycystic ovarian syndrome)   • Obesity in pregnancy   • Nausea and vomiting of pregnancy, antepartum   • Hypertension in pregnancy, essential, antepartum   • GERD without esophagitis   • Hemorrhoids during pregnancy in second trimester   • Constipation during pregnancy in second trimester   • Pregnancy   • Thrombocytopenia affecting pregnancy (CMS/HCC)   • Cramping affecting pregnancy, antepartum   • SGA (small for gestational age)   • Poor fetal growth affecting management of mother in third trimester   • Anemia affecting pregnancy in third trimester   • Hypertension affecting pregnancy       Start Time: 1326 Stop Time:1400    Pt placed back on monitors due to reported decreased FM.  Patient had only felt one movement from 1100 to 1326.    Interpretation A  Nonstress Test Interpretation A: Reactive (18 1400 : Mitali Villarreal, RN)

## 2018-12-20 NOTE — NURSING NOTE
Patient reported has only felt one fetal movement since 1100 despite eating lunch.  Patient placed back on monitors for another NST.

## 2018-12-20 NOTE — TELEPHONE ENCOUNTER
----- Message from Callie Mcdonnell sent at 12/20/2018 10:33 AM EST -----  Regarding: FMLA questions  I have questions on some FMLA papers for a pt of dr helton at [facility]. I need some clarification, can you call [place] office when you get a chance please?

## 2018-12-20 NOTE — NON STRESS TEST
Lavonne Rocha, a  at 35w2d with an EVA of 2019, by Last Menstrual Period, was seen at [facility] ANTEPARTUM UNIT for a nonstress test.    No chief complaint on file.      Patient Active Problem List   Diagnosis   • Bulging lumbar disc   • Hyperesthesia   • Degeneration of intervertebral disc of lumbar region   • Lumbar facet arthropathy   • Lumbar radiculitis   • Chronic bilateral low back pain with sciatica   • PCOS (polycystic ovarian syndrome)   • Obesity in pregnancy   • Nausea and vomiting of pregnancy, antepartum   • Hypertension in pregnancy, essential, antepartum   • GERD without esophagitis   • Hemorrhoids during pregnancy in second trimester   • Constipation during pregnancy in second trimester   • Pregnancy   • Thrombocytopenia affecting pregnancy (CMS/HCC)   • Cramping affecting pregnancy, antepartum   • SGA (small for gestational age)   • Poor fetal growth affecting management of mother in third trimester   • Anemia affecting pregnancy in third trimester   • Hypertension affecting pregnancy       Start Time: 938 Stop Time:   Interpretation A  Nonstress Test Interpretation A: Reactive (18 1014 : Mitali Villarreal RN)

## 2018-12-20 NOTE — PLAN OF CARE
Problem: Patient Care Overview  Goal: Plan of Care Review  Outcome: Ongoing (interventions implemented as appropriate)   12/20/18 0514   Coping/Psychosocial   Plan of Care Reviewed With patient;spouse   Plan of Care Review   Progress no change   OTHER   Outcome Summary RNST. Pt oriented to unit. 24 hour urine in progess until 12/20 @1755. BPs WNL. Pt rested comfortably overnight.      Goal: Individualization and Mutuality   12/20/18 0514   Individualization   Patient Specific Goals (Include Timeframe) maintain pregnancy to term.24 hour urine WNL.    Patient Specific Interventions BPs monitored q4h. BID NST. 24 hour urine in progress.      Goal: Discharge Needs Assessment  Outcome: Ongoing (interventions implemented as appropriate)   12/20/18 0514   Discharge Needs Assessment   Readmission Within the Last 30 Days no previous admission in last 30 days   Concerns to be Addressed no discharge needs identified   Patient/Family Anticipates Transition to home with family   Patient/Family Anticipated Services at Transition none   Transportation Concerns car, none   Transportation Anticipated car, drives self;family or friend will provide   Anticipated Changes Related to Illness none   Equipment Needed After Discharge none   Offered/Gave Vendor List no   Disability   Equipment Currently Used at Home none     Goal: Interprofessional Rounds/Family Conf  Outcome: Ongoing (interventions implemented as appropriate)   12/20/18 0514   Interdisciplinary Rounds/Family Conf   Participants nursing;patient;pharmacy;physician       Problem: Prenatal Patient, Hospitalized (Adult,Obstetrics,Pediatric)  Goal: Signs and Symptoms of Listed Potential Problems Will be Absent, Minimized or Managed (Prenatal Patient, Hospitalized)  Outcome: Ongoing (interventions implemented as appropriate)   12/20/18 0514   Goal/Outcome Evaluation   Problems Assessed (Prenatal Patient) all   Problems Present (Prenatal Patient) hypertensive disorders        Problem: Hypertensive Disorders in Pregnancy (Adult,Obstetrics,Pediatric)  Goal: Signs and Symptoms of Listed Potential Problems Will be Absent, Minimized or Managed (Hypertensive Disorders in Pregnancy)  Outcome: Ongoing (interventions implemented as appropriate)   12/20/18 0504   Goal/Outcome Evaluation   Problems Assessed (Hypertensive Disorders in Pregnancy) all   Problems Present (Hypertensive/in PG) none

## 2018-12-20 NOTE — PLAN OF CARE
Problem: Patient Care Overview  Goal: Plan of Care Review  Outcome: Ongoing (interventions implemented as appropriate)   12/20/18 1731   Coping/Psychosocial   Plan of Care Reviewed With patient;significant other;mother   OTHER   Outcome Summary RNST, 24 hour urine to be completed at 1755 and possible DC home if results WNL, patient reported DFM twice today but NST reactive x2 today. FKC completed twice on dayshift also.     Goal: Individualization and Mutuality  Outcome: Ongoing (interventions implemented as appropriate)   12/20/18 1731   Individualization   Patient Specific Preferences continue pregnancy to IOL date of 12/31/18   Patient Specific Goals (Include Timeframe) 24 hour urine WNL and pt DC home tonight, BP remians normotensive   Patient Specific Interventions VS every 4 hours, NST BID, 24 hour urine in progress    Mutuality/Individual Preferences   What Anxieties, Fears, Concerns, or Questions Do You Have About Your Care? delivery before 12/31/18   What Information Would Help Us Give You More Personalized Care? GHTN during pregnancy   How Would You and/or Your Support Person Like to Participate in Your Care? explanations   Mutuality/Individual Preferences   How to Address Anxieties/Fears keep informed of POC     Goal: Discharge Needs Assessment  Outcome: Ongoing (interventions implemented as appropriate)   12/20/18 1731   Discharge Needs Assessment   Readmission Within the Last 30 Days no previous admission in last 30 days   Concerns to be Addressed no discharge needs identified   Patient/Family Anticipates Transition to home with family   Patient/Family Anticipated Services at Transition none   Transportation Anticipated car, drives self   Anticipated Changes Related to Illness none   Equipment Needed After Discharge none   Offered/Gave Vendor List no   Disability   Equipment Currently Used at Home none     Goal: Interprofessional Rounds/Family Conf  Outcome: Ongoing (interventions implemented as  appropriate)   12/20/18 1731   Interdisciplinary Rounds/Family Conf   Participants family;nursing;patient;pharmacy;physician       Problem: Prenatal Patient, Hospitalized (Adult,Obstetrics,Pediatric)  Goal: Signs and Symptoms of Listed Potential Problems Will be Absent, Minimized or Managed (Prenatal Patient, Hospitalized)  Outcome: Ongoing (interventions implemented as appropriate)   12/20/18 1731   Goal/Outcome Evaluation   Problems Assessed (Prenatal Patient) all   Problems Present (Prenatal Patient) none       Problem: Hypertensive Disorders in Pregnancy (Adult,Obstetrics,Pediatric)  Goal: Signs and Symptoms of Listed Potential Problems Will be Absent, Minimized or Managed (Hypertensive Disorders in Pregnancy)  Outcome: Ongoing (interventions implemented as appropriate)   12/20/18 1731   Goal/Outcome Evaluation   Problems Assessed (Hypertensive Disorders in Pregnancy) all   Problems Present (Hypertensive/in PG) none

## 2018-12-20 NOTE — NON STRESS TEST
Reason for test: Antepartum  Date of Test: 2018  Time frame of test:    RN NST Interpretation: Reactive    Lavonne Rocha, a  at 35w1d with an EVA of 2019, by Last Menstrual Period, was seen at [facility] ANTEPARTUM UNIT for a nonstress test.    No chief complaint on file.      Patient Active Problem List   Diagnosis   • Bulging lumbar disc   • Hyperesthesia   • Degeneration of intervertebral disc of lumbar region   • Lumbar facet arthropathy   • Lumbar radiculitis   • Chronic bilateral low back pain with sciatica   • PCOS (polycystic ovarian syndrome)   • Obesity in pregnancy   • Nausea and vomiting of pregnancy, antepartum   • Hypertension in pregnancy, essential, antepartum   • GERD without esophagitis   • Hemorrhoids during pregnancy in second trimester   • Constipation during pregnancy in second trimester   • Pregnancy   • Thrombocytopenia affecting pregnancy (CMS/HCC)   • Cramping affecting pregnancy, antepartum   • SGA (small for gestational age)   • Poor fetal growth affecting management of mother in third trimester   • Anemia affecting pregnancy in third trimester   • Hypertension affecting pregnancy

## 2018-12-20 NOTE — DISCHARGE SUMMARY
ANTEPARTUM discharge summary     Admission date 2018     Patient: Lavonne Rocha MRN: [id number]   YOB: 1995 Age: 23 y.o. Sex: female     No chief complaint on file.      HPI:    Lavonne Rocha is a 23 y.o.,  AT 35w2d admitted for evaluation of elevated blood pressur modified bed rest blood pressures h Mostly systolics are in the 130s  And the diastolics are in70s and 80s..laboratory evaluation overall reassuring If it is within normal range patient is requesting discharge  Denies vaginal bleeding, leakage of fluid, or contractions. Admits to good fetal movement. Patient does not have a headache. no abdominal discomfort on examination      Past Medical History:   Diagnosis Date   • Anxiety    • Arthritis    • Chronic bilateral low back pain with sciatica    • Depression    • GERD (gastroesophageal reflux disease)    • Gestational hypertension    • Klebsiella cystitis 2017   • PCOS (polycystic ovarian syndrome)    • Polycystic ovary syndrome      Past Surgical History:   Procedure Laterality Date   • D&C WITH SUCTION N/A 10/12/2017    Procedure: DILATATION AND CURETTAGE WITH SUCTION;  Surgeon: Yomi Telles MD;  Location: [facility];  Service:    • DILATATION AND CURETTAGE  10/2017   • WISDOM TOOTH EXTRACTION       No current facility-administered medications on file prior to encounter.      Current Outpatient Medications on File Prior to Encounter   Medication Sig Dispense Refill   • Acetaminophen (TYLENOL 8 HOUR PO) Take  by mouth.     • aspirin 81 MG tablet Take 1 tablet by mouth Daily. 90 tablet 2   • ferrous sulfate 325 (65 FE) MG tablet Take 325 mg by mouth Daily With Breakfast.     • fluticasone (FLONASE) 50 MCG/ACT nasal spray 2 sprays into the nostril(s) as directed by provider Daily. 9.9 mL 1   • hydrocortisone (ANUSOL-HC) 2.5 % rectal cream Apply rectally 2 times daily 30 g 1   • Loratadine (CLARITIN PO) Take  by mouth.     • omeprazole (PRILOSEC) 20 MG capsule Take 1  capsule by mouth Daily. 30 capsule 2   • Prenatal Vit-Fe Fumarate-FA (PRENATAL, CLASSIC, VITAMIN) 28-0.8 MG tablet tablet Take 1 tablet by mouth Daily.     • sennosides-docusate sodium (SENOKOT-S) 8.6-50 MG tablet Take 2 tablets by mouth Daily. 60 tablet 2   • promethazine (PHENERGAN) 12.5 MG tablet Take 1 tablet by mouth Every 6 (Six) Hours As Needed for Nausea or Vomiting. 20 tablet 0   • promethazine (PHENERGAN) 25 MG tablet Take 25 mg by mouth Every 6 (Six) Hours As Needed for Nausea or Vomiting.  0       ROS:      Except as outlined in history of physical illness, patient denies any changes in her GYN, , GI systems. All other systems reviewed are negative.      OBJECTIVE:     Vitals:   Vitals:    12/20/18 0005 12/20/18 0819 12/20/18 1220 12/20/18 1223   BP: 117/69 125/73 146/76 142/78   BP Location: Left arm Right arm Left arm Left arm   Patient Position: Lying Sitting Sitting Lying   Pulse: 81 72 60    Resp: 16 18 16    Temp:  97.9 °F (36.6 °C) 97.5 °F (36.4 °C)    TempSrc:  Oral Oral    SpO2:  98% 100%    Weight:       Height:             Appearance/Psychiatric: In no distress   Constitutional: The patient is well nourished   Cardiovascular: She does not have edema. Heart RRR  Respiratory: Respiratory effort is normal. CTAB   Abdomen: Soft, gravid.  Ext: nontender, no edema. +2/4 bilateral patellar reflexes   Cx; deferred      Patient Active Problem List    Diagnosis   • Hypertension affecting pregnancy [O16.9]   • Anemia affecting pregnancy in third trimester [O99.013]   • Poor fetal growth affecting management of mother in third trimester [O36.5930]   • SGA (small for gestational age) [P05.10]   • Pregnancy [Z34.90]   • Thrombocytopenia affecting pregnancy (CMS/HCC) [O99.119, D69.6]   • Cramping affecting pregnancy, antepartum [O26.899, R10.9]   • Hypertension in pregnancy, essential, antepartum [O10.019]   • GERD without esophagitis [K21.9]   • Hemorrhoids during pregnancy in second trimester [O22.42]    • Constipation during pregnancy in second trimester [O99.612, K59.00]   • Nausea and vomiting of pregnancy, antepartum [O21.9]   • Obesity in pregnancy [O99.210]   • PCOS (polycystic ovarian syndrome) [E28.2]   • Chronic bilateral low back pain with sciatica [M54.40, G89.29]   • Lumbar facet arthropathy [M47.816]   • Lumbar radiculitis [M54.16]   • Bulging lumbar disc [M51.26]   • Hyperesthesia [R20.3]   • Degeneration of intervertebral disc of lumbar region [M51.36]       LOS: 1 day    Butch Pickering MD   December 20, 2018    Assessment and Plan: awaiting the results of 24-hour urin. If normal will allow patient to go home on d If her condition should change sh Warning signs have been reviewed. Orders have been entered.

## 2018-12-21 ENCOUNTER — TELEPHONE (OUTPATIENT)
Dept: OBSTETRICS AND GYNECOLOGY | Age: 23
End: 2018-12-21

## 2018-12-21 NOTE — NURSING NOTE
Despite recent BPs. Pt denies ans S/S preclampsia. No visual disturbances, SOA, epigastric pain, HA.

## 2018-12-21 NOTE — NURSING NOTE
Pt given AVS print out. Questions encouraged & answered. Education completed re: follow up appointments, GHTN, labor warning signs & when to return back to the hospital. Pt states understanding.

## 2018-12-21 NOTE — TELEPHONE ENCOUNTER
PT CALLING, HAS TONS OF QUESTIONS. WANTS TO TALK ABOUT INDUCTION. YOU WANT HER TO COME IN ON [facility]MA[facility]IA[facility], YOUR NOT HERE (PT THOUGHT SHE WAS SEEING YOU), BOTH NP'S ARE NOT HERE AND THERE ARE NO U/S OPENINGS DUE TO ONE TECH. WANTS TO SPEAK TO YOU.

## 2018-12-24 ENCOUNTER — PROCEDURE VISIT (OUTPATIENT)
Dept: OBSTETRICS AND GYNECOLOGY | Age: 23
End: 2018-12-24

## 2018-12-24 ENCOUNTER — ROUTINE PRENATAL (OUTPATIENT)
Dept: OBSTETRICS AND GYNECOLOGY | Age: 23
End: 2018-12-24

## 2018-12-24 VITALS — WEIGHT: 250 LBS | DIASTOLIC BLOOD PRESSURE: 90 MMHG | SYSTOLIC BLOOD PRESSURE: 150 MMHG | BODY MASS INDEX: 42.91 KG/M2

## 2018-12-24 DIAGNOSIS — Z34.03 ENCOUNTER FOR PRENATAL CARE IN THIRD TRIMESTER OF FIRST PREGNANCY: Primary | ICD-10-CM

## 2018-12-24 DIAGNOSIS — O10.019 HYPERTENSION IN PREGNANCY, ESSENTIAL, ANTEPARTUM: ICD-10-CM

## 2018-12-24 DIAGNOSIS — O10.919 HTN IN PREGNANCY, CHRONIC: Primary | ICD-10-CM

## 2018-12-24 DIAGNOSIS — K21.9 GERD WITHOUT ESOPHAGITIS: ICD-10-CM

## 2018-12-24 DIAGNOSIS — Z13.89 SCREENING FOR BLOOD OR PROTEIN IN URINE: ICD-10-CM

## 2018-12-24 DIAGNOSIS — O99.013 ANEMIA AFFECTING PREGNANCY IN THIRD TRIMESTER: ICD-10-CM

## 2018-12-24 DIAGNOSIS — O99.820 GROUP B STREPTOCOCCUS CARRIER, +RV CULTURE, CURRENTLY PREGNANT: ICD-10-CM

## 2018-12-24 DIAGNOSIS — O99.210 OBESITY IN PREGNANCY: ICD-10-CM

## 2018-12-24 LAB
BILIRUB BLD-MCNC: NEGATIVE MG/DL
GLUCOSE UR STRIP-MCNC: NEGATIVE MG/DL
KETONES UR QL: NEGATIVE
LEUKOCYTE EST, POC: ABNORMAL
NITRITE UR-MCNC: NEGATIVE MG/ML
PH UR: 6.5 [PH] (ref 5–8)
PROT UR STRIP-MCNC: NEGATIVE MG/DL
RBC # UR STRIP: NEGATIVE /UL
SP GR UR: 1.03 (ref 1–1.03)
UROBILINOGEN UR QL: NORMAL

## 2018-12-24 PROCEDURE — 76819 FETAL BIOPHYS PROFIL W/O NST: CPT | Performed by: OBSTETRICS & GYNECOLOGY

## 2018-12-24 PROCEDURE — 81002 URINALYSIS NONAUTO W/O SCOPE: CPT | Performed by: OBSTETRICS & GYNECOLOGY

## 2018-12-24 PROCEDURE — 0502F SUBSEQUENT PRENATAL CARE: CPT | Performed by: OBSTETRICS & GYNECOLOGY

## 2018-12-24 NOTE — PROGRESS NOTES
Repeat BP 140s/80s  Denies H/A, RUQ pain, visual changes   GBS + - sensitivities pending   GHTN - BPP today 8/8    PTL/PreE warnings   Thursday NST and BP check at hospital

## 2018-12-25 LAB
CLINDAMYCIN ISLT KB: ABNORMAL
GP B STREP DNA SPEC QL NAA+PROBE: POSITIVE
ORGANISM ID: ABNORMAL

## 2018-12-26 ENCOUNTER — TELEPHONE (OUTPATIENT)
Dept: OBSTETRICS AND GYNECOLOGY | Age: 23
End: 2018-12-26

## 2018-12-26 ENCOUNTER — HOSPITAL ENCOUNTER (INPATIENT)
Facility: HOSPITAL | Age: 23
LOS: 10 days | Discharge: HOME | End: 2019-01-05
Attending: OBSTETRICS & GYNECOLOGY | Admitting: OBSTETRICS & GYNECOLOGY

## 2018-12-26 PROBLEM — O16.3 ELEVATED BLOOD PRESSURE AFFECTING PREGNANCY IN THIRD TRIMESTER, ANTEPARTUM: Status: ACTIVE | Noted: 2018-12-26

## 2018-12-26 LAB
ALBUMIN SERPL-MCNC: 3.4 G/DL (ref 3.5–5.2)
ALBUMIN/GLOB SERPL: 1 G/DL
ALP SERPL-CCNC: 144 U/L (ref 39–117)
ALT SERPL W P-5'-P-CCNC: 10 U/L (ref 1–33)
ANION GAP SERPL CALCULATED.3IONS-SCNC: 11.2 MMOL/L
AST SERPL-CCNC: 9 U/L (ref 1–32)
BASOPHILS # BLD AUTO: 0.02 10*3/MM3 (ref 0–0.2)
BASOPHILS NFR BLD AUTO: 0.2 % (ref 0–1.5)
BILIRUB SERPL-MCNC: <0.2 MG/DL (ref 0.1–1.2)
BUN BLD-MCNC: 8 MG/DL (ref 6–20)
BUN/CREAT SERPL: 14.8 (ref 7–25)
CALCIUM SPEC-SCNC: 9.3 MG/DL (ref 8.6–10.5)
CHLORIDE SERPL-SCNC: 103 MMOL/L (ref 98–107)
CO2 SERPL-SCNC: 22.8 MMOL/L (ref 22–29)
CREAT BLD-MCNC: 0.54 MG/DL (ref 0.57–1)
DEPRECATED RDW RBC AUTO: 40.8 FL (ref 37–54)
EOSINOPHIL # BLD AUTO: 0.09 10*3/MM3 (ref 0–0.7)
EOSINOPHIL NFR BLD AUTO: 0.9 % (ref 0.3–6.2)
ERYTHROCYTE [DISTWIDTH] IN BLOOD BY AUTOMATED COUNT: 13.5 % (ref 11.7–13)
EXPIRATION DATE: NORMAL
GFR SERPL CREATININE-BSD FRML MDRD: 138 ML/MIN/1.73
GLOBULIN UR ELPH-MCNC: 3.3 GM/DL
GLUCOSE BLD-MCNC: 70 MG/DL (ref 65–99)
HCT VFR BLD AUTO: 33.6 % (ref 35.6–45.5)
HGB BLD-MCNC: 11.3 G/DL (ref 11.9–15.5)
IMM GRANULOCYTES # BLD AUTO: 0.05 10*3/MM3 (ref 0–0.03)
IMM GRANULOCYTES NFR BLD AUTO: 0.5 % (ref 0–0.5)
LYMPHOCYTES # BLD AUTO: 1.55 10*3/MM3 (ref 0.9–4.8)
LYMPHOCYTES NFR BLD AUTO: 15 % (ref 19.6–45.3)
Lab: NORMAL
MCH RBC QN AUTO: 27.5 PG (ref 26.9–32)
MCHC RBC AUTO-ENTMCNC: 33.6 G/DL (ref 32.4–36.3)
MCV RBC AUTO: 81.8 FL (ref 80.5–98.2)
MONOCYTES # BLD AUTO: 0.63 10*3/MM3 (ref 0.2–1.2)
MONOCYTES NFR BLD AUTO: 6.1 % (ref 5–12)
NEUTROPHILS # BLD AUTO: 8.05 10*3/MM3 (ref 1.9–8.1)
NEUTROPHILS NFR BLD AUTO: 77.8 % (ref 42.7–76)
PLATELET # BLD AUTO: 136 10*3/MM3 (ref 140–500)
PMV BLD AUTO: 13.6 FL (ref 6–12)
POTASSIUM BLD-SCNC: 4.1 MMOL/L (ref 3.5–5.2)
PROT SERPL-MCNC: 6.7 G/DL (ref 6–8.5)
PROT UR STRIP-MCNC: NEGATIVE MG/DL
RBC # BLD AUTO: 4.11 10*6/MM3 (ref 3.9–5.2)
SODIUM BLD-SCNC: 137 MMOL/L (ref 136–145)
WBC NRBC COR # BLD: 10.34 10*3/MM3 (ref 4.5–10.7)

## 2018-12-26 PROCEDURE — 81002 URINALYSIS NONAUTO W/O SCOPE: CPT | Performed by: OBSTETRICS & GYNECOLOGY

## 2018-12-26 PROCEDURE — 59025 FETAL NON-STRESS TEST: CPT | Performed by: OBSTETRICS & GYNECOLOGY

## 2018-12-26 PROCEDURE — 80053 COMPREHEN METABOLIC PANEL: CPT | Performed by: OBSTETRICS & GYNECOLOGY

## 2018-12-26 PROCEDURE — 59025 FETAL NON-STRESS TEST: CPT

## 2018-12-26 PROCEDURE — 85025 COMPLETE CBC W/AUTO DIFF WBC: CPT | Performed by: OBSTETRICS & GYNECOLOGY

## 2018-12-26 PROCEDURE — 99221 1ST HOSP IP/OBS SF/LOW 40: CPT | Performed by: OBSTETRICS & GYNECOLOGY

## 2018-12-26 RX ORDER — ZOLPIDEM TARTRATE 5 MG/1
5 TABLET ORAL NIGHTLY PRN
Status: DISCONTINUED | OUTPATIENT
Start: 2018-12-26 | End: 2019-01-05 | Stop reason: HOSPADM

## 2018-12-26 RX ORDER — OXYCODONE HYDROCHLORIDE AND ACETAMINOPHEN 5; 325 MG/1; MG/1
2 TABLET ORAL EVERY 4 HOURS PRN
Status: DISCONTINUED | OUTPATIENT
Start: 2018-12-26 | End: 2018-12-27

## 2018-12-26 RX ORDER — ACETAMINOPHEN 325 MG/1
650 TABLET ORAL EVERY 4 HOURS PRN
Status: DISCONTINUED | OUTPATIENT
Start: 2018-12-26 | End: 2019-01-05 | Stop reason: HOSPADM

## 2018-12-26 RX ORDER — ACETAMINOPHEN 500 MG
1000 TABLET ORAL EVERY 6 HOURS PRN
COMMUNITY
End: 2019-05-01

## 2018-12-26 RX ORDER — OXYCODONE HYDROCHLORIDE AND ACETAMINOPHEN 5; 325 MG/1; MG/1
1 TABLET ORAL ONCE
Status: COMPLETED | OUTPATIENT
Start: 2018-12-26 | End: 2018-12-26

## 2018-12-26 RX ORDER — LABETALOL HYDROCHLORIDE 5 MG/ML
20 INJECTION, SOLUTION INTRAVENOUS
Status: DISCONTINUED | OUTPATIENT
Start: 2018-12-26 | End: 2019-01-05 | Stop reason: HOSPADM

## 2018-12-26 RX ORDER — ACETAMINOPHEN 500 MG
1000 TABLET ORAL ONCE
Status: COMPLETED | OUTPATIENT
Start: 2018-12-26 | End: 2018-12-26

## 2018-12-26 RX ADMIN — ACETAMINOPHEN 1000 MG: 500 TABLET, FILM COATED ORAL at 18:15

## 2018-12-26 RX ADMIN — OXYCODONE HYDROCHLORIDE AND ACETAMINOPHEN 1 TABLET: 5; 325 TABLET ORAL at 19:53

## 2018-12-26 NOTE — TELEPHONE ENCOUNTER
----- Message from Ursula Mixon MD sent at 12/26/2018 12:03 PM EST -----  Please call patient and notify of positive results of GBS and needs vancomycin during labor

## 2018-12-26 NOTE — TELEPHONE ENCOUNTER
martin    Her job is sending over [employer] requesting information.  They are tying to dimitris mortensen that her last pregnancy is not not a preexisting condition to this pregnancy and she said if you had any questions let her know.

## 2018-12-26 NOTE — TELEPHONE ENCOUNTER
Patient notified about the positive gbs results.  Dr. Mixon patient have been having headache all day long today that is not going away . Took tylenol around 11 am and some  allergy medicine claritin otc .  /74  130/80   Scheduled to go to LD tomorrow, should she be concerned ?   Thank you

## 2018-12-27 LAB
BASOPHILS # BLD AUTO: 0.01 10*3/MM3 (ref 0–0.2)
BASOPHILS NFR BLD AUTO: 0.1 % (ref 0–1.5)
COLLECT DURATION TIME UR: 24 HRS
COLLECT DURATION TIME UR: 24 HRS
CREAT UR-MCNC: 80.7 MG/DL
CREATINE 24H UR-MRATE: 1.78 G/24 HR (ref 0.7–1.6)
DEPRECATED RDW RBC AUTO: 41.4 FL (ref 37–54)
EOSINOPHIL # BLD AUTO: 0.13 10*3/MM3 (ref 0–0.7)
EOSINOPHIL NFR BLD AUTO: 1.6 % (ref 0.3–6.2)
ERYTHROCYTE [DISTWIDTH] IN BLOOD BY AUTOMATED COUNT: 13.7 % (ref 11.7–13)
HCT VFR BLD AUTO: 34.7 % (ref 35.6–45.5)
HGB BLD-MCNC: 11.5 G/DL (ref 11.9–15.5)
IMM GRANULOCYTES # BLD AUTO: 0.03 10*3/MM3 (ref 0–0.03)
IMM GRANULOCYTES NFR BLD AUTO: 0.4 % (ref 0–0.5)
LYMPHOCYTES # BLD AUTO: 1.34 10*3/MM3 (ref 0.9–4.8)
LYMPHOCYTES NFR BLD AUTO: 17 % (ref 19.6–45.3)
MCH RBC QN AUTO: 27.2 PG (ref 26.9–32)
MCHC RBC AUTO-ENTMCNC: 33.1 G/DL (ref 32.4–36.3)
MCV RBC AUTO: 82 FL (ref 80.5–98.2)
MONOCYTES # BLD AUTO: 0.45 10*3/MM3 (ref 0.2–1.2)
MONOCYTES NFR BLD AUTO: 5.7 % (ref 5–12)
NEUTROPHILS # BLD AUTO: 5.96 10*3/MM3 (ref 1.9–8.1)
NEUTROPHILS NFR BLD AUTO: 75.6 % (ref 42.7–76)
PLATELET # BLD AUTO: 140 10*3/MM3 (ref 140–500)
PMV BLD AUTO: 13 FL (ref 6–12)
PROT 24H UR-MRATE: 198 MG/24HOURS (ref 0–150)
PROT UR-MCNC: 9 MG/DL
RBC # BLD AUTO: 4.23 10*6/MM3 (ref 3.9–5.2)
SPECIMEN VOL 24H UR: 2200 ML
SPECIMEN VOL 24H UR: 2200 ML
WBC NRBC COR # BLD: 7.89 10*3/MM3 (ref 4.5–10.7)

## 2018-12-27 PROCEDURE — 99231 SBSQ HOSP IP/OBS SF/LOW 25: CPT | Performed by: OBSTETRICS & GYNECOLOGY

## 2018-12-27 PROCEDURE — 59025 FETAL NON-STRESS TEST: CPT | Performed by: OBSTETRICS & GYNECOLOGY

## 2018-12-27 PROCEDURE — 82570 ASSAY OF URINE CREATININE: CPT | Performed by: OBSTETRICS & GYNECOLOGY

## 2018-12-27 PROCEDURE — 25010000002 PROMETHAZINE PER 50 MG: Performed by: OBSTETRICS & GYNECOLOGY

## 2018-12-27 PROCEDURE — 84156 ASSAY OF PROTEIN URINE: CPT | Performed by: OBSTETRICS & GYNECOLOGY

## 2018-12-27 PROCEDURE — 81050 URINALYSIS VOLUME MEASURE: CPT | Performed by: OBSTETRICS & GYNECOLOGY

## 2018-12-27 PROCEDURE — 25010000002 BETAMETHASONE ACET & SOD PHOS PER 4 MG: Performed by: OBSTETRICS & GYNECOLOGY

## 2018-12-27 PROCEDURE — 85025 COMPLETE CBC W/AUTO DIFF WBC: CPT | Performed by: OBSTETRICS & GYNECOLOGY

## 2018-12-27 PROCEDURE — 59025 FETAL NON-STRESS TEST: CPT

## 2018-12-27 RX ORDER — BETAMETHASONE SODIUM PHOSPHATE AND BETAMETHASONE ACETATE 3; 3 MG/ML; MG/ML
12 INJECTION, SUSPENSION INTRA-ARTICULAR; INTRALESIONAL; INTRAMUSCULAR; SOFT TISSUE EVERY 24 HOURS
Status: COMPLETED | OUTPATIENT
Start: 2018-12-27 | End: 2018-12-28

## 2018-12-27 RX ORDER — PROMETHAZINE HYDROCHLORIDE 25 MG/ML
12.5 INJECTION, SOLUTION INTRAMUSCULAR; INTRAVENOUS EVERY 6 HOURS PRN
Status: DISCONTINUED | OUTPATIENT
Start: 2018-12-27 | End: 2019-01-05 | Stop reason: HOSPADM

## 2018-12-27 RX ORDER — PROMETHAZINE HYDROCHLORIDE 25 MG/1
25 TABLET ORAL EVERY 6 HOURS PRN
Status: DISCONTINUED | OUTPATIENT
Start: 2018-12-27 | End: 2019-01-02

## 2018-12-27 RX ORDER — PRENATAL VIT NO.126/IRON/FOLIC 28MG-0.8MG
1 TABLET ORAL DAILY
Status: DISCONTINUED | OUTPATIENT
Start: 2018-12-27 | End: 2019-01-05 | Stop reason: HOSPADM

## 2018-12-27 RX ORDER — FAMOTIDINE 20 MG/1
20 TABLET, FILM COATED ORAL 2 TIMES DAILY
Status: DISCONTINUED | OUTPATIENT
Start: 2018-12-27 | End: 2019-01-05 | Stop reason: HOSPADM

## 2018-12-27 RX ORDER — LANOLIN
1 CREAM (ML) TOPICAL AS NEEDED
Status: DISCONTINUED | OUTPATIENT
Start: 2018-12-27 | End: 2019-01-05 | Stop reason: HOSPADM

## 2018-12-27 RX ORDER — PROMETHAZINE HYDROCHLORIDE 25 MG/ML
12.5 INJECTION, SOLUTION INTRAMUSCULAR; INTRAVENOUS ONCE
Status: COMPLETED | OUTPATIENT
Start: 2018-12-27 | End: 2018-12-27

## 2018-12-27 RX ADMIN — ACETAMINOPHEN 650 MG: 325 TABLET, FILM COATED ORAL at 09:00

## 2018-12-27 RX ADMIN — Medication 1 TABLET: at 13:20

## 2018-12-27 RX ADMIN — BETAMETHASONE SODIUM PHOSPHATE AND BETAMETHASONE ACETATE 12 MG: 3; 3 INJECTION, SUSPENSION INTRA-ARTICULAR; INTRALESIONAL; INTRAMUSCULAR at 13:21

## 2018-12-27 RX ADMIN — FAMOTIDINE 20 MG: 20 TABLET, FILM COATED ORAL at 13:20

## 2018-12-27 RX ADMIN — PROMETHAZINE HYDROCHLORIDE 12.5 MG: 25 INJECTION INTRAMUSCULAR; INTRAVENOUS at 10:00

## 2018-12-27 RX ADMIN — ACETAMINOPHEN 650 MG: 325 TABLET, FILM COATED ORAL at 22:01

## 2018-12-28 PROCEDURE — 25010000002 BETAMETHASONE ACET & SOD PHOS PER 4 MG: Performed by: OBSTETRICS & GYNECOLOGY

## 2018-12-28 PROCEDURE — 59025 FETAL NON-STRESS TEST: CPT

## 2018-12-28 PROCEDURE — 59025 FETAL NON-STRESS TEST: CPT | Performed by: OBSTETRICS & GYNECOLOGY

## 2018-12-28 PROCEDURE — 99232 SBSQ HOSP IP/OBS MODERATE 35: CPT | Performed by: OBSTETRICS & GYNECOLOGY

## 2018-12-28 RX ORDER — CYCLOBENZAPRINE HCL 10 MG
10 TABLET ORAL ONCE
Status: COMPLETED | OUTPATIENT
Start: 2018-12-28 | End: 2018-12-28

## 2018-12-28 RX ORDER — ASPIRIN 81 MG/1
81 TABLET, CHEWABLE ORAL DAILY
Status: DISCONTINUED | OUTPATIENT
Start: 2018-12-29 | End: 2019-01-02

## 2018-12-28 RX ADMIN — ACETAMINOPHEN 650 MG: 325 TABLET, FILM COATED ORAL at 19:03

## 2018-12-28 RX ADMIN — Medication 1 TABLET: at 08:52

## 2018-12-28 RX ADMIN — FAMOTIDINE 20 MG: 20 TABLET, FILM COATED ORAL at 20:32

## 2018-12-28 RX ADMIN — CYCLOBENZAPRINE 10 MG: 10 TABLET, FILM COATED ORAL at 19:04

## 2018-12-28 RX ADMIN — BETAMETHASONE SODIUM PHOSPHATE AND BETAMETHASONE ACETATE 12 MG: 3; 3 INJECTION, SUSPENSION INTRA-ARTICULAR; INTRALESIONAL; INTRAMUSCULAR at 13:30

## 2018-12-28 RX ADMIN — FAMOTIDINE 20 MG: 20 TABLET, FILM COATED ORAL at 08:52

## 2018-12-29 PROCEDURE — 99232 SBSQ HOSP IP/OBS MODERATE 35: CPT | Performed by: OBSTETRICS & GYNECOLOGY

## 2018-12-29 PROCEDURE — 59025 FETAL NON-STRESS TEST: CPT

## 2018-12-29 PROCEDURE — 59025 FETAL NON-STRESS TEST: CPT | Performed by: OBSTETRICS & GYNECOLOGY

## 2018-12-29 RX ADMIN — Medication 1 TABLET: at 16:05

## 2018-12-29 RX ADMIN — Medication 81 MG: at 16:05

## 2018-12-29 RX ADMIN — FAMOTIDINE 20 MG: 20 TABLET, FILM COATED ORAL at 09:18

## 2018-12-29 RX ADMIN — FAMOTIDINE 20 MG: 20 TABLET, FILM COATED ORAL at 21:25

## 2018-12-29 RX ADMIN — Medication 1 APPLICATION: at 16:05

## 2018-12-30 PROCEDURE — 59025 FETAL NON-STRESS TEST: CPT

## 2018-12-30 PROCEDURE — 99232 SBSQ HOSP IP/OBS MODERATE 35: CPT | Performed by: OBSTETRICS & GYNECOLOGY

## 2018-12-30 PROCEDURE — 59025 FETAL NON-STRESS TEST: CPT | Performed by: OBSTETRICS & GYNECOLOGY

## 2018-12-30 RX ADMIN — FAMOTIDINE 20 MG: 20 TABLET, FILM COATED ORAL at 20:56

## 2018-12-30 RX ADMIN — Medication 1 TABLET: at 13:53

## 2018-12-30 RX ADMIN — FAMOTIDINE 20 MG: 20 TABLET, FILM COATED ORAL at 09:58

## 2018-12-30 RX ADMIN — Medication 81 MG: at 09:57

## 2018-12-30 RX ADMIN — ACETAMINOPHEN 650 MG: 325 TABLET, FILM COATED ORAL at 17:00

## 2018-12-31 ENCOUNTER — ANESTHESIA EVENT (OUTPATIENT)
Dept: LABOR AND DELIVERY | Facility: HOSPITAL | Age: 23
End: 2018-12-31

## 2018-12-31 ENCOUNTER — ANESTHESIA (OUTPATIENT)
Dept: LABOR AND DELIVERY | Facility: HOSPITAL | Age: 23
End: 2018-12-31

## 2018-12-31 LAB
ABO GROUP BLD: NORMAL
ALBUMIN SERPL-MCNC: 2.9 G/DL (ref 3.5–5.2)
ALBUMIN/GLOB SERPL: 0.8 G/DL
ALP SERPL-CCNC: 125 U/L (ref 39–117)
ALT SERPL W P-5'-P-CCNC: 9 U/L (ref 1–33)
ANION GAP SERPL CALCULATED.3IONS-SCNC: 12.4 MMOL/L
AST SERPL-CCNC: 11 U/L (ref 1–32)
BASOPHILS # BLD AUTO: 0.01 10*3/MM3 (ref 0–0.2)
BASOPHILS NFR BLD AUTO: 0.1 % (ref 0–1.5)
BILIRUB SERPL-MCNC: <0.2 MG/DL (ref 0.1–1.2)
BLD GP AB SCN SERPL QL: NEGATIVE
BUN BLD-MCNC: 9 MG/DL (ref 6–20)
BUN/CREAT SERPL: 17.6 (ref 7–25)
CALCIUM SPEC-SCNC: 8.5 MG/DL (ref 8.6–10.5)
CHLORIDE SERPL-SCNC: 105 MMOL/L (ref 98–107)
CO2 SERPL-SCNC: 17.6 MMOL/L (ref 22–29)
CREAT BLD-MCNC: 0.51 MG/DL (ref 0.57–1)
DEPRECATED RDW RBC AUTO: 41.1 FL (ref 37–54)
EOSINOPHIL # BLD AUTO: 0.05 10*3/MM3 (ref 0–0.7)
EOSINOPHIL NFR BLD AUTO: 0.4 % (ref 0.3–6.2)
ERYTHROCYTE [DISTWIDTH] IN BLOOD BY AUTOMATED COUNT: 13.8 % (ref 11.7–13)
EXPIRATION DATE: NORMAL
GFR SERPL CREATININE-BSD FRML MDRD: 147 ML/MIN/1.73
GLOBULIN UR ELPH-MCNC: 3.8 GM/DL
GLUCOSE BLD-MCNC: 128 MG/DL (ref 65–99)
HCT VFR BLD AUTO: 36.7 % (ref 35.6–45.5)
HGB BLD-MCNC: 12.3 G/DL (ref 11.9–15.5)
IMM GRANULOCYTES # BLD AUTO: 0.11 10*3/MM3 (ref 0–0.03)
IMM GRANULOCYTES NFR BLD AUTO: 1 % (ref 0–0.5)
LYMPHOCYTES # BLD AUTO: 1.42 10*3/MM3 (ref 0.9–4.8)
LYMPHOCYTES NFR BLD AUTO: 12.5 % (ref 19.6–45.3)
Lab: NORMAL
MCH RBC QN AUTO: 27.4 PG (ref 26.9–32)
MCHC RBC AUTO-ENTMCNC: 33.5 G/DL (ref 32.4–36.3)
MCV RBC AUTO: 81.7 FL (ref 80.5–98.2)
MONOCYTES # BLD AUTO: 0.67 10*3/MM3 (ref 0.2–1.2)
MONOCYTES NFR BLD AUTO: 5.9 % (ref 5–12)
NEUTROPHILS # BLD AUTO: 9.19 10*3/MM3 (ref 1.9–8.1)
NEUTROPHILS NFR BLD AUTO: 81.1 % (ref 42.7–76)
PLATELET # BLD AUTO: 143 10*3/MM3 (ref 140–500)
PMV BLD AUTO: 13.6 FL (ref 6–12)
POTASSIUM BLD-SCNC: 3.4 MMOL/L (ref 3.5–5.2)
PROT SERPL-MCNC: 6.7 G/DL (ref 6–8.5)
PROT UR STRIP-MCNC: NEGATIVE MG/DL
RBC # BLD AUTO: 4.49 10*6/MM3 (ref 3.9–5.2)
RH BLD: POSITIVE
SODIUM BLD-SCNC: 135 MMOL/L (ref 136–145)
T&S EXPIRATION DATE: NORMAL
WBC NRBC COR # BLD: 11.34 10*3/MM3 (ref 4.5–10.7)

## 2018-12-31 PROCEDURE — 86901 BLOOD TYPING SEROLOGIC RH(D): CPT | Performed by: OBSTETRICS & GYNECOLOGY

## 2018-12-31 PROCEDURE — 59025 FETAL NON-STRESS TEST: CPT

## 2018-12-31 PROCEDURE — 86900 BLOOD TYPING SEROLOGIC ABO: CPT | Performed by: OBSTETRICS & GYNECOLOGY

## 2018-12-31 PROCEDURE — 86850 RBC ANTIBODY SCREEN: CPT | Performed by: OBSTETRICS & GYNECOLOGY

## 2018-12-31 PROCEDURE — 85025 COMPLETE CBC W/AUTO DIFF WBC: CPT | Performed by: OBSTETRICS & GYNECOLOGY

## 2018-12-31 PROCEDURE — 80053 COMPREHEN METABOLIC PANEL: CPT | Performed by: OBSTETRICS & GYNECOLOGY

## 2018-12-31 PROCEDURE — 59025 FETAL NON-STRESS TEST: CPT | Performed by: OBSTETRICS & GYNECOLOGY

## 2018-12-31 PROCEDURE — 81002 URINALYSIS NONAUTO W/O SCOPE: CPT | Performed by: OBSTETRICS & GYNECOLOGY

## 2018-12-31 RX ORDER — SODIUM CHLORIDE 0.9 % (FLUSH) 0.9 %
3 SYRINGE (ML) INJECTION EVERY 12 HOURS SCHEDULED
Status: DISCONTINUED | OUTPATIENT
Start: 2018-12-31 | End: 2019-01-03

## 2018-12-31 RX ORDER — CEFAZOLIN SODIUM 2 G/100ML
2 INJECTION, SOLUTION INTRAVENOUS ONCE
Status: COMPLETED | OUTPATIENT
Start: 2019-01-01 | End: 2019-01-01

## 2018-12-31 RX ORDER — ONDANSETRON 2 MG/ML
4 INJECTION INTRAMUSCULAR; INTRAVENOUS EVERY 6 HOURS PRN
Status: DISCONTINUED | OUTPATIENT
Start: 2018-12-31 | End: 2019-01-02 | Stop reason: HOSPADM

## 2018-12-31 RX ORDER — ONDANSETRON 4 MG/1
4 TABLET, ORALLY DISINTEGRATING ORAL EVERY 6 HOURS PRN
Status: DISCONTINUED | OUTPATIENT
Start: 2018-12-31 | End: 2019-01-02 | Stop reason: HOSPADM

## 2018-12-31 RX ORDER — CEFAZOLIN SODIUM 1 G/50ML
1 INJECTION, SOLUTION INTRAVENOUS EVERY 8 HOURS
Status: DISCONTINUED | OUTPATIENT
Start: 2019-01-01 | End: 2019-01-02 | Stop reason: HOSPADM

## 2018-12-31 RX ORDER — TERBUTALINE SULFATE 1 MG/ML
0.25 INJECTION, SOLUTION SUBCUTANEOUS AS NEEDED
Status: DISCONTINUED | OUTPATIENT
Start: 2018-12-31 | End: 2019-01-02 | Stop reason: HOSPADM

## 2018-12-31 RX ORDER — SODIUM CHLORIDE 0.9 % (FLUSH) 0.9 %
5 SYRINGE (ML) INJECTION AS NEEDED
Status: DISCONTINUED | OUTPATIENT
Start: 2018-12-31 | End: 2019-01-05 | Stop reason: HOSPADM

## 2018-12-31 RX ORDER — ONDANSETRON 4 MG/1
4 TABLET, FILM COATED ORAL EVERY 6 HOURS PRN
Status: DISCONTINUED | OUTPATIENT
Start: 2018-12-31 | End: 2019-01-02 | Stop reason: HOSPADM

## 2018-12-31 RX ORDER — SODIUM CHLORIDE, SODIUM LACTATE, POTASSIUM CHLORIDE, CALCIUM CHLORIDE 600; 310; 30; 20 MG/100ML; MG/100ML; MG/100ML; MG/100ML
100 INJECTION, SOLUTION INTRAVENOUS CONTINUOUS
Status: DISCONTINUED | OUTPATIENT
Start: 2018-12-31 | End: 2019-01-05 | Stop reason: HOSPADM

## 2018-12-31 RX ADMIN — FAMOTIDINE 20 MG: 20 TABLET, FILM COATED ORAL at 09:03

## 2018-12-31 RX ADMIN — SODIUM CHLORIDE, POTASSIUM CHLORIDE, SODIUM LACTATE AND CALCIUM CHLORIDE 75 ML/HR: 600; 310; 30; 20 INJECTION, SOLUTION INTRAVENOUS at 17:43

## 2018-12-31 RX ADMIN — Medication 81 MG: at 09:03

## 2018-12-31 RX ADMIN — Medication 1 TABLET: at 09:03

## 2018-12-31 RX ADMIN — DINOPROSTONE 10 MG: 10 INSERT VAGINAL at 17:42

## 2019-01-01 PROCEDURE — 25010000003 CEFAZOLIN IN DEXTROSE 2-4 GM/100ML-% SOLUTION: Performed by: OBSTETRICS & GYNECOLOGY

## 2019-01-01 PROCEDURE — C1755 CATHETER, INTRASPINAL: HCPCS | Performed by: ANESTHESIOLOGY

## 2019-01-01 PROCEDURE — 25010000002 ROPIVACAINE PER 1 MG: Performed by: ANESTHESIOLOGY

## 2019-01-01 PROCEDURE — 25010000003 CEFAZOLIN 1-4 GM/50ML-% SOLUTION: Performed by: OBSTETRICS & GYNECOLOGY

## 2019-01-01 PROCEDURE — 99024 POSTOP FOLLOW-UP VISIT: CPT | Performed by: OBSTETRICS & GYNECOLOGY

## 2019-01-01 PROCEDURE — 25010000002 ONDANSETRON PER 1 MG: Performed by: ANESTHESIOLOGY

## 2019-01-01 RX ORDER — OXYTOCIN-SODIUM CHLORIDE 0.9% IV SOLN 30 UNIT/500ML 30-0.9/5 UT/ML-%
2-30 SOLUTION INTRAVENOUS
Status: DISCONTINUED | OUTPATIENT
Start: 2019-01-01 | End: 2019-01-02 | Stop reason: HOSPADM

## 2019-01-01 RX ORDER — DIPHENHYDRAMINE HYDROCHLORIDE 50 MG/ML
12.5 INJECTION INTRAMUSCULAR; INTRAVENOUS EVERY 8 HOURS PRN
Status: DISCONTINUED | OUTPATIENT
Start: 2019-01-01 | End: 2019-01-02 | Stop reason: HOSPADM

## 2019-01-01 RX ORDER — ONDANSETRON 2 MG/ML
4 INJECTION INTRAMUSCULAR; INTRAVENOUS ONCE AS NEEDED
Status: COMPLETED | OUTPATIENT
Start: 2019-01-01 | End: 2019-01-01

## 2019-01-01 RX ORDER — ROPIVACAINE HYDROCHLORIDE 2 MG/ML
INJECTION, SOLUTION EPIDURAL; INFILTRATION; PERINEURAL AS NEEDED
Status: DISCONTINUED | OUTPATIENT
Start: 2019-01-01 | End: 2019-01-02 | Stop reason: SURG

## 2019-01-01 RX ORDER — FAMOTIDINE 10 MG/ML
20 INJECTION, SOLUTION INTRAVENOUS ONCE AS NEEDED
Status: DISCONTINUED | OUTPATIENT
Start: 2019-01-01 | End: 2019-01-02 | Stop reason: HOSPADM

## 2019-01-01 RX ORDER — LIDOCAINE HYDROCHLORIDE 15 MG/ML
INJECTION, SOLUTION EPIDURAL; INFILTRATION; INTRACAUDAL; PERINEURAL AS NEEDED
Status: DISCONTINUED | OUTPATIENT
Start: 2019-01-01 | End: 2019-01-02 | Stop reason: SURG

## 2019-01-01 RX ORDER — EPHEDRINE SULFATE 50 MG/ML
5 INJECTION, SOLUTION INTRAVENOUS AS NEEDED
Status: DISCONTINUED | OUTPATIENT
Start: 2019-01-01 | End: 2019-01-02 | Stop reason: HOSPADM

## 2019-01-01 RX ADMIN — LIDOCAINE HYDROCHLORIDE 2 ML: 15 INJECTION, SOLUTION EPIDURAL; INFILTRATION; INTRACAUDAL; PERINEURAL at 13:51

## 2019-01-01 RX ADMIN — CEFAZOLIN SODIUM 1 G: 1 INJECTION, SOLUTION INTRAVENOUS at 23:30

## 2019-01-01 RX ADMIN — EPHEDRINE SULFATE 5 MG: 50 INJECTION, SOLUTION INTRAVENOUS at 23:23

## 2019-01-01 RX ADMIN — SODIUM CHLORIDE, POTASSIUM CHLORIDE, SODIUM LACTATE AND CALCIUM CHLORIDE 100 ML/HR: 600; 310; 30; 20 INJECTION, SOLUTION INTRAVENOUS at 16:37

## 2019-01-01 RX ADMIN — CEFAZOLIN SODIUM 2 G: 2 INJECTION, SOLUTION INTRAVENOUS at 07:47

## 2019-01-01 RX ADMIN — CEFAZOLIN SODIUM 1 G: 1 INJECTION, SOLUTION INTRAVENOUS at 15:45

## 2019-01-01 RX ADMIN — ONDANSETRON 4 MG: 2 INJECTION INTRAMUSCULAR; INTRAVENOUS at 23:30

## 2019-01-01 RX ADMIN — SODIUM CHLORIDE, POTASSIUM CHLORIDE, SODIUM LACTATE AND CALCIUM CHLORIDE 75 ML/HR: 600; 310; 30; 20 INJECTION, SOLUTION INTRAVENOUS at 07:22

## 2019-01-01 RX ADMIN — ROPIVACAINE HYDROCHLORIDE 5 ML: 2 INJECTION, SOLUTION EPIDURAL; INFILTRATION at 14:01

## 2019-01-01 RX ADMIN — OXYTOCIN 2 MILLI-UNITS/MIN: 10 INJECTION, SOLUTION INTRAMUSCULAR; INTRAVENOUS at 07:51

## 2019-01-01 RX ADMIN — Medication 10 ML/HR: at 14:02

## 2019-01-01 RX ADMIN — LIDOCAINE HYDROCHLORIDE 5 ML: 15 INJECTION, SOLUTION EPIDURAL; INFILTRATION; INTRACAUDAL; PERINEURAL at 13:55

## 2019-01-01 NOTE — PLAN OF CARE
Problem: Patient Care Overview  Goal: Plan of Care Review  Outcome: Ongoing (interventions implemented as appropriate)   01/01/19 0513   Coping/Psychosocial   Plan of Care Reviewed With patient;spouse   Plan of Care Review   Progress improving   OTHER   Outcome Summary Cervidil placed at 1742 on 12/31/18. Will remove at 0542 on 1/1/19. Pt. has been able to sleep throughout the night. Last SVE at 1743 on 12/31/18 showed pt. to be 1/50/-2.      Goal: Individualization and Mutuality  Outcome: Ongoing (interventions implemented as appropriate)   12/31/18 2153 01/01/19 0513   Individualization   Patient Specific Preferences Cervidil, Breastfeeding, epidural, SWATI --    Patient Specific Goals (Include Timeframe) --  healthy mom and baby   Patient Specific Interventions cervidil, breastfeeding, epidural, SWATI --    Mutuality/Individual Preferences   What Anxieties, Fears, Concerns, or Questions Do You Have About Your Care? early gestational delivery --    How Would You and/or Your Support Person Like to Participate in Your Care? remain at bedside and active in care --    Mutuality/Individual Preferences   How to Address Anxieties/Fears education --      Goal: Discharge Needs Assessment  Outcome: Ongoing (interventions implemented as appropriate)   01/01/19 0513   Discharge Needs Assessment   Readmission Within the Last 30 Days other (see comments)  (on antepartum since 12/26/18 until coming to L&D on 12/31/18)   Concerns to be Addressed no discharge needs identified   Patient/Family Anticipates Transition to home with family   Patient/Family Anticipated Services at Transition none   Transportation Concerns car, none   Transportation Anticipated car, drives self;family or friend will provide   Anticipated Changes Related to Illness none   Equipment Needed After Discharge none   Disability   Equipment Currently Used at Home none     Goal: Interprofessional Rounds/Family Conf  Outcome: Ongoing (interventions implemented as  appropriate)   01/01/19 0513   Interdisciplinary Rounds/Family Conf   Participants nursing;patient;physician;other (see comments)  (spouse)       Problem: Hypertensive Disorders in Pregnancy (Adult,Obstetrics,Pediatric)  Goal: Signs and Symptoms of Listed Potential Problems Will be Absent, Minimized or Managed (Hypertensive Disorders in Pregnancy)  Outcome: Ongoing (interventions implemented as appropriate)   01/01/19 0513   Goal/Outcome Evaluation   Problems Assessed (Hypertensive Disorders in Pregnancy) all   Problems Present (Hypertensive/in PG) none       Problem: Labor (Cervical Ripen, Induct, Augment) (Adult,Obstetrics,Pediatric)  Goal: Signs and Symptoms of Listed Potential Problems Will be Absent, Minimized or Managed (Labor)  Outcome: Ongoing (interventions implemented as appropriate)   01/01/19 0513   Goal/Outcome Evaluation   Problems Assessed (Labor) all   Problems Present (Labor) none

## 2019-01-01 NOTE — PLAN OF CARE
Problem: Patient Care Overview  Goal: Plan of Care Review  Outcome: Ongoing (interventions implemented as appropriate)   18   Coping/Psychosocial   Plan of Care Reviewed With patient;spouse   Plan of Care Review   Progress improving   OTHER   Outcome Summary  at 36+6wks arrives from antepartum for judson IOL d/t IUGR. Pt reports +FM and denies LOF, VB, ctxs. Pt denies pain at this time. Infant cephalic presentation. Category 1 FHR strip. SVE /-2. Cervidil placed. Will continue with current POC for judson IOL      Goal: Individualization and Mutuality  Outcome: Ongoing (interventions implemented as appropriate)   18   Individualization   Patient Specific Preferences Cervidil, Breastfeeding, epidural, SWATI   Patient Specific Goals (Include Timeframe) adequate rest through night with cervidil   Patient Specific Interventions cervidil, breastfeeding, epidural, SWATI   Mutuality/Individual Preferences   What Anxieties, Fears, Concerns, or Questions Do You Have About Your Care? early gestational delivery   What Information Would Help Us Give You More Personalized Care? none   How Would You and/or Your Support Person Like to Participate in Your Care? remain at bedside and active in care   Mutuality/Individual Preferences   How to Address Anxieties/Fears education       Problem: Hypertensive Disorders in Pregnancy (Adult,Obstetrics,Pediatric)  Goal: Signs and Symptoms of Listed Potential Problems Will be Absent, Minimized or Managed (Hypertensive Disorders in Pregnancy)  Outcome: Ongoing (interventions implemented as appropriate)   18   Goal/Outcome Evaluation   Problems Assessed (Hypertensive Disorders in Pregnancy) all   Problems Present (Hypertensive/in PG) none       Problem: Labor (Cervical Ripen, Induct, Augment) (Adult,Obstetrics,Pediatric)  Goal: Signs and Symptoms of Listed Potential Problems Will be Absent, Minimized or Managed (Labor)  Outcome: Ongoing (interventions implemented as  appropriate)   12/31/18 0669   Goal/Outcome Evaluation   Problems Assessed (Labor) all   Problems Present (Labor) none

## 2019-01-01 NOTE — PROGRESS NOTES
"[facility]  Obstetric Progress Note    Chief Complaint   Patient presents with   • Elevated Blood Pressure      at 36+1wks arrives in triage d/t elevated b/ps with H/A that won't resolve with Tylenol. Pt states \"feels like allergy h/a bc its behind my eyes. I don't think it feels like a h/a bc of b/p\". Pt denies ctx pain at this time but h/a pain is 3/10. Pt reports +FM and denies LOF, VB, ctx. Abdomen palpates soft. Pt denies visual changes, epigastric pain, n/v    • Scheduled Induction      at 36+6wks arrives on unit from antepartum for judson IOL d/t IUGR. Pt reports +FM and denies LOF, VB, ctxs. Abdomen palpates soft.        Subjective     Patient:    The patient feels well. S/p cervadil and now on pitocin and feeling mild contractions.  No headache.      Review of Systems - ROS:  No fever or chills, no nausea or vomiting, no leg pain, no LE edema, no leaking fluid, no bleeding, no headache, no dysuria       Objective     Vital Signs Range for the last 24 hours  Temp:  [98 °F (36.7 °C)-98.9 °F (37.2 °C)] 98.9 °F (37.2 °C)   Temp src: Axillary   BP: (110-165)/(56-88) 142/70   Heart Rate:  [] 92   Resp:  [16-18] 18           Device (Oxygen Therapy): room air           Flowsheet Rows      First Filed Value   Admission Height  162.6 cm (64\") Documented at 2018 1653   Admission Weight  114 kg (250 lb 9.6 oz) Documented at 2018 2148          Intake/Output last 24 hours:      Intake/Output Summary (Last 24 hours) at 2019 0947  Last data filed at 2019 0906  Gross per 24 hour   Intake 800 ml   Output 1100 ml   Net -300 ml       Intake/Output this shift:    I/O this shift:  In: -   Out: 100 [Urine:100]    Physical Exam:  General: Patient is comfortable, well appearing and in no acute distress   Heart CVS exam: normal rate and regular rhythm.   Lungs Chest: no tachypnea, retractions or cyanosis.     Abdomen Abdominal exam: soft, nontender, nondistended, no masses or organomegaly. "   Extremities Exam of extremities: peripheral pulses normal, no pedal edema, no clubbing or cyanosis     Presentation: vtx   Cervix: Exam by: Method: sterile exam per RN   Dilation: Cervical Dilation (cm): 1   Effacement: Cervical Effacement: 50%   Station: Fetal Station: -2         Fetal Heart Rate Assessment   Method: Fetal HR Assessment Method: external   Beats/min: Fetal HR (beats/min): 145   Baseline: Fetal Heart Baseline Rate: normal range   Varibility: Fetal HR Variability: moderate (amplitude range 6 to 25 bpm)   Accels: Fetal HR Accelerations: greater than/equal to 15 bpm, lasting at least 15 seconds   Decels: Fetal HR Decelerations: absent   Tracing Category:       Uterine Assessment   Method: Method: external tocotransducer   Frequency (min): Contraction Frequency (Minutes): 1.5-4   Ctx Count in 10 min:     Duration:     Intensity: Contraction Intensity: mild by palpation   Intensity by IUPC:     Resting Tone: Uterine Resting Tone: soft by palpation   Resting Tone by IUPC:     [place] Units:       Lab Results (last 24 hours)     Procedure Component Value Units Date/Time    POC Protein, Urine, Qualitative, Dipstick [[id number]]  (Normal) Collected:  12/31/18 2353    Specimen:  Urine Updated:  12/31/18 2354     Protein, POC Negative mg/dL      Lot Number [id number]     Expiration Date 09/30/2019    Comprehensive Metabolic Panel [[id number]]  (Abnormal) Collected:  12/31/18 1443    Specimen:  Blood Updated:  12/31/18 1553     Glucose 128 mg/dL      BUN 9 mg/dL      Creatinine 0.51 mg/dL      Sodium 135 mmol/L      Potassium 3.4 mmol/L      Chloride 105 mmol/L      CO2 17.6 mmol/L      Calcium 8.5 mg/dL      Total Protein 6.7 g/dL      Albumin 2.90 g/dL      ALT (SGPT) 9 U/L      AST (SGOT) 11 U/L      Alkaline Phosphatase 125 U/L      Total Bilirubin <0.2 mg/dL      eGFR Non African Amer 147 mL/min/1.73      Globulin 3.8 gm/dL      A/G Ratio 0.8 g/dL      BUN/Creatinine Ratio 17.6     Anion Gap 12.4 mmol/L      CBC & Differential [[id number]] Collected:  12/31/18 1443    Specimen:  Blood Updated:  12/31/18 1537    Narrative:       The following orders were created for panel order CBC & Differential.  Procedure                               Abnormality         Status                     ---------                               -----------         ------                     Scan Slide[[id number]]                                                                  CBC Auto Differential[[id number]]        Abnormal            Final result                 Please view results for these tests on the individual orders.    CBC Auto Differential [[id number]]  (Abnormal) Collected:  12/31/18 1443    Specimen:  Blood Updated:  12/31/18 1537     WBC 11.34 10*3/mm3      RBC 4.49 10*6/mm3      Hemoglobin 12.3 g/dL      Hematocrit 36.7 %      MCV 81.7 fL      MCH 27.4 pg      MCHC 33.5 g/dL      RDW 13.8 %      RDW-SD 41.1 fl      MPV 13.6 fL      Platelets 143 10*3/mm3      Neutrophil % 81.1 %      Lymphocyte % 12.5 %      Monocyte % 5.9 %      Eosinophil % 0.4 %      Basophil % 0.1 %      Immature Grans % 1.0 %      Neutrophils, Absolute 9.19 10*3/mm3      Lymphocytes, Absolute 1.42 10*3/mm3      Monocytes, Absolute 0.67 10*3/mm3      Eosinophils, Absolute 0.05 10*3/mm3      Basophils, Absolute 0.01 10*3/mm3      Immature Grans, Absolute 0.11 10*3/mm3               Assessment/Plan       Chronic bilateral low back pain with sciatica    Obesity in pregnancy    Hypertension in pregnancy, essential, antepartum    Pregnancy    Thrombocytopenia affecting pregnancy (CMS/HCC)    Poor fetal growth affecting management of mother in third trimester    Group B Streptococcus carrier, +RV culture, currently pregnant    Elevated blood pressure affecting pregnancy in third trimester, antepartum        Assessment:  1.  Intrauterine pregnancy at 37w0d weeks gestation with reactive fetal status.    2.  induction of labor  for gestational hypertension with labile  BPs  with unfavorable cervix  3.  Obstetrical history significant for is non-contributory.  4.  GBS status: positive, ancef ordered  Plan:  1. fetal and uterine monitoring  continuously, labor augmentation  Pitocin, analgesia with  epidural and antibiotic for GBS  2. Plan of care has been reviewed with patient and family  3.  Risks, benefits of treatment plan have been discussed.  4.  All questions have been answered.  5.  BPs in normal to borderline range so far      Annette Gomez MD 1/1/2019 9:47 AM

## 2019-01-01 NOTE — PLAN OF CARE
Problem: Patient Care Overview  Goal: Plan of Care Review  Outcome: Ongoing (interventions implemented as appropriate)   19 1135   Coping/Psychosocial   Plan of Care Reviewed With patient;spouse   Plan of Care Review   Progress improving   OTHER   Outcome Summary Pitocin initiated after cervidil removed, br with brp, NPO except ice, frequent position changes.      Goal: Individualization and Mutuality  Outcome: Ongoing (interventions implemented as appropriate)   18 2153 19 0513 19 1135   Individualization   Patient Specific Preferences Cervidil, Breastfeeding, epidural, SWATI --  --    Patient Specific Goals (Include Timeframe) --  healthy mom and baby --    Patient Specific Interventions cervidil, breastfeeding, epidural, SWATI --  --    Mutuality/Individual Preferences   What Anxieties, Fears, Concerns, or Questions Do You Have About Your Care? early gestational delivery --  --    What Information Would Help Us Give You More Personalized Care? none --  --    How Would You and/or Your Support Person Like to Participate in Your Care? --  --  labor support and care of    Mutuality/Individual Preferences   How to Address Anxieties/Fears education --  --      Goal: Discharge Needs Assessment  Outcome: Ongoing (interventions implemented as appropriate)   19 1135   Discharge Needs Assessment   Readmission Within the Last 30 Days no previous admission in last 30 days   Concerns to be Addressed no discharge needs identified   Patient/Family Anticipates Transition to home   Patient/Family Anticipated Services at Transition none   Transportation Concerns car, none   Transportation Anticipated car, drives self   Equipment Needed After Discharge none   Disability   Equipment Currently Used at Home none     Goal: Interprofessional Rounds/Family Conf  Outcome: Ongoing (interventions implemented as appropriate)   19 1135   Interdisciplinary Rounds/Family Conf   Participants  nursing;patient;physician       Problem: Hypertensive Disorders in Pregnancy (Adult,Obstetrics,Pediatric)  Goal: Signs and Symptoms of Listed Potential Problems Will be Absent, Minimized or Managed (Hypertensive Disorders in Pregnancy)  Outcome: Ongoing (interventions implemented as appropriate)   01/01/19 1135   Goal/Outcome Evaluation   Problems Assessed (Hypertensive Disorders in Pregnancy) all   Problems Present (Hypertensive/in PG) none       Problem: Labor (Cervical Ripen, Induct, Augment) (Adult,Obstetrics,Pediatric)  Goal: Signs and Symptoms of Listed Potential Problems Will be Absent, Minimized or Managed (Labor)  Outcome: Ongoing (interventions implemented as appropriate)   01/01/19 1135   Goal/Outcome Evaluation   Problems Assessed (Labor) all   Problems Present (Labor) none

## 2019-01-01 NOTE — PLAN OF CARE
Problem: Patient Care Overview  Goal: Plan of Care Review  Outcome: Ongoing (interventions implemented as appropriate)   19 1135 19 8500   Coping/Psychosocial   Plan of Care Reviewed With patient;spouse --    Plan of Care Review   Progress improving --    OTHER   Outcome Summary --  pitocin continued. pain 0/10      Goal: Individualization and Mutuality  Outcome: Ongoing (interventions implemented as appropriate)   18 2153 19 0513 19 1135   Individualization   Patient Specific Preferences Cervidil, Breastfeeding, epidural, SWATI --  --    Patient Specific Goals (Include Timeframe) --  healthy mom and baby --    Patient Specific Interventions cervidil, breastfeeding, epidural, SWATI --  --    Mutuality/Individual Preferences   What Anxieties, Fears, Concerns, or Questions Do You Have About Your Care? early gestational delivery --  --    What Information Would Help Us Give You More Personalized Care? none --  --    How Would You and/or Your Support Person Like to Participate in Your Care? --  --  labor support and care of    Mutuality/Individual Preferences   How to Address Anxieties/Fears education --  --      Goal: Discharge Needs Assessment  Outcome: Ongoing (interventions implemented as appropriate)   19 0513 19 1135   Discharge Needs Assessment   Readmission Within the Last 30 Days --  no previous admission in last 30 days   Concerns to be Addressed --  no discharge needs identified   Patient/Family Anticipates Transition to --  home   Patient/Family Anticipated Services at Transition --  none   Transportation Concerns --  car, none   Transportation Anticipated --  car, drives self   Anticipated Changes Related to Illness none --    Equipment Needed After Discharge --  none   Disability   Equipment Currently Used at Home --  none       Problem: Hypertensive Disorders in Pregnancy (Adult,Obstetrics,Pediatric)  Goal: Signs and Symptoms of Listed Potential Problems Will be  Absent, Minimized or Managed (Hypertensive Disorders in Pregnancy)  Outcome: Ongoing (interventions implemented as appropriate)   19 1135   Goal/Outcome Evaluation   Problems Assessed (Hypertensive Disorders in Pregnancy) all   Problems Present (Hypertensive/in PG) none       Problem: Labor (Cervical Ripen, Induct, Augment) (Adult,Obstetrics,Pediatric)  Goal: Signs and Symptoms of Listed Potential Problems Will be Absent, Minimized or Managed (Labor)  Outcome: Ongoing (interventions implemented as appropriate)   19 1135   Goal/Outcome Evaluation   Problems Assessed (Labor) all   Problems Present (Labor) none       Problem: Fall Risk,  (Adult,Obstetrics,Pediatric)  Goal: Identify Related Risk Factors and Signs and Symptoms  Outcome: Ongoing (interventions implemented as appropriate)   19 1850   Fall Risk,  (Adult,Obstetrics,Pediatric)   Related Risk Factors (Fall Risk, ) medication side effects   Signs and Symptoms (Fall Risk, ) increased risk of  fall/drop;presence of fall risk factors     Goal: Absence of Maternal Fall  Outcome: Ongoing (interventions implemented as appropriate)   19 1850   Fall Risk,  (Adult,Obstetrics,Pediatric)   Absence of Maternal Fall making progress toward outcome

## 2019-01-01 NOTE — ANESTHESIA PROCEDURE NOTES
Labor Epidural    Pre-sedation assessment completed: 1/1/2019 1:36 PM    Patient location during procedure: OR  Start Time: 1/1/2019 1:36 PM  Performed By  Anesthesiologist: Karina Jama MD  Preanesthetic Checklist  Completed: patient identified, pre-op evaluation, timeout performed, IV checked, risks and benefits discussed and monitors and equipment checked  Prep:  Pt Position:sitting  Sterile Tech:gloves, mask and sterile barrier  Prep:chlorhexidine gluconate and isopropyl alcohol  Monitoring:blood pressure monitoring and EKG  Epidural Block Procedure:  Approach:midline  Guidance:landmark technique  Needle Type:Tuohy  Needle Gauge:18  Loss of Resistance Medium: air  Paresthesia: none  Aspiration:negative  Test Dose:negative  Number of Attempts: 1  Post Assessment:  Dressing:occlusive dressing applied and secured with tape  Pt Tolerance:patient tolerated the procedure well with no apparent complications  Complications:no

## 2019-01-01 NOTE — ANESTHESIA PREPROCEDURE EVALUATION
Anesthesia Evaluation                  Airway   Mallampati: II  Dental - normal exam     Pulmonary - normal exam   Cardiovascular - normal exam        Neuro/Psych  GI/Hepatic/Renal/Endo      Musculoskeletal     Abdominal    Substance History      OB/GYN          Other          Other Comment: IMPRESSION:  1. No change when compared to prior MRI lumbar spine from [facility] 06/09/2016.  2. There is mild bilateral facet overgrowth at L3-4, minimal posterior  disc bulge but no canal or foraminal narrowing. There are small size  synovial cysts adjacent to the posterior aspect of the left facet  joints, superior lateral aspect of the left facet joint at L3-4 that  extend into the paravertebral soft tissues and do not come close to any  nerve roots.  3. There is mild to moderate bilateral facet overgrowth at L4-5, mild  disc space narrowing, disc desiccation, posterior central annular disc  tear with associated posterior central small disc protrusion that  minimally narrows the thecal sac comes close the ventral aspect of the  traversing L5 nerve roots but does not abut or compress them and there  is no foraminal narrowing at L4-5.  4. At L5-S1, there is mild bilateral facet overgrowth, mild disc space  narrowing, diffuse disc desiccation, 2 mm retrolisthesis of L5 on S1 but  there is no canal or lateral recess or foraminal narrowing. The  remainder of the lumbar spine MRI is normal.                  Anesthesia Plan    ASA 2     epidural     Anesthetic plan, all risks, benefits, and alternatives have been provided, discussed and informed consent has been obtained with: patient and spouse/significant other.

## 2019-01-02 PROCEDURE — 25010000003 CEFAZOLIN IN DEXTROSE 2-4 GM/100ML-% SOLUTION: Performed by: OBSTETRICS & GYNECOLOGY

## 2019-01-02 PROCEDURE — 25010000002 MORPHINE PER 10 MG: Performed by: ANESTHESIOLOGY

## 2019-01-02 PROCEDURE — 25010000002 ENOXAPARIN PER 10 MG: Performed by: OBSTETRICS & GYNECOLOGY

## 2019-01-02 PROCEDURE — 59510 CESAREAN DELIVERY: CPT | Performed by: OBSTETRICS & GYNECOLOGY

## 2019-01-02 PROCEDURE — 25010000002 HYDROMORPHONE PER 4 MG: Performed by: ANESTHESIOLOGY

## 2019-01-02 PROCEDURE — 25010000002 ONDANSETRON PER 1 MG: Performed by: ANESTHESIOLOGY

## 2019-01-02 RX ORDER — LIDOCAINE HYDROCHLORIDE 20 MG/ML
INJECTION, SOLUTION EPIDURAL; INFILTRATION; INTRACAUDAL; PERINEURAL AS NEEDED
Status: DISCONTINUED | OUTPATIENT
Start: 2019-01-02 | End: 2019-01-02 | Stop reason: SURG

## 2019-01-02 RX ORDER — IBUPROFEN 600 MG/1
600 TABLET ORAL EVERY 8 HOURS PRN
Status: DISCONTINUED | OUTPATIENT
Start: 2019-01-02 | End: 2019-01-05 | Stop reason: HOSPADM

## 2019-01-02 RX ORDER — METHYLERGONOVINE MALEATE 0.2 MG/ML
200 INJECTION INTRAVENOUS ONCE AS NEEDED
Status: DISCONTINUED | OUTPATIENT
Start: 2019-01-02 | End: 2019-01-02 | Stop reason: HOSPADM

## 2019-01-02 RX ORDER — MORPHINE SULFATE 1 MG/ML
INJECTION, SOLUTION EPIDURAL; INTRATHECAL; INTRAVENOUS AS NEEDED
Status: DISCONTINUED | OUTPATIENT
Start: 2019-01-02 | End: 2019-01-02 | Stop reason: SURG

## 2019-01-02 RX ORDER — ACETAMINOPHEN 325 MG/1
650 TABLET ORAL EVERY 4 HOURS PRN
Status: DISCONTINUED | OUTPATIENT
Start: 2019-01-02 | End: 2019-01-05 | Stop reason: HOSPADM

## 2019-01-02 RX ORDER — OXYTOCIN-SODIUM CHLORIDE 0.9% IV SOLN 30 UNIT/500ML 30-0.9/5 UT/ML-%
999 SOLUTION INTRAVENOUS ONCE
Status: COMPLETED | OUTPATIENT
Start: 2019-01-02 | End: 2019-01-02

## 2019-01-02 RX ORDER — DIPHENHYDRAMINE HCL 25 MG
25 CAPSULE ORAL EVERY 4 HOURS PRN
Status: DISCONTINUED | OUTPATIENT
Start: 2019-01-02 | End: 2019-01-05 | Stop reason: HOSPADM

## 2019-01-02 RX ORDER — CARBOPROST TROMETHAMINE 250 UG/ML
250 INJECTION, SOLUTION INTRAMUSCULAR AS NEEDED
Status: DISCONTINUED | OUTPATIENT
Start: 2019-01-02 | End: 2019-01-02 | Stop reason: HOSPADM

## 2019-01-02 RX ORDER — DIPHENHYDRAMINE HCL 25 MG
25 CAPSULE ORAL EVERY 4 HOURS PRN
Status: DISCONTINUED | OUTPATIENT
Start: 2019-01-02 | End: 2019-01-02 | Stop reason: SDUPTHER

## 2019-01-02 RX ORDER — PHYTONADIONE 1 MG/.5ML
INJECTION, EMULSION INTRAMUSCULAR; INTRAVENOUS; SUBCUTANEOUS
Status: DISPENSED
Start: 2019-01-02 | End: 2019-01-02

## 2019-01-02 RX ORDER — CEFAZOLIN SODIUM 2 G/100ML
2 INJECTION, SOLUTION INTRAVENOUS EVERY 8 HOURS
Status: COMPLETED | OUTPATIENT
Start: 2019-01-02 | End: 2019-01-02

## 2019-01-02 RX ORDER — PROMETHAZINE HYDROCHLORIDE 12.5 MG/1
12.5 TABLET ORAL EVERY 4 HOURS PRN
Status: DISCONTINUED | OUTPATIENT
Start: 2019-01-02 | End: 2019-01-05 | Stop reason: HOSPADM

## 2019-01-02 RX ORDER — ONDANSETRON 2 MG/ML
INJECTION INTRAMUSCULAR; INTRAVENOUS AS NEEDED
Status: DISCONTINUED | OUTPATIENT
Start: 2019-01-02 | End: 2019-01-02 | Stop reason: SURG

## 2019-01-02 RX ORDER — SIMETHICONE 80 MG
80 TABLET,CHEWABLE ORAL 4 TIMES DAILY PRN
Status: DISCONTINUED | OUTPATIENT
Start: 2019-01-02 | End: 2019-01-05 | Stop reason: HOSPADM

## 2019-01-02 RX ORDER — MORPHINE SULFATE 2 MG/ML
2 INJECTION, SOLUTION INTRAMUSCULAR; INTRAVENOUS
Status: ACTIVE | OUTPATIENT
Start: 2019-01-02 | End: 2019-01-03

## 2019-01-02 RX ORDER — ERYTHROMYCIN 5 MG/G
OINTMENT OPHTHALMIC
Status: DISPENSED
Start: 2019-01-02 | End: 2019-01-02

## 2019-01-02 RX ORDER — OXYCODONE HYDROCHLORIDE AND ACETAMINOPHEN 5; 325 MG/1; MG/1
2 TABLET ORAL EVERY 6 HOURS PRN
Status: DISCONTINUED | OUTPATIENT
Start: 2019-01-02 | End: 2019-01-03

## 2019-01-02 RX ORDER — ONDANSETRON 2 MG/ML
4 INJECTION INTRAMUSCULAR; INTRAVENOUS ONCE AS NEEDED
Status: DISCONTINUED | OUTPATIENT
Start: 2019-01-02 | End: 2019-01-05 | Stop reason: HOSPADM

## 2019-01-02 RX ORDER — HYDROMORPHONE HYDROCHLORIDE 1 MG/ML
0.5 INJECTION, SOLUTION INTRAMUSCULAR; INTRAVENOUS; SUBCUTANEOUS
Status: DISCONTINUED | OUTPATIENT
Start: 2019-01-02 | End: 2019-01-05 | Stop reason: HOSPADM

## 2019-01-02 RX ORDER — NALOXONE HCL 0.4 MG/ML
0.2 VIAL (ML) INJECTION
Status: DISCONTINUED | OUTPATIENT
Start: 2019-01-02 | End: 2019-01-05 | Stop reason: HOSPADM

## 2019-01-02 RX ORDER — METHYLERGONOVINE MALEATE 0.2 MG/ML
200 INJECTION INTRAVENOUS ONCE
Status: DISCONTINUED | OUTPATIENT
Start: 2019-01-02 | End: 2019-01-05 | Stop reason: HOSPADM

## 2019-01-02 RX ORDER — OXYTOCIN-SODIUM CHLORIDE 0.9% IV SOLN 30 UNIT/500ML 30-0.9/5 UT/ML-%
125 SOLUTION INTRAVENOUS CONTINUOUS PRN
Status: DISCONTINUED | OUTPATIENT
Start: 2019-01-02 | End: 2019-01-02 | Stop reason: HOSPADM

## 2019-01-02 RX ORDER — ONDANSETRON 4 MG/1
4 TABLET, FILM COATED ORAL EVERY 8 HOURS PRN
Status: DISCONTINUED | OUTPATIENT
Start: 2019-01-02 | End: 2019-01-05 | Stop reason: HOSPADM

## 2019-01-02 RX ORDER — DOCUSATE SODIUM 100 MG/1
100 CAPSULE, LIQUID FILLED ORAL 2 TIMES DAILY PRN
Status: DISCONTINUED | OUTPATIENT
Start: 2019-01-02 | End: 2019-01-05 | Stop reason: HOSPADM

## 2019-01-02 RX ORDER — HYDROXYZINE 50 MG/1
50 TABLET, FILM COATED ORAL EVERY 6 HOURS PRN
Status: DISCONTINUED | OUTPATIENT
Start: 2019-01-02 | End: 2019-01-05 | Stop reason: HOSPADM

## 2019-01-02 RX ORDER — MISOPROSTOL 200 UG/1
800 TABLET ORAL AS NEEDED
Status: DISCONTINUED | OUTPATIENT
Start: 2019-01-02 | End: 2019-01-02 | Stop reason: HOSPADM

## 2019-01-02 RX ORDER — OXYCODONE HYDROCHLORIDE AND ACETAMINOPHEN 5; 325 MG/1; MG/1
1 TABLET ORAL EVERY 4 HOURS PRN
Status: DISCONTINUED | OUTPATIENT
Start: 2019-01-02 | End: 2019-01-05 | Stop reason: HOSPADM

## 2019-01-02 RX ORDER — OXYTOCIN-SODIUM CHLORIDE 0.9% IV SOLN 30 UNIT/500ML 30-0.9/5 UT/ML-%
250 SOLUTION INTRAVENOUS CONTINUOUS
Status: ACTIVE | OUTPATIENT
Start: 2019-01-02 | End: 2019-01-02

## 2019-01-02 RX ADMIN — FAMOTIDINE 20 MG: 20 TABLET, FILM COATED ORAL at 08:58

## 2019-01-02 RX ADMIN — ACETAMINOPHEN 650 MG: 325 TABLET, FILM COATED ORAL at 14:41

## 2019-01-02 RX ADMIN — LIDOCAINE HYDROCHLORIDE 5 ML: 20 INJECTION, SOLUTION EPIDURAL; INFILTRATION; INTRACAUDAL; PERINEURAL at 01:15

## 2019-01-02 RX ADMIN — OXYCODONE AND ACETAMINOPHEN 1 TABLET: 5; 325 TABLET ORAL at 21:19

## 2019-01-02 RX ADMIN — FAMOTIDINE 20 MG: 20 TABLET, FILM COATED ORAL at 21:18

## 2019-01-02 RX ADMIN — ENOXAPARIN SODIUM 40 MG: 40 INJECTION SUBCUTANEOUS at 08:59

## 2019-01-02 RX ADMIN — IBUPROFEN 600 MG: 600 TABLET ORAL at 06:41

## 2019-01-02 RX ADMIN — OXYCODONE AND ACETAMINOPHEN 1 TABLET: 5; 325 TABLET ORAL at 17:30

## 2019-01-02 RX ADMIN — LIDOCAINE HYDROCHLORIDE 10 ML: 20 INJECTION, SOLUTION EPIDURAL; INFILTRATION; INTRACAUDAL; PERINEURAL at 01:02

## 2019-01-02 RX ADMIN — LIDOCAINE HYDROCHLORIDE 5 ML: 20 INJECTION, SOLUTION EPIDURAL; INFILTRATION; INTRACAUDAL; PERINEURAL at 01:37

## 2019-01-02 RX ADMIN — HYDROMORPHONE HYDROCHLORIDE 0.5 MG: 1 INJECTION, SOLUTION INTRAMUSCULAR; INTRAVENOUS; SUBCUTANEOUS at 04:00

## 2019-01-02 RX ADMIN — ONDANSETRON 4 MG: 2 INJECTION INTRAMUSCULAR; INTRAVENOUS at 01:42

## 2019-01-02 RX ADMIN — MORPHINE SULFATE 3 MG: 1 INJECTION EPIDURAL; INTRATHECAL; INTRAVENOUS at 01:37

## 2019-01-02 RX ADMIN — DOCUSATE SODIUM 100 MG: 100 CAPSULE, LIQUID FILLED ORAL at 21:19

## 2019-01-02 RX ADMIN — IBUPROFEN 600 MG: 600 TABLET ORAL at 14:41

## 2019-01-02 RX ADMIN — SODIUM CHLORIDE, POTASSIUM CHLORIDE, SODIUM LACTATE AND CALCIUM CHLORIDE: 600; 310; 30; 20 INJECTION, SOLUTION INTRAVENOUS at 01:46

## 2019-01-02 RX ADMIN — OXYTOCIN 999 ML/HR: 10 INJECTION INTRAVENOUS at 01:32

## 2019-01-02 RX ADMIN — Medication 3 ML: at 16:42

## 2019-01-02 RX ADMIN — IBUPROFEN 600 MG: 600 TABLET ORAL at 22:30

## 2019-01-02 RX ADMIN — ENOXAPARIN SODIUM 40 MG: 40 INJECTION SUBCUTANEOUS at 21:19

## 2019-01-02 RX ADMIN — CEFAZOLIN SODIUM 2 G: 2 INJECTION, SOLUTION INTRAVENOUS at 00:57

## 2019-01-02 NOTE — OP NOTE
SECTION FULL OPERATIVE REPORT    Pre-operative Diagnosis:   IUP 37 1/7 weeks  Gestational hypertension  Small for gestational age  Failure to dilate          Post-operative Diagnosis: same  Occiput posterior presentation    Procedure: Primary Low Transverse  Section  Anesthesia: epidural  Surgeon(s): Annette Gomez  Assistant(s): Phil  Infant: LV FI, Apgars 8/9, weight 5lbs, 12oz  Findings: Normal uterus, tubes and ovaries  Complications: none  Specimens: none  EBL: 800 mL      Description of Procedure:  The patient was taken to the operating room where anesthesia was found to be adequate. She was prepped and draped in the usual sterile fashion in the dorsal supine position with a leftward tilt.   A Pfannenstiel skin incision was made with the scalpel and carried through the fat to the underlying layer of fascia. The fascia was then incised in the midline and the incision was extended laterally with eason scissors. The superior aspect of the fascia was then grasped with Kocher clamps and the underlying rectus muscles were then dissected off bluntly with the Eason scissors. The inferior aspect of the fascia was then grasped with Kocher clamps and dissected off similarly with Eason scissors. The rectus muscles were  and the peritoneum entered. The peritoneal incision was then carried superiorly and inferiorly taking care to identify the bladder at the lower aspect.   The bladder blade was inserted. The vesicouterine peritoneum was then identified and entered sharply with Metzenbaum scissors and a bladder flap was created.. The bladder blade was reinserted and the uterine incision was made in a low transverse fashion using the scalpel. This was extended with the bandage scissors.  The bladder blade was removed and the infant was delivered atraumatically. The nose and mouth were suctioned and the cord was clamped and cut. The infant was taken to the warmer for the waiting staff. Cord blood was  collected. The placenta was removed, and the uterus was cleared of all clots and debris. The uterine incision was repaired in two layers using 0- vicryl suture. The uterus was examined and noted to be hemostatic.   The posterior cul-de-sac and gutters were cleared of all clots and debris. The uterus was then re-inspected to ensure hemostasis as were all subfascial tissues. The peritoneum was closed using 2-0 vicryl suture in a running fashion.  The fascia was re-approximated in a running fashion using 0-vicryl suture. The subcutaneous tissue was re-approximated in a running fashion with 3-0 vicryl. The skin was closed with 4-0 vicryl.  Steri strips were applied.    The patient tolerated the procedure well. Sponge, lap and needle counts were correct. The patient was taken to recovery in stable condition.    Annette Gomez MD  January 2, 2019

## 2019-01-02 NOTE — ANESTHESIA POSTPROCEDURE EVALUATION
Patient: Lavonne Rocha    Procedure Summary     Date:  19 Room / Location:   [facility]    Anesthesia Start:  1336 Anesthesia Stop:  19    Procedure:   SECTION PRIMARY (N/A Abdomen) Diagnosis:  (failure to progress)    Surgeon:  Annette Gomez MD Provider:  Gibson Stringer MD    Anesthesia Type:  epidural ASA Status:  2          Anesthesia Type: epidural  Last vitals  BP   106/70 (19)   Temp   36.4 °C (97.5 °F) (19)   Pulse   87 (19)   Resp   16 (19)     SpO2   96 % (19)     Post Anesthesia Care and Evaluation    Patient location during evaluation: PACU  Patient participation: complete - patient participated  Level of consciousness: awake  Pain score: 2  Pain management: adequate  Airway patency: patent  Anesthetic complications: No anesthetic complications  PONV Status: controlled  Cardiovascular status: acceptable  Respiratory status: acceptable  Hydration status: acceptable

## 2019-01-02 NOTE — PLAN OF CARE
Problem: Patient Care Overview  Goal: Plan of Care Review  Outcome: Ongoing (interventions implemented as appropriate)   19   Coping/Psychosocial   Plan of Care Reviewed With patient;spouse;family   Plan of Care Review   Progress improving     Goal: Individualization and Mutuality  Outcome: Ongoing (interventions implemented as appropriate)   19   Individualization   Patient Specific Preferences breastfeeding, caryn, safe delivery   Patient Specific Goals (Include Timeframe) pain control   Patient Specific Interventions    Mutuality/Individual Preferences   What Anxieties, Fears, Concerns, or Questions Do You Have About Your Care? small baby/premie   Mutuality/Individual Preferences   How to Address Anxieties/Fears education and support     Goal: Discharge Needs Assessment  Outcome: Ongoing (interventions implemented as appropriate)      Problem: Hypertensive Disorders in Pregnancy (Adult,Obstetrics,Pediatric)  Goal: Signs and Symptoms of Listed Potential Problems Will be Absent, Minimized or Managed (Hypertensive Disorders in Pregnancy)  Outcome: Ongoing (interventions implemented as appropriate)   19   Goal/Outcome Evaluation   Problems Assessed (Hypertensive Disorders in Pregnancy) all   Problems Present (Hypertensive/in PG) none       Problem: Labor (Cervical Ripen, Induct, Augment) (Adult,Obstetrics,Pediatric)  Goal: Signs and Symptoms of Listed Potential Problems Will be Absent, Minimized or Managed (Labor)  Outcome: Outcome(s) achieved Date Met: 19   Goal/Outcome Evaluation   Problems Assessed (Labor) all   Problems Present (Labor) malpresentation;shoulder dystocia       Problem: Fall Risk,  (Adult,Obstetrics,Pediatric)  Goal: Identify Related Risk Factors and Signs and Symptoms  Outcome: Ongoing (interventions implemented as appropriate)   19   Fall Risk,  (Adult,Obstetrics,Pediatric)   Related Risk Factors (Fall Risk,  ) regional anesthesia;pregnancy weight gain   Signs and Symptoms (Fall Risk, ) presence of fall risk factors     Goal: Absence of Maternal Fall  Outcome: Ongoing (interventions implemented as appropriate)   19   Fall Risk,  (Adult,Obstetrics,Pediatric)   Absence of Maternal Fall making progress toward outcome     Goal: Absence of  Fall/Drop  Outcome: Ongoing (interventions implemented as appropriate)   19   Fall Risk,  (Adult,Obstetrics,Pediatric)   Absence of  Fall/Drop making progress toward outcome       Problem: Skin Injury Risk (Adult)  Goal: Identify Related Risk Factors and Signs and Symptoms  Outcome: Ongoing (interventions implemented as appropriate)   19   Skin Injury Risk (Adult)   Related Risk Factors (Skin Injury Risk) mechanical forces;mobility impaired     Goal: Skin Health and Integrity  Outcome: Ongoing (interventions implemented as appropriate)   19   Skin Injury Risk (Adult)   Skin Health and Integrity making progress toward outcome       Problem:  Delivery (Adult,Obstetrics,Pediatric)  Goal: Signs and Symptoms of Listed Potential Problems Will be Absent, Minimized or Managed ( Delivery)  Outcome: Outcome(s) achieved Date Met: 19   Goal/Outcome Evaluation   Problems Assessed ( Delivery) other (see comments)  (FTP and OP )   Problems Present ( Delivery) none

## 2019-01-02 NOTE — PROGRESS NOTES
"[facility]  Obstetric Progress Note    Chief Complaint   Patient presents with   • Elevated Blood Pressure      at 36+1wks arrives in triage d/t elevated b/ps with H/A that won't resolve with Tylenol. Pt states \"feels like allergy h/a bc its behind my eyes. I don't think it feels like a h/a bc of b/p\". Pt denies ctx pain at this time but h/a pain is 3/10. Pt reports +FM and denies LOF, VB, ctx. Abdomen palpates soft. Pt denies visual changes, epigastric pain, n/v    • Scheduled Induction      at 36+6wks arrives on unit from antepartum for judson IOL d/t IUGR. Pt reports +FM and denies LOF, VB, ctxs. Abdomen palpates soft.        Subjective     Patient:    The patient feels well. Pain controlled with epidural      Review of Systems - no fever or chills, no SOB, no headache       Objective     Vital Signs Range for the last 24 hours  Temp:  [97.9 °F (36.6 °C)-98.9 °F (37.2 °C)] 98.8 °F (37.1 °C)   Temp src: Oral   BP: (107-165)/(46-88) 134/69   Heart Rate:  [] 91   Resp:  [16-18] 18           Device (Oxygen Therapy): room air           Flowsheet Rows      First Filed Value   Admission Height  162.6 cm (64\") Documented at 2018 1653   Admission Weight  114 kg (250 lb 9.6 oz) Documented at 2018 2148          Intake/Output last 24 hours:      Intake/Output Summary (Last 24 hours) at 2019 0057  Last data filed at 2019 0051  Gross per 24 hour   Intake 2285.82 ml   Output 2175 ml   Net 110.82 ml       Intake/Output this shift:    I/O this shift:  In: -   Out: 675 [Urine:675]    Physical Exam:  General: Patient is comfortable and well appearing   Heart CVS exam: normal rate and regular rhythm.   Lungs Chest: no tachypnea, retractions or cyanosis.     Abdomen Abdominal exam: palpable contractions, palpates moderate.   Extremities Exam of extremities: pedal edema 1 +     Presentation: Cephalic, possible brow   Cervix: Exam by: Method: sterile exam per RN   Dilation: Cervical Dilation (cm): 4-5 "   Effacement: Cervical Effacement: 80%   Station: Fetal Station: -1         Fetal Heart Rate Assessment   Method: Fetal HR Assessment Method: external   Beats/min: Fetal HR (beats/min): 150   Baseline: Fetal Heart Baseline Rate: normal range   Varibility: Fetal HR Variability: moderate (amplitude range 6 to 25 bpm)   Accels: Fetal HR Accelerations: prolonged, greater than/equal to 15 bpm, lasting at least 15 seconds   Decels: Fetal HR Decelerations: variable   Tracing Category:       Uterine Assessment   Method: Method: palpation, external tocotransducer   Frequency (min): Contraction Frequency (Minutes): 2-3   Ctx Count in 10 min:     Duration:     Intensity: Contraction Intensity: strong by palpation   Intensity by IUPC:     Resting Tone: Uterine Resting Tone: soft by palpation   Resting Tone by IUPC:     [place] Units:       Lab Results (last 24 hours)     ** No results found for the last 24 hours. **              Assessment/Plan       Chronic bilateral low back pain with sciatica    Obesity in pregnancy    Hypertension in pregnancy, essential, antepartum    Pregnancy    Thrombocytopenia affecting pregnancy (CMS/HCC)    Poor fetal growth affecting management of mother in third trimester    Group B Streptococcus carrier, +RV culture, currently pregnant    Elevated blood pressure affecting pregnancy in third trimester, antepartum        Assessment:  1.  Intrauterine pregnancy at 37w1d weeks gestation with reactive fetal status.    2.  Arrest of dilation at 5 cm now for 6 hours despite adequate labor, caput feels like possible brow presentation  :     Plan:  1. will proceed with primary CS, RBA discussed  2. Plan of care has been reviewed with patient and family  3.  Risks, benefits of treatment plan have been discussed.  4.  All questions have been answered.  5.  Will dose ancef for CS      Annette Gomez MD 1/2/2019 12:57 AM

## 2019-01-02 NOTE — ADDENDUM NOTE
Addendum  created 01/02/19 0712 by Juan Garza MD    Intraprocedure Flowsheets edited, Sign clinical note

## 2019-01-02 NOTE — LACTATION NOTE
This note was copied from a baby's chart.  P1 37 week SGA baby. Mom had C/S, h/o PCOS. Baby has been latching per Mom and just received glucose gel per RN. Tried to latch baby with and without nipple shield but she is too sleepy. HGP initiated. RN to feed ebm and formula to baby. Encouraged pumping every 3 hrs until baby is latching well. Will call for further assist.

## 2019-01-02 NOTE — PLAN OF CARE
Problem: Patient Care Overview  Goal: Plan of Care Review  Outcome: Ongoing (interventions implemented as appropriate)   01/02/19 2760   Coping/Psychosocial   Plan of Care Reviewed With patient   Plan of Care Review   Progress improving

## 2019-01-02 NOTE — NURSING NOTE
With vag exam, large caput noted that feels unusual. Bedside US per this RN and MEGAN/ Gerard RN. Appears cephalic, but straight OP presentation

## 2019-01-02 NOTE — ANESTHESIA POSTPROCEDURE EVALUATION
"Patient: Lavonne Rocha    Procedure Summary     Date:  01/01/19 Room / Location:      Anesthesia Start:  1336 Anesthesia Stop:  01/02/19 0215    Procedure:  LABOR ANALGESIA Diagnosis:      Scheduled Providers:   Provider:  Gibson Stringer MD    Anesthesia Type:  epidural ASA Status:  2          Anesthesia Type: epidural  Last vitals  BP   145/59 (01/02/19 0059)   Temp   37.1 °C (98.8 °F) (01/02/19 0029)   Pulse   82 (01/02/19 0059)   Resp   18 (01/02/19 0029)     SpO2   99 % (12/31/18 0904)     Post Anesthesia Care and Evaluation    Patient location during evaluation: PACU  Patient participation: complete - patient participated  Level of consciousness: awake and alert  Pain management: adequate  Airway patency: patent  Anesthetic complications: No anesthetic complications    Cardiovascular status: acceptable  Respiratory status: acceptable  Hydration status: acceptable    Comments: /59   Pulse 82   Temp 37.1 °C (98.8 °F)   Resp 18   Ht 162.6 cm (64\")   Wt 114 kg (251 lb 3.2 oz)   LMP 04/17/2018   SpO2 99%   Breastfeeding? Yes   BMI 43.12 kg/m²         "

## 2019-01-03 LAB
BASOPHILS # BLD AUTO: 0.02 10*3/MM3 (ref 0–0.2)
BASOPHILS NFR BLD AUTO: 0.2 % (ref 0–1.5)
DEPRECATED RDW RBC AUTO: 44.1 FL (ref 37–54)
EOSINOPHIL # BLD AUTO: 0.09 10*3/MM3 (ref 0–0.7)
EOSINOPHIL NFR BLD AUTO: 1 % (ref 0.3–6.2)
ERYTHROCYTE [DISTWIDTH] IN BLOOD BY AUTOMATED COUNT: 14 % (ref 11.7–13)
HCT VFR BLD AUTO: 30.3 % (ref 35.6–45.5)
HGB BLD-MCNC: 9.5 G/DL (ref 11.9–15.5)
IMM GRANULOCYTES # BLD AUTO: 0.03 10*3/MM3 (ref 0–0.03)
IMM GRANULOCYTES NFR BLD AUTO: 0.3 % (ref 0–0.5)
LYMPHOCYTES # BLD AUTO: 1.64 10*3/MM3 (ref 0.9–4.8)
LYMPHOCYTES NFR BLD AUTO: 17.5 % (ref 19.6–45.3)
MCH RBC QN AUTO: 27.4 PG (ref 26.9–32)
MCHC RBC AUTO-ENTMCNC: 31.4 G/DL (ref 32.4–36.3)
MCV RBC AUTO: 87.3 FL (ref 80.5–98.2)
MONOCYTES # BLD AUTO: 0.72 10*3/MM3 (ref 0.2–1.2)
MONOCYTES NFR BLD AUTO: 7.7 % (ref 5–12)
NEUTROPHILS # BLD AUTO: 6.86 10*3/MM3 (ref 1.9–8.1)
NEUTROPHILS NFR BLD AUTO: 73.3 % (ref 42.7–76)
PLATELET # BLD AUTO: 129 10*3/MM3 (ref 140–500)
PMV BLD AUTO: 12.2 FL (ref 6–12)
RBC # BLD AUTO: 3.47 10*6/MM3 (ref 3.9–5.2)
WBC NRBC COR # BLD: 9.36 10*3/MM3 (ref 4.5–10.7)

## 2019-01-03 PROCEDURE — 25010000002 ENOXAPARIN PER 10 MG: Performed by: OBSTETRICS & GYNECOLOGY

## 2019-01-03 PROCEDURE — 85025 COMPLETE CBC W/AUTO DIFF WBC: CPT | Performed by: OBSTETRICS & GYNECOLOGY

## 2019-01-03 RX ORDER — OXYCODONE HYDROCHLORIDE AND ACETAMINOPHEN 5; 325 MG/1; MG/1
2 TABLET ORAL EVERY 4 HOURS PRN
Status: DISCONTINUED | OUTPATIENT
Start: 2019-01-03 | End: 2019-01-05 | Stop reason: HOSPADM

## 2019-01-03 RX ADMIN — Medication 1 TABLET: at 09:27

## 2019-01-03 RX ADMIN — OXYCODONE AND ACETAMINOPHEN 2 TABLET: 5; 325 TABLET ORAL at 05:46

## 2019-01-03 RX ADMIN — OXYCODONE AND ACETAMINOPHEN 2 TABLET: 5; 325 TABLET ORAL at 10:08

## 2019-01-03 RX ADMIN — DOCUSATE SODIUM 100 MG: 100 CAPSULE, LIQUID FILLED ORAL at 09:27

## 2019-01-03 RX ADMIN — IBUPROFEN 600 MG: 600 TABLET ORAL at 06:32

## 2019-01-03 RX ADMIN — OXYCODONE AND ACETAMINOPHEN 2 TABLET: 5; 325 TABLET ORAL at 01:27

## 2019-01-03 RX ADMIN — IBUPROFEN 600 MG: 600 TABLET ORAL at 16:25

## 2019-01-03 RX ADMIN — OXYCODONE AND ACETAMINOPHEN 2 TABLET: 5; 325 TABLET ORAL at 14:03

## 2019-01-03 RX ADMIN — FAMOTIDINE 20 MG: 20 TABLET, FILM COATED ORAL at 09:26

## 2019-01-03 RX ADMIN — OXYCODONE AND ACETAMINOPHEN 2 TABLET: 5; 325 TABLET ORAL at 19:36

## 2019-01-03 RX ADMIN — DOCUSATE SODIUM 100 MG: 100 CAPSULE, LIQUID FILLED ORAL at 19:36

## 2019-01-03 RX ADMIN — ENOXAPARIN SODIUM 40 MG: 40 INJECTION SUBCUTANEOUS at 22:07

## 2019-01-03 RX ADMIN — ENOXAPARIN SODIUM 40 MG: 40 INJECTION SUBCUTANEOUS at 11:24

## 2019-01-03 RX ADMIN — FAMOTIDINE 20 MG: 20 TABLET, FILM COATED ORAL at 22:07

## 2019-01-03 NOTE — LACTATION NOTE
Mom reports baby will latch and fall asleep. Encouraged to try and BF first, pump and then give pumped milk before formula. Encouraged mom to call when needing assistance with latching.    Lactation Consult Note    Evaluation Completed: 1/3/2019 10:38 AM  Patient Name: Lavonne Rocha  :  1995  MRN:  [id number]     REFERRAL  INFORMATION:                                         DELIVERY HISTORY:          Skin to skin initiation date/time:        Skin to skin end date/time:              MATERNAL ASSESSMENT:                               INFANT ASSESSMENT:  Information for the patient's :  Kelsi Rocha [[id number]]   No past medical history on file.                                                                                                                                MATERNAL INFANT FEEDING:                                                                       EQUIPMENT TYPE:                                 BREAST PUMPING:          LACTATION REFERRALS:

## 2019-01-03 NOTE — PLAN OF CARE
Problem: Patient Care Overview  Goal: Plan of Care Review  Outcome: Ongoing (interventions implemented as appropriate)   01/03/19 1306   Coping/Psychosocial   Plan of Care Reviewed With patient   Plan of Care Review   Progress improving   OTHER   Outcome Summary using EBP and supplementing. pain controlled with po med. amb in hallway. inc CDI with steri strips     Goal: Individualization and Mutuality  Outcome: Ongoing (interventions implemented as appropriate)   01/03/19 1306   Individualization   Patient Specific Preferences EBP and breastfeeding     Goal: Discharge Needs Assessment  Outcome: Ongoing (interventions implemented as appropriate)   01/03/19 1306   Discharge Needs Assessment   Readmission Within the Last 30 Days no previous admission in last 30 days   Concerns to be Addressed no discharge needs identified   Patient/Family Anticipates Transition to home   Patient/Family Anticipated Services at Transition none   Transportation Concerns car, none   Transportation Anticipated family or friend will provide   Anticipated Changes Related to Illness none   Equipment Needed After Discharge none   Disability   Equipment Currently Used at Home none       Problem: Breastfeeding (Adult,Obstetrics,Pediatric)  Goal: Signs and Symptoms of Listed Potential Problems Will be Absent, Minimized or Managed (Breastfeeding)  Outcome: Ongoing (interventions implemented as appropriate)   01/03/19 1306   Goal/Outcome Evaluation   Problems Assessed (Breastfeeding) all   Problems Present (Breastfeeding) none

## 2019-01-03 NOTE — LACTATION NOTE
Pt continues to pump.  Reassured pt it was normal not to see much if any colostrum.  Reviewed pumping frequency, pumping schedule, and diet.  Encouraged pt to call for any needs.

## 2019-01-03 NOTE — PROGRESS NOTES
1/3/2019    Name:Lavonne Rocha    MR#:[id number]     Progress Note:  Post-Op 1 S/P    HD:8    Subjective   23 y.o. yo Female  s/p CS at 37w1d doing well. Pain well controlled. Tolerating regular diet and having flatus. Lochia normal. Complains of little milk output, discussed colostrum and likely engorgement not until day 3. Baby having some hypoglycemia and being supplemented    Patient Active Problem List   Diagnosis   • Bulging lumbar disc   • Hyperesthesia   • Degeneration of intervertebral disc of lumbar region   • Lumbar facet arthropathy   • Lumbar radiculitis   • Chronic bilateral low back pain with sciatica   • PCOS (polycystic ovarian syndrome)   • Obesity in pregnancy   • Nausea and vomiting of pregnancy, antepartum   • Hypertension in pregnancy, essential, antepartum   • GERD without esophagitis   • Hemorrhoids during pregnancy in second trimester   • Constipation during pregnancy in second trimester   • Pregnancy   • Thrombocytopenia affecting pregnancy (CMS/HCC)   • Cramping affecting pregnancy, antepartum   • SGA (small for gestational age)   • Poor fetal growth affecting management of mother in third trimester   • Anemia affecting pregnancy in third trimester   • Hypertension affecting pregnancy   • Group B Streptococcus carrier, +RV culture, currently pregnant   • Elevated blood pressure affecting pregnancy in third trimester, antepartum        Objective    Vitals  Temp:  Temp:  [97.7 °F (36.5 °C)-98.3 °F (36.8 °C)] 98.3 °F (36.8 °C)  Temp src: Oral  BP:  BP: (110-124)/(67-75) 123/75  Pulse:  Heart Rate:  [75-87] 75  RR:   Resp:  [16-20] 18  Weight: 114 kg (251 lb 3.2 oz)  BMI:  Body mass index is 43.12 kg/m².      General Awake, alert, no distress  Lungs CTAB, heart RRR no M/R/G  Abdomen Soft, non-distended, fundus firm, 2 cm below umbilicus, appropriately tender  Incision  Intact, no erythema or exudate  Extremities Calves NT bilaterally       I/O last 3 completed  shifts:  In: 1930 [P.O.:1330; I.V.:600]  Out: 6225 [Urine:5425; Blood:800]    LABS:   Lab Results   Component Value Date    WBC 9.36 01/03/2019    HGB 9.5 (L) 01/03/2019    HCT 30.3 (L) 01/03/2019    MCV 87.3 01/03/2019     (L) 01/03/2019       Infant: female       Assessment   1.  POD 1  2. Gestational hypertension- Bps wnl, monitor closely  3. Mild thrombocytopenia, 140 -> 129  4. Postpartum DVT ppx with lovenox due to obesity and CS    Plan: Doing well.  Routine postoperative care      Chronic bilateral low back pain with sciatica    Obesity in pregnancy    Hypertension in pregnancy, essential, antepartum    Pregnancy    Thrombocytopenia affecting pregnancy (CMS/HCC)    Poor fetal growth affecting management of mother in third trimester    Group B Streptococcus carrier, +RV culture, currently pregnant    Elevated blood pressure affecting pregnancy in third trimester, antepartum      Martita Navas MD  1/3/2019 9:35 AM

## 2019-01-04 PROCEDURE — 25010000002 ENOXAPARIN PER 10 MG: Performed by: OBSTETRICS & GYNECOLOGY

## 2019-01-04 RX ORDER — FLUTICASONE PROPIONATE 50 MCG
2 SPRAY, SUSPENSION (ML) NASAL DAILY
Qty: 9.9 ML | Refills: 1 | OUTPATIENT
Start: 2019-01-04 | End: 2019-01-05 | Stop reason: HOSPADM

## 2019-01-04 RX ADMIN — OXYCODONE AND ACETAMINOPHEN 2 TABLET: 5; 325 TABLET ORAL at 08:22

## 2019-01-04 RX ADMIN — IBUPROFEN 600 MG: 600 TABLET ORAL at 00:00

## 2019-01-04 RX ADMIN — OXYCODONE AND ACETAMINOPHEN 2 TABLET: 5; 325 TABLET ORAL at 12:28

## 2019-01-04 RX ADMIN — OXYCODONE AND ACETAMINOPHEN 2 TABLET: 5; 325 TABLET ORAL at 00:00

## 2019-01-04 RX ADMIN — IBUPROFEN 600 MG: 600 TABLET ORAL at 16:48

## 2019-01-04 RX ADMIN — Medication 1 TABLET: at 08:22

## 2019-01-04 RX ADMIN — DOCUSATE SODIUM 100 MG: 100 CAPSULE, LIQUID FILLED ORAL at 21:05

## 2019-01-04 RX ADMIN — OXYCODONE AND ACETAMINOPHEN 2 TABLET: 5; 325 TABLET ORAL at 16:48

## 2019-01-04 RX ADMIN — FAMOTIDINE 20 MG: 20 TABLET, FILM COATED ORAL at 20:38

## 2019-01-04 RX ADMIN — ENOXAPARIN SODIUM 40 MG: 40 INJECTION SUBCUTANEOUS at 08:26

## 2019-01-04 RX ADMIN — OXYCODONE AND ACETAMINOPHEN 2 TABLET: 5; 325 TABLET ORAL at 21:05

## 2019-01-04 RX ADMIN — IBUPROFEN 600 MG: 600 TABLET ORAL at 08:22

## 2019-01-04 RX ADMIN — ENOXAPARIN SODIUM 40 MG: 40 INJECTION SUBCUTANEOUS at 20:38

## 2019-01-04 RX ADMIN — FAMOTIDINE 20 MG: 20 TABLET, FILM COATED ORAL at 08:22

## 2019-01-04 RX ADMIN — Medication 1 APPLICATION: at 08:22

## 2019-01-04 RX ADMIN — OXYCODONE AND ACETAMINOPHEN 2 TABLET: 5; 325 TABLET ORAL at 04:07

## 2019-01-04 RX ADMIN — DOCUSATE SODIUM 100 MG: 100 CAPSULE, LIQUID FILLED ORAL at 08:22

## 2019-01-04 NOTE — LACTATION NOTE
This note was copied from a baby's chart.  Baby latches for a brief moment then comes off screaming. Tried the nipple shield and she did the same thing so Mom is giving formula now then she will pump. Encouraged to call for next feeding and try latching her before she is too hungry. Encouraged pumping every 3 hrs until she is latching better. Discussed paced bottle feeding.

## 2019-01-04 NOTE — PROGRESS NOTES
[facility]   PROGRESS NOTE    Post-Op Day 2 S/P   Subjective     Patient reports:  Pain is well controlled with ibuprofen (OTC) and prescription NSAID's including motrin and prescription opiates (Percocet). Pt ambulating well. Tolerating diet. Tolerating po -- normal.  Intake -- c/o of tolerating po solids.   Voiding - without difficulty; flatus reported..  Vaginal bleeding is light.      Objective      Vitals: Vital Signs Range for the last 24 hours  Temperature: Temp:  [97.6 °F (36.4 °C)-98.1 °F (36.7 °C)] 97.6 °F (36.4 °C)   Temp Source: Temp src: Oral   BP: BP: (118-144)/(68-77) 126/70   Pulse: Heart Rate:  [73-96] 73   Respirations: Resp:  [16-18] 16   SPO2:     O2 Amount (l/min):     O2 Devices     Weight:              Physical Exam    Lungs respirations unlabored   Abdomen Soft, NT, ND, fundus-firm NT below umbilicus   Incision  healing well, no drainage, no erythema, no hernia, well approximated   Extremities LE non-tender, 1-2+ edema     I reviewed the patient's new clinical results.    Assessment/Plan        Chronic bilateral low back pain with sciatica    Obesity in pregnancy    Hypertension in pregnancy, essential, antepartum    Pregnancy    Thrombocytopenia affecting pregnancy (CMS/HCC)    Poor fetal growth affecting management of mother in third trimester    Group B Streptococcus carrier, +RV culture, currently pregnant    Elevated blood pressure affecting pregnancy in third trimester, antepartum      Assessment:    Lavonne Rocha is Day 2  post-partum  , Low Transverse    .       Plan:  continue post op care.  Abhay lozano in am      Meg Ospina MD  2019  1:39 PM

## 2019-01-05 VITALS
RESPIRATION RATE: 16 BRPM | HEIGHT: 64 IN | SYSTOLIC BLOOD PRESSURE: 118 MMHG | HEART RATE: 55 BPM | TEMPERATURE: 97.7 F | WEIGHT: 251.2 LBS | BODY MASS INDEX: 42.88 KG/M2 | OXYGEN SATURATION: 97 % | DIASTOLIC BLOOD PRESSURE: 70 MMHG

## 2019-01-05 PROCEDURE — 25010000002 ENOXAPARIN PER 10 MG: Performed by: OBSTETRICS & GYNECOLOGY

## 2019-01-05 RX ORDER — IBUPROFEN 600 MG/1
600 TABLET ORAL EVERY 8 HOURS PRN
Qty: 30 TABLET | Refills: 4 | Status: SHIPPED | OUTPATIENT
Start: 2019-01-05 | End: 2019-02-08 | Stop reason: SDUPTHER

## 2019-01-05 RX ORDER — OXYCODONE HYDROCHLORIDE AND ACETAMINOPHEN 5; 325 MG/1; MG/1
1 TABLET ORAL EVERY 4 HOURS PRN
Qty: 20 TABLET | Refills: 0 | Status: SHIPPED | OUTPATIENT
Start: 2019-01-05 | End: 2019-05-01

## 2019-01-05 RX ADMIN — OXYCODONE AND ACETAMINOPHEN 1 TABLET: 5; 325 TABLET ORAL at 11:24

## 2019-01-05 RX ADMIN — DOCUSATE SODIUM 100 MG: 100 CAPSULE, LIQUID FILLED ORAL at 09:32

## 2019-01-05 RX ADMIN — IBUPROFEN 600 MG: 600 TABLET ORAL at 03:06

## 2019-01-05 RX ADMIN — OXYCODONE AND ACETAMINOPHEN 2 TABLET: 5; 325 TABLET ORAL at 03:06

## 2019-01-05 RX ADMIN — IBUPROFEN 600 MG: 600 TABLET ORAL at 11:24

## 2019-01-05 RX ADMIN — Medication 1 TABLET: at 09:32

## 2019-01-05 RX ADMIN — ENOXAPARIN SODIUM 40 MG: 40 INJECTION SUBCUTANEOUS at 09:32

## 2019-01-05 NOTE — PLAN OF CARE
Problem: Patient Care Overview  Goal: Plan of Care Review  Outcome: Ongoing (interventions implemented as appropriate)   19 [id number]   Coping/Psychosocial   Plan of Care Reviewed With patient   Plan of Care Review   Progress improving   OTHER   Outcome Summary VSS, amb/voiding without difficulty, good pain control with po pain medication     Goal: Individualization and Mutuality  Outcome: Ongoing (interventions implemented as appropriate)      Problem: Breastfeeding (Adult,Obstetrics,Pediatric)  Goal: Signs and Symptoms of Listed Potential Problems Will be Absent, Minimized or Managed (Breastfeeding)  Outcome: Ongoing (interventions implemented as appropriate)      Problem: Postpartum ( Delivery) (Adult,Obstetrics,Pediatric)  Goal: Signs and Symptoms of Listed Potential Problems Will be Absent, Minimized or Managed (Postpartum)  Outcome: Ongoing (interventions implemented as appropriate)

## 2019-01-05 NOTE — PLAN OF CARE
Problem: Patient Care Overview  Goal: Plan of Care Review  Outcome: Outcome(s) achieved Date Met: 19    Goal: Individualization and Mutuality  Outcome: Outcome(s) achieved Date Met: 19    Goal: Discharge Needs Assessment  Outcome: Outcome(s) achieved Date Met: 19    Goal: Interprofessional Rounds/Family Conf  Outcome: Outcome(s) achieved Date Met: 19      Problem: Postpartum ( Delivery) (Adult,Obstetrics,Pediatric)  Goal: Signs and Symptoms of Listed Potential Problems Will be Absent, Minimized or Managed (Postpartum)  Outcome: Outcome(s) achieved Date Met: 19

## 2019-01-05 NOTE — PROGRESS NOTES
2019    Name:Lavonne Rocha    MR#:[id number]     Progress Note:  Post-Op 3 S/P    HD:10    Subjective   23 y.o. yo Female  s/p CS at 37w1d doing well. Pain well controlled. Tolerating regular diet and having flatus. Lochia normal.     Patient Active Problem List   Diagnosis   • Bulging lumbar disc   • Hyperesthesia   • Degeneration of intervertebral disc of lumbar region   • Lumbar facet arthropathy   • Lumbar radiculitis   • Chronic bilateral low back pain with sciatica   • PCOS (polycystic ovarian syndrome)   • Obesity in pregnancy   • Nausea and vomiting of pregnancy, antepartum   • Hypertension in pregnancy, essential, antepartum   • GERD without esophagitis   • Hemorrhoids during pregnancy in second trimester   • Constipation during pregnancy in second trimester   • Pregnancy   • Thrombocytopenia affecting pregnancy (CMS/HCC)   • Cramping affecting pregnancy, antepartum   • SGA (small for gestational age)   • Poor fetal growth affecting management of mother in third trimester   • Anemia affecting pregnancy in third trimester   • Hypertension affecting pregnancy   • Group B Streptococcus carrier, +RV culture, currently pregnant   • Elevated blood pressure affecting pregnancy in third trimester, antepartum        Objective    Vitals  Temp:  Temp:  [97.7 °F (36.5 °C)-98.1 °F (36.7 °C)] 97.7 °F (36.5 °C)  Temp src: Oral  BP:  BP: (118-136)/(70-74) 118/70  Pulse:  Heart Rate:  [55-88] 55  RR:   Resp:  [16-20] 16  Weight: 114 kg (251 lb 3.2 oz)  BMI:  Body mass index is 43.12 kg/m².    General Awake, alert, no distress  Abdomen Soft, non-distended, fundus firm, 3 below umbilicus, appropriately tender  Incision  Intact, no erythema or exudate  Extremities Calves NT bilaterally     I/O last 3 completed shifts:  In: 210 [P.O.:210]  Out: 750 [Urine:750]    LABS:   Lab Results   Component Value Date    WBC 9.36 2019    HGB 9.5 (L) 2019    HCT 30.3 (L) 2019    MCV 87.3  01/03/2019     (L) 01/03/2019       Infant: female       Assessment    1.  POD 3    BP and Plt stable     Plan: Doing well.  Routine postoperative care      Chronic bilateral low back pain with sciatica    Obesity in pregnancy    Hypertension in pregnancy, essential, antepartum    Pregnancy    Thrombocytopenia affecting pregnancy (CMS/HCC)    Poor fetal growth affecting management of mother in third trimester    Group B Streptococcus carrier, +RV culture, currently pregnant    Elevated blood pressure affecting pregnancy in third trimester, antepartum      Yomi Telles MD  1/5/2019 10:46 AM

## 2019-01-11 ENCOUNTER — POSTPARTUM VISIT (OUTPATIENT)
Dept: OBSTETRICS AND GYNECOLOGY | Age: 24
End: 2019-01-11

## 2019-01-11 VITALS
BODY MASS INDEX: 39.27 KG/M2 | DIASTOLIC BLOOD PRESSURE: 84 MMHG | SYSTOLIC BLOOD PRESSURE: 130 MMHG | HEIGHT: 64 IN | WEIGHT: 230 LBS

## 2019-01-11 PROCEDURE — 0503F POSTPARTUM CARE VISIT: CPT | Performed by: NURSE PRACTITIONER

## 2019-01-11 NOTE — PROGRESS NOTES
"Subjective   Lavonne Rocha is a 23 y.o. female is being seen today for   Chief Complaint   Patient presents with   • Postpartum Care     1 week postpartum,    .    History of Present Illness     Here for 1 week incision check.   She had a primary csection at 37w1 day for gestational hypertension and failure to progress.  She is doing well.  Pain is well controlled.  Bleeding is light.  Baby is doing well and she is primarily pumping. She denies any headaches or blurry vision.  No edema.  No pp depression.     The following portions of the patient's history were reviewed and updated as appropriate: allergies, current medications, past family history, past medical history, past social history, past surgical history and problem list.    /84   Ht 162.6 cm (64\")   Wt 104 kg (230 lb)   LMP 2018   Breastfeeding? Yes   BMI 39.48 kg/m²         Review of Systems   Constitutional: Negative.  Negative for chills and fever.   HENT: Negative.    Eyes: Negative.    Respiratory: Negative.    Cardiovascular: Negative.    Gastrointestinal: Negative.  Negative for abdominal distention, constipation, diarrhea and vomiting.   Endocrine: Negative.    Genitourinary: Positive for vaginal bleeding. Negative for difficulty urinating and dysuria.   Musculoskeletal: Negative.    Skin: Negative.    Allergic/Immunologic: Negative.    Neurological: Negative.    Hematological: Negative.    Psychiatric/Behavioral: Negative.        Objective   Physical Exam   Constitutional: She is oriented to person, place, and time. She appears well-developed and well-nourished.   Abdominal: Soft.   Incision C/D/I, no redness or induration   Neurological: She is alert and oriented to person, place, and time. She has normal reflexes.   Psychiatric: She has a normal mood and affect. Her behavior is normal.         Assessment/Plan   Lavonne was seen today for postpartum care.    Diagnoses and all orders for this visit:    Postpartum care and " examination        Normal 1 week postpartum exam.  Follow up  for 6 week pp visit

## 2019-01-14 ENCOUNTER — TELEPHONE (OUTPATIENT)
Dept: LACTATION | Facility: HOSPITAL | Age: 24
End: 2019-01-14

## 2019-01-14 NOTE — TELEPHONE ENCOUNTER
D/c phone call. Baby is difficult to latch, she is using nipple shield, pumping and giving some formula. Her Pediatrician has a LC but she may make appointment for [facility].

## 2019-02-08 ENCOUNTER — OFFICE VISIT (OUTPATIENT)
Dept: FAMILY MEDICINE CLINIC | Facility: CLINIC | Age: 24
End: 2019-02-08

## 2019-02-08 ENCOUNTER — TELEPHONE (OUTPATIENT)
Dept: FAMILY MEDICINE CLINIC | Facility: CLINIC | Age: 24
End: 2019-02-08

## 2019-02-08 VITALS
HEART RATE: 56 BPM | DIASTOLIC BLOOD PRESSURE: 64 MMHG | BODY MASS INDEX: 40.12 KG/M2 | OXYGEN SATURATION: 98 % | SYSTOLIC BLOOD PRESSURE: 124 MMHG | WEIGHT: 235 LBS | HEIGHT: 64 IN

## 2019-02-08 DIAGNOSIS — R00.2 PALPITATIONS: Primary | ICD-10-CM

## 2019-02-08 DIAGNOSIS — D64.9 ANEMIA, UNSPECIFIED TYPE: ICD-10-CM

## 2019-02-08 DIAGNOSIS — R01.1 HEART MURMUR: ICD-10-CM

## 2019-02-08 DIAGNOSIS — R53.83 OTHER FATIGUE: ICD-10-CM

## 2019-02-08 PROCEDURE — 99214 OFFICE O/P EST MOD 30 MIN: CPT | Performed by: FAMILY MEDICINE

## 2019-02-08 PROCEDURE — 93000 ELECTROCARDIOGRAM COMPLETE: CPT | Performed by: FAMILY MEDICINE

## 2019-02-08 RX ORDER — LORATADINE 10 MG/1
10 TABLET ORAL DAILY
COMMUNITY
End: 2021-06-27

## 2019-02-08 RX ORDER — FLUTICASONE PROPIONATE 50 MCG
2 SPRAY, SUSPENSION (ML) NASAL DAILY
COMMUNITY
End: 2019-03-25 | Stop reason: SDUPTHER

## 2019-02-08 RX ORDER — IBUPROFEN 800 MG/1
800 TABLET ORAL EVERY 8 HOURS PRN
Qty: 90 TABLET | Refills: 4 | Status: SHIPPED | OUTPATIENT
Start: 2019-02-08 | End: 2020-09-15 | Stop reason: SDUPTHER

## 2019-02-08 NOTE — PROGRESS NOTES
Subjective   Lavonne Rocha is a 23 y.o. female. Presents today for   Chief Complaint   Patient presents with   • Palpitations     reports told ? heart murmur, never f/u       Palpitations    This is a recurrent problem. The current episode started more than 1 month ago. The problem occurs intermittently. The problem has been waxing and waning. The symptoms are aggravated by caffeine. Associated symptoms include malaise/fatigue. Pertinent negatives include no chest fullness, chest pain, dizziness, irregular heartbeat, nausea, shortness of breath, syncope or vomiting. Associated symptoms comments: Just had a delivery, did have anemia of pregnancy;  Reports ER told her to f/u for heart murmur at time of miscarriage.    Reports given anti-anxiety med in past with relief, but deosn't feel anxious.    Post-partum bleeding improved.. She has tried nothing for the symptoms. The treatment provided no relief. Risk factors include obesity. Her past medical history is significant for anemia. There is no history of heart disease, hyperthyroidism or a valve disorder.       Review of Systems   Constitutional: Positive for malaise/fatigue.   Respiratory: Negative for shortness of breath.    Cardiovascular: Positive for palpitations. Negative for chest pain and syncope.   Gastrointestinal: Negative for nausea and vomiting.   Neurological: Negative for dizziness.       Patient Active Problem List   Diagnosis   • Bulging lumbar disc   • Hyperesthesia   • Degeneration of intervertebral disc of lumbar region   • Lumbar facet arthropathy   • Lumbar radiculitis   • Chronic bilateral low back pain with sciatica   • PCOS (polycystic ovarian syndrome)   • Obesity in pregnancy   • Nausea and vomiting of pregnancy, antepartum   • Hypertension in pregnancy, essential, antepartum   • GERD without esophagitis   • Hemorrhoids during pregnancy in second trimester   • Constipation during pregnancy in second trimester   • Pregnancy   • Thrombocytopenia  affecting pregnancy (CMS/HCC)   • Cramping affecting pregnancy, antepartum   • SGA (small for gestational age)   • Poor fetal growth affecting management of mother in third trimester   • Anemia affecting pregnancy in third trimester   • Hypertension affecting pregnancy   • Group B Streptococcus carrier, +RV culture, currently pregnant   • Elevated blood pressure affecting pregnancy in third trimester, antepartum       Social History     Socioeconomic History   • Marital status: Significant Other     Spouse name: BOBBY   • Number of children: 0   • Years of education: 12   • Highest education level: Not on file   Occupational History   • Occupation:    Tobacco Use   • Smoking status: Former Smoker     Packs/day: 0.50     Years: 6.00     Pack years: 3.00     Types: Cigarettes     Start date:      Last attempt to quit: 2018     Years since quittin.7   • Smokeless tobacco: Never Used   Substance and Sexual Activity   • Alcohol use: No   • Drug use: No   • Sexual activity: Yes     Partners: Male     Birth control/protection: None     Comment: spouse = BOBBY   Social History Narrative    GYN patient 2015       Allergies   Allergen Reactions   • Amoxicillin Rash   • Buspar [Buspirone] Rash   • Penicillins Rash       Current Outpatient Medications on File Prior to Visit   Medication Sig Dispense Refill   • fluticasone (FLONASE) 50 MCG/ACT nasal spray 2 sprays into the nostril(s) as directed by provider Daily.     • ibuprofen (ADVIL,MOTRIN) 600 MG tablet Take 1 tablet by mouth Every 8 (Eight) Hours As Needed for Mild Pain . 30 tablet 4   • loratadine (CLARITIN) 10 MG tablet Take 10 mg by mouth Daily.     • Prenatal Vit-Fe Fumarate-FA (PRENATAL, CLASSIC, VITAMIN) 28-0.8 MG tablet tablet Take 1 tablet by mouth Daily.     • acetaminophen (TYLENOL) 500 MG tablet Take 1,000 mg by mouth Every 6 (Six) Hours As Needed for Mild Pain .     • aspirin 81 MG tablet Take 1 tablet by mouth Daily. 90 tablet 2   •  "ferrous sulfate 325 (65 FE) MG tablet Take 325 mg by mouth Daily With Breakfast.     • oxyCODONE-acetaminophen (PERCOCET) 5-325 MG per tablet Take 1 tablet by mouth Every 4 (Four) Hours As Needed for Severe Pain . 20 tablet 0   • sennosides-docusate sodium (SENOKOT-S) 8.6-50 MG tablet Take 2 tablets by mouth Daily. 60 tablet 2     No current facility-administered medications on file prior to visit.        Objective   Vitals:    02/08/19 1410   BP: 124/64   Pulse: 56   SpO2: 98%   Weight: 107 kg (235 lb)   Height: 162.6 cm (64\")       Physical Exam   Constitutional: She appears well-developed and well-nourished.   HENT:   Head: Normocephalic and atraumatic.   Neck: Neck supple. No JVD present. No thyromegaly present.   Cardiovascular: Normal rate and regular rhythm. Exam reveals no gallop and no friction rub.   Murmur heard.   Crescendo decrescendo systolic murmur is present with a grade of 2/6.  Pulmonary/Chest: Effort normal and breath sounds normal. No respiratory distress. She has no wheezes. She has no rales.   Abdominal: Soft. Bowel sounds are normal. She exhibits no distension. There is no tenderness. There is no rebound and no guarding.   Musculoskeletal: She exhibits no edema.   Neurological: She is alert.   Skin: Skin is warm and dry.   Psychiatric: She has a normal mood and affect. Her behavior is normal.   Nursing note and vitals reviewed.        ECG 12 Lead - palpitation  Date/Time: 2/8/2019 2:54 PM  Performed by: Nickolas Lr DO  Authorized by: Nickolas Lr DO   Rhythm: sinus rhythm  Rate: normal  BPM: 71  Conduction: conduction normal  Conduction comments: QRS 92 ms  QTc 450 ms  ST Segments: ST segments normal  T flattening: III  Clinical impression comment: 1) NSR 2) nonspecific T wave change            Assessment/Plan   Lavonne was seen today for palpitations.    Diagnoses and all orders for this visit:    Palpitations  -     ECG 12 Lead - palpitation  -     Comprehensive Metabolic Panel  - "     CBC & Differential  -     TSH  -     Magnesium  -     Ferritin  -     Iron Profile  -     Holter Monitor - 48 Hour; Future  -     Adult Transthoracic Echo Limited W/ Cont if Necessary Per Protocol; Future    Anemia, unspecified type  -     Comprehensive Metabolic Panel  -     CBC & Differential  -     TSH  -     Magnesium  -     Ferritin  -     Iron Profile    Other fatigue  -     Comprehensive Metabolic Panel  -     CBC & Differential  -     TSH  -     Magnesium  -     Ferritin  -     Iron Profile    Heart murmur  -     Holter Monitor - 48 Hour; Future  -     Adult Transthoracic Echo Limited W/ Cont if Necessary Per Protocol; Future    -request ibu for back pain ok fill  -d/w heart murmur possibly flow murmur since recent pregnancy but since noted prior to this pregnancy, will check ECHO.  Reports also palpiations, d/w cut back caffeine intake will recheck blood counts, thyroid and lytes.  Had anemia of pregnancy, will recheck.   If worsens, go ER immediately.         -Follow up: 3 months and prn

## 2019-02-09 LAB
ALBUMIN SERPL-MCNC: 4.1 G/DL (ref 3.5–5.5)
ALBUMIN/GLOB SERPL: 1.5 {RATIO} (ref 1.2–2.2)
ALP SERPL-CCNC: 118 IU/L (ref 39–117)
ALT SERPL-CCNC: 48 IU/L (ref 0–32)
AST SERPL-CCNC: 26 IU/L (ref 0–40)
BASOPHILS # BLD AUTO: 0 X10E3/UL (ref 0–0.2)
BASOPHILS NFR BLD AUTO: 0 %
BILIRUB SERPL-MCNC: <0.2 MG/DL (ref 0–1.2)
BUN SERPL-MCNC: 8 MG/DL (ref 6–20)
BUN/CREAT SERPL: 12 (ref 9–23)
CALCIUM SERPL-MCNC: 9.4 MG/DL (ref 8.7–10.2)
CHLORIDE SERPL-SCNC: 104 MMOL/L (ref 96–106)
CO2 SERPL-SCNC: 23 MMOL/L (ref 20–29)
CREAT SERPL-MCNC: 0.67 MG/DL (ref 0.57–1)
EOSINOPHIL # BLD AUTO: 0.3 X10E3/UL (ref 0–0.4)
EOSINOPHIL NFR BLD AUTO: 4 %
ERYTHROCYTE [DISTWIDTH] IN BLOOD BY AUTOMATED COUNT: 14.3 % (ref 12.3–15.4)
FERRITIN SERPL-MCNC: 27 NG/ML (ref 15–150)
GLOBULIN SER CALC-MCNC: 2.7 G/DL (ref 1.5–4.5)
GLUCOSE SERPL-MCNC: 83 MG/DL (ref 65–99)
HCT VFR BLD AUTO: 40.4 % (ref 34–46.6)
HGB BLD-MCNC: 13.1 G/DL (ref 11.1–15.9)
IMM GRANULOCYTES # BLD AUTO: 0 X10E3/UL (ref 0–0.1)
IMM GRANULOCYTES NFR BLD AUTO: 0 %
IRON SATN MFR SERPL: 10 % (ref 15–55)
IRON SERPL-MCNC: 41 UG/DL (ref 27–159)
LYMPHOCYTES # BLD AUTO: 1.9 X10E3/UL (ref 0.7–3.1)
LYMPHOCYTES NFR BLD AUTO: 23 %
MAGNESIUM SERPL-MCNC: 2 MG/DL (ref 1.6–2.3)
MCH RBC QN AUTO: 26.6 PG (ref 26.6–33)
MCHC RBC AUTO-ENTMCNC: 32.4 G/DL (ref 31.5–35.7)
MCV RBC AUTO: 82 FL (ref 79–97)
MONOCYTES # BLD AUTO: 0.4 X10E3/UL (ref 0.1–0.9)
MONOCYTES NFR BLD AUTO: 5 %
NEUTROPHILS # BLD AUTO: 5.4 X10E3/UL (ref 1.4–7)
NEUTROPHILS NFR BLD AUTO: 68 %
PLATELET # BLD AUTO: 168 X10E3/UL (ref 150–379)
POTASSIUM SERPL-SCNC: 4.2 MMOL/L (ref 3.5–5.2)
PROT SERPL-MCNC: 6.8 G/DL (ref 6–8.5)
RBC # BLD AUTO: 4.92 X10E6/UL (ref 3.77–5.28)
SODIUM SERPL-SCNC: 143 MMOL/L (ref 134–144)
TIBC SERPL-MCNC: 429 UG/DL (ref 250–450)
TSH SERPL DL<=0.005 MIU/L-ACNC: 0.46 UIU/ML (ref 0.45–4.5)
UIBC SERPL-MCNC: 388 UG/DL (ref 131–425)
WBC # BLD AUTO: 8 X10E3/UL (ref 3.4–10.8)

## 2019-02-11 DIAGNOSIS — R01.1 HEART MURMUR: Primary | ICD-10-CM

## 2019-02-11 DIAGNOSIS — R00.2 PALPITATIONS: ICD-10-CM

## 2019-02-13 ENCOUNTER — POSTPARTUM VISIT (OUTPATIENT)
Dept: OBSTETRICS AND GYNECOLOGY | Age: 24
End: 2019-02-13

## 2019-02-13 VITALS
SYSTOLIC BLOOD PRESSURE: 140 MMHG | HEIGHT: 64 IN | DIASTOLIC BLOOD PRESSURE: 80 MMHG | WEIGHT: 237 LBS | BODY MASS INDEX: 40.46 KG/M2

## 2019-02-13 DIAGNOSIS — Z30.011 ENCOUNTER FOR INITIAL PRESCRIPTION OF CONTRACEPTIVE PILLS: ICD-10-CM

## 2019-02-13 PROBLEM — O99.119 THROMBOCYTOPENIA AFFECTING PREGNANCY (HCC): Status: RESOLVED | Noted: 2018-10-24 | Resolved: 2019-02-13

## 2019-02-13 PROBLEM — O99.612 CONSTIPATION DURING PREGNANCY IN SECOND TRIMESTER: Status: RESOLVED | Noted: 2018-09-28 | Resolved: 2019-02-13

## 2019-02-13 PROBLEM — O99.013 ANEMIA AFFECTING PREGNANCY IN THIRD TRIMESTER: Status: RESOLVED | Noted: 2018-12-07 | Resolved: 2019-02-13

## 2019-02-13 PROBLEM — O10.019 HYPERTENSION IN PREGNANCY, ESSENTIAL, ANTEPARTUM: Status: RESOLVED | Noted: 2018-09-28 | Resolved: 2019-02-13

## 2019-02-13 PROBLEM — O21.9 NAUSEA AND VOMITING OF PREGNANCY, ANTEPARTUM: Status: RESOLVED | Noted: 2018-06-22 | Resolved: 2019-02-13

## 2019-02-13 PROBLEM — O16.3 ELEVATED BLOOD PRESSURE AFFECTING PREGNANCY IN THIRD TRIMESTER, ANTEPARTUM: Status: RESOLVED | Noted: 2018-12-26 | Resolved: 2019-02-13

## 2019-02-13 PROBLEM — K59.00 CONSTIPATION DURING PREGNANCY IN SECOND TRIMESTER: Status: RESOLVED | Noted: 2018-09-28 | Resolved: 2019-02-13

## 2019-02-13 PROBLEM — O99.210 OBESITY IN PREGNANCY: Status: RESOLVED | Noted: 2018-05-30 | Resolved: 2019-02-13

## 2019-02-13 PROBLEM — Z34.90 PREGNANCY: Status: RESOLVED | Noted: 2018-10-24 | Resolved: 2019-02-13

## 2019-02-13 PROBLEM — E28.2 PCOS (POLYCYSTIC OVARIAN SYNDROME): Status: RESOLVED | Noted: 2017-01-09 | Resolved: 2019-02-13

## 2019-02-13 PROBLEM — O36.5930 POOR FETAL GROWTH AFFECTING MANAGEMENT OF MOTHER IN THIRD TRIMESTER: Status: RESOLVED | Noted: 2018-11-10 | Resolved: 2019-02-13

## 2019-02-13 PROBLEM — O16.9 HYPERTENSION AFFECTING PREGNANCY: Status: RESOLVED | Noted: 2018-12-19 | Resolved: 2019-02-13

## 2019-02-13 PROBLEM — O26.899 CRAMPING AFFECTING PREGNANCY, ANTEPARTUM: Status: RESOLVED | Noted: 2018-10-24 | Resolved: 2019-02-13

## 2019-02-13 PROBLEM — K21.9 GERD WITHOUT ESOPHAGITIS: Status: RESOLVED | Noted: 2018-09-28 | Resolved: 2019-02-13

## 2019-02-13 PROBLEM — D69.6 THROMBOCYTOPENIA AFFECTING PREGNANCY: Status: RESOLVED | Noted: 2018-10-24 | Resolved: 2019-02-13

## 2019-02-13 PROBLEM — O99.820 GROUP B STREPTOCOCCUS CARRIER, +RV CULTURE, CURRENTLY PREGNANT: Status: RESOLVED | Noted: 2018-12-24 | Resolved: 2019-02-13

## 2019-02-13 PROBLEM — R10.9 CRAMPING AFFECTING PREGNANCY, ANTEPARTUM: Status: RESOLVED | Noted: 2018-10-24 | Resolved: 2019-02-13

## 2019-02-13 PROBLEM — O22.42 HEMORRHOIDS DURING PREGNANCY IN SECOND TRIMESTER: Status: RESOLVED | Noted: 2018-09-28 | Resolved: 2019-02-13

## 2019-02-13 PROCEDURE — 0503F POSTPARTUM CARE VISIT: CPT | Performed by: OBSTETRICS & GYNECOLOGY

## 2019-02-13 RX ORDER — ACETAMINOPHEN AND CODEINE PHOSPHATE 120; 12 MG/5ML; MG/5ML
1 SOLUTION ORAL DAILY
Qty: 28 TABLET | Refills: 12 | Status: SHIPPED | OUTPATIENT
Start: 2019-02-13 | End: 2019-05-01

## 2019-02-13 NOTE — PROGRESS NOTES
"Subjective   Lavonne Rocha is a 23 y.o. female who presents for a postpartum visit. She is 6 weeks postpartum following a low cervical transverse  section. I have fully reviewed the prenatal and intrapartum course. The delivery was at 37 gestational weeks. Outcome: primary  section, low transverse incision. Anesthesia: epidural. Postpartum course has been uncomplicated. Baby's course has been uncomplicated. Baby is feeding by bottle. Bleeding thin lochia. Bowel function is normal. Bladder function is normal. Patient is not sexually active. Contraception method is oral progesterone-only contraceptive. Postpartum depression screening: negative.    The following portions of the patient's history were reviewed and updated as appropriate: allergies, current medications, past family history, past medical history, past social history, past surgical history and problem list.    Review of Systems  Pertinent items are noted in HPI.    Objective   /80   Ht 162.6 cm (64\")   Wt 108 kg (237 lb)   Breastfeeding? No   BMI 40.68 kg/m²    General:  alert, appears stated age and cooperative   Abdomen: soft, non-tender; bowel sounds normal; no masses,  no organomegaly and Inc healed well      Assessment/Plan    postpartum exam. Pap smear not done at today's visit.    1. Contraception: oral progesterone-only contraceptive  2. GHTN - improved - following up with PCP    3. Follow up in: 6 weeks or as needed to discuss BC            "

## 2019-02-22 ENCOUNTER — TELEPHONE (OUTPATIENT)
Dept: OBSTETRICS AND GYNECOLOGY | Age: 24
End: 2019-02-22

## 2019-02-22 NOTE — TELEPHONE ENCOUNTER
Dr Mixon pt states she is going back to work 3/15/19 and per Dr Mixon she needs to be seen here after her PCP Dr Lr clears her from having Chronic Hypertension and then have an appt with Dr Mixon for a birth control consult. Dr Mixon may I please schedule this pt in an available ob follow up spot on 3/13/19 or could you please let me know when/where to schedule this pt? There isn't any other available gyn follow up spots available before pt is returning to work & after her PCP appt.

## 2019-02-28 ENCOUNTER — HOSPITAL ENCOUNTER (OUTPATIENT)
Dept: CARDIOLOGY | Facility: HOSPITAL | Age: 24
Discharge: HOME | End: 2019-02-28
Admitting: FAMILY MEDICINE

## 2019-02-28 ENCOUNTER — APPOINTMENT (OUTPATIENT)
Dept: CARDIOLOGY | Facility: HOSPITAL | Age: 24
End: 2019-02-28

## 2019-02-28 ENCOUNTER — HOSPITAL ENCOUNTER (OUTPATIENT)
Dept: CARDIOLOGY | Facility: HOSPITAL | Age: 24
Discharge: HOME | End: 2019-02-28

## 2019-02-28 DIAGNOSIS — R01.1 HEART MURMUR: ICD-10-CM

## 2019-02-28 DIAGNOSIS — R00.2 PALPITATIONS: ICD-10-CM

## 2019-02-28 LAB
AORTIC DIMENSIONLESS INDEX: 0.6 (DI)
BH CV ECHO MEAS - AO MAX PG (FULL): 5.6 MMHG
BH CV ECHO MEAS - AO MAX PG: 8 MMHG
BH CV ECHO MEAS - AO MEAN PG (FULL): 3 MMHG
BH CV ECHO MEAS - AO MEAN PG: 4 MMHG
BH CV ECHO MEAS - AO ROOT AREA (BSA CORRECTED): 1.3
BH CV ECHO MEAS - AO ROOT AREA: 6.2 CM^2
BH CV ECHO MEAS - AO ROOT DIAM: 2.8 CM
BH CV ECHO MEAS - AO V2 MAX: 141 CM/SEC
BH CV ECHO MEAS - AO V2 MEAN: 93.6 CM/SEC
BH CV ECHO MEAS - AO V2 VTI: 28 CM
BH CV ECHO MEAS - ASC AORTA: 2.8 CM
BH CV ECHO MEAS - AVA(I,A): 2.3 CM^2
BH CV ECHO MEAS - AVA(I,D): 2.3 CM^2
BH CV ECHO MEAS - AVA(V,A): 2.1 CM^2
BH CV ECHO MEAS - AVA(V,D): 2.1 CM^2
BH CV ECHO MEAS - BSA(HAYCOCK): 2.3 M^2
BH CV ECHO MEAS - BSA: 2.1 M^2
BH CV ECHO MEAS - BZI_BMI: 40.7 KILOGRAMS/M^2
BH CV ECHO MEAS - BZI_METRIC_HEIGHT: 162.6 CM
BH CV ECHO MEAS - BZI_METRIC_WEIGHT: 107.5 KG
BH CV ECHO MEAS - EDV(CUBED): 157.5 ML
BH CV ECHO MEAS - EDV(MOD-SP2): 113 ML
BH CV ECHO MEAS - EDV(MOD-SP4): 162 ML
BH CV ECHO MEAS - EDV(TEICH): 141.3 ML
BH CV ECHO MEAS - EF(CUBED): 75 %
BH CV ECHO MEAS - EF(MOD-BP): 60 %
BH CV ECHO MEAS - EF(MOD-SP2): 61.1 %
BH CV ECHO MEAS - EF(MOD-SP4): 59.9 %
BH CV ECHO MEAS - EF(TEICH): 66.4 %
BH CV ECHO MEAS - ESV(CUBED): 39.3 ML
BH CV ECHO MEAS - ESV(MOD-SP2): 44 ML
BH CV ECHO MEAS - ESV(MOD-SP4): 65 ML
BH CV ECHO MEAS - ESV(TEICH): 47.4 ML
BH CV ECHO MEAS - FS: 37 %
BH CV ECHO MEAS - IVS/LVPW: 1
BH CV ECHO MEAS - IVSD: 0.9 CM
BH CV ECHO MEAS - LAT PEAK E' VEL: 14 CM/SEC
BH CV ECHO MEAS - LV DIASTOLIC VOL/BSA (35-75): 77.1 ML/M^2
BH CV ECHO MEAS - LV MASS(C)D: 180.1 GRAMS
BH CV ECHO MEAS - LV MASS(C)DI: 85.7 GRAMS/M^2
BH CV ECHO MEAS - LV MAX PG: 2.4 MMHG
BH CV ECHO MEAS - LV MEAN PG: 1 MMHG
BH CV ECHO MEAS - LV SYSTOLIC VOL/BSA (12-30): 30.9 ML/M^2
BH CV ECHO MEAS - LV V1 MAX: 76.9 CM/SEC
BH CV ECHO MEAS - LV V1 MEAN: 57.4 CM/SEC
BH CV ECHO MEAS - LV V1 VTI: 16.9 CM
BH CV ECHO MEAS - LVIDD: 5.4 CM
BH CV ECHO MEAS - LVIDS: 3.4 CM
BH CV ECHO MEAS - LVLD AP2: 9 CM
BH CV ECHO MEAS - LVLD AP4: 10.1 CM
BH CV ECHO MEAS - LVLS AP2: 7 CM
BH CV ECHO MEAS - LVLS AP4: 8.8 CM
BH CV ECHO MEAS - LVOT AREA (M): 3.8 CM^2
BH CV ECHO MEAS - LVOT AREA: 3.8 CM^2
BH CV ECHO MEAS - LVOT DIAM: 2.2 CM
BH CV ECHO MEAS - LVPWD: 0.9 CM
BH CV ECHO MEAS - MED PEAK E' VEL: 9 CM/SEC
BH CV ECHO MEAS - MV A DUR: 0.1 SEC
BH CV ECHO MEAS - MV A MAX VEL: 58.2 CM/SEC
BH CV ECHO MEAS - MV DEC SLOPE: 325 CM/SEC^2
BH CV ECHO MEAS - MV DEC TIME: 0.14 SEC
BH CV ECHO MEAS - MV E MAX VEL: 78.6 CM/SEC
BH CV ECHO MEAS - MV E/A: 1.4
BH CV ECHO MEAS - MV MAX PG: 2.4 MMHG
BH CV ECHO MEAS - MV MEAN PG: 1 MMHG
BH CV ECHO MEAS - MV P1/2T MAX VEL: 78.6 CM/SEC
BH CV ECHO MEAS - MV P1/2T: 70.8 MSEC
BH CV ECHO MEAS - MV V2 MAX: 78 CM/SEC
BH CV ECHO MEAS - MV V2 MEAN: 44.1 CM/SEC
BH CV ECHO MEAS - MV V2 VTI: 24 CM
BH CV ECHO MEAS - MVA P1/2T LCG: 2.8 CM^2
BH CV ECHO MEAS - MVA(P1/2T): 3.1 CM^2
BH CV ECHO MEAS - MVA(VTI): 2.7 CM^2
BH CV ECHO MEAS - PA ACC TIME: 0.09 SEC
BH CV ECHO MEAS - PA MAX PG (FULL): 2.3 MMHG
BH CV ECHO MEAS - PA MAX PG: 4.5 MMHG
BH CV ECHO MEAS - PA PR(ACCEL): 37.6 MMHG
BH CV ECHO MEAS - PA V2 MAX: 106 CM/SEC
BH CV ECHO MEAS - RAP SYSTOLE: 3 MMHG
BH CV ECHO MEAS - RV MAX PG: 2.2 MMHG
BH CV ECHO MEAS - RV MEAN PG: 1 MMHG
BH CV ECHO MEAS - RV V1 MAX: 73.9 CM/SEC
BH CV ECHO MEAS - RV V1 MEAN: 54.3 CM/SEC
BH CV ECHO MEAS - RV V1 VTI: 15.9 CM
BH CV ECHO MEAS - SI(AO): 82 ML/M^2
BH CV ECHO MEAS - SI(CUBED): 56.2 ML/M^2
BH CV ECHO MEAS - SI(LVOT): 30.6 ML/M^2
BH CV ECHO MEAS - SI(MOD-SP2): 32.8 ML/M^2
BH CV ECHO MEAS - SI(MOD-SP4): 46.1 ML/M^2
BH CV ECHO MEAS - SI(TEICH): 44.7 ML/M^2
BH CV ECHO MEAS - SV(AO): 172.4 ML
BH CV ECHO MEAS - SV(CUBED): 118.2 ML
BH CV ECHO MEAS - SV(LVOT): 64.2 ML
BH CV ECHO MEAS - SV(MOD-SP2): 69 ML
BH CV ECHO MEAS - SV(MOD-SP4): 97 ML
BH CV ECHO MEAS - SV(TEICH): 93.9 ML
BH CV ECHO MEASUREMENTS AVERAGE E/E' RATIO: 6.83
BH CV XLRA - RV BASE: 4.6 CM
BH CV XLRA - TDI S': 7.6 CM/SEC
LEFT ATRIUM VOLUME INDEX: 19 ML/M2
LV EF 2D ECHO EST: 60 %
MAXIMAL PREDICTED HEART RATE: 195 BPM
STRESS TARGET HR: 166 BPM

## 2019-02-28 PROCEDURE — 25010000002 PERFLUTREN (DEFINITY) 8.476 MG IN SODIUM CHLORIDE 0.9 % 10 ML INJECTION: Performed by: FAMILY MEDICINE

## 2019-02-28 PROCEDURE — 93226 XTRNL ECG REC<48 HR SCAN A/R: CPT

## 2019-02-28 PROCEDURE — 93306 TTE W/DOPPLER COMPLETE: CPT

## 2019-02-28 PROCEDURE — 93306 TTE W/DOPPLER COMPLETE: CPT | Performed by: INTERNAL MEDICINE

## 2019-02-28 PROCEDURE — 93225 XTRNL ECG REC<48 HRS REC: CPT

## 2019-02-28 RX ADMIN — SODIUM CHLORIDE 3 ML: 9 INJECTION INTRAMUSCULAR; INTRAVENOUS; SUBCUTANEOUS at 13:22

## 2019-03-04 ENCOUNTER — TELEPHONE (OUTPATIENT)
Dept: OBSTETRICS AND GYNECOLOGY | Age: 24
End: 2019-03-04

## 2019-03-04 PROCEDURE — 93227 XTRNL ECG REC<48 HR R&I: CPT | Performed by: INTERNAL MEDICINE

## 2019-03-04 NOTE — TELEPHONE ENCOUNTER
Patient delivered 1/2/19 and was supposed to clear her hypertension issue with her PCP first before getting her iud inserted.She has to cancel her appt for next week but wants to know if she can just stay on the progestin only pills until she can get with her PCP? Not breastfeeding.

## 2019-03-25 RX ORDER — CYCLOBENZAPRINE HCL 10 MG
TABLET ORAL
Qty: 30 TABLET | Refills: 1 | OUTPATIENT
Start: 2019-03-25

## 2019-03-25 RX ORDER — FLUTICASONE PROPIONATE 50 MCG
2 SPRAY, SUSPENSION (ML) NASAL DAILY
Qty: 2 BOTTLE | Refills: 1 | Status: SHIPPED | OUTPATIENT
Start: 2019-03-25 | End: 2019-05-01

## 2019-03-26 ENCOUNTER — TELEPHONE (OUTPATIENT)
Dept: OBSTETRICS AND GYNECOLOGY | Age: 24
End: 2019-03-26

## 2019-04-23 ENCOUNTER — TELEPHONE (OUTPATIENT)
Dept: OBSTETRICS AND GYNECOLOGY | Age: 24
End: 2019-04-23

## 2019-04-23 NOTE — TELEPHONE ENCOUNTER
"Dr Mixon pt (aware Dr Mixon is out [place] today) reports giving birth 3 months almost 4 months ago & is having emotional concerns. Pt states she has been a little down, unable to focus, not herself & isn't sure if she is dealing with post partum depression or not. Pt just started working a new job & isn't able to leave too early. She is requestnig to pleases c/i next   Wednesday 5/1/19 at 330. Dr Mixon you have a spot but it is an \"ob\" spot, would this be ok to schedule?   "

## 2019-05-01 ENCOUNTER — OFFICE VISIT (OUTPATIENT)
Dept: OBSTETRICS AND GYNECOLOGY | Age: 24
End: 2019-05-01

## 2019-05-01 VITALS
DIASTOLIC BLOOD PRESSURE: 80 MMHG | HEIGHT: 64 IN | BODY MASS INDEX: 41.15 KG/M2 | WEIGHT: 241 LBS | SYSTOLIC BLOOD PRESSURE: 130 MMHG

## 2019-05-01 PROCEDURE — 99213 OFFICE O/P EST LOW 20 MIN: CPT | Performed by: OBSTETRICS & GYNECOLOGY

## 2019-05-01 NOTE — PROGRESS NOTES
"Subjective   Lavonne Rocha is a 23 y.o. female poss ppd , irritaion , mood swings. Four months PP s/p LTCS - female - [place].  Just started new job with [employer]  -  works 2nd shift so at home at night by herself -Willing to try medication. Has good support system.      History of Present Illness    The following portions of the patient's history were reviewed and updated as appropriate: allergies, current medications, past family history, past medical history, past social history, past surgical history and problem list.    Review of Systems   Constitutional: Negative for chills and fever.   Gastrointestinal: Negative for abdominal distention and abdominal pain.   Genitourinary: Negative for dysuria, menstrual problem, pelvic pain, vaginal bleeding, vaginal discharge and vaginal pain.   Psychiatric/Behavioral: Positive for agitation, confusion, decreased concentration and dysphoric mood. Negative for hallucinations and suicidal ideas. The patient is nervous/anxious.    All other systems reviewed and are negative.  /80   Ht 162.6 cm (64\")   Wt 109 kg (241 lb)   LMP 04/17/2019 (Approximate)   Breastfeeding? No   BMI 41.37 kg/m²       Objective   Physical Exam   Constitutional: She is oriented to person, place, and time. She appears well-developed and well-nourished.   Pulmonary/Chest: Effort normal.   Neurological: She is alert and oriented to person, place, and time.   Skin: Skin is warm and dry.   Psychiatric: She has a normal mood and affect. Her behavior is normal.   Nursing note and vitals reviewed.      Assessment/Plan   Lavonne was seen today for gynecologic exam.    Diagnoses and all orders for this visit:    Postpartum depression  -     sertraline (ZOLOFT) 50 MG tablet; Take 1 tablet by mouth Daily.     Counseling was given to patient for the following topics: instructions for management, patient and family education and importance of treatment compliance . Total time of the " encounter was 20 minutes and 15 minutes was spend counseling.    Will call me in 2 weeks

## 2019-05-13 ENCOUNTER — TELEPHONE (OUTPATIENT)
Dept: OBSTETRICS AND GYNECOLOGY | Age: 24
End: 2019-05-13

## 2019-05-13 NOTE — TELEPHONE ENCOUNTER
Called to check on patient - feels she is doing better but still some agitation - will check back in 1 week.

## 2019-05-20 ENCOUNTER — TELEPHONE (OUTPATIENT)
Dept: OBSTETRICS AND GYNECOLOGY | Age: 24
End: 2019-05-20

## 2019-05-20 DIAGNOSIS — K64.9 HEMORRHOIDS, UNSPECIFIED HEMORRHOID TYPE: ICD-10-CM

## 2019-05-20 RX ORDER — FLUOXETINE HYDROCHLORIDE 20 MG/1
20 CAPSULE ORAL DAILY
Qty: 30 CAPSULE | Refills: 3 | Status: SHIPPED | OUTPATIENT
Start: 2019-05-20 | End: 2019-09-26 | Stop reason: SDUPTHER

## 2019-05-20 NOTE — TELEPHONE ENCOUNTER
Called to check on patient - patient states she cannot tell a big difference with the zoloft - wants to try something different. Also needs meds for hemorrhoids.

## 2019-06-07 ENCOUNTER — TELEPHONE (OUTPATIENT)
Dept: OBSTETRICS AND GYNECOLOGY | Age: 24
End: 2019-06-07

## 2019-07-01 ENCOUNTER — OFFICE VISIT (OUTPATIENT)
Dept: FAMILY MEDICINE CLINIC | Facility: CLINIC | Age: 24
End: 2019-07-01

## 2019-07-01 VITALS
SYSTOLIC BLOOD PRESSURE: 142 MMHG | DIASTOLIC BLOOD PRESSURE: 72 MMHG | BODY MASS INDEX: 42.68 KG/M2 | OXYGEN SATURATION: 98 % | HEART RATE: 97 BPM | HEIGHT: 64 IN | WEIGHT: 250 LBS

## 2019-07-01 DIAGNOSIS — G56.80 SCAPULOTHORACIC SYNDROME: Primary | ICD-10-CM

## 2019-07-01 DIAGNOSIS — H66.003 ACUTE SUPPURATIVE OTITIS MEDIA OF BOTH EARS WITHOUT SPONTANEOUS RUPTURE OF TYMPANIC MEMBRANES, RECURRENCE NOT SPECIFIED: ICD-10-CM

## 2019-07-01 PROCEDURE — 96372 THER/PROPH/DIAG INJ SC/IM: CPT | Performed by: FAMILY MEDICINE

## 2019-07-01 PROCEDURE — 99213 OFFICE O/P EST LOW 20 MIN: CPT | Performed by: FAMILY MEDICINE

## 2019-07-01 RX ORDER — METHYLPREDNISOLONE ACETATE 80 MG/ML
80 INJECTION, SUSPENSION INTRA-ARTICULAR; INTRALESIONAL; INTRAMUSCULAR; SOFT TISSUE ONCE
Status: COMPLETED | OUTPATIENT
Start: 2019-07-01 | End: 2019-07-01

## 2019-07-01 RX ORDER — FLUTICASONE PROPIONATE 50 MCG
2 SPRAY, SUSPENSION (ML) NASAL DAILY
COMMUNITY
Start: 2019-06-14 | End: 2019-09-26 | Stop reason: SDUPTHER

## 2019-07-01 RX ORDER — DOXYCYCLINE HYCLATE 100 MG/1
100 CAPSULE ORAL 2 TIMES DAILY
Qty: 20 CAPSULE | Refills: 0 | Status: SHIPPED | OUTPATIENT
Start: 2019-07-01 | End: 2020-07-16

## 2019-07-01 RX ADMIN — METHYLPREDNISOLONE ACETATE 80 MG: 80 INJECTION, SUSPENSION INTRA-ARTICULAR; INTRALESIONAL; INTRAMUSCULAR; SOFT TISSUE at 18:20

## 2019-07-01 NOTE — PROGRESS NOTES
Subjective   Lavonne Rocha is a 23 y.o. female. Presents today for   Chief Complaint   Patient presents with   • Shoulder Pain     bilateral   • Depression   • Anxiety       Upper Extremity Issue   This is a new problem. The current episode started more than 1 month ago. The problem occurs intermittently. The problem has been waxing and waning. Associated symptoms include arthralgias and myalgias. Associated symptoms comments: Pain across upper/middle back and into upper chest wall.  . 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 She has tried nothing for the symptoms. The treatment provided no relief.     Earache, has allergies;  Ear fullness/popping would like checked while here.    Depression and anxiety - doing well on prozac, tolerating and feels much improved.  Was very anxious after having 1st child.  Review of Systems   HENT: Positive for ear pain.    Musculoskeletal: Positive for arthralgias, back pain and myalgias.       Patient Active Problem List   Diagnosis   • Bulging lumbar disc   • Degeneration of intervertebral disc of lumbar region   • Lumbar facet arthropathy   • Lumbar radiculitis   • Chronic bilateral low back pain with sciatica   • Postpartum depression   • Hemorrhoids       Social History     Socioeconomic History   • Marital status: Significant Other     Spouse name: BOBBY   • Number of children: 0   • Years of education: 12   • Highest education level: Not on file   Occupational History   • Occupation:    Tobacco Use   • Smoking status: Former Smoker     Packs/day: 0.50     Years: 6.00     Pack years: 3.00     Types: Cigarettes     Start date:      Last attempt to quit: 2018     Years since quittin.1   • Smokeless tobacco: Never Used   Substance and Sexual Activity   • Alcohol use: No   • Drug use: No   • Sexual activity: Yes     Partners: Male     Birth control/protection: None     Comment: spouse = BOBBY   Social History Narrative    GYN patient 2015       Allergies  "  Allergen Reactions   • Amoxicillin Rash   • Buspar [Buspirone] Rash   • Penicillins Rash       Current Outpatient Medications on File Prior to Visit   Medication Sig Dispense Refill   • FLUoxetine (PROZAC) 20 MG capsule Take 1 capsule by mouth Daily. 30 capsule 3   • fluticasone (FLONASE) 50 MCG/ACT nasal spray 2 sprays into the nostril(s) as directed by provider Daily.     • ibuprofen (ADVIL,MOTRIN) 800 MG tablet Take 1 tablet by mouth Every 8 (Eight) Hours As Needed (back pain). 90 tablet 4   • loratadine (CLARITIN) 10 MG tablet Take 10 mg by mouth Daily.     • ferrous sulfate 325 (65 FE) MG tablet Take 325 mg by mouth Daily With Breakfast.       Current Facility-Administered Medications on File Prior to Visit   Medication Dose Route Frequency Provider Last Rate Last Dose   • perflutren (DEFINITY) 8.476 mg in sodium chloride 0.9 % 10 mL injection  3 mL Intravenous Once Nickolas Lr DO           Objective   Vitals:    07/01/19 1725   BP: 142/72   BP Location: Right arm   Patient Position: Sitting   Cuff Size: Large Adult   Pulse: 97   SpO2: 98%   Weight: 113 kg (250 lb)   Height: 162.6 cm (64\")       Physical Exam   Constitutional: She is oriented to person, place, and time. She appears well-developed and well-nourished.   Musculoskeletal:        Left shoulder: She exhibits tenderness and crepitus (snapping of left scapula with ROM). She exhibits no bony tenderness, no effusion and normal strength.        Thoracic back: She exhibits tenderness, pain and spasm.   Neurological: She is alert and oriented to person, place, and time.   Skin: Skin is warm and dry.   Psychiatric: She has a normal mood and affect. Her behavior is normal.   Nursing note and vitals reviewed.      Assessment/Plan   Lavonne was seen today for shoulder pain, depression and anxiety.    Diagnoses and all orders for this visit:    Scapulothoracic syndrome  -     methylPREDNISolone acetate (DEPO-medrol) injection 80 mg    Acute suppurative " otitis media of both ears without spontaneous rupture of tympanic membranes, recurrence not specified  -     doxycycline (VIBRAMYCIN) 100 MG capsule; Take 1 capsule by mouth 2 (Two) Times a Day.    gave HEP for shoulder;  COnsider PT or chiropractic care.  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

## 2019-08-12 ENCOUNTER — TELEPHONE (OUTPATIENT)
Dept: FAMILY MEDICINE CLINIC | Facility: CLINIC | Age: 24
End: 2019-08-12

## 2019-09-19 DIAGNOSIS — R53.83 OTHER FATIGUE: Primary | ICD-10-CM

## 2019-09-24 ENCOUNTER — OFFICE VISIT (OUTPATIENT)
Dept: FAMILY MEDICINE CLINIC | Facility: CLINIC | Age: 24
End: 2019-09-24

## 2019-09-24 VITALS
TEMPERATURE: 97.6 F | OXYGEN SATURATION: 98 % | SYSTOLIC BLOOD PRESSURE: 136 MMHG | BODY MASS INDEX: 43.94 KG/M2 | DIASTOLIC BLOOD PRESSURE: 80 MMHG | WEIGHT: 256 LBS | HEART RATE: 79 BPM

## 2019-09-24 DIAGNOSIS — R53.83 OTHER FATIGUE: Primary | ICD-10-CM

## 2019-09-24 DIAGNOSIS — R23.3 EASY BRUISING: ICD-10-CM

## 2019-09-24 DIAGNOSIS — N93.9 VAGINAL BLEEDING: ICD-10-CM

## 2019-09-24 PROCEDURE — 99214 OFFICE O/P EST MOD 30 MIN: CPT | Performed by: FAMILY MEDICINE

## 2019-09-24 NOTE — PROGRESS NOTES
Subjective   Lavonne Rocha is a 24 y.o. female. Presents today for   Chief Complaint   Patient presents with   • Fatigue     severe fatigue and taking iron supplements and not getting better.  easy bruising       Fatigue   This is a new problem. The current episode started more than 1 month ago. The problem occurs constantly. Associated symptoms include fatigue. Pertinent negatives include no abdominal pain, change in bowel habit, chills, fever, joint swelling, myalgias, nausea, rash or vomiting. Associated symptoms comments: Easy bruising;  Vaginal bleeding has been very heavy;  No black or bloody stools; no abd pain;  Has had off/on sharp pain;  . Nothing aggravates the symptoms. She has tried nothing for the symptoms. The treatment provided no relief.       Review of Systems   Constitutional: Positive for fatigue. Negative for chills and fever.   Gastrointestinal: Negative for abdominal pain, change in bowel habit, nausea and vomiting.   Musculoskeletal: Negative for joint swelling and myalgias.   Skin: Negative for rash.       Patient Active Problem List   Diagnosis   • Bulging lumbar disc   • Degeneration of intervertebral disc of lumbar region   • Lumbar facet arthropathy   • Lumbar radiculitis   • Chronic bilateral low back pain with sciatica   • Postpartum depression   • Hemorrhoids       Social History     Socioeconomic History   • Marital status: Significant Other     Spouse name: BOBBY   • Number of children: 0   • Years of education: 12   • Highest education level: Not on file   Occupational History   • Occupation:    Tobacco Use   • Smoking status: Former Smoker     Packs/day: 0.50     Years: 6.00     Pack years: 3.00     Types: Cigarettes     Start date:      Last attempt to quit: 2018     Years since quittin.3   • Smokeless tobacco: Never Used   Substance and Sexual Activity   • Alcohol use: No   • Drug use: No   • Sexual activity: Yes     Partners: Male     Birth  control/protection: None     Comment: spouse = BOBBY   Social History Narrative    GYN patient 2015       Allergies   Allergen Reactions   • Amoxicillin Rash   • Buspar [Buspirone] Rash   • Penicillins Rash       Current Outpatient Medications on File Prior to Visit   Medication Sig Dispense Refill   • ferrous sulfate 325 (65 FE) MG tablet Take 325 mg by mouth Daily With Breakfast.     • FLUoxetine (PROZAC) 20 MG capsule Take 1 capsule by mouth Daily. 30 capsule 3   • fluticasone (FLONASE) 50 MCG/ACT nasal spray 2 sprays into the nostril(s) as directed by provider Daily.     • ibuprofen (ADVIL,MOTRIN) 800 MG tablet Take 1 tablet by mouth Every 8 (Eight) Hours As Needed (back pain). 90 tablet 4   • loratadine (CLARITIN) 10 MG tablet Take 10 mg by mouth Daily.     • doxycycline (VIBRAMYCIN) 100 MG capsule Take 1 capsule by mouth 2 (Two) Times a Day. 20 capsule 0     Current Facility-Administered Medications on File Prior to Visit   Medication Dose Route Frequency Provider Last Rate Last Dose   • perflutren (DEFINITY) 8.476 mg in sodium chloride 0.9 % 10 mL injection  3 mL Intravenous Once Nickolas Lr DO           Objective   Vitals:    09/24/19 1435   BP: 136/80   Pulse: 79   Temp: 97.6 °F (36.4 °C)   SpO2: 98%   Weight: 116 kg (256 lb)       Physical Exam   Constitutional: She appears well-developed and well-nourished.   HENT:   Head: Normocephalic and atraumatic.   Neck: Neck supple. No JVD present. No thyromegaly present.   Cardiovascular: Normal rate, regular rhythm and normal heart sounds. Exam reveals no gallop and no friction rub.   No murmur heard.  Pulmonary/Chest: Effort normal and breath sounds normal. No respiratory distress. She has no wheezes. She has no rales.   Abdominal: Soft. Bowel sounds are normal. She exhibits no distension. There is no tenderness. There is no rebound and no guarding.   Musculoskeletal: She exhibits no edema.   Neurological: She is alert.   Skin: Skin is warm and dry.    Few mild bruises   Psychiatric: She has a normal mood and affect. Her behavior is normal.   Nursing note and vitals reviewed.      Assessment/Plan   Lavonne was seen today for fatigue.    Diagnoses and all orders for this visit:    Other fatigue  -     Ferritin  -     Vitamin B12  -     TSH  -     CBC & Differential  -     Comprehensive Metabolic Panel  -     Iron Profile    Easy bruising  -     Ferritin  -     Vitamin B12  -     TSH  -     CBC & Differential  -     Comprehensive Metabolic Panel  -     Iron Profile    Vaginal bleeding  -     Ferritin  -     Vitamin B12  -     TSH  -     CBC & Differential  -     Comprehensive Metabolic Panel  -     Iron Profile  -     US Non-ob Transvaginal; Future    -vaginal bleeding and heavy with hx of anemia, will recheck and vaginal u/s;   F/u with gyn for mgmt;  Ok for OCP as bp stable and doing well.         -Follow up: arrange f/u with Gyn after labs

## 2019-09-25 DIAGNOSIS — N93.9 VAGINAL BLEEDING: Primary | ICD-10-CM

## 2019-09-25 LAB
ALBUMIN SERPL-MCNC: 4.5 G/DL (ref 3.5–5.5)
ALBUMIN/GLOB SERPL: 2.3 {RATIO} (ref 1.2–2.2)
ALP SERPL-CCNC: 108 IU/L (ref 39–117)
ALT SERPL-CCNC: 28 IU/L (ref 0–32)
AST SERPL-CCNC: 16 IU/L (ref 0–40)
BASOPHILS # BLD AUTO: 0 X10E3/UL (ref 0–0.2)
BASOPHILS NFR BLD AUTO: 0 %
BILIRUB SERPL-MCNC: <0.2 MG/DL (ref 0–1.2)
BUN SERPL-MCNC: 11 MG/DL (ref 6–20)
BUN/CREAT SERPL: 15 (ref 9–23)
CALCIUM SERPL-MCNC: 9.4 MG/DL (ref 8.7–10.2)
CHLORIDE SERPL-SCNC: 102 MMOL/L (ref 96–106)
CO2 SERPL-SCNC: 23 MMOL/L (ref 20–29)
CREAT SERPL-MCNC: 0.71 MG/DL (ref 0.57–1)
EOSINOPHIL # BLD AUTO: 0.6 X10E3/UL (ref 0–0.4)
EOSINOPHIL NFR BLD AUTO: 6 %
ERYTHROCYTE [DISTWIDTH] IN BLOOD BY AUTOMATED COUNT: 14.2 % (ref 12.3–15.4)
FERRITIN SERPL-MCNC: 61 NG/ML (ref 15–150)
GLOBULIN SER CALC-MCNC: 2 G/DL (ref 1.5–4.5)
GLUCOSE SERPL-MCNC: 86 MG/DL (ref 65–99)
HCT VFR BLD AUTO: 42.2 % (ref 34–46.6)
HGB BLD-MCNC: 14.2 G/DL (ref 11.1–15.9)
IMM GRANULOCYTES # BLD AUTO: 0 X10E3/UL (ref 0–0.1)
IMM GRANULOCYTES NFR BLD AUTO: 0 %
IRON SATN MFR SERPL: 12 % (ref 15–55)
IRON SERPL-MCNC: 46 UG/DL (ref 27–159)
LYMPHOCYTES # BLD AUTO: 2.3 X10E3/UL (ref 0.7–3.1)
LYMPHOCYTES NFR BLD AUTO: 24 %
MCH RBC QN AUTO: 29.6 PG (ref 26.6–33)
MCHC RBC AUTO-ENTMCNC: 33.6 G/DL (ref 31.5–35.7)
MCV RBC AUTO: 88 FL (ref 79–97)
MONOCYTES # BLD AUTO: 0.6 X10E3/UL (ref 0.1–0.9)
MONOCYTES NFR BLD AUTO: 7 %
NEUTROPHILS # BLD AUTO: 5.9 X10E3/UL (ref 1.4–7)
NEUTROPHILS NFR BLD AUTO: 63 %
PLATELET # BLD AUTO: 192 X10E3/UL (ref 150–450)
POTASSIUM SERPL-SCNC: 4.6 MMOL/L (ref 3.5–5.2)
PROT SERPL-MCNC: 6.5 G/DL (ref 6–8.5)
RBC # BLD AUTO: 4.79 X10E6/UL (ref 3.77–5.28)
SODIUM SERPL-SCNC: 139 MMOL/L (ref 134–144)
TIBC SERPL-MCNC: 373 UG/DL (ref 250–450)
TSH SERPL DL<=0.005 MIU/L-ACNC: 1.26 UIU/ML (ref 0.45–4.5)
UIBC SERPL-MCNC: 327 UG/DL (ref 131–425)
VIT B12 SERPL-MCNC: 298 PG/ML (ref 232–1245)
WBC # BLD AUTO: 9.5 X10E3/UL (ref 3.4–10.8)

## 2019-09-25 NOTE — PROGRESS NOTES
Call results to patient.  Blood counts are normal;  IRon low end of normal, would take ferrous gluconate or equivalent daily (continue if no).  Her B12 level is low, start B12 1000mcg IM monthly x 6 months.  This can cause fatigue.

## 2019-09-26 ENCOUNTER — TELEPHONE (OUTPATIENT)
Dept: FAMILY MEDICINE CLINIC | Facility: CLINIC | Age: 24
End: 2019-09-26

## 2019-09-26 ENCOUNTER — HOSPITAL ENCOUNTER (OUTPATIENT)
Dept: ULTRASOUND IMAGING | Facility: HOSPITAL | Age: 24
Discharge: HOME | End: 2019-09-26
Admitting: FAMILY MEDICINE

## 2019-09-26 DIAGNOSIS — N93.9 VAGINAL BLEEDING: ICD-10-CM

## 2019-09-26 PROCEDURE — 76830 TRANSVAGINAL US NON-OB: CPT

## 2019-09-26 PROCEDURE — 76856 US EXAM PELVIC COMPLETE: CPT

## 2019-09-26 RX ORDER — FLUTICASONE PROPIONATE 50 MCG
2 SPRAY, SUSPENSION (ML) NASAL DAILY
Qty: 18 G | Refills: 5 | Status: SHIPPED | OUTPATIENT
Start: 2019-09-26 | End: 2020-09-15 | Stop reason: SDUPTHER

## 2019-09-27 RX ORDER — FLUOXETINE HYDROCHLORIDE 20 MG/1
20 CAPSULE ORAL DAILY
Qty: 90 CAPSULE | Refills: 3 | Status: SHIPPED | OUTPATIENT
Start: 2019-09-27 | End: 2020-09-15

## 2019-10-07 ENCOUNTER — OFFICE VISIT (OUTPATIENT)
Dept: SLEEP MEDICINE | Facility: HOSPITAL | Age: 24
End: 2019-10-07

## 2019-10-07 VITALS
DIASTOLIC BLOOD PRESSURE: 67 MMHG | HEIGHT: 64 IN | SYSTOLIC BLOOD PRESSURE: 148 MMHG | OXYGEN SATURATION: 97 % | WEIGHT: 253 LBS | HEART RATE: 73 BPM | BODY MASS INDEX: 43.19 KG/M2

## 2019-10-07 DIAGNOSIS — R53.83 OTHER FATIGUE: ICD-10-CM

## 2019-10-07 DIAGNOSIS — R06.83 SNORING: ICD-10-CM

## 2019-10-07 DIAGNOSIS — E66.01 MORBID OBESITY WITH BMI OF 40.0-44.9, ADULT (HCC): ICD-10-CM

## 2019-10-07 DIAGNOSIS — G47.10 HYPERSOMNIA: Primary | ICD-10-CM

## 2019-10-07 PROCEDURE — G0463 HOSPITAL OUTPT CLINIC VISIT: HCPCS

## 2019-10-07 NOTE — PROGRESS NOTES
[facility]  Telephone: [phone] / Fax: [phone] [place]  Telephone: [phone] / Fax: [phone] [place]    Referring Physician: Nickolas Lr DO  PCP: Nickolas Lr DO    Reason for consult:  sleep apnea    Lavonne Rocha is a 24 y.o.female  was seen in the [facility] today for evaluation of sleep apnea.  Since giving birth, she has noticed increase in EDS despite sleeping through the night.  She snores at night according to her . She wakes herself up due to coughing at night.  reports witnessed apneas which have increased since pregnancy/weight gain. Her daughter is 9 months old now. She has depression, though it is well controlled on the Prozac.  She gained 60 lbs over past 3 years.  Her sleep schedule is 9 PM-5:30 AM.  She wakes up exhausted.  She does report restlessness of the legs at night.  Symptoms are associated with creepy Crawly dysfunction.  It is worse at night and gets better with movement.  She reports excessive sweating during sleep.  Occasionally she feels paralyzed while going to sleep or waking up from sleep.  She also reports possible sensation of muscle weakness when laughing or getting angry.  ESS score is 11.    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  0 alcohol.  Consumes 2 caffeinated beverages a day.    Review of systems, positive for myalgias, swollen joints, anxiety, depression.  Rest is negative    Lavonne Rocha  has a past medical history of Anxiety, Arthritis, Chronic bilateral low back pain with sciatica, Depression, GERD (gastroesophageal reflux disease), Gestational hypertension, Klebsiella cystitis (9/25/2017), PCOS (polycystic ovarian syndrome), and Polycystic ovary syndrome.    Current Medications:    Current Outpatient Medications:   •  Cyanocobalamin 1000 MCG/ML kit, Inject 1,000 mcg as directed Every 30 (Thirty) Days., Disp: 1 kit, Rfl: 5  •  doxycycline (VIBRAMYCIN) 100 MG  "capsule, Take 1 capsule by mouth 2 (Two) Times a Day., Disp: 20 capsule, Rfl: 0  •  ferrous sulfate 325 (65 FE) MG tablet, Take 325 mg by mouth Daily With Breakfast., Disp: , Rfl:   •  FLUoxetine (PROZAC) 20 MG capsule, Take 1 capsule by mouth Daily., Disp: 90 capsule, Rfl: 3  •  fluticasone (FLONASE) 50 MCG/ACT nasal spray, 2 sprays into the nostril(s) as directed by provider Daily., Disp: 18 g, Rfl: 5  •  ibuprofen (ADVIL,MOTRIN) 800 MG tablet, Take 1 tablet by mouth Every 8 (Eight) Hours As Needed (back pain)., Disp: 90 tablet, Rfl: 4  •  loratadine (CLARITIN) 10 MG tablet, Take 10 mg by mouth Daily., Disp: , Rfl:   No current facility-administered medications for this visit.     Facility-Administered Medications Ordered in Other Visits:   •  perflutren (DEFINITY) 8.476 mg in sodium chloride 0.9 % 10 mL injection, 3 mL, Intravenous, Once, Nickolas Lr DO    I have reviewed Past Medical History, Past Surgical History, Medication List, Social History and Family History as entered in Sleep Questionnaire and EPIC.    ESS  11   Vital Signs /67   Pulse 73   Ht 162.6 cm (64\")   Wt 115 kg (253 lb)   SpO2 97%   BMI 43.43 kg/m²  Body mass index is 43.43 kg/m².    General Alert and oriented. No acute distress noted   Pharynx/Throat Class IV Mallampati airway, large tongue, no evidence of redundant lateral pharyngeal tissue. No oral lesions. No thrush. Moist mucous membranes.   Head Normocephalic. Symmetrical. Atraumatic.    Nose No septal deviation. No drainage   Chest Wall Normal shape. Symmetric expansion with respiration. No tenderness.   Neck Trachea midline, no thyromegaly or adenopathy    Lungs Clear to auscultation bilaterally. No wheezes. No rhonchi. No rales. Respirations regular, even and unlabored.   Heart Regular rhythm and normal rate. Normal S1 and S2. No murmur   Abdomen Soft, non-tender and non-distended. Normal bowel sounds. No masses.   Extremities Moves all extremities well. No edema "   Psychiatric Normal mood and affect.         Impression:  1. Hypersomnia    2. Other fatigue    3. Snoring    4. Morbid obesity with BMI of 40.0-44.9, adult (CMS/[facility])          Plan:  I discussed the pathophysiology of obstructive sleep apnea with the patient.  We discussed the adverse outcomes associated with untreated sleep-disordered breathing.  We discussed treatment modalities of obstructive sleep apnea including CPAP device as well as oral mandibular advancement device. Sleep study will be scheduled to establish definitive diagnosis of sleep disorder breathing.  Weight loss will be strongly beneficial in order to reduce the severity of sleep-disordered breathing.  Patient has narrow oropharyngeal structure.  Caution during activities that require prolonged concentration is strongly advised.  Patient will be notified of sleep study results after sleep study is completed.  If sleep apnea is only mild,  oral mandibular advancement device may be one of the treatment options.  However if sleep apnea is moderately severe, CPAP treatment will be strongly encouraged.  The patient is not opposed to treatment with CPAP device if we confirm significant obstructive sleep apnea on polysomnography.       Thank you for allowing me to participate in your patient's care.    The patient will follow up with Dr. Corey after completion of HST.    BRAD Pereira  [facility]  Phone: [phone]      Part of this note may be an electronic transcription/translation of spoken language to printed text using the Dragon Dictation System. Some errors may exist even though the document was edited.

## 2019-11-08 ENCOUNTER — HOSPITAL ENCOUNTER (OUTPATIENT)
Dept: SLEEP MEDICINE | Facility: HOSPITAL | Age: 24
End: 2019-11-08

## 2020-02-14 ENCOUNTER — TELEPHONE (OUTPATIENT)
Dept: OBSTETRICS AND GYNECOLOGY | Age: 25
End: 2020-02-14

## 2020-02-14 NOTE — TELEPHONE ENCOUNTER
----- Message from Anna Rogel MA sent at 2/13/2020  2:53 PM EST -----  Regarding: Prescription Question  Contact: [phone]      ----- Message -----  From: Lavonne Rocha  Sent: 2/13/2020   2:49 PM EST  [facility]  Subject: Prescription Question                            Dr Mixon,     I would like to come off of my Prozac prescribed due to PPD. I am feeling much better and do not think I need to be on it anymore. I would like to know if it is safe to come off of. I feel like this has been making me gain weight so id like to give it a try. Thank you.

## 2020-02-28 ENCOUNTER — TELEPHONE (OUTPATIENT)
Dept: OBSTETRICS AND GYNECOLOGY | Age: 25
End: 2020-02-28

## 2020-06-25 ENCOUNTER — TELEPHONE (OUTPATIENT)
Dept: OBSTETRICS AND GYNECOLOGY | Age: 25
End: 2020-06-25

## 2020-06-25 NOTE — TELEPHONE ENCOUNTER
Is she breastfeeding? Have her r/o pregnancy, if negative, have her schedule a problem visit with u/s next week with Dr Mixon or an AP.  If pain gets acutely worse, she should go to ER

## 2020-06-25 NOTE — TELEPHONE ENCOUNTER
([facility] patient) Patient called, stated that she has a history of PCOS and she's been experiencing the symptoms as if she is coming on her cycle, however she's not bleeding but she is cramping patient stated that she has felt these symptoms    Continuously for two weeks. (Patient has not took a pregnancy test)    Patient also stated that on her right side, her ovarie is exteremly sensitive and she's been experiencing a lot of pain. Patient has beliefs that it might be a cysts. Patient has concerns, wanted to know what you advised.

## 2020-06-25 NOTE — TELEPHONE ENCOUNTER
Called patient, patient denies breastfeeding. Patient will take a pregnancy test and give us a call back.

## 2020-07-16 ENCOUNTER — TELEMEDICINE (OUTPATIENT)
Dept: FAMILY MEDICINE CLINIC | Facility: TELEHEALTH | Age: 25
End: 2020-07-16

## 2020-07-16 DIAGNOSIS — H66.90 ACUTE OTITIS MEDIA, UNSPECIFIED OTITIS MEDIA TYPE: Primary | ICD-10-CM

## 2020-07-16 DIAGNOSIS — R42 DIZZINESS: ICD-10-CM

## 2020-07-16 PROCEDURE — 99213 OFFICE O/P EST LOW 20 MIN: CPT | Performed by: NURSE PRACTITIONER

## 2020-07-16 RX ORDER — MECLIZINE HYDROCHLORIDE 25 MG/1
25 TABLET ORAL 3 TIMES DAILY PRN
Qty: 30 TABLET | Refills: 0 | OUTPATIENT
Start: 2020-07-16 | End: 2021-04-30

## 2020-07-16 RX ORDER — DOXYCYCLINE 100 MG/1
100 CAPSULE ORAL 2 TIMES DAILY
Qty: 20 CAPSULE | Refills: 0 | Status: SHIPPED | OUTPATIENT
Start: 2020-07-16 | End: 2020-07-26

## 2020-07-16 RX ORDER — PREDNISONE 10 MG/1
10 TABLET ORAL 2 TIMES DAILY
Qty: 10 TABLET | Refills: 0 | Status: SHIPPED | OUTPATIENT
Start: 2020-07-16 | End: 2020-07-21

## 2020-07-16 NOTE — PROGRESS NOTES
CHIEF COMPLAINT  No chief complaint on file.        HPI  Lavonne Rocha is a 24 y.o. female  presents with complaint of ear pain in left ear, both ears are congested; allergies have been bad--taking Claritin, Flonase, and using Neti Pot; after Neti Pot on Tuesday and began having severe left ear pain; mild dizziness; clear d/c from nose; also started Mucinex D    Denies fever, cough, sore throat, vomiting, nausea    Review of Systems   Constitutional: Negative for fatigue and fever.   HENT: Positive for congestion, ear pain and postnasal drip. Negative for sinus pressure, sinus pain and sore throat.    Respiratory: Negative for cough.    Neurological: Positive for dizziness.       Past Medical History:   Diagnosis Date   • Anxiety    • Arthritis    • Chronic bilateral low back pain with sciatica    • Depression    • GERD (gastroesophageal reflux disease)    • Gestational hypertension    • Klebsiella cystitis 2017   • PCOS (polycystic ovarian syndrome)    • Polycystic ovary syndrome        Family History   Problem Relation Age of Onset   • Diabetes Maternal Grandfather    • Diabetes Paternal Grandmother    • Arthritis Paternal Grandfather    • Breast cancer Neg Hx    • Ovarian cancer Neg Hx    • Uterine cancer Neg Hx    • Colon cancer Neg Hx    • Deep vein thrombosis Neg Hx    • Pulmonary embolism Neg Hx    • Malig Hyperthermia Neg Hx        Social History     Socioeconomic History   • Marital status: Significant Other     Spouse name: BOBBY   • Number of children: 0   • Years of education: 12   • Highest education level: Not on file   Occupational History   • Occupation:    Tobacco Use   • Smoking status: Former Smoker     Packs/day: 0.50     Years: 6.00     Pack years: 3.00     Types: Cigarettes     Start date:      Last attempt to quit: 2018     Years since quittin.1   • Smokeless tobacco: Never Used   Substance and Sexual Activity   • Alcohol use: No   • Drug use: No   • Sexual activity:  Yes     Partners: Male     Birth control/protection: None     Comment: spouse = BOBBY   Social History Narrative    GYN patient 2015         There were no vitals taken for this visit.    PHYSICAL EXAM  Physical Exam   Constitutional: She is oriented to person, place, and time. She appears well-developed and well-nourished.   HENT:   Head: Normocephalic and atraumatic.   Right Ear: Hearing and external ear normal.   Left Ear: Hearing and external ear normal.   Directed pt exam--tenderness around left ear--mostly behind; no sinus tenderness   Pulmonary/Chest: Effort normal.  No respiratory distress.  Lymphadenopathy:        Head (right side): No preauricular and no posterior auricular adenopathy present.        Head (left side): Posterior auricular adenopathy present. No preauricular adenopathy present.        Right cervical: No anterior cervical and no posterior cervical adenopathy present.       Left cervical: No anterior cervical and no posterior cervical adenopathy present.   Neurological: She is alert and oriented to person, place, and time.         Diagnoses and all orders for this visit:    Acute otitis media, unspecified otitis media type  -     doxycycline (MONODOX) 100 MG capsule; Take 1 capsule by mouth 2 (Two) Times a Day for 10 days.  -     predniSONE (DELTASONE) 10 MG tablet; Take 1 tablet by mouth 2 (Two) Times a Day for 5 days.  --complete doxycycline as prescribed  --start prednisone as prescribed  --may continue Claritin, Flonase and Neti Pot rinses  --stop Mucinex D  --increase intake of clear, decaffeinated fluids    Dizziness  -     meclizine (ANTIVERT) 25 MG tablet; Take 1 tablet by mouth 3 (Three) Times a Day As Needed for Dizziness.  --may take 1/2-1 tablet every 8 hours as needed for dizziness  --increase intake of water as antihistamine can cause dry mucous membranes    **if at any time, experiences fever AND/OR worsening cough AND/OR shortness of breath, has been advised to go to nearest  urgent care or emergency department for evaluation AND/OR testing    **if no improvement in 5-7 days, but not worsening, may schedule a f/u virtual visit or E-visit      FOLLOW-UP  As discussed with PCP/Virtual Care if no improvement or Urgent Care/Emergency Department if worsening of symptoms    Patient verbalizes understanding of medication dosage, comfort measures, instructions for treatment and follow-up.    BRAD Bacon  07/16/2020  11:17 AM    This visit was performed via Telehealth.  This patient has been instructed to follow-up with their primary care provider if their symptoms worsen or the treatment provided does not resolve their illness.

## 2020-07-16 NOTE — PATIENT INSTRUCTIONS
Otitis Media, Adult    Otitis media occurs when there is inflammation and fluid in the middle ear. Your middle ear is a part of the ear that contains bones for hearing as well as air that helps send sounds to your brain.  What are the causes?  This condition is caused by a blockage in the eustachian tube. This tube drains fluid from the ear to the back of the nose (nasopharynx). A blockage in this tube can be caused by an object or by swelling (edema) in the tube. Problems that can cause a blockage include:  · A cold or other upper respiratory infection.  · Allergies.  · An irritant, such as tobacco smoke.  · Enlarged adenoids. The adenoids are areas of soft tissue located high in the back of the throat, behind the nose and the roof of the mouth.  · A mass in the nasopharynx.  · Damage to the ear caused by pressure changes (barotrauma).  What are the signs or symptoms?  Symptoms of this condition include:  · Ear pain.  · A fever.  · Decreased hearing.  · A headache.  · Tiredness (lethargy).  · Fluid leaking from the ear.  · Ringing in the ear.  How is this diagnosed?  This condition is diagnosed with a physical exam. During the exam your health care provider will use an instrument called an otoscope to look into your ear and check for redness, swelling, and fluid. He or she will also ask about your symptoms.  Your health care provider may also order tests, such as:  · A test to check the movement of the eardrum (pneumatic otoscopy). This test is done by squeezing a small amount of air into the ear.  · A test that changes air pressure in the middle ear to check how well the eardrum moves and whether the eustachian tube is working (tympanogram).  How is this treated?  This condition usually goes away on its own within 3-5 days. But if the condition is caused by a bacteria infection and does not go away own its own, or keeps coming back, your health care provider may:  · Prescribe antibiotic medicines to treat the  infection.  · Prescribe or recommend medicines to control pain.  Follow these instructions at home:  · Take over-the-counter and prescription medicines only as told by your health care provider.  · If you were prescribed an antibiotic medicine, take it as told by your health care provider. Do not stop taking the antibiotic even if you start to feel better.  · Keep all follow-up visits as told by your health care provider. This is important.  Contact a health care provider if:  · You have bleeding from your nose.  · There is a lump on your neck.  · You are not getting better in 5 days.  · You feel worse instead of better.  Get help right away if:  · You have severe pain that is not controlled with medicine.  · You have swelling, redness, or pain around your ear.  · You have stiffness in your neck.  · A part of your face is paralyzed.  · The bone behind your ear (mastoid) is tender when you touch it.  · You develop a severe headache.  Summary  · Otitis media is redness, soreness, and swelling of the middle ear.  · This condition usually goes away on its own within 3-5 days.  · If the problem does not go away in 3-5 days, your health care provider may prescribe or recommend medicines to treat your symptoms.  · If you were prescribed an antibiotic medicine, take it as told by your health care provider.  This information is not intended to replace advice given to you by your health care provider. Make sure you discuss any questions you have with your health care provider.  Document Released: 09/22/2005 Document Revised: 11/30/2018 Document Reviewed: 12/08/2017  [employer] Patient Education © 2020 [employer]  Dizziness  Dizziness is a common problem. It is a feeling of unsteadiness or light-headedness. You may feel like you are about to faint. Dizziness can lead to injury if you stumble or fall. Anyone can become dizzy, but dizziness is more common in older adults. This condition can be caused by a number of things,  including medicines, dehydration, or illness.  Follow these instructions at home:  Eating and drinking  · Drink enough fluid to keep your urine clear or pale yellow. This helps to keep you from becoming dehydrated. Try to drink more clear fluids, such as water.  · Do not drink alcohol.  · Limit your caffeine intake if told to do so by your health care provider. Check ingredients and nutrition facts to see if a food or beverage contains caffeine.  · Limit your salt (sodium) intake if told to do so by your health care provider. Check ingredients and nutrition facts to see if a food or beverage contains sodium.  Activity  · Avoid making quick movements.  ? Rise slowly from chairs and steady yourself until you feel okay.  ? In the morning, first sit up on the side of the bed. When you feel okay, stand slowly while you hold onto something until you know that your balance is fine.  · If you need to  one place for a long time, move your legs often. Tighten and relax the muscles in your legs while you are standing.  · Do not drive or use heavy machinery if you feel dizzy.  · Avoid bending down if you feel dizzy. Place items in your home so that they are easy for you to reach without leaning over.  Lifestyle  · Do not use any products that contain nicotine or tobacco, such as cigarettes and e-cigarettes. If you need help quitting, ask your health care provider.  · Try to reduce your stress level by using methods such as yoga or meditation. Talk with your health care provider if you need help to manage your stress.  General instructions  · Watch your dizziness for any changes.  · Take over-the-counter and prescription medicines only as told by your health care provider. Talk with your health care provider if you think that your dizziness is caused by a medicine that you are taking.  · Tell a friend or a family member that you are feeling dizzy. If he or she notices any changes in your behavior, have this person call  your health care provider.  · Keep all follow-up visits as told by your health care provider. This is important.  Contact a health care provider if:  · Your dizziness does not go away.  · Your dizziness or light-headedness gets worse.  · You feel nauseous.  · You have reduced hearing.  · You have new symptoms.  · You are unsteady on your feet or you feel like the room is spinning.  Get help right away if:  · You vomit or have diarrhea and are unable to eat or drink anything.  · You have problems talking, walking, swallowing, or using your arms, hands, or legs.  · You feel generally weak.  · You are not thinking clearly or you have trouble forming sentences. It may take a friend or family member to notice this.  · You have chest pain, abdominal pain, shortness of breath, or sweating.  · Your vision changes.  · You have any bleeding.  · You have a severe headache.  · You have neck pain or a stiff neck.  · You have a fever.  These symptoms may represent a serious problem that is an emergency. Do not wait to see if the symptoms will go away. Get medical help right away. Call your local emergency services (911 in the [place] Do not drive yourself to the hospital.  Summary  · Dizziness is a feeling of unsteadiness or light-headedness. This condition can be caused by a number of things, including medicines, dehydration, or illness.  · Anyone can become dizzy, but dizziness is more common in older adults.  · Drink enough fluid to keep your urine clear or pale yellow. Do not drink alcohol.  · Avoid making quick movements if you feel dizzy. Monitor your dizziness for any changes.  This information is not intended to replace advice given to you by your health care provider. Make sure you discuss any questions you have with your health care provider.  Document Released: 06/13/2002 Document Revised: 12/21/2018 Document Reviewed: 01/20/2018  [employer] Patient Education © 2020 [employer]

## 2020-09-15 ENCOUNTER — OFFICE VISIT (OUTPATIENT)
Dept: FAMILY MEDICINE CLINIC | Facility: CLINIC | Age: 25
End: 2020-09-15

## 2020-09-15 VITALS
DIASTOLIC BLOOD PRESSURE: 62 MMHG | BODY MASS INDEX: 44.05 KG/M2 | SYSTOLIC BLOOD PRESSURE: 126 MMHG | WEIGHT: 258 LBS | HEIGHT: 64 IN | RESPIRATION RATE: 18 BRPM | HEART RATE: 87 BPM | OXYGEN SATURATION: 99 % | TEMPERATURE: 98.2 F

## 2020-09-15 DIAGNOSIS — D50.0 IRON DEFICIENCY ANEMIA DUE TO CHRONIC BLOOD LOSS: ICD-10-CM

## 2020-09-15 DIAGNOSIS — N92.0 MENORRHAGIA WITH REGULAR CYCLE: ICD-10-CM

## 2020-09-15 DIAGNOSIS — Z23 NEED FOR TETANUS, DIPHTHERIA, AND ACELLULAR PERTUSSIS (TDAP) VACCINE IN PATIENT OF ADOLESCENT AGE OR OLDER: ICD-10-CM

## 2020-09-15 DIAGNOSIS — M54.50 CHRONIC BILATERAL LOW BACK PAIN WITHOUT SCIATICA: ICD-10-CM

## 2020-09-15 DIAGNOSIS — Z23 NEED FOR INFLUENZA VACCINATION: ICD-10-CM

## 2020-09-15 DIAGNOSIS — G89.29 CHRONIC BILATERAL LOW BACK PAIN WITHOUT SCIATICA: ICD-10-CM

## 2020-09-15 DIAGNOSIS — M79.10 MYALGIA: Primary | ICD-10-CM

## 2020-09-15 DIAGNOSIS — E53.8 B12 DEFICIENCY: ICD-10-CM

## 2020-09-15 PROCEDURE — 90471 IMMUNIZATION ADMIN: CPT | Performed by: FAMILY MEDICINE

## 2020-09-15 PROCEDURE — 90686 IIV4 VACC NO PRSV 0.5 ML IM: CPT | Performed by: FAMILY MEDICINE

## 2020-09-15 PROCEDURE — 99214 OFFICE O/P EST MOD 30 MIN: CPT | Performed by: FAMILY MEDICINE

## 2020-09-15 RX ORDER — IBUPROFEN 800 MG/1
800 TABLET ORAL EVERY 8 HOURS PRN
Qty: 90 TABLET | Refills: 4 | Status: SHIPPED | OUTPATIENT
Start: 2020-09-15 | End: 2020-10-31 | Stop reason: SDUPTHER

## 2020-09-15 RX ORDER — FLUTICASONE PROPIONATE 50 MCG
2 SPRAY, SUSPENSION (ML) NASAL DAILY
Qty: 18 G | Refills: 5 | Status: SHIPPED | OUTPATIENT
Start: 2020-09-15 | End: 2020-10-31 | Stop reason: SDUPTHER

## 2020-09-15 RX ORDER — CYCLOBENZAPRINE HCL 10 MG
10 TABLET ORAL 2 TIMES DAILY PRN
Qty: 60 TABLET | Refills: 5 | Status: SHIPPED | OUTPATIENT
Start: 2020-09-15 | End: 2020-10-31 | Stop reason: SDUPTHER

## 2020-09-15 NOTE — PROGRESS NOTES
Subjective   Lavonne Rocha is a 25 y.o. female. Presents today for   Chief Complaint   Patient presents with   • Back Pain     entire back starts at shoulder blades and goes down to tail bone        Back Pain  This is a recurrent problem. The current episode started 1 to 4 weeks ago. The problem occurs constantly. The problem has been waxing and waning since onset. The pain is present in the thoracic spine and lumbar spine. The quality of the pain is described as aching. The pain is moderate. Pertinent negatives include no numbness or tingling. (Having muscle spasms, when asked about periods as hx of iron def, notes heavy periods since had daughter.      Had MRI and seen spinal surgeon  inpast but nearly 2 years ago;  Feels back limiting activity.) Treatments tried: Working on weight loss, stretching;   Has had chronic lumbar spine pain off/on for years. The treatment provided no relief.   No prior back surgery, has seen a couple of back surgeons.    Review of Systems   Musculoskeletal: Positive for back pain.   Neurological: Negative for tingling and numbness.       Patient Active Problem List   Diagnosis   • Bulging lumbar disc   • Degeneration of intervertebral disc of lumbar region   • Lumbar facet arthropathy   • Lumbar radiculitis   • Chronic bilateral low back pain with sciatica   • Postpartum depression   • Hemorrhoids       Social History     Socioeconomic History   • Marital status: Significant Other     Spouse name: BOBBY   • Number of children: 0   • Years of education: 12   • Highest education level: Not on file   Occupational History   • Occupation:    Tobacco Use   • Smoking status: Former Smoker     Packs/day: 0.50     Years: 6.00     Pack years: 3.00     Types: Cigarettes     Start date:      Quit date: 2018     Years since quittin.3   • Smokeless tobacco: Never Used   Substance and Sexual Activity   • Alcohol use: No   • Drug use: No   • Sexual activity: Yes     Partners: Male  "    Birth control/protection: None     Comment: spouse = BOBBY   Social History Narrative    GYN patient 2015       Allergies   Allergen Reactions   • Amoxicillin Rash   • Buspar [Buspirone] Rash   • Penicillins Rash       Current Outpatient Medications on File Prior to Visit   Medication Sig Dispense Refill   • fluticasone (FLONASE) 50 MCG/ACT nasal spray 2 sprays into the nostril(s) as directed by provider Daily. 18 g 5   • ibuprofen (ADVIL,MOTRIN) 800 MG tablet Take 1 tablet by mouth Every 8 (Eight) Hours As Needed (back pain). 90 tablet 4   • loratadine (CLARITIN) 10 MG tablet Take 10 mg by mouth Daily.     • meclizine (ANTIVERT) 25 MG tablet Take 1 tablet by mouth 3 (Three) Times a Day As Needed for Dizziness. 30 tablet 0   • Cyanocobalamin 1000 MCG/ML kit Inject 1,000 mcg as directed Every 30 (Thirty) Days. 1 kit 5   • ferrous sulfate 325 (65 FE) MG tablet Take 325 mg by mouth Daily With Breakfast.     • FLUoxetine (PROZAC) 20 MG capsule Take 1 capsule by mouth Daily. 90 capsule 3     Current Facility-Administered Medications on File Prior to Visit   Medication Dose Route Frequency Provider Last Rate Last Dose   • perflutren (DEFINITY) 8.476 mg in sodium chloride 0.9 % 10 mL injection  3 mL Intravenous Once Nickolas Lr DO           Objective   Vitals:    09/15/20 1555   BP: 126/62   BP Location: Left arm   Patient Position: Sitting   Cuff Size: Large Adult   Pulse: 87   Resp: 18   Temp: 98.2 °F (36.8 °C)   TempSrc: Oral   SpO2: 99%   Weight: 117 kg (258 lb)   Height: 162.6 cm (64.02\")   PainSc: 0-No pain     Body mass index is 44.26 kg/m².    Physical Exam  Vitals signs and nursing note reviewed.   Constitutional:       General: She is not in acute distress.     Appearance: Normal appearance. She is obese. She is not ill-appearing, toxic-appearing or diaphoretic.   HENT:      Head: Normocephalic and atraumatic.      Mouth/Throat:      Mouth: Mucous membranes are moist.      Pharynx: Oropharynx is " clear. No oropharyngeal exudate.   Neck:      Musculoskeletal: Neck supple.   Cardiovascular:      Rate and Rhythm: Normal rate and regular rhythm.   Pulmonary:      Effort: Pulmonary effort is normal. No respiratory distress.      Breath sounds: Normal breath sounds. No stridor. No wheezing or rhonchi.   Abdominal:      Palpations: Abdomen is soft.      Tenderness: There is no abdominal tenderness.   Musculoskeletal:      Thoracic back: She exhibits tenderness, pain and spasm.      Lumbar back: She exhibits tenderness, pain and spasm.      Right lower leg: No edema.      Left lower leg: No edema.   Neurological:      Mental Status: She is alert.   Psychiatric:         Mood and Affect: Mood normal.         Assessment/Plan   Lavonne was seen today for back pain.    Diagnoses and all orders for this visit:    Myalgia  -     CBC & Differential  -     Basic Metabolic Panel  -     Vitamin B12  -     TSH  -     Iron Profile  -     Ferritin  -     Magnesium  -     CK    Need for tetanus, diphtheria, and acellular pertussis (Tdap) vaccine in patient of adolescent age or older    Need for influenza vaccination  -     Fluarix/Fluzone/Afluria Quad>6 Months    Iron deficiency anemia due to chronic blood loss  -     CBC & Differential  -     Basic Metabolic Panel  -     Vitamin B12  -     TSH  -     Iron Profile  -     Ferritin  -     Magnesium  -     CK    B12 deficiency  -     CBC & Differential  -     Basic Metabolic Panel  -     Vitamin B12  -     TSH  -     Iron Profile  -     Ferritin  -     Magnesium  -     CK    Menorrhagia with regular cycle  -     CBC & Differential  -     Basic Metabolic Panel  -     Vitamin B12  -     TSH  -     Iron Profile  -     Ferritin  -     Magnesium  -     CK    d/w possible muscle spasms related more to iron def, has hx of low and heavy periods;  Will recheck;  F/u with gyn as well  Continue medications;  Consider repeat imaging;  Plans to f/u with surgeon  Will await labs for further  medications         -Follow up: 3 months and prn

## 2020-09-16 LAB
BASOPHILS # BLD AUTO: 0.1 X10E3/UL (ref 0–0.2)
BASOPHILS NFR BLD AUTO: 1 %
BUN SERPL-MCNC: 10 MG/DL (ref 6–20)
BUN/CREAT SERPL: 14 (ref 9–23)
CALCIUM SERPL-MCNC: 9.3 MG/DL (ref 8.7–10.2)
CHLORIDE SERPL-SCNC: 105 MMOL/L (ref 96–106)
CK SERPL-CCNC: 72 U/L (ref 32–182)
CO2 SERPL-SCNC: 24 MMOL/L (ref 20–29)
CREAT SERPL-MCNC: 0.72 MG/DL (ref 0.57–1)
EOSINOPHIL # BLD AUTO: 0.5 X10E3/UL (ref 0–0.4)
EOSINOPHIL NFR BLD AUTO: 5 %
ERYTHROCYTE [DISTWIDTH] IN BLOOD BY AUTOMATED COUNT: 12.8 % (ref 11.7–15.4)
FERRITIN SERPL-MCNC: 96 NG/ML (ref 15–150)
GLUCOSE SERPL-MCNC: 93 MG/DL (ref 65–99)
HCT VFR BLD AUTO: 44.8 % (ref 34–46.6)
HGB BLD-MCNC: 14.6 G/DL (ref 11.1–15.9)
IMM GRANULOCYTES # BLD AUTO: 0 X10E3/UL (ref 0–0.1)
IMM GRANULOCYTES NFR BLD AUTO: 0 %
IRON SATN MFR SERPL: 18 % (ref 15–55)
IRON SERPL-MCNC: 70 UG/DL (ref 27–159)
LYMPHOCYTES # BLD AUTO: 2.1 X10E3/UL (ref 0.7–3.1)
LYMPHOCYTES NFR BLD AUTO: 24 %
MAGNESIUM SERPL-MCNC: 1.9 MG/DL (ref 1.6–2.3)
MCH RBC QN AUTO: 28.6 PG (ref 26.6–33)
MCHC RBC AUTO-ENTMCNC: 32.6 G/DL (ref 31.5–35.7)
MCV RBC AUTO: 88 FL (ref 79–97)
MONOCYTES # BLD AUTO: 0.8 X10E3/UL (ref 0.1–0.9)
MONOCYTES NFR BLD AUTO: 10 %
NEUTROPHILS # BLD AUTO: 5.2 X10E3/UL (ref 1.4–7)
NEUTROPHILS NFR BLD AUTO: 60 %
PLATELET # BLD AUTO: 187 X10E3/UL (ref 150–450)
POTASSIUM SERPL-SCNC: 4.2 MMOL/L (ref 3.5–5.2)
RBC # BLD AUTO: 5.11 X10E6/UL (ref 3.77–5.28)
SODIUM SERPL-SCNC: 142 MMOL/L (ref 134–144)
TIBC SERPL-MCNC: 392 UG/DL (ref 250–450)
TSH SERPL DL<=0.005 MIU/L-ACNC: 1.08 UIU/ML (ref 0.45–4.5)
UIBC SERPL-MCNC: 322 UG/DL (ref 131–425)
VIT B12 SERPL-MCNC: 461 PG/ML (ref 232–1245)
WBC # BLD AUTO: 8.8 X10E3/UL (ref 3.4–10.8)

## 2020-09-20 NOTE — PROGRESS NOTES
Call and mail copy of results to patient.  Blood counts and iron levels normal  Thyroid normal  Mag level normal, but below 2.  Can try magnesium oxide 400mg daily for muscle cramps.

## 2020-09-30 ENCOUNTER — HOSPITAL ENCOUNTER (OUTPATIENT)
Dept: MRI IMAGING | Facility: HOSPITAL | Age: 25
Discharge: HOME | End: 2020-09-30
Admitting: FAMILY MEDICINE

## 2020-09-30 DIAGNOSIS — M54.50 CHRONIC BILATERAL LOW BACK PAIN WITHOUT SCIATICA: ICD-10-CM

## 2020-09-30 DIAGNOSIS — G89.29 CHRONIC BILATERAL LOW BACK PAIN WITHOUT SCIATICA: ICD-10-CM

## 2020-09-30 PROCEDURE — 72148 MRI LUMBAR SPINE W/O DYE: CPT

## 2020-10-05 NOTE — PROGRESS NOTES
Call patient regarding imaging study.  MRI of lumbar spine notes mild bulging disk material at L3-4 and L5-S1, which is new as compared to last MRI.  The other changes are stable.

## 2020-10-31 DIAGNOSIS — M79.10 MYALGIA: ICD-10-CM

## 2020-10-31 DIAGNOSIS — G89.29 CHRONIC BILATERAL LOW BACK PAIN WITHOUT SCIATICA: ICD-10-CM

## 2020-10-31 DIAGNOSIS — M54.50 CHRONIC BILATERAL LOW BACK PAIN WITHOUT SCIATICA: ICD-10-CM

## 2020-11-02 RX ORDER — CYCLOBENZAPRINE HCL 10 MG
10 TABLET ORAL 2 TIMES DAILY PRN
Qty: 60 TABLET | Refills: 5 | Status: SHIPPED | OUTPATIENT
Start: 2020-11-02 | End: 2021-06-27

## 2020-11-02 RX ORDER — IBUPROFEN 800 MG/1
800 TABLET ORAL EVERY 8 HOURS PRN
Qty: 90 TABLET | Refills: 4 | Status: SHIPPED | OUTPATIENT
Start: 2020-11-02 | End: 2021-06-27

## 2020-11-02 RX ORDER — FLUTICASONE PROPIONATE 50 MCG
2 SPRAY, SUSPENSION (ML) NASAL DAILY
Qty: 18 G | Refills: 5 | Status: SHIPPED | OUTPATIENT
Start: 2020-11-02 | End: 2022-02-07

## 2020-11-13 ENCOUNTER — OFFICE VISIT (OUTPATIENT)
Dept: FAMILY MEDICINE CLINIC | Facility: CLINIC | Age: 25
End: 2020-11-13

## 2020-11-13 DIAGNOSIS — M54.41 CHRONIC BILATERAL LOW BACK PAIN WITH BILATERAL SCIATICA: Primary | ICD-10-CM

## 2020-11-13 DIAGNOSIS — M54.42 CHRONIC BILATERAL LOW BACK PAIN WITH BILATERAL SCIATICA: Primary | ICD-10-CM

## 2020-11-13 DIAGNOSIS — G89.29 CHRONIC BILATERAL LOW BACK PAIN WITH BILATERAL SCIATICA: Primary | ICD-10-CM

## 2020-11-13 PROCEDURE — 99442 PR PHYS/QHP TELEPHONE EVALUATION 11-20 MIN: CPT | Performed by: FAMILY MEDICINE

## 2020-11-13 NOTE — PROGRESS NOTES
Subjective   Lavonne Rocha is a 25 y.o. female. Presents today for No chief complaint on file.    This visit has been rescheduled as a phone visit to comply with patient safety concerns in accordance with CDC recommendations. Total time of discussion was 12 minutes.  Patient Telephone VISIT, patient gave consent to treat.      History of Present Illness  Patient struggling with back pain for sevral years;  Has done PT several times;  Working on weight loss and down 14 lbs;  Had MRI noted changes reviewed with ana paula.  Did go back to spinal, ? Pars defect per notes, has pending CT scan to evaluate.  Patient does note that if reaches behind her has feeling of nerve shooting down her spine and legs.  Is takign ibuprofen and flexeril with some relief;       Review of Systems   Musculoskeletal: Positive for arthralgias and back pain.       Patient Active Problem List   Diagnosis   • Bulging lumbar disc   • Degeneration of intervertebral disc of lumbar region   • Lumbar facet arthropathy   • Lumbar radiculitis   • Chronic bilateral low back pain with sciatica   • Postpartum depression   • Hemorrhoids       Social History     Socioeconomic History   • Marital status: Significant Other     Spouse name: BOBBY   • Number of children: 0   • Years of education: 12   • Highest education level: Not on file   Occupational History   • Occupation:    Tobacco Use   • Smoking status: Former Smoker     Packs/day: 0.50     Years: 6.00     Pack years: 3.00     Types: Cigarettes     Start date:      Quit date: 2018     Years since quittin.5   • Smokeless tobacco: Never Used   Substance and Sexual Activity   • Alcohol use: No   • Drug use: No   • Sexual activity: Yes     Partners: Male     Birth control/protection: None     Comment: spouse = BOBBY   Social History Narrative    GYN patient        Allergies   Allergen Reactions   • Amoxicillin Rash   • Buspar [Buspirone] Rash   • Penicillins Rash       Current  Outpatient Medications on File Prior to Visit   Medication Sig Dispense Refill   • cyclobenzaprine (FLEXERIL) 10 MG tablet Take 1 tablet by mouth 2 (Two) Times a Day As Needed for Muscle Spasms. 60 tablet 5   • fluticasone ([employer] FLUTICASONE PROPIONATE) 50 MCG/ACT nasal spray 2 sprays into each nostril Daily for 30 days. 1 each 11   • fluticasone (FLONASE) 50 MCG/ACT nasal spray 2 sprays into the nostril(s) as directed by provider Daily. 18 g 5   • ibuprofen (ADVIL,MOTRIN) 800 MG tablet Take 1 tablet by mouth Every 8 (Eight) Hours As Needed (back pain). 90 tablet 4   • loratadine (CLARITIN) 10 MG tablet Take 10 mg by mouth Daily.     • meclizine (ANTIVERT) 25 MG tablet Take 1 tablet by mouth 3 (Three) Times a Day As Needed for Dizziness. 30 tablet 0     Current Facility-Administered Medications on File Prior to Visit   Medication Dose Route Frequency Provider Last Rate Last Dose   • perflutren (DEFINITY) 8.476 mg in sodium chloride 0.9 % 10 mL injection  3 mL Intravenous Once Nickolas Lr R, DO           Objective   There were no vitals filed for this visit.  There is no height or weight on file to calculate BMI.    Physical Exam  Pulmonary:      Effort: No respiratory distress.   Psychiatric:         Behavior: Behavior normal.         Thought Content: Thought content normal.         Judgment: Judgment normal.         Assessment/Plan   Diagnoses and all orders for this visit:    1. Chronic bilateral low back pain with bilateral sciatica (Primary)  -     diclofenac (FLECTOR) 1.3 % patch patch; Apply 1 patch topically to the appropriate area as directed 2 (Two) Times a Day As Needed (back pain).  Dispense: 60 patch; Refill: 5    -will try adding flector patches to help with pain control;  Continue other medications and f/u with spinal as planned         -Follow up: Prn - RTC if worse or no improvement.

## 2020-12-15 ENCOUNTER — HOSPITAL ENCOUNTER (OUTPATIENT)
Dept: OTHER | Facility: HOSPITAL | Age: 25
Discharge: HOME | End: 2020-12-15

## 2021-01-29 ENCOUNTER — OFFICE VISIT (OUTPATIENT)
Dept: OBSTETRICS AND GYNECOLOGY | Age: 26
End: 2021-01-29

## 2021-01-29 ENCOUNTER — TELEPHONE (OUTPATIENT)
Dept: OBSTETRICS AND GYNECOLOGY | Age: 26
End: 2021-01-29

## 2021-01-29 VITALS
DIASTOLIC BLOOD PRESSURE: 72 MMHG | HEIGHT: 64 IN | SYSTOLIC BLOOD PRESSURE: 110 MMHG | BODY MASS INDEX: 41.48 KG/M2 | WEIGHT: 243 LBS

## 2021-01-29 DIAGNOSIS — R10.31 RIGHT LOWER QUADRANT ABDOMINAL PAIN: Primary | ICD-10-CM

## 2021-01-29 DIAGNOSIS — L70.0 ACNE VULGARIS: ICD-10-CM

## 2021-01-29 PROBLEM — R10.9 ABDOMINAL PAIN: Status: ACTIVE | Noted: 2021-01-29

## 2021-01-29 PROCEDURE — 99213 OFFICE O/P EST LOW 20 MIN: CPT | Performed by: NURSE PRACTITIONER

## 2021-01-29 RX ORDER — NORETHINDRONE ACETATE AND ETHINYL ESTRADIOL, ETHINYL ESTRADIOL AND FERROUS FUMARATE 1MG-10(24)
1 KIT ORAL DAILY
Qty: 28 TABLET | Refills: 2 | Status: SHIPPED | OUTPATIENT
Start: 2021-01-29 | End: 2021-04-30

## 2021-01-29 NOTE — PROGRESS NOTES
"Subjective   Lavonne Rocha is a 25 y.o. female is being seen today for   Chief Complaint   Patient presents with   • Abdominal Pain     pt c/o right side abdominal pain times two weeks.  (pt has PCOS) also said that she is having a discharge.   .    History of Present Illness     Patient complains of lower right sided abdominal pain since January 19  She has had some nausea and vomited twice in the past few weeks too but can't tell if that is related to the pain or not (both have been at random times)  Pain seems to come and go  She recently started Keto diet as well (November) and is working on loosing weight  States she has also had diarrhea in the past few weeks which is not normal for her- no constipation  No fevers  Motrin seems to help the pain  Rates pain 3/10 currently    She also requests to restart OCPs today.  She has taken in the past and is having issues with acne  Previously had hypertension and was not taking them due to that but has lost close to 50 pounds and BP has improved.  Plans another pregnancy in the next year but would like to loose more weight first        The following portions of the patient's history were reviewed and updated as appropriate: allergies, current medications, past family history, past medical history, past social history, past surgical history and problem list.    /72   Ht 162.6 cm (64\")   Wt 110 kg (243 lb)   LMP 01/29/2021 (Exact Date)   Breastfeeding No   BMI 41.71 kg/m²         Review of Systems   Constitutional: Negative.    HENT: Negative.    Eyes: Negative.    Respiratory: Negative.    Cardiovascular: Negative.    Gastrointestinal: Positive for abdominal pain and diarrhea.   Endocrine: Negative.    Genitourinary: Negative.    Musculoskeletal: Negative.    Skin: Negative.    Allergic/Immunologic: Negative.    Neurological: Negative.    Hematological: Negative.    Psychiatric/Behavioral: Negative.        Objective   Physical Exam  Constitutional:       " Appearance: She is well-developed.   Genitourinary:     Vagina: Normal.      Cervix: No cervical motion tenderness, discharge or friability.      Uterus: Not tender.    Skin:     General: Skin is warm and dry.   Neurological:      Mental Status: She is alert and oriented to person, place, and time.           Assessment/Plan   Diagnoses and all orders for this visit:    1. Right lower quadrant abdominal pain (Primary)    2. Acne vulgaris    Other orders  -     Norethin-Eth Estrad-Fe Biphas (Lo Loestrin Fe) 1 MG-10 MCG / 10 MCG tablet; Take 1 tablet by mouth Daily.  Dispense: 28 tablet; Refill: 2      Ultrasound done for evaluation of abdominal pain and uterus and ovaries are normal.  No evidence of gyn cause of pain.  Symptoms are more consistent with GI issue and possibly related to her new diet/change in bowels.  She will continue to monitor and discuss with PCP if s/s do not improve.    Discussed acne- patient would like to restart OCPs.  R/B/A reviewed.  She does vape.  Loloestrin sent to pharmacy.  She will monitor her BP and d/c pills if elevated.    Follow up for BP check and annual exam in 3 months

## 2021-01-29 NOTE — TELEPHONE ENCOUNTER
Patient called would like to see , patient has been cramping since the 19th and hasn't came on her cycle yet. Patient come's on her cycle every month and has concerns because she took two pregnancy test that both returned negative.     However the patient's main concern is  that she experiencing some constant pain  In her right ovary and the patient is the constantly  cramping. Please advise

## 2021-05-24 RX ORDER — PROGESTERONE 200 MG/1
200 CAPSULE ORAL DAILY
Qty: 30 CAPSULE | Refills: 2 | Status: SHIPPED | OUTPATIENT
Start: 2021-05-24 | End: 2021-07-28

## 2021-05-24 NOTE — TELEPHONE ENCOUNTER
Schedule patient for pregnancy confirmation with us please and thank you . I have dr ford sent rx for progesterone .

## 2021-05-24 NOTE — TELEPHONE ENCOUNTER
Pt calling had taken two positive pregnancy test and was instructed to call due to previous misscairages, 04/27/2021.  Pt was told they need to be placed on progesterone.

## 2021-06-17 ENCOUNTER — TELEPHONE (OUTPATIENT)
Dept: OBSTETRICS AND GYNECOLOGY | Age: 26
End: 2021-06-17

## 2021-06-17 DIAGNOSIS — O21.9 NAUSEA AND VOMITING DURING PREGNANCY: Primary | ICD-10-CM

## 2021-06-17 RX ORDER — DIPHENHYDRAMINE HYDROCHLORIDE 25 MG/1
25 CAPSULE ORAL EVERY 8 HOURS
Qty: 90 TABLET | Refills: 2 | Status: SHIPPED | OUTPATIENT
Start: 2021-06-17 | End: 2021-06-28

## 2021-06-17 NOTE — TELEPHONE ENCOUNTER
----- Message from Lavonne Rocha sent at 6/17/2021  8:06 AM EDT -----  Regarding: Prescription Question  Contact: [phone]  Hi, I'm going on a trip Friday and have had terrible morning sickness. This is lasting almost all day off and on. I wondered if I could get a prescription called in for nausea before my trip.     Thank you!

## 2021-06-17 NOTE — TELEPHONE ENCOUNTER
7 2/7 weeks   [facility]    HOME HEALTH ORDERS  FACE TO FACE ENCOUNTER    Patient Name: Bessy Nunez  YOB: 1960    PCP: Edgar Nova MD   PCP Address: [address] / [id number]  PCP Phone Number: [phone]  PCP Fax: [phone]    Encounter Date: 10/30/2020    Admit to Home Health    Diagnoses:  Active Hospital Problems    Diagnosis  POA    *Transient neurological symptoms [R29.818]  Yes     Priority: 1 - High    Slurred speech [R47.81]  Yes    Fall at home, initial encounter [W19.XXXA, Y92.009]  Not Applicable    Nephrolithiasis incidental on CT 10/2020 [N20.0]  Yes    Chronic kidney disease, stage 3 [N18.30]  Yes    Left-sided weakness [R53.1]  Yes    History of stroke 9/2017 L thalamus; 7/2020 R thalamus; felt to be due to small vessel disease, not vasculitis [Z86.73]  Not Applicable     9/14/2017 MRI brain: 1. Findings compatible with a small acute lacunar infarct adjacent to the left frontal horn.  Nonspecific enhancement just inferior to this region and extending into the left basal ganglia, as well as within the inferior aspect of the left cerebellum.  No evidence of a focal mass.  2.  Extensive increased signal intensity involving the periventricular white matter compatible with demyelinating disease versus vasculitis.  3.  Clinical correlation and followup recommended.  4.  This reportedly flattened in the EPIC and the corpus callosum medical record system.  12/4/19 MRI brain with/without: Chronic ischemic change as further detailed above, similar to MRI of 04/15/2019.  Multiple small foci of prior hemorrhage in the cerebellum and jose, possible sequela from hypertension.  No evidence of recent infarction or other acute intracranial pathology.    7/30/2020 MRI brain: Subcentimeter focus of diffusion restriction right parietal periventricular white matter concerning for acute/recent infarction.  In light of history is may be sequela of underlying vasculitis  with superimposed severe patchy and confluent T2 FLAIR signal abnormality supratentorial white matter and jose.  Small remote left basal ganglia and bilateral thalamic remote lacunar-type infarct with additional remote inferior cerebellar infarcts with encephalomalacia.  Punctate remote microhemorrhages cerebellar hemispheres and brainstem.    Etiology for neurologic findings believed to be more likely secondary to small vessel disease causing CVA rather than CNS vasculitis however if the patient were to experience similar events in the future it could still be considered. Therapy recommended HH for ongoing therapy. Patient without any further new or worsening symptoms. He is going home with home health on 8/3. Restarted home diltiazem on discharge. Discharging with walker and HH. Discharging with another 21 days of joint DAPT with plavix and ASA followed by ASA monotherapy. Follow up in stroke clinic in 4-6 weeks. All information relayed to patient and family prior to discharge.       Impaired functional mobility, balance, gait, and endurance [Z74.09]  Yes    CNS vasculitis failed imuran; failed cytoxan; 1/2020 rituxan [I77.6]  Yes     Failed Cytoxan hiatus and transition to Imuran Sept/Oct 2018; restarted on cytoxan  12/4/19 MRI brain with/without: Chronic ischemic change as further detailed above, similar to MRI of 04/15/2019.  Multiple small foci of prior hemorrhage in the cerebellum and jose, possible sequela from hypertension.  No evidence of recent infarction or other acute intracranial pathology.      Type 2 diabetes mellitus with diabetic polyneuropathy, with long-term current use of insulin [E11.42, Z79.4]  Not Applicable    Obesity (BMI 30-39.9) [E66.9]  Yes    JOHN on CPAP [G47.33, Z99.89]  Not Applicable     07/30/2020 2 night at home sleep study very severe sleep apnea AHI 54      Mixed hyperlipidemia [E78.2]  Yes      Resolved Hospital Problems   No resolved problems to display.       Future  Appointments   Date Time Provider Department Center   11/5/2020  1:40 PM Edgar Nova MD [facility]           I have seen and examined this patient face to face today. My clinical findings that support the need for the home health skilled services and home bound status are the following:  Weakness/numbness causing balance and gait disturbance due to Stroke and Weakness/Debility making it taxing to leave home.  Requiring assistive device to leave home due to unsteady gait caused by  Stroke and Weakness/Debility.  Medical restrictions requiring assistance of another human to leave home due to  Unstable ambulation, Frequent Falls and Decreased range of motions in extremities.  Mental confusion making it unsafe for patient to leave home alone due to  reccurent strokes & CNS vasculitis.    Allergies:Review of patient's allergies indicates:  No Known Allergies    Diet: cardiac diet and diabetic diet: 2000 calorie    Activities: activity as tolerated and ambulate in house with assistance    Nursing:   SN to complete comprehensive assessment including routine vital signs. Instruct on disease process and s/s of complications to report to MD. Review/verify medication list sent home with the patient at time of discharge  and instruct patient/caregiver as needed. Frequency may be adjusted depending on start of care date.    Notify MD if SBP > 160 or < 90; DBP > 90 or < 50; HR > 120 or < 50; Temp > 101;       CONSULTS:    Physical Therapy to evaluate and treat. Evaluate for home safety and equipment needs; Establish/upgrade home exercise program. Perform / instruct on therapeutic exercises, gait training, transfer training, and Range of Motion.  Occupational Therapy to evaluate and treat. Evaluate home environment for safety and equipment needs. Perform/Instruct on transfers, ADL training, ROM, and therapeutic exercises.  Speech Therapy  to evaluate and treat for  Swallowing and Cognition.  Aide to provide  assistance with personal care, ADLs, and vital signs.    MISCELLANEOUS CARE:  Routine Skin for Bedridden Patients: Instruct patient/caregiver to apply moisture barrier cream to all skin folds and wet areas in perineal area daily and after baths and all bowel movements.  Diabetic Care:   SN to perform and educate Diabetic management with blood glucose monitoring:, Fingerstick blood sugar AC and HS and Report CBG < 60 or > 350 to physician.    WOUND CARE ORDERS  n/a      Medications: Review discharge medications with patient and family and provide education.      Current Discharge Medication List      CONTINUE these medications which have NOT CHANGED    Details   alendronate (FOSAMAX) 70 MG tablet Take 1 tablet (70 mg total) by mouth every 7 days.  Qty: 4 tablet, Refills: 11    Associated Diagnoses: Other osteoporosis with current pathological fracture with routine healing, subsequent encounter      aspirin (ECOTRIN) 81 MG EC tablet Take 81 mg by mouth once daily.      atenoloL (TENORMIN) 50 MG tablet Take 1 tablet (50 mg total) by mouth once daily.  Qty: 90 tablet, Refills: 3    Comments: .  Associated Diagnoses: Essential hypertension      atorvastatin (LIPITOR) 40 MG tablet Take 1 tablet (40 mg total) by mouth once daily.  Qty: 90 tablet, Refills: 3    Associated Diagnoses: Mixed hyperlipidemia      ciprofloxacin HCl (CIPRO) 500 MG tablet Take 1 tablet (500 mg total) by mouth every 12 (twelve) hours.  Qty: 28 tablet, Refills: 0      diltiaZEM (DILT-XR) 240 MG CDCR Take 1 capsule (240 mg total) by mouth once daily.  Qty: 90 capsule, Refills: 3    Associated Diagnoses: Essential hypertension      donepeziL (ARICEPT) 5 MG tablet Take 1 tablet (5 mg total) by mouth every evening.  Qty: 90 tablet, Refills: 3    Associated Diagnoses: Cognitive impairment      insulin (LANTUS SOLOSTAR U-100 INSULIN) glargine 100 units/mL (3mL) SubQ pen Inject 25 Units into the skin 2 (two) times a day. Up to 40 bid with chemotherapy  Qty:  2 Box, Refills: 11    Associated Diagnoses: Type 2 diabetes mellitus with diabetic polyneuropathy, with long-term current use of insulin      insulin aspart U-100 (NOVOLOG) 100 unit/mL (3 mL) InPn pen Inject 10 Units into the skin before meals and at bedtime as needed (Hyperglycemia). Up to 20 BID with chemo  Qty: 2 Box, Refills: 11    Associated Diagnoses: Controlled type 2 diabetes mellitus with diabetic nephropathy, with long-term current use of insulin      irbesartan (AVAPRO) 300 MG tablet Take 1 tablet (300 mg total) by mouth every evening.  Qty: 90 tablet, Refills: 3    Comments: .  Associated Diagnoses: Essential hypertension      omega-3 fatty acids-vitamin E (FISH OIL) 1,000 mg Cap Take 1 capsule by mouth 2 (two) times daily.  Refills: 0    Associated Diagnoses: Hypertriglyceridemia      oxybutynin (DITROPAN-XL) 5 MG TR24 Take 1 tablet (5 mg total) by mouth once daily.  Qty: 30 tablet, Refills: 11    Associated Diagnoses: Urinary incontinence due to cognitive impairment      sertraline (ZOLOFT) 100 MG tablet 1.5 tablet daily  Qty: 135 tablet, Refills: 3    Comments: Please consider 90 day supplies to promote better adherence  Associated Diagnoses: Major depressive disorder with single episode, in full remission      vitamin D 1000 units Tab Take 5 tablets (5,000 Units total) by mouth once daily.      blood sugar diagnostic (TRUE METRIX GLUCOSE TEST STRIP) Strp Test blood sugar four times a day  Qty: 400 each, Refills: 11    Associated Diagnoses: Controlled type 2 diabetes mellitus with diabetic nephropathy, with long-term current use of insulin      FREESTYLE ARELY 14 DAY READER Misc GLUCOSE TESTING : FASTING AND PRIOR TO MEALS  Qty: 1 each, Refills: 0    Comments: Please consider 90 day supplies to promote better adherence  Associated Diagnoses: Uncontrolled type 2 diabetes mellitus with hyperglycemia      FREESTYLE ARELY 14 DAY SENSOR Kit GLUCOSE TESTING 4 TIMES A DAY  Qty: 2 kit, Refills: 11     "Associated Diagnoses: Uncontrolled type 2 diabetes mellitus with hyperglycemia      HYDROcodone-acetaminophen (NORCO) 5-325 mg per tablet Take 1 tablet by mouth every 8 (eight) hours as needed for Pain.  Qty: 42 tablet, Refills: 0    Comments: Quantity prescribed more than 7 day supply? Yes, quantity medically necessary  Associated Diagnoses: Closed fracture of one rib of left side with routine healing      lancets 30 gauge Misc Test blood sugar four times a day  Qty: 100 each, Refills: 11      liraglutide 0.6 mg/0.1 mL, 18 mg/3 mL, subq PNIJ (VICTOZA 3-TOMASZ) 0.6 mg/0.1 mL (18 mg/3 mL) PnIj pen Inject 1.8 mg into the skin once daily.  Qty: 9 mL, Refills: 11    Associated Diagnoses: Controlled type 2 diabetes mellitus with diabetic nephropathy, with long-term current use of insulin      methylphenidate HCl (RITALIN) 10 MG tablet Take 1 tablet (10 mg total) by mouth 2 (two) times daily with meals.  Qty: 60 tablet, Refills: 0    Associated Diagnoses: Fatigue, unspecified type      mupirocin (BACTROBAN) 2 % ointment Apply topically 3 (three) times daily.  Qty: 30 g, Refills: 0    Associated Diagnoses: Abrasion of right foot, initial encounter      pen needle, diabetic 32 gauge x 5/32" Ndle USE 1 PEN NEEDLE 4 TIMES DAILY (  SINGLE  USE)  Qty: 350 each, Refills: 11      QUEtiapine (SEROQUEL) 25 MG Tab Take 1 tablet (25 mg total) by mouth every evening.  Qty: 30 tablet, Refills: 11    Associated Diagnoses: Dementia with behavioral disturbance, unspecified dementia type      SUPREP BOWEL PREP KIT 17.5-3.13-1.6 gram SolR USE AS DIRECTED             I certify that this patient is confined to his home and needs intermittent skilled nursing care, physical therapy, speech therapy and occupational therapy.        " No

## 2021-06-17 NOTE — TELEPHONE ENCOUNTER
----- Message from Lavonne Rocha sent at 6/17/2021  8:06 AM EDT -----  Regarding: Prescription Question  Contact: [phone]  Hi, I'm going on a trip Friday and have had terrible morning sickness. This is lasting almost all day off and on. I wondered if I could get a prescription called in for nausea before my trip.     Thank you!

## 2021-06-27 VITALS
SYSTOLIC BLOOD PRESSURE: 143 MMHG | HEART RATE: 83 BPM | BODY MASS INDEX: 42.17 KG/M2 | HEIGHT: 64 IN | TEMPERATURE: 97.3 F | OXYGEN SATURATION: 99 % | DIASTOLIC BLOOD PRESSURE: 75 MMHG | WEIGHT: 247 LBS | RESPIRATION RATE: 18 BRPM

## 2021-06-27 LAB
ALBUMIN SERPL-MCNC: 3.9 G/DL (ref 3.5–5.2)
ALBUMIN/GLOB SERPL: 1.5 G/DL
ALP SERPL-CCNC: 78 U/L (ref 39–117)
ALT SERPL W P-5'-P-CCNC: 31 U/L (ref 1–33)
ANION GAP SERPL CALCULATED.3IONS-SCNC: 9.1 MMOL/L (ref 5–15)
AST SERPL-CCNC: 15 U/L (ref 1–32)
BASOPHILS # BLD AUTO: 0.03 10*3/MM3 (ref 0–0.2)
BASOPHILS NFR BLD AUTO: 0.4 % (ref 0–1.5)
BILIRUB SERPL-MCNC: 0.2 MG/DL (ref 0–1.2)
BILIRUB UR QL STRIP: NEGATIVE
BUN SERPL-MCNC: 5 MG/DL (ref 6–20)
BUN/CREAT SERPL: 8.8 (ref 7–25)
CALCIUM SPEC-SCNC: 8.7 MG/DL (ref 8.6–10.5)
CHLORIDE SERPL-SCNC: 105 MMOL/L (ref 98–107)
CLARITY UR: CLEAR
CO2 SERPL-SCNC: 22.9 MMOL/L (ref 22–29)
COLOR UR: YELLOW
CREAT SERPL-MCNC: 0.57 MG/DL (ref 0.57–1)
DEPRECATED RDW RBC AUTO: 42.8 FL (ref 37–54)
EOSINOPHIL # BLD AUTO: 0.28 10*3/MM3 (ref 0–0.4)
EOSINOPHIL NFR BLD AUTO: 3.4 % (ref 0.3–6.2)
ERYTHROCYTE [DISTWIDTH] IN BLOOD BY AUTOMATED COUNT: 13.1 % (ref 12.3–15.4)
GFR SERPL CREATININE-BSD FRML MDRD: 127 ML/MIN/1.73
GLOBULIN UR ELPH-MCNC: 2.6 GM/DL
GLUCOSE SERPL-MCNC: 86 MG/DL (ref 65–99)
GLUCOSE UR STRIP-MCNC: NEGATIVE MG/DL
HCT VFR BLD AUTO: 39.2 % (ref 34–46.6)
HGB BLD-MCNC: 13 G/DL (ref 12–15.9)
HGB UR QL STRIP.AUTO: NEGATIVE
HOLD SPECIMEN: NORMAL
HOLD SPECIMEN: NORMAL
IMM GRANULOCYTES # BLD AUTO: 0.04 10*3/MM3 (ref 0–0.05)
IMM GRANULOCYTES NFR BLD AUTO: 0.5 % (ref 0–0.5)
KETONES UR QL STRIP: NEGATIVE
LEUKOCYTE ESTERASE UR QL STRIP.AUTO: NEGATIVE
LYMPHOCYTES # BLD AUTO: 1.58 10*3/MM3 (ref 0.7–3.1)
LYMPHOCYTES NFR BLD AUTO: 19 % (ref 19.6–45.3)
MCH RBC QN AUTO: 29.4 PG (ref 26.6–33)
MCHC RBC AUTO-ENTMCNC: 33.2 G/DL (ref 31.5–35.7)
MCV RBC AUTO: 88.7 FL (ref 79–97)
MONOCYTES # BLD AUTO: 0.57 10*3/MM3 (ref 0.1–0.9)
MONOCYTES NFR BLD AUTO: 6.9 % (ref 5–12)
NEUTROPHILS NFR BLD AUTO: 5.8 10*3/MM3 (ref 1.7–7)
NEUTROPHILS NFR BLD AUTO: 69.8 % (ref 42.7–76)
NITRITE UR QL STRIP: NEGATIVE
NRBC BLD AUTO-RTO: 0 /100 WBC (ref 0–0.2)
PH UR STRIP.AUTO: 6.5 [PH] (ref 5–8)
PLATELET # BLD AUTO: 150 10*3/MM3 (ref 140–450)
PMV BLD AUTO: 12.5 FL (ref 6–12)
POTASSIUM SERPL-SCNC: 3.8 MMOL/L (ref 3.5–5.2)
PROT SERPL-MCNC: 6.5 G/DL (ref 6–8.5)
PROT UR QL STRIP: NEGATIVE
RBC # BLD AUTO: 4.42 10*6/MM3 (ref 3.77–5.28)
SODIUM SERPL-SCNC: 137 MMOL/L (ref 136–145)
SP GR UR STRIP: 1.02 (ref 1–1.03)
UROBILINOGEN UR QL STRIP: NORMAL
WBC # BLD AUTO: 8.3 10*3/MM3 (ref 3.4–10.8)
WHOLE BLOOD HOLD SPECIMEN: NORMAL

## 2021-06-27 PROCEDURE — 81003 URINALYSIS AUTO W/O SCOPE: CPT

## 2021-06-27 PROCEDURE — 99283 EMERGENCY DEPT VISIT LOW MDM: CPT

## 2021-06-27 PROCEDURE — 80053 COMPREHEN METABOLIC PANEL: CPT

## 2021-06-27 PROCEDURE — 85025 COMPLETE CBC W/AUTO DIFF WBC: CPT

## 2021-06-28 ENCOUNTER — HOSPITAL ENCOUNTER (EMERGENCY)
Facility: HOSPITAL | Age: 26
Discharge: HOME | End: 2021-06-28
Attending: EMERGENCY MEDICINE | Admitting: EMERGENCY MEDICINE

## 2021-06-28 ENCOUNTER — OFFICE VISIT (OUTPATIENT)
Dept: OBSTETRICS AND GYNECOLOGY | Age: 26
End: 2021-06-28

## 2021-06-28 VITALS
WEIGHT: 253 LBS | HEIGHT: 64 IN | BODY MASS INDEX: 43.19 KG/M2 | SYSTOLIC BLOOD PRESSURE: 124 MMHG | DIASTOLIC BLOOD PRESSURE: 78 MMHG

## 2021-06-28 DIAGNOSIS — O09.299 HISTORY OF MISCARRIAGE, CURRENTLY PREGNANT: ICD-10-CM

## 2021-06-28 DIAGNOSIS — O21.0 HYPEREMESIS GRAVIDARUM: Primary | ICD-10-CM

## 2021-06-28 DIAGNOSIS — E66.01 MORBID OBESITY WITH BMI OF 40.0-44.9, ADULT (HCC): ICD-10-CM

## 2021-06-28 DIAGNOSIS — Z3A.08 8 WEEKS GESTATION OF PREGNANCY: Primary | ICD-10-CM

## 2021-06-28 DIAGNOSIS — O21.9 NAUSEA AND VOMITING DURING PREGNANCY: ICD-10-CM

## 2021-06-28 PROBLEM — R11.2 NAUSEA AND VOMITING: Status: ACTIVE | Noted: 2021-06-28

## 2021-06-28 LAB — HCG INTACT+B SERPL-ACNC: NORMAL MIU/ML

## 2021-06-28 PROCEDURE — 84702 CHORIONIC GONADOTROPIN TEST: CPT | Performed by: PHYSICIAN ASSISTANT

## 2021-06-28 PROCEDURE — 25010000002 METOCLOPRAMIDE PER 10 MG: Performed by: PHYSICIAN ASSISTANT

## 2021-06-28 PROCEDURE — 99214 OFFICE O/P EST MOD 30 MIN: CPT | Performed by: NURSE PRACTITIONER

## 2021-06-28 PROCEDURE — 96374 THER/PROPH/DIAG INJ IV PUSH: CPT

## 2021-06-28 RX ORDER — METOCLOPRAMIDE HYDROCHLORIDE 5 MG/ML
10 INJECTION INTRAMUSCULAR; INTRAVENOUS ONCE
Status: COMPLETED | OUTPATIENT
Start: 2021-06-28 | End: 2021-06-28

## 2021-06-28 RX ORDER — ACETAMINOPHEN 500 MG
1000 TABLET ORAL ONCE
Status: COMPLETED | OUTPATIENT
Start: 2021-06-28 | End: 2021-06-28

## 2021-06-28 RX ORDER — PRENATAL VIT/IRON FUM/FOLIC AC 27MG-0.8MG
TABLET ORAL DAILY
COMMUNITY

## 2021-06-28 RX ORDER — METOCLOPRAMIDE 5 MG/1
10 TABLET ORAL 3 TIMES DAILY PRN
Qty: 30 TABLET | Refills: 0 | Status: SHIPPED | OUTPATIENT
Start: 2021-06-28 | End: 2021-07-28

## 2021-06-28 RX ORDER — ONDANSETRON 4 MG/1
4 TABLET, FILM COATED ORAL EVERY 6 HOURS PRN
Qty: 30 TABLET | Refills: 1 | Status: SHIPPED | OUTPATIENT
Start: 2021-06-28 | End: 2021-07-22

## 2021-06-28 RX ADMIN — METOCLOPRAMIDE HYDROCHLORIDE 10 MG: 5 INJECTION INTRAMUSCULAR; INTRAVENOUS at 03:13

## 2021-06-28 RX ADMIN — SODIUM CHLORIDE, POTASSIUM CHLORIDE, SODIUM LACTATE AND CALCIUM CHLORIDE 2000 ML: 600; 310; 30; 20 INJECTION, SOLUTION INTRAVENOUS at 03:11

## 2021-06-28 RX ADMIN — ACETAMINOPHEN 1000 MG: 500 TABLET, FILM COATED ORAL at 05:19

## 2021-06-28 NOTE — PROGRESS NOTES
"Subjective     Chief Complaint   Patient presents with   • Possible Pregnancy       Lavonne Rocha is a 25 y.o.  whose LMP is Patient's last menstrual period was 2021.     Pt presents today for confirmation of pregnancy  U/S today confirms Single live IUP  Yolk sac, fetal pole  + FHT  Normal ovaries  EDC 2022  She has been having N/V   Was previously given Unisom and vitamin B-6 by Dr. Mixon which did help some  This past Friday she was unable to keep any fluids or food down  She went to ER last night and was given IVF's and reglan  She took the reglan which has not helped  She has been able to keep water down but has not eaten since Friday  On vaginal progesterone due to hx of miscarriage   Hx of PIH with last pregnancy      No Additional Complaints Reported    The following portions of the patient's history were reviewed and updated as appropriate:vital signs, allergies, current medications, past medical history, past social history, past surgical history and problem list      Review of Systems   A comprehensive review of systems was negative except for: Gastrointestinal: positive for nausea and vomiting  Genitourinary: positive for IUP     Objective      /78   Ht 162.6 cm (64\")   Wt 115 kg (253 lb)   LMP 2021   BMI 43.43 kg/m²     Physical Exam    General:   alert and no distress   Heart: Not performed today   Lungs: Not performed today.   Breast: Not performed today   Neck: na   Abdomen: {Not performed today   CVA: Not performed today   Pelvis: Not performed today   Extremities: Not performed today   Neurologic: negative   Psychiatric: Normal affect, judgement, and mood       Lab Review   Labs: No data reviewed     Imaging   Ultrasound - Pelvic Vaginal    Assessment/Plan     ASSESSMENT  1. 8 weeks gestation of pregnancy    2. Morbid obesity with BMI of 40.0-44.9, adult (CMS/[facility])    3. Nausea and vomiting during pregnancy    4. History of miscarriage, currently pregnant  "       PLAN  1.   Orders Placed This Encounter   Procedures   • Urine Culture - Urine, Urine, Clean Catch   • Chlamydia trachomatis, Neisseria gonorrhoeae, Trichomonas vaginalis, PCR - Swab, Urine, Clean Catch   • OB Panel With HIV   • Varicella Zoster Antibody, IgG   • Hemoglobinopathy Fractionation Cascade   • Hemoglobin A1c   • TSH       2. Medications prescribed this encounter:        New Medications Ordered This Visit   Medications   • ondansetron (Zofran) 4 MG tablet     Sig: Take 1 tablet by mouth Every 6 (Six) Hours As Needed for Nausea or Vomiting.     Dispense:  30 tablet     Refill:  1       3. Prenatal folder given to patient  Prenatal labs today  Discussed risks of zofran in first trimester. Pt wishes to proceed with taking as needed as other treatments have failed. She is unable to take phenergan due to sedation s/e.   cfDNA info given - pt wishes to have done in 2 weeks - will do OB intake at same time    Follow up: 2 week(s) OB intake and panorama with Dr. Oral Olivo, APRN  6/28/2021

## 2021-07-01 LAB
ABO GROUP BLD: NORMAL
BACTERIA UR CULT: NORMAL
BACTERIA UR CULT: NORMAL
BASOPHILS # BLD AUTO: 0 X10E3/UL (ref 0–0.2)
BASOPHILS NFR BLD AUTO: 0 %
BLD GP AB SCN SERPL QL: NEGATIVE
C TRACH RRNA SPEC QL NAA+PROBE: NEGATIVE
EOSINOPHIL # BLD AUTO: 0.2 X10E3/UL (ref 0–0.4)
EOSINOPHIL NFR BLD AUTO: 2 %
ERYTHROCYTE [DISTWIDTH] IN BLOOD BY AUTOMATED COUNT: 12.5 % (ref 11.7–15.4)
HBA1C MFR BLD: 5.3 % (ref 4.8–5.6)
HBV SURFACE AG SERPL QL IA: NEGATIVE
HCT VFR BLD AUTO: 39.6 % (ref 34–46.6)
HCV AB S/CO SERPL IA: <0.1 S/CO RATIO (ref 0–0.9)
HGB A MFR BLD ELPH: 97.6 % (ref 96.4–98.8)
HGB A2 MFR BLD ELPH: 2.4 % (ref 1.8–3.2)
HGB BLD-MCNC: 13.2 G/DL (ref 11.1–15.9)
HGB F MFR BLD ELPH: 0 % (ref 0–2)
HGB FRACT BLD-IMP: NORMAL
HGB S MFR BLD ELPH: 0 %
HIV 1+2 AB+HIV1 P24 AG SERPL QL IA: NON REACTIVE
IMM GRANULOCYTES # BLD AUTO: 0 X10E3/UL (ref 0–0.1)
IMM GRANULOCYTES NFR BLD AUTO: 1 %
LYMPHOCYTES # BLD AUTO: 1.2 X10E3/UL (ref 0.7–3.1)
LYMPHOCYTES NFR BLD AUTO: 15 %
MCH RBC QN AUTO: 28.8 PG (ref 26.6–33)
MCHC RBC AUTO-ENTMCNC: 33.3 G/DL (ref 31.5–35.7)
MCV RBC AUTO: 86 FL (ref 79–97)
MONOCYTES # BLD AUTO: 0.6 X10E3/UL (ref 0.1–0.9)
MONOCYTES NFR BLD AUTO: 8 %
N GONORRHOEA RRNA SPEC QL NAA+PROBE: NEGATIVE
NEUTROPHILS # BLD AUTO: 5.9 X10E3/UL (ref 1.4–7)
NEUTROPHILS NFR BLD AUTO: 74 %
PLATELET # BLD AUTO: 164 X10E3/UL (ref 150–450)
RBC # BLD AUTO: 4.59 X10E6/UL (ref 3.77–5.28)
RH BLD: POSITIVE
RPR SER QL: NON REACTIVE
RUBV IGG SERPL IA-ACNC: 4.07 INDEX
T VAGINALIS DNA SPEC QL NAA+PROBE: NEGATIVE
TSH SERPL DL<=0.005 MIU/L-ACNC: 1.55 UIU/ML (ref 0.27–4.2)
VZV IGG SER IA-ACNC: 2068 INDEX
WBC # BLD AUTO: 7.9 X10E3/UL (ref 3.4–10.8)

## 2021-07-08 ENCOUNTER — TELEPHONE (OUTPATIENT)
Dept: OBSTETRICS AND GYNECOLOGY | Age: 26
End: 2021-07-08

## 2021-07-08 NOTE — TELEPHONE ENCOUNTER
Yes she will be a couple days over 10 wks tomorrow, do the order need to be in pt chart before I can schedule? Please advise

## 2021-07-08 NOTE — TELEPHONE ENCOUNTER
Dr Mixon pt calls asking to come in tomorrow to have her sergio labs drawn instead of 07/12. Please advise if you are able to add those orders for pt to come in tomorrow

## 2021-07-19 ENCOUNTER — TELEPHONE (OUTPATIENT)
Dept: OBSTETRICS AND GYNECOLOGY | Age: 26
End: 2021-07-19

## 2021-07-21 ENCOUNTER — TELEPHONE (OUTPATIENT)
Dept: OBSTETRICS AND GYNECOLOGY | Age: 26
End: 2021-07-21

## 2021-07-21 DIAGNOSIS — N76.0 BV (BACTERIAL VAGINOSIS): ICD-10-CM

## 2021-07-21 DIAGNOSIS — B96.89 BV (BACTERIAL VAGINOSIS): ICD-10-CM

## 2021-07-21 DIAGNOSIS — E66.01 MORBID OBESITY WITH BMI OF 40.0-44.9, ADULT (HCC): Primary | ICD-10-CM

## 2021-07-21 RX ORDER — METRONIDAZOLE 500 MG/1
500 TABLET ORAL 2 TIMES DAILY
Qty: 14 TABLET | Refills: 0 | Status: SHIPPED | OUTPATIENT
Start: 2021-07-21 | End: 2021-07-28

## 2021-07-21 NOTE — TELEPHONE ENCOUNTER
"Dr. Mixon pt currently 12w1d,  Pt states she has noticed a faint odor and has had \"a little pain\" during intercourse.  She also states she has some burning w/urination which \"feels like it's internal.\"  No c/o discharge and/or itching.  She believes she has a bacterial infection and would like medication.  "

## 2021-07-22 RX ORDER — ONDANSETRON 4 MG/1
TABLET, FILM COATED ORAL
Qty: 30 TABLET | Refills: 1 | Status: SHIPPED | OUTPATIENT
Start: 2021-07-22 | End: 2021-08-30

## 2021-07-28 ENCOUNTER — ROUTINE PRENATAL (OUTPATIENT)
Dept: OBSTETRICS AND GYNECOLOGY | Age: 26
End: 2021-07-28

## 2021-07-28 VITALS — DIASTOLIC BLOOD PRESSURE: 74 MMHG | BODY MASS INDEX: 43.94 KG/M2 | WEIGHT: 256 LBS | SYSTOLIC BLOOD PRESSURE: 130 MMHG

## 2021-07-28 DIAGNOSIS — Z86.59 HISTORY OF POSTPARTUM DEPRESSION, CURRENTLY PREGNANT: ICD-10-CM

## 2021-07-28 DIAGNOSIS — E66.01 MORBID OBESITY WITH BMI OF 40.0-44.9, ADULT (HCC): ICD-10-CM

## 2021-07-28 DIAGNOSIS — O09.299 HISTORY OF MISCARRIAGE, CURRENTLY PREGNANT: ICD-10-CM

## 2021-07-28 DIAGNOSIS — O99.891 HISTORY OF POSTPARTUM DEPRESSION, CURRENTLY PREGNANT: ICD-10-CM

## 2021-07-28 DIAGNOSIS — O21.9 NAUSEA AND VOMITING DURING PREGNANCY: ICD-10-CM

## 2021-07-28 DIAGNOSIS — O09.299 PREVIOUS PREGNANCY WITH CONGENITAL HEART DEFECT, CURRENTLY PREGNANT: ICD-10-CM

## 2021-07-28 DIAGNOSIS — Z98.891 PREVIOUS CESAREAN SECTION: ICD-10-CM

## 2021-07-28 DIAGNOSIS — Z87.59 HISTORY OF GESTATIONAL HYPERTENSION: ICD-10-CM

## 2021-07-28 DIAGNOSIS — G89.29 CHRONIC BILATERAL LOW BACK PAIN WITH SCIATICA, SCIATICA LATERALITY UNSPECIFIED: ICD-10-CM

## 2021-07-28 DIAGNOSIS — N76.0 BV (BACTERIAL VAGINOSIS): ICD-10-CM

## 2021-07-28 DIAGNOSIS — Z34.81 PRENATAL CARE, SUBSEQUENT PREGNANCY IN FIRST TRIMESTER: Primary | ICD-10-CM

## 2021-07-28 DIAGNOSIS — Z13.89 SCREENING FOR BLOOD OR PROTEIN IN URINE: ICD-10-CM

## 2021-07-28 DIAGNOSIS — B96.89 BV (BACTERIAL VAGINOSIS): ICD-10-CM

## 2021-07-28 DIAGNOSIS — M54.40 CHRONIC BILATERAL LOW BACK PAIN WITH SCIATICA, SCIATICA LATERALITY UNSPECIFIED: ICD-10-CM

## 2021-07-28 PROBLEM — R11.2 NAUSEA AND VOMITING: Status: RESOLVED | Noted: 2021-06-28 | Resolved: 2021-07-28

## 2021-07-28 PROBLEM — R10.9 ABDOMINAL PAIN: Status: RESOLVED | Noted: 2021-01-29 | Resolved: 2021-07-28

## 2021-07-28 LAB
BILIRUB BLD-MCNC: NEGATIVE MG/DL
GLUCOSE UR STRIP-MCNC: NEGATIVE MG/DL
KETONES UR QL: NEGATIVE
LEUKOCYTE EST, POC: NEGATIVE
NITRITE UR-MCNC: NEGATIVE MG/ML
PH UR: 6.5 [PH] (ref 5–8)
PROT UR STRIP-MCNC: NEGATIVE MG/DL
RBC # UR STRIP: NEGATIVE /UL
SP GR UR: 1.03 (ref 1–1.03)
UROBILINOGEN UR QL: NORMAL

## 2021-07-28 PROCEDURE — 0501F PRENATAL FLOW SHEET: CPT | Performed by: OBSTETRICS & GYNECOLOGY

## 2021-07-28 RX ORDER — ASPIRIN 81 MG/1
81 TABLET ORAL DAILY
COMMUNITY
End: 2022-02-07

## 2021-07-28 RX ORDER — PROMETHAZINE HYDROCHLORIDE 12.5 MG/1
12.5 TABLET ORAL EVERY 6 HOURS PRN
Qty: 30 TABLET | Refills: 1 | Status: SHIPPED | OUTPATIENT
Start: 2021-07-28 | End: 2021-08-30

## 2021-07-28 NOTE — PROGRESS NOTES
Chief Complaint   Patient presents with   • Routine Prenatal Visit     cc: ob intake , gender - girl , still having nausea , vomiting , started flagyl        HPI: 25 y.o.  at 13w1d presents for prenatal care     Vitals:    21 0921   BP: 130/74   Weight: 116 kg (256 lb)       Review of systems:     Gen: negative  CV:     negative  GI: nausea and vomiting   :   negative  MS:    negative  Neuro: negative  Pul: negative      A/P  1. Intrauterine pregnancy at 13w1d   2. Pregnancy Risk:  HIGH RISK        Diagnoses and all orders for this visit:    1. Prenatal care, subsequent pregnancy in first trimester (Primary)    2. Screening for blood or protein in urine  -     POC Urinalysis Dipstick    3. Morbid obesity with BMI of 40.0-44.9, adult (CMS/[facility])    4. History of miscarriage, currently pregnant    5. Nausea and vomiting during pregnancy  -     promethazine (PHENERGAN) 12.5 MG tablet; Take 1 tablet by mouth Every 6 (Six) Hours As Needed for Nausea or Vomiting.  Dispense: 30 tablet; Refill: 1    6. Chronic bilateral low back pain with sciatica, sciatica laterality unspecified    7. BV (bacterial vaginosis)    8. History of gestational hypertension    9. History of postpartum depression, currently pregnant    10. Previous  section        Nutrition and weight gain were addressed.  Practice OB call structure was discussed.       -----------------------  PLAN:   Return in about 26 days (around 2021), or ob check.  H/O gHTN - start ASA   cfDNA neg   H/o  - desires repeat  SAB warnings   Prenatal labs reviewed   3 weeks       Ursula Mixon MD  2021 09:50 EDT

## 2021-08-04 ENCOUNTER — TELEPHONE (OUTPATIENT)
Dept: OBSTETRICS AND GYNECOLOGY | Age: 26
End: 2021-08-04

## 2021-08-04 NOTE — TELEPHONE ENCOUNTER
Please tell her I cannot give this letter. I am sorry . The risks of COVID in pregnancy far outweigh the risks of the vaccine in pregnancy.

## 2021-08-04 NOTE — TELEPHONE ENCOUNTER
Oral ob 14 weeks    Pt called stating that she would like a letter saying that she can be exempt from getting the covid  vaccine while pregnant. Pt stated that she works for [employer] and want to keep her job but does not wish to take  the vaccine while pregnant.

## 2021-08-04 NOTE — TELEPHONE ENCOUNTER
Spoke with patient this morning , she is aware you not in the office till Friday . Lavonne asking for medical exemption letter to avoid getting the vaccine , she is aware vaccine is mandated by [employer] but she went ahead and spoke with [facility] department and they told her if she gets a medical letter she should be fine . She says she researched that covid vaccine causing low platelets and she thinks this is one of her medical diagnosis in the past hx .

## 2021-08-23 ENCOUNTER — ROUTINE PRENATAL (OUTPATIENT)
Dept: OBSTETRICS AND GYNECOLOGY | Age: 26
End: 2021-08-23

## 2021-08-23 VITALS — WEIGHT: 258 LBS | BODY MASS INDEX: 44.29 KG/M2 | SYSTOLIC BLOOD PRESSURE: 135 MMHG | DIASTOLIC BLOOD PRESSURE: 72 MMHG

## 2021-08-23 DIAGNOSIS — Z98.891 PREVIOUS CESAREAN SECTION: ICD-10-CM

## 2021-08-23 DIAGNOSIS — Z34.82 PRENATAL CARE, SUBSEQUENT PREGNANCY IN SECOND TRIMESTER: Primary | ICD-10-CM

## 2021-08-23 DIAGNOSIS — Z36.9 ANTENATAL SCREENING ENCOUNTER: ICD-10-CM

## 2021-08-23 DIAGNOSIS — Z87.59 HISTORY OF GESTATIONAL HYPERTENSION: ICD-10-CM

## 2021-08-23 DIAGNOSIS — O99.891 HISTORY OF POSTPARTUM DEPRESSION, CURRENTLY PREGNANT: ICD-10-CM

## 2021-08-23 DIAGNOSIS — O09.299 HISTORY OF MISCARRIAGE, CURRENTLY PREGNANT: ICD-10-CM

## 2021-08-23 DIAGNOSIS — Z13.89 SCREENING FOR BLOOD OR PROTEIN IN URINE: ICD-10-CM

## 2021-08-23 DIAGNOSIS — O09.299 PREVIOUS PREGNANCY WITH CONGENITAL HEART DEFECT, CURRENTLY PREGNANT: ICD-10-CM

## 2021-08-23 DIAGNOSIS — Z86.59 HISTORY OF POSTPARTUM DEPRESSION, CURRENTLY PREGNANT: ICD-10-CM

## 2021-08-23 DIAGNOSIS — E66.01 MORBID OBESITY WITH BMI OF 40.0-44.9, ADULT (HCC): ICD-10-CM

## 2021-08-23 PROCEDURE — 0502F SUBSEQUENT PRENATAL CARE: CPT | Performed by: OBSTETRICS & GYNECOLOGY

## 2021-08-23 NOTE — PROGRESS NOTES
Patient reports nausea improved   Desires quad screen  Very nervous about vaccine - lengthy discussion recommending vaccine  SAB warnings  3 weeks with antaomy uS

## 2021-08-25 LAB
2ND TRIMESTER 4 SCREEN SERPL-IMP: NORMAL
AFP ADJ MOM SERPL: 0.56
AFP SERPL-MCNC: 16 NG/ML
AGE AT DELIVERY: 28.3 YR
FET TS 18 RISK FROM MAT AGE: NORMAL
FET TS 21 RISK FROM MAT AGE: 835
GA METHOD: NORMAL
GA: 17 WEEKS
HCG ADJ MOM SERPL: 0.65
HCG SERPL-ACNC: NORMAL MIU/ML
IDDM PATIENT QL: NO
INHIBIN A ADJ MOM SERPL: 0.51
INHIBIN A SERPL-MCNC: 58.82 PG/ML
MULTIPLE PREGNANCY: NO
NEURAL TUBE DEFECT RISK FETUS: NORMAL %
SERVICE CMNT-IMP: NORMAL
TS 18 RISK FETUS: NORMAL
TS 21 RISK FETUS: 9407
U ESTRIOL ADJ MOM SERPL: 0.66
U ESTRIOL SERPL-MCNC: 0.68 NG/ML

## 2021-08-26 NOTE — PROGRESS NOTES
Call patient and notify of negative screen for open neural tube defects, trisomy 18 and down syndrome

## 2021-08-29 DIAGNOSIS — O21.9 NAUSEA AND VOMITING DURING PREGNANCY: ICD-10-CM

## 2021-08-30 RX ORDER — ONDANSETRON 4 MG/1
TABLET, FILM COATED ORAL
Qty: 30 TABLET | Refills: 1 | Status: SHIPPED | OUTPATIENT
Start: 2021-08-30 | End: 2021-09-13

## 2021-08-30 RX ORDER — PROMETHAZINE HYDROCHLORIDE 12.5 MG/1
TABLET ORAL
Qty: 30 TABLET | Refills: 1 | Status: SHIPPED | OUTPATIENT
Start: 2021-08-30 | End: 2021-09-13

## 2021-09-01 ENCOUNTER — TELEPHONE (OUTPATIENT)
Dept: OBSTETRICS AND GYNECOLOGY | Age: 26
End: 2021-09-01

## 2021-09-01 NOTE — TELEPHONE ENCOUNTER
Pt having heart palpitations since saturday.Pt thinks its related to her Covid vaccine she received on Wednesday.Pt concerned because they are stonger than she had last pregnancy.Next appt 9/13/21.dn

## 2021-09-01 NOTE — TELEPHONE ENCOUNTER
Would encourage her to make sure super hydrated and if still concerned go to urgent care or L&D for eval

## 2021-09-13 ENCOUNTER — ROUTINE PRENATAL (OUTPATIENT)
Dept: OBSTETRICS AND GYNECOLOGY | Age: 26
End: 2021-09-13

## 2021-09-13 VITALS — SYSTOLIC BLOOD PRESSURE: 124 MMHG | BODY MASS INDEX: 44.11 KG/M2 | WEIGHT: 257 LBS | DIASTOLIC BLOOD PRESSURE: 72 MMHG

## 2021-09-13 DIAGNOSIS — M54.40 CHRONIC BILATERAL LOW BACK PAIN WITH SCIATICA, SCIATICA LATERALITY UNSPECIFIED: ICD-10-CM

## 2021-09-13 DIAGNOSIS — O28.3 ABNORMAL FETAL ULTRASOUND: ICD-10-CM

## 2021-09-13 DIAGNOSIS — G89.29 CHRONIC BILATERAL LOW BACK PAIN WITH SCIATICA, SCIATICA LATERALITY UNSPECIFIED: ICD-10-CM

## 2021-09-13 DIAGNOSIS — O09.299 HISTORY OF MISCARRIAGE, CURRENTLY PREGNANT: ICD-10-CM

## 2021-09-13 DIAGNOSIS — Z13.89 SCREENING FOR BLOOD OR PROTEIN IN URINE: ICD-10-CM

## 2021-09-13 DIAGNOSIS — E66.01 MORBID OBESITY WITH BMI OF 40.0-44.9, ADULT (HCC): ICD-10-CM

## 2021-09-13 DIAGNOSIS — O09.299 PREVIOUS PREGNANCY WITH CONGENITAL HEART DEFECT, CURRENTLY PREGNANT: ICD-10-CM

## 2021-09-13 DIAGNOSIS — Z87.59 HISTORY OF GESTATIONAL HYPERTENSION: ICD-10-CM

## 2021-09-13 DIAGNOSIS — O99.891 HISTORY OF POSTPARTUM DEPRESSION, CURRENTLY PREGNANT: ICD-10-CM

## 2021-09-13 DIAGNOSIS — Z86.59 HISTORY OF POSTPARTUM DEPRESSION, CURRENTLY PREGNANT: ICD-10-CM

## 2021-09-13 DIAGNOSIS — Z98.891 PREVIOUS CESAREAN SECTION: ICD-10-CM

## 2021-09-13 DIAGNOSIS — Z34.82 PRENATAL CARE, SUBSEQUENT PREGNANCY IN SECOND TRIMESTER: Primary | ICD-10-CM

## 2021-09-13 LAB
BILIRUB BLD-MCNC: NEGATIVE MG/DL
GLUCOSE UR STRIP-MCNC: NEGATIVE MG/DL
KETONES UR QL: NEGATIVE
LEUKOCYTE EST, POC: NEGATIVE
NITRITE UR-MCNC: NEGATIVE MG/ML
PH UR: 6 [PH] (ref 5–8)
PROT UR STRIP-MCNC: NEGATIVE MG/DL
RBC # UR STRIP: NEGATIVE /UL
SP GR UR: 1.02 (ref 1–1.03)
UROBILINOGEN UR QL: NORMAL

## 2021-09-13 PROCEDURE — 0502F SUBSEQUENT PRENATAL CARE: CPT | Performed by: OBSTETRICS & GYNECOLOGY

## 2021-09-13 NOTE — PROGRESS NOTES
Patient reports back/pelvic pain - to PT   Had covid vacc   Incomplete anatomy - repeat 4 weeks   SAB warnings   4 weeks

## 2021-10-04 ENCOUNTER — TELEPHONE (OUTPATIENT)
Dept: OBSTETRICS AND GYNECOLOGY | Age: 26
End: 2021-10-04

## 2021-10-04 DIAGNOSIS — R12 HEARTBURN: Primary | ICD-10-CM

## 2021-10-04 RX ORDER — FAMOTIDINE 20 MG/1
20 TABLET, FILM COATED ORAL 2 TIMES DAILY
Qty: 60 TABLET | Refills: 2 | Status: SHIPPED | OUTPATIENT
Start: 2021-10-04 | End: 2021-11-03

## 2021-10-13 ENCOUNTER — ROUTINE PRENATAL (OUTPATIENT)
Dept: OBSTETRICS AND GYNECOLOGY | Age: 26
End: 2021-10-13

## 2021-10-13 VITALS — SYSTOLIC BLOOD PRESSURE: 120 MMHG | BODY MASS INDEX: 44.8 KG/M2 | WEIGHT: 261 LBS | DIASTOLIC BLOOD PRESSURE: 64 MMHG

## 2021-10-13 DIAGNOSIS — E66.01 MORBID OBESITY WITH BMI OF 40.0-44.9, ADULT (HCC): ICD-10-CM

## 2021-10-13 DIAGNOSIS — Z87.59 HISTORY OF GESTATIONAL HYPERTENSION: ICD-10-CM

## 2021-10-13 DIAGNOSIS — Z98.891 PREVIOUS CESAREAN SECTION: ICD-10-CM

## 2021-10-13 DIAGNOSIS — Z34.82 PRENATAL CARE, SUBSEQUENT PREGNANCY IN SECOND TRIMESTER: Primary | ICD-10-CM

## 2021-10-13 DIAGNOSIS — Z13.89 SCREENING FOR BLOOD OR PROTEIN IN URINE: ICD-10-CM

## 2021-10-13 DIAGNOSIS — O09.299 PREVIOUS PREGNANCY WITH CONGENITAL HEART DEFECT, CURRENTLY PREGNANT: ICD-10-CM

## 2021-10-13 PROBLEM — O28.3 ABNORMAL FETAL ULTRASOUND: Status: RESOLVED | Noted: 2021-09-13 | Resolved: 2021-10-13

## 2021-10-13 PROCEDURE — 90471 IMMUNIZATION ADMIN: CPT | Performed by: OBSTETRICS & GYNECOLOGY

## 2021-10-13 PROCEDURE — 0502F SUBSEQUENT PRENATAL CARE: CPT | Performed by: OBSTETRICS & GYNECOLOGY

## 2021-10-13 PROCEDURE — 90686 IIV4 VACC NO PRSV 0.5 ML IM: CPT | Performed by: OBSTETRICS & GYNECOLOGY

## 2021-10-13 RX ORDER — CYCLOBENZAPRINE HCL 10 MG
TABLET ORAL
COMMUNITY
Start: 2021-09-23 | End: 2021-11-03

## 2021-10-13 NOTE — PROGRESS NOTES
Patient c/o shoulder pain  Referral for fetal ECHO put in   Normal anatomy and growth today   Flu vacc today   PTL warnings

## 2021-11-03 ENCOUNTER — ROUTINE PRENATAL (OUTPATIENT)
Dept: OBSTETRICS AND GYNECOLOGY | Age: 26
End: 2021-11-03

## 2021-11-03 ENCOUNTER — PREP FOR SURGERY (OUTPATIENT)
Dept: OTHER | Facility: HOSPITAL | Age: 26
End: 2021-11-03

## 2021-11-03 VITALS — BODY MASS INDEX: 44.63 KG/M2 | SYSTOLIC BLOOD PRESSURE: 128 MMHG | DIASTOLIC BLOOD PRESSURE: 72 MMHG | WEIGHT: 260 LBS

## 2021-11-03 DIAGNOSIS — Z23 ENCOUNTER FOR ADMINISTRATION OF VACCINE: ICD-10-CM

## 2021-11-03 DIAGNOSIS — Z87.59 HISTORY OF GESTATIONAL HYPERTENSION: ICD-10-CM

## 2021-11-03 DIAGNOSIS — Z13.1 SCREENING FOR DIABETES MELLITUS: ICD-10-CM

## 2021-11-03 DIAGNOSIS — R12 HEARTBURN: ICD-10-CM

## 2021-11-03 DIAGNOSIS — G89.29 CHRONIC BILATERAL LOW BACK PAIN WITH SCIATICA, SCIATICA LATERALITY UNSPECIFIED: ICD-10-CM

## 2021-11-03 DIAGNOSIS — Z13.89 SCREENING FOR BLOOD OR PROTEIN IN URINE: ICD-10-CM

## 2021-11-03 DIAGNOSIS — M54.40 CHRONIC BILATERAL LOW BACK PAIN WITH SCIATICA, SCIATICA LATERALITY UNSPECIFIED: ICD-10-CM

## 2021-11-03 DIAGNOSIS — Z34.82 PRENATAL CARE, SUBSEQUENT PREGNANCY IN SECOND TRIMESTER: Primary | ICD-10-CM

## 2021-11-03 DIAGNOSIS — O99.891 HISTORY OF POSTPARTUM DEPRESSION, CURRENTLY PREGNANT: ICD-10-CM

## 2021-11-03 DIAGNOSIS — Z86.59 HISTORY OF POSTPARTUM DEPRESSION, CURRENTLY PREGNANT: ICD-10-CM

## 2021-11-03 DIAGNOSIS — Z98.891 PREVIOUS CESAREAN SECTION: ICD-10-CM

## 2021-11-03 DIAGNOSIS — O09.299 PREVIOUS PREGNANCY WITH CONGENITAL HEART DEFECT, CURRENTLY PREGNANT: ICD-10-CM

## 2021-11-03 DIAGNOSIS — Z13.0 SCREENING FOR IRON DEFICIENCY ANEMIA: ICD-10-CM

## 2021-11-03 DIAGNOSIS — Z98.891 PREVIOUS CESAREAN SECTION: Primary | ICD-10-CM

## 2021-11-03 DIAGNOSIS — E66.01 MORBID OBESITY WITH BMI OF 40.0-44.9, ADULT (HCC): ICD-10-CM

## 2021-11-03 PROCEDURE — 0502F SUBSEQUENT PRENATAL CARE: CPT | Performed by: OBSTETRICS & GYNECOLOGY

## 2021-11-03 PROCEDURE — 90471 IMMUNIZATION ADMIN: CPT | Performed by: OBSTETRICS & GYNECOLOGY

## 2021-11-03 PROCEDURE — 90715 TDAP VACCINE 7 YRS/> IM: CPT | Performed by: OBSTETRICS & GYNECOLOGY

## 2021-11-03 RX ORDER — CARBOPROST TROMETHAMINE 250 UG/ML
250 INJECTION, SOLUTION INTRAMUSCULAR AS NEEDED
Status: CANCELLED | OUTPATIENT
Start: 2021-11-03

## 2021-11-03 RX ORDER — LIDOCAINE HYDROCHLORIDE 10 MG/ML
5 INJECTION, SOLUTION EPIDURAL; INFILTRATION; INTRACAUDAL; PERINEURAL AS NEEDED
Status: CANCELLED | OUTPATIENT
Start: 2021-11-03

## 2021-11-03 RX ORDER — CEFDINIR 300 MG/1
CAPSULE ORAL
COMMUNITY
Start: 2021-10-25 | End: 2021-11-19

## 2021-11-03 RX ORDER — SODIUM CHLORIDE 0.9 % (FLUSH) 0.9 %
3 SYRINGE (ML) INJECTION EVERY 12 HOURS SCHEDULED
Status: CANCELLED | OUTPATIENT
Start: 2021-11-03

## 2021-11-03 RX ORDER — CLINDAMYCIN PHOSPHATE 900 MG/50ML
900 INJECTION INTRAVENOUS ONCE
Status: CANCELLED | OUTPATIENT
Start: 2021-11-03 | End: 2021-11-03

## 2021-11-03 RX ORDER — OXYTOCIN-SODIUM CHLORIDE 0.9% IV SOLN 30 UNIT/500ML 30-0.9/5 UT/ML-%
125 SOLUTION INTRAVENOUS CONTINUOUS PRN
Status: CANCELLED | OUTPATIENT
Start: 2021-11-03

## 2021-11-03 RX ORDER — SODIUM CHLORIDE 0.9 % (FLUSH) 0.9 %
10 SYRINGE (ML) INJECTION AS NEEDED
Status: CANCELLED | OUTPATIENT
Start: 2021-11-03

## 2021-11-03 RX ORDER — SODIUM CHLORIDE, SODIUM LACTATE, POTASSIUM CHLORIDE, CALCIUM CHLORIDE 600; 310; 30; 20 MG/100ML; MG/100ML; MG/100ML; MG/100ML
125 INJECTION, SOLUTION INTRAVENOUS CONTINUOUS
Status: CANCELLED | OUTPATIENT
Start: 2021-11-03

## 2021-11-03 RX ORDER — OMEPRAZOLE 40 MG/1
40 CAPSULE, DELAYED RELEASE ORAL DAILY
Qty: 30 CAPSULE | Refills: 1 | Status: SHIPPED | OUTPATIENT
Start: 2021-11-03 | End: 2022-02-07

## 2021-11-03 RX ORDER — ONDANSETRON 4 MG/1
4 TABLET, FILM COATED ORAL EVERY 6 HOURS PRN
Status: CANCELLED | OUTPATIENT
Start: 2021-11-03

## 2021-11-03 RX ORDER — OXYTOCIN-SODIUM CHLORIDE 0.9% IV SOLN 30 UNIT/500ML 30-0.9/5 UT/ML-%
250 SOLUTION INTRAVENOUS CONTINUOUS PRN
Status: CANCELLED | OUTPATIENT
Start: 2021-11-03 | End: 2021-11-03

## 2021-11-03 RX ORDER — METHYLERGONOVINE MALEATE 0.2 MG/ML
200 INJECTION INTRAVENOUS AS NEEDED
Status: CANCELLED | OUTPATIENT
Start: 2021-11-03

## 2021-11-03 RX ORDER — OXYTOCIN-SODIUM CHLORIDE 0.9% IV SOLN 30 UNIT/500ML 30-0.9/5 UT/ML-%
999 SOLUTION INTRAVENOUS ONCE
Status: CANCELLED | OUTPATIENT
Start: 2021-11-03 | End: 2021-11-03

## 2021-11-03 RX ORDER — SODIUM CHLORIDE 0.9 % (FLUSH) 0.9 %
10 SYRINGE (ML) INJECTION EVERY 12 HOURS SCHEDULED
Status: CANCELLED | OUTPATIENT
Start: 2021-11-03

## 2021-11-03 RX ORDER — ONDANSETRON 2 MG/ML
4 INJECTION INTRAMUSCULAR; INTRAVENOUS EVERY 6 HOURS PRN
Status: CANCELLED | OUTPATIENT
Start: 2021-11-03

## 2021-11-03 NOTE — PROGRESS NOTES
Patient c/o chest congestion - on abx  One-hour gtt and tdap today   Prior  - repeat scheduled today   PTL warnings

## 2021-11-04 LAB
GLUCOSE 1H P 50 G GLC PO SERPL-MCNC: 119 MG/DL (ref 65–139)
HCT VFR BLD AUTO: 33.9 % (ref 34–46.6)
HGB BLD-MCNC: 11.5 G/DL (ref 11.1–15.9)

## 2021-11-15 ENCOUNTER — TELEPHONE (OUTPATIENT)
Dept: OBSTETRICS AND GYNECOLOGY | Age: 26
End: 2021-11-15

## 2021-11-15 NOTE — TELEPHONE ENCOUNTER
Patient called she is approximately 29 weeks pregnant and her daughter has hand foot and mouth.  Patient is concerning about the wellbeing of her unborn child.  Please advise.

## 2021-11-19 ENCOUNTER — ROUTINE PRENATAL (OUTPATIENT)
Dept: OBSTETRICS AND GYNECOLOGY | Age: 26
End: 2021-11-19

## 2021-11-19 DIAGNOSIS — M54.40 CHRONIC BILATERAL LOW BACK PAIN WITH SCIATICA, SCIATICA LATERALITY UNSPECIFIED: ICD-10-CM

## 2021-11-19 DIAGNOSIS — N89.8 VAGINAL DISCHARGE DURING PREGNANCY IN THIRD TRIMESTER: ICD-10-CM

## 2021-11-19 DIAGNOSIS — Z34.83 PRENATAL CARE, SUBSEQUENT PREGNANCY IN THIRD TRIMESTER: Primary | ICD-10-CM

## 2021-11-19 DIAGNOSIS — R12 HEARTBURN: ICD-10-CM

## 2021-11-19 DIAGNOSIS — Z13.89 SCREENING FOR BLOOD OR PROTEIN IN URINE: ICD-10-CM

## 2021-11-19 DIAGNOSIS — R82.90 MALODOROUS URINE: ICD-10-CM

## 2021-11-19 DIAGNOSIS — O26.893 VAGINAL DISCHARGE DURING PREGNANCY IN THIRD TRIMESTER: ICD-10-CM

## 2021-11-19 DIAGNOSIS — E66.01 MORBID OBESITY WITH BMI OF 40.0-44.9, ADULT (HCC): ICD-10-CM

## 2021-11-19 DIAGNOSIS — Z87.59 HISTORY OF GESTATIONAL HYPERTENSION: ICD-10-CM

## 2021-11-19 DIAGNOSIS — G89.29 CHRONIC BILATERAL LOW BACK PAIN WITH SCIATICA, SCIATICA LATERALITY UNSPECIFIED: ICD-10-CM

## 2021-11-19 DIAGNOSIS — O09.299 PREVIOUS PREGNANCY WITH CONGENITAL HEART DEFECT, CURRENTLY PREGNANT: ICD-10-CM

## 2021-11-19 DIAGNOSIS — Z98.891 PREVIOUS CESAREAN SECTION: ICD-10-CM

## 2021-11-19 LAB
BILIRUB BLD-MCNC: NEGATIVE MG/DL
GLUCOSE UR STRIP-MCNC: NEGATIVE MG/DL
KETONES UR QL: ABNORMAL
LEUKOCYTE EST, POC: NEGATIVE
NITRITE UR-MCNC: NEGATIVE MG/ML
PH UR: 6 [PH] (ref 5–8)
PROT UR STRIP-MCNC: NEGATIVE MG/DL
RBC # UR STRIP: NEGATIVE /UL
SP GR UR: 1.02 (ref 1–1.03)
UROBILINOGEN UR QL: NORMAL

## 2021-11-19 PROCEDURE — 0502F SUBSEQUENT PRENATAL CARE: CPT | Performed by: OBSTETRICS & GYNECOLOGY

## 2021-11-19 NOTE — PROGRESS NOTES
Patient c/o pelvic pressure and vag d/c - Nuswab collected   Malodorous urine - urine cx   Obesity = Normal growth today   PTL warnings

## 2021-11-21 LAB
BACTERIA UR CULT: NORMAL
BACTERIA UR CULT: NORMAL

## 2021-11-22 DIAGNOSIS — G89.29 CHRONIC BILATERAL LOW BACK PAIN WITHOUT SCIATICA: ICD-10-CM

## 2021-11-22 DIAGNOSIS — M54.50 CHRONIC BILATERAL LOW BACK PAIN WITHOUT SCIATICA: ICD-10-CM

## 2021-11-22 DIAGNOSIS — M79.10 MYALGIA: ICD-10-CM

## 2021-11-22 RX ORDER — CYCLOBENZAPRINE HCL 10 MG
TABLET ORAL
Qty: 60 TABLET | Refills: 5 | OUTPATIENT
Start: 2021-11-22

## 2021-11-24 LAB
A VAGINAE DNA VAG QL NAA+PROBE: NORMAL SCORE
BVAB2 DNA VAG QL NAA+PROBE: NORMAL SCORE
C ALBICANS DNA VAG QL NAA+PROBE: NEGATIVE
C GLABRATA DNA VAG QL NAA+PROBE: NEGATIVE
C TRACH DNA VAG QL NAA+PROBE: NEGATIVE
MEGA1 DNA VAG QL NAA+PROBE: NORMAL SCORE
N GONORRHOEA DNA VAG QL NAA+PROBE: NEGATIVE
T VAGINALIS DNA VAG QL NAA+PROBE: NEGATIVE

## 2021-12-03 ENCOUNTER — ROUTINE PRENATAL (OUTPATIENT)
Dept: OBSTETRICS AND GYNECOLOGY | Age: 26
End: 2021-12-03

## 2021-12-03 VITALS — DIASTOLIC BLOOD PRESSURE: 80 MMHG | WEIGHT: 262 LBS | BODY MASS INDEX: 44.97 KG/M2 | SYSTOLIC BLOOD PRESSURE: 135 MMHG

## 2021-12-03 VITALS — DIASTOLIC BLOOD PRESSURE: 70 MMHG | SYSTOLIC BLOOD PRESSURE: 138 MMHG | BODY MASS INDEX: 44.63 KG/M2 | WEIGHT: 260 LBS

## 2021-12-03 DIAGNOSIS — O09.299 PREVIOUS PREGNANCY WITH CONGENITAL HEART DEFECT, CURRENTLY PREGNANT: ICD-10-CM

## 2021-12-03 DIAGNOSIS — M54.40 CHRONIC BILATERAL LOW BACK PAIN WITH SCIATICA, SCIATICA LATERALITY UNSPECIFIED: ICD-10-CM

## 2021-12-03 DIAGNOSIS — Z98.891 PREVIOUS CESAREAN SECTION: ICD-10-CM

## 2021-12-03 DIAGNOSIS — Z13.89 SCREENING FOR BLOOD OR PROTEIN IN URINE: ICD-10-CM

## 2021-12-03 DIAGNOSIS — G89.29 CHRONIC BILATERAL LOW BACK PAIN WITH SCIATICA, SCIATICA LATERALITY UNSPECIFIED: ICD-10-CM

## 2021-12-03 DIAGNOSIS — Z34.83 PRENATAL CARE, SUBSEQUENT PREGNANCY IN THIRD TRIMESTER: Primary | ICD-10-CM

## 2021-12-03 DIAGNOSIS — O99.891 HISTORY OF POSTPARTUM DEPRESSION, CURRENTLY PREGNANT: ICD-10-CM

## 2021-12-03 DIAGNOSIS — Z87.59 HISTORY OF GESTATIONAL HYPERTENSION: ICD-10-CM

## 2021-12-03 DIAGNOSIS — E66.01 MORBID OBESITY WITH BMI OF 40.0-44.9, ADULT (HCC): ICD-10-CM

## 2021-12-03 DIAGNOSIS — R12 HEARTBURN: ICD-10-CM

## 2021-12-03 DIAGNOSIS — Z86.59 HISTORY OF POSTPARTUM DEPRESSION, CURRENTLY PREGNANT: ICD-10-CM

## 2021-12-03 DIAGNOSIS — K64.9 HEMORRHOIDS, UNSPECIFIED HEMORRHOID TYPE: ICD-10-CM

## 2021-12-03 PROCEDURE — 0502F SUBSEQUENT PRENATAL CARE: CPT | Performed by: OBSTETRICS & GYNECOLOGY

## 2021-12-03 NOTE — PROGRESS NOTES
Patient reports increased swelling and [facility]   Discussed compression stockings and increased H20   H/O GHTN  - BP wnl - cont ASA   PTL warnings   2 weeks with growth US

## 2021-12-20 ENCOUNTER — ROUTINE PRENATAL (OUTPATIENT)
Dept: OBSTETRICS AND GYNECOLOGY | Age: 26
End: 2021-12-20

## 2021-12-20 VITALS — DIASTOLIC BLOOD PRESSURE: 84 MMHG | WEIGHT: 261 LBS | SYSTOLIC BLOOD PRESSURE: 130 MMHG | BODY MASS INDEX: 44.8 KG/M2

## 2021-12-20 DIAGNOSIS — E66.01 MORBID OBESITY WITH BMI OF 40.0-44.9, ADULT (HCC): ICD-10-CM

## 2021-12-20 DIAGNOSIS — Z13.89 SCREENING FOR BLOOD OR PROTEIN IN URINE: ICD-10-CM

## 2021-12-20 DIAGNOSIS — O09.299 HISTORY OF MISCARRIAGE, CURRENTLY PREGNANT: ICD-10-CM

## 2021-12-20 DIAGNOSIS — Z98.891 PREVIOUS CESAREAN SECTION: ICD-10-CM

## 2021-12-20 DIAGNOSIS — Z3A.33 33 WEEKS GESTATION OF PREGNANCY: Primary | ICD-10-CM

## 2021-12-20 DIAGNOSIS — Z86.59 HISTORY OF POSTPARTUM DEPRESSION, CURRENTLY PREGNANT: ICD-10-CM

## 2021-12-20 DIAGNOSIS — O09.299 PREVIOUS PREGNANCY WITH CONGENITAL HEART DEFECT, CURRENTLY PREGNANT: ICD-10-CM

## 2021-12-20 DIAGNOSIS — Z87.59 HISTORY OF GESTATIONAL HYPERTENSION: ICD-10-CM

## 2021-12-20 DIAGNOSIS — O99.891 HISTORY OF POSTPARTUM DEPRESSION, CURRENTLY PREGNANT: ICD-10-CM

## 2021-12-20 LAB
GLUCOSE UR STRIP-MCNC: NEGATIVE MG/DL
PROT UR STRIP-MCNC: NEGATIVE MG/DL

## 2021-12-20 PROCEDURE — 0502F SUBSEQUENT PRENATAL CARE: CPT | Performed by: NURSE PRACTITIONER

## 2021-12-20 NOTE — PROGRESS NOTES
Doing well  Reports good fetal movement  Denies LOF or bleeding  Having BHC's a few times a day  H/o GHTN - blood pressure slightly elevated, repeat BP - 130/76  She denies any HA's, visual changes, RUQ pain or worsening edema  Negative urine protein, taking daily asa  Normal growth u/s today  PTL/FKC discussed  Pre-eclampsia warnings were discussed as well  F/u 1 week

## 2021-12-27 ENCOUNTER — ROUTINE PRENATAL (OUTPATIENT)
Dept: OBSTETRICS AND GYNECOLOGY | Age: 26
End: 2021-12-27

## 2021-12-27 VITALS — BODY MASS INDEX: 45.14 KG/M2 | WEIGHT: 263 LBS | DIASTOLIC BLOOD PRESSURE: 70 MMHG | SYSTOLIC BLOOD PRESSURE: 132 MMHG

## 2021-12-27 DIAGNOSIS — G89.29 CHRONIC BILATERAL LOW BACK PAIN WITH SCIATICA, SCIATICA LATERALITY UNSPECIFIED: ICD-10-CM

## 2021-12-27 DIAGNOSIS — O99.891 HISTORY OF POSTPARTUM DEPRESSION, CURRENTLY PREGNANT: ICD-10-CM

## 2021-12-27 DIAGNOSIS — Z13.89 SCREENING FOR BLOOD OR PROTEIN IN URINE: ICD-10-CM

## 2021-12-27 DIAGNOSIS — E66.01 MORBID OBESITY WITH BMI OF 40.0-44.9, ADULT (HCC): ICD-10-CM

## 2021-12-27 DIAGNOSIS — M54.40 CHRONIC BILATERAL LOW BACK PAIN WITH SCIATICA, SCIATICA LATERALITY UNSPECIFIED: ICD-10-CM

## 2021-12-27 DIAGNOSIS — Z98.891 PREVIOUS CESAREAN SECTION: ICD-10-CM

## 2021-12-27 DIAGNOSIS — Z34.83 PRENATAL CARE, SUBSEQUENT PREGNANCY IN THIRD TRIMESTER: Primary | ICD-10-CM

## 2021-12-27 DIAGNOSIS — Z86.59 HISTORY OF POSTPARTUM DEPRESSION, CURRENTLY PREGNANT: ICD-10-CM

## 2021-12-27 DIAGNOSIS — Z87.59 HISTORY OF GESTATIONAL HYPERTENSION: ICD-10-CM

## 2021-12-27 DIAGNOSIS — O09.299 PREVIOUS PREGNANCY WITH CONGENITAL HEART DEFECT, CURRENTLY PREGNANT: ICD-10-CM

## 2021-12-27 LAB
BILIRUB BLD-MCNC: NEGATIVE MG/DL
GLUCOSE UR STRIP-MCNC: NEGATIVE MG/DL
KETONES UR QL: NEGATIVE
LEUKOCYTE EST, POC: NEGATIVE
NITRITE UR-MCNC: NEGATIVE MG/ML
PH UR: 6.5 [PH] (ref 5–8)
PROT UR STRIP-MCNC: ABNORMAL MG/DL
RBC # UR STRIP: NEGATIVE /UL
SP GR UR: 1.03 (ref 1–1.03)
UROBILINOGEN UR QL: NORMAL

## 2021-12-27 PROCEDURE — 0502F SUBSEQUENT PRENATAL CARE: CPT | Performed by: OBSTETRICS & GYNECOLOGY

## 2021-12-27 NOTE — PROGRESS NOTES
Patient reports pelvic pressure   Denies headache or blurred vision   Reports good fetal movement  PTL/PREe warnings   1 week

## 2021-12-29 ENCOUNTER — HOSPITAL ENCOUNTER (EMERGENCY)
Facility: HOSPITAL | Age: 26
Discharge: HOME | End: 2021-12-30
Attending: OBSTETRICS & GYNECOLOGY | Admitting: OBSTETRICS & GYNECOLOGY

## 2021-12-29 LAB
ALBUMIN SERPL-MCNC: 3.4 G/DL (ref 3.5–5.2)
ALBUMIN/GLOB SERPL: 1.3 G/DL
ALP SERPL-CCNC: 122 U/L (ref 39–117)
ALT SERPL W P-5'-P-CCNC: 11 U/L (ref 1–33)
ANION GAP SERPL CALCULATED.3IONS-SCNC: 10.7 MMOL/L (ref 5–15)
AST SERPL-CCNC: 9 U/L (ref 1–32)
BACTERIA UR QL AUTO: ABNORMAL /HPF
BASOPHILS # BLD AUTO: 0.03 10*3/MM3 (ref 0–0.2)
BASOPHILS NFR BLD AUTO: 0.3 % (ref 0–1.5)
BILIRUB SERPL-MCNC: <0.2 MG/DL (ref 0–1.2)
BILIRUB UR QL STRIP: NEGATIVE
BUN SERPL-MCNC: 9 MG/DL (ref 6–20)
BUN/CREAT SERPL: 25 (ref 7–25)
CALCIUM SPEC-SCNC: 9.2 MG/DL (ref 8.6–10.5)
CHLORIDE SERPL-SCNC: 107 MMOL/L (ref 98–107)
CLARITY UR: ABNORMAL
CO2 SERPL-SCNC: 20.3 MMOL/L (ref 22–29)
COLOR UR: YELLOW
CREAT SERPL-MCNC: 0.36 MG/DL (ref 0.57–1)
CREAT UR-MCNC: 124.8 MG/DL
DEPRECATED RDW RBC AUTO: 39.5 FL (ref 37–54)
EOSINOPHIL # BLD AUTO: 0.27 10*3/MM3 (ref 0–0.4)
EOSINOPHIL NFR BLD AUTO: 2.9 % (ref 0.3–6.2)
ERYTHROCYTE [DISTWIDTH] IN BLOOD BY AUTOMATED COUNT: 13.6 % (ref 12.3–15.4)
GFR SERPL CREATININE-BSD FRML MDRD: >150 ML/MIN/1.73
GLOBULIN UR ELPH-MCNC: 2.7 GM/DL
GLUCOSE SERPL-MCNC: 116 MG/DL (ref 65–99)
GLUCOSE UR STRIP-MCNC: NEGATIVE MG/DL
HCT VFR BLD AUTO: 34.5 % (ref 34–46.6)
HGB BLD-MCNC: 11.5 G/DL (ref 12–15.9)
HGB UR QL STRIP.AUTO: NEGATIVE
HYALINE CASTS UR QL AUTO: ABNORMAL /LPF
IMM GRANULOCYTES # BLD AUTO: 0.05 10*3/MM3 (ref 0–0.05)
IMM GRANULOCYTES NFR BLD AUTO: 0.5 % (ref 0–0.5)
KETONES UR QL STRIP: NEGATIVE
LEUKOCYTE ESTERASE UR QL STRIP.AUTO: ABNORMAL
LYMPHOCYTES # BLD AUTO: 1.4 10*3/MM3 (ref 0.7–3.1)
LYMPHOCYTES NFR BLD AUTO: 14.9 % (ref 19.6–45.3)
MCH RBC QN AUTO: 27.1 PG (ref 26.6–33)
MCHC RBC AUTO-ENTMCNC: 33.3 G/DL (ref 31.5–35.7)
MCV RBC AUTO: 81.4 FL (ref 79–97)
MONOCYTES # BLD AUTO: 0.61 10*3/MM3 (ref 0.1–0.9)
MONOCYTES NFR BLD AUTO: 6.5 % (ref 5–12)
NEUTROPHILS NFR BLD AUTO: 7.01 10*3/MM3 (ref 1.7–7)
NEUTROPHILS NFR BLD AUTO: 74.9 % (ref 42.7–76)
NITRITE UR QL STRIP: NEGATIVE
NRBC BLD AUTO-RTO: 0 /100 WBC (ref 0–0.2)
PH UR STRIP.AUTO: 6.5 [PH] (ref 5–8)
PLATELET # BLD AUTO: 136 10*3/MM3 (ref 140–450)
PMV BLD AUTO: 13 FL (ref 6–12)
POTASSIUM SERPL-SCNC: 3.8 MMOL/L (ref 3.5–5.2)
PROT ?TM UR-MCNC: 19 MG/DL
PROT SERPL-MCNC: 6.1 G/DL (ref 6–8.5)
PROT UR QL STRIP: NEGATIVE
PROT/CREAT UR: 152.2 MG/G CREA (ref 0–200)
RBC # BLD AUTO: 4.24 10*6/MM3 (ref 3.77–5.28)
RBC # UR STRIP: ABNORMAL /HPF
REF LAB TEST METHOD: ABNORMAL
SODIUM SERPL-SCNC: 138 MMOL/L (ref 136–145)
SP GR UR STRIP: 1.02 (ref 1–1.03)
SQUAMOUS #/AREA URNS HPF: ABNORMAL /HPF
URATE SERPL-MCNC: 3.5 MG/DL (ref 2.4–5.7)
UROBILINOGEN UR QL STRIP: ABNORMAL
WBC # UR STRIP: ABNORMAL /HPF
WBC NRBC COR # BLD: 9.37 10*3/MM3 (ref 3.4–10.8)

## 2021-12-29 PROCEDURE — 59025 FETAL NON-STRESS TEST: CPT

## 2021-12-29 PROCEDURE — 85025 COMPLETE CBC W/AUTO DIFF WBC: CPT | Performed by: OBSTETRICS & GYNECOLOGY

## 2021-12-29 PROCEDURE — 80053 COMPREHEN METABOLIC PANEL: CPT | Performed by: OBSTETRICS & GYNECOLOGY

## 2021-12-29 PROCEDURE — 99283 EMERGENCY DEPT VISIT LOW MDM: CPT | Performed by: OBSTETRICS & GYNECOLOGY

## 2021-12-29 PROCEDURE — 87086 URINE CULTURE/COLONY COUNT: CPT | Performed by: OBSTETRICS & GYNECOLOGY

## 2021-12-29 PROCEDURE — 84550 ASSAY OF BLOOD/URIC ACID: CPT | Performed by: OBSTETRICS & GYNECOLOGY

## 2021-12-29 PROCEDURE — 81001 URINALYSIS AUTO W/SCOPE: CPT | Performed by: OBSTETRICS & GYNECOLOGY

## 2021-12-29 PROCEDURE — 84156 ASSAY OF PROTEIN URINE: CPT | Performed by: OBSTETRICS & GYNECOLOGY

## 2021-12-29 PROCEDURE — 82570 ASSAY OF URINE CREATININE: CPT | Performed by: OBSTETRICS & GYNECOLOGY

## 2021-12-29 RX ORDER — FEXOFENADINE HYDROCHLORIDE 60 MG/1
60 TABLET, FILM COATED ORAL DAILY
COMMUNITY
End: 2022-02-07

## 2021-12-29 RX ORDER — ACETAMINOPHEN 500 MG
1000 TABLET ORAL EVERY 6 HOURS PRN
COMMUNITY
End: 2022-02-07

## 2021-12-30 VITALS
SYSTOLIC BLOOD PRESSURE: 112 MMHG | TEMPERATURE: 97.6 F | OXYGEN SATURATION: 99 % | HEART RATE: 77 BPM | DIASTOLIC BLOOD PRESSURE: 54 MMHG | RESPIRATION RATE: 18 BRPM

## 2021-12-30 PROBLEM — D69.6 GESTATIONAL THROMBOCYTOPENIA (HCC): Status: ACTIVE | Noted: 2021-12-30

## 2021-12-30 PROBLEM — O99.119 GESTATIONAL THROMBOCYTOPENIA: Status: ACTIVE | Noted: 2021-12-30

## 2021-12-30 PROCEDURE — 59025 FETAL NON-STRESS TEST: CPT

## 2021-12-31 LAB — BACTERIA SPEC AEROBE CULT: NO GROWTH

## 2022-01-03 ENCOUNTER — ROUTINE PRENATAL (OUTPATIENT)
Dept: OBSTETRICS AND GYNECOLOGY | Age: 27
End: 2022-01-03

## 2022-01-03 VITALS — SYSTOLIC BLOOD PRESSURE: 122 MMHG | WEIGHT: 262 LBS | BODY MASS INDEX: 44.97 KG/M2 | DIASTOLIC BLOOD PRESSURE: 78 MMHG

## 2022-01-03 DIAGNOSIS — Z98.891 PREVIOUS CESAREAN SECTION: ICD-10-CM

## 2022-01-03 DIAGNOSIS — O99.891 HISTORY OF POSTPARTUM DEPRESSION, CURRENTLY PREGNANT: ICD-10-CM

## 2022-01-03 DIAGNOSIS — O09.299 HISTORY OF MISCARRIAGE, CURRENTLY PREGNANT: ICD-10-CM

## 2022-01-03 DIAGNOSIS — R12 HEARTBURN: ICD-10-CM

## 2022-01-03 DIAGNOSIS — O21.9 NAUSEA AND VOMITING DURING PREGNANCY: ICD-10-CM

## 2022-01-03 DIAGNOSIS — Z86.59 HISTORY OF POSTPARTUM DEPRESSION, CURRENTLY PREGNANT: ICD-10-CM

## 2022-01-03 DIAGNOSIS — Z13.89 SCREENING FOR BLOOD OR PROTEIN IN URINE: ICD-10-CM

## 2022-01-03 DIAGNOSIS — O09.299 PREVIOUS PREGNANCY WITH CONGENITAL HEART DEFECT, CURRENTLY PREGNANT: ICD-10-CM

## 2022-01-03 DIAGNOSIS — K64.9 HEMORRHOIDS, UNSPECIFIED HEMORRHOID TYPE: ICD-10-CM

## 2022-01-03 DIAGNOSIS — Z3A.35 35 WEEKS GESTATION OF PREGNANCY: Primary | ICD-10-CM

## 2022-01-03 DIAGNOSIS — Z36.85 ANTENATAL SCREENING FOR STREPTOCOCCUS B: ICD-10-CM

## 2022-01-03 DIAGNOSIS — Z87.59 HISTORY OF GESTATIONAL HYPERTENSION: ICD-10-CM

## 2022-01-03 DIAGNOSIS — E66.01 MORBID OBESITY WITH BMI OF 40.0-44.9, ADULT: ICD-10-CM

## 2022-01-03 DIAGNOSIS — M54.40 CHRONIC BILATERAL LOW BACK PAIN WITH SCIATICA, SCIATICA LATERALITY UNSPECIFIED: ICD-10-CM

## 2022-01-03 DIAGNOSIS — G89.29 CHRONIC BILATERAL LOW BACK PAIN WITH SCIATICA, SCIATICA LATERALITY UNSPECIFIED: ICD-10-CM

## 2022-01-03 LAB
GLUCOSE UR STRIP-MCNC: NEGATIVE MG/DL
PROT UR STRIP-MCNC: NEGATIVE MG/DL

## 2022-01-03 PROCEDURE — 0502F SUBSEQUENT PRENATAL CARE: CPT | Performed by: NURSE PRACTITIONER

## 2022-01-03 NOTE — PROGRESS NOTES
Doing well    Reports good fetal movement, denies LOF or bleeding  Was seen in L&D on Thursday for elevated blood pressure and headache  Her pressures were normal in L&D and no evidence of pre-e  Blood pressure today is normal, neg urine protein  She denies visual changes or swelling  GBS today  PTL/FKC and pre-e warnings discussed  F/u with Dr. Mixon Friday

## 2022-01-05 LAB — GP B STREP DNA SPEC QL NAA+PROBE: NEGATIVE

## 2022-01-10 ENCOUNTER — ROUTINE PRENATAL (OUTPATIENT)
Dept: OBSTETRICS AND GYNECOLOGY | Age: 27
End: 2022-01-10

## 2022-01-10 VITALS — SYSTOLIC BLOOD PRESSURE: 132 MMHG | DIASTOLIC BLOOD PRESSURE: 76 MMHG | WEIGHT: 262 LBS | BODY MASS INDEX: 44.97 KG/M2

## 2022-01-10 DIAGNOSIS — O99.113 BENIGN GESTATIONAL THROMBOCYTOPENIA IN THIRD TRIMESTER: ICD-10-CM

## 2022-01-10 DIAGNOSIS — Z98.891 PREVIOUS CESAREAN SECTION: ICD-10-CM

## 2022-01-10 DIAGNOSIS — Z13.89 SCREENING FOR BLOOD OR PROTEIN IN URINE: ICD-10-CM

## 2022-01-10 DIAGNOSIS — R12 HEARTBURN: ICD-10-CM

## 2022-01-10 DIAGNOSIS — Z87.59 HISTORY OF GESTATIONAL HYPERTENSION: ICD-10-CM

## 2022-01-10 DIAGNOSIS — E66.01 MORBID OBESITY WITH BMI OF 40.0-44.9, ADULT: ICD-10-CM

## 2022-01-10 DIAGNOSIS — Z34.83 PRENATAL CARE, SUBSEQUENT PREGNANCY IN THIRD TRIMESTER: Primary | ICD-10-CM

## 2022-01-10 DIAGNOSIS — D69.6 BENIGN GESTATIONAL THROMBOCYTOPENIA IN THIRD TRIMESTER: ICD-10-CM

## 2022-01-10 DIAGNOSIS — K64.9 HEMORRHOIDS, UNSPECIFIED HEMORRHOID TYPE: ICD-10-CM

## 2022-01-10 DIAGNOSIS — O09.299 PREVIOUS PREGNANCY WITH CONGENITAL HEART DEFECT, CURRENTLY PREGNANT: ICD-10-CM

## 2022-01-10 LAB
BILIRUB BLD-MCNC: NEGATIVE MG/DL
GLUCOSE UR STRIP-MCNC: NEGATIVE MG/DL
KETONES UR QL: NEGATIVE
LEUKOCYTE EST, POC: ABNORMAL
NITRITE UR-MCNC: NEGATIVE MG/ML
PH UR: 6.5 [PH] (ref 5–8)
PROT UR STRIP-MCNC: ABNORMAL MG/DL
RBC # UR STRIP: NEGATIVE /UL
SP GR UR: 1.02 (ref 1–1.03)
UROBILINOGEN UR QL: NORMAL

## 2022-01-10 PROCEDURE — 0502F SUBSEQUENT PRENATAL CARE: CPT | Performed by: OBSTETRICS & GYNECOLOGY

## 2022-01-10 NOTE — PROGRESS NOTES
Patient reports ready to have a baby   Denies ctx, loss of fluid or vag bleeding +FM   RLTCS 1/26/22  Labor warnings/FKC   GBS neg

## 2022-01-11 LAB
ERYTHROCYTE [DISTWIDTH] IN BLOOD BY AUTOMATED COUNT: 13.8 % (ref 11.7–15.4)
HCT VFR BLD AUTO: 36.1 % (ref 34–46.6)
HGB BLD-MCNC: 12.1 G/DL (ref 11.1–15.9)
MCH RBC QN AUTO: 27.5 PG (ref 26.6–33)
MCHC RBC AUTO-ENTMCNC: 33.5 G/DL (ref 31.5–35.7)
MCV RBC AUTO: 82 FL (ref 79–97)
PLATELET # BLD AUTO: 135 X10E3/UL (ref 150–450)
RBC # BLD AUTO: 4.4 X10E6/UL (ref 3.77–5.28)
WBC # BLD AUTO: 8.9 X10E3/UL (ref 3.4–10.8)

## 2022-01-17 ENCOUNTER — ROUTINE PRENATAL (OUTPATIENT)
Dept: OBSTETRICS AND GYNECOLOGY | Age: 27
End: 2022-01-17

## 2022-01-17 VITALS — WEIGHT: 264 LBS | DIASTOLIC BLOOD PRESSURE: 72 MMHG | SYSTOLIC BLOOD PRESSURE: 135 MMHG | BODY MASS INDEX: 45.32 KG/M2

## 2022-01-17 DIAGNOSIS — Z87.59 HISTORY OF GESTATIONAL HYPERTENSION: ICD-10-CM

## 2022-01-17 DIAGNOSIS — G89.29 CHRONIC BILATERAL LOW BACK PAIN WITH SCIATICA, SCIATICA LATERALITY UNSPECIFIED: ICD-10-CM

## 2022-01-17 DIAGNOSIS — M54.40 CHRONIC BILATERAL LOW BACK PAIN WITH SCIATICA, SCIATICA LATERALITY UNSPECIFIED: ICD-10-CM

## 2022-01-17 DIAGNOSIS — E66.01 MORBID OBESITY WITH BMI OF 40.0-44.9, ADULT: ICD-10-CM

## 2022-01-17 DIAGNOSIS — Z86.59 HISTORY OF POSTPARTUM DEPRESSION, CURRENTLY PREGNANT: ICD-10-CM

## 2022-01-17 DIAGNOSIS — O09.299 PREVIOUS PREGNANCY WITH CONGENITAL HEART DEFECT, CURRENTLY PREGNANT: ICD-10-CM

## 2022-01-17 DIAGNOSIS — O99.891 HISTORY OF POSTPARTUM DEPRESSION, CURRENTLY PREGNANT: ICD-10-CM

## 2022-01-17 DIAGNOSIS — D69.6 BENIGN GESTATIONAL THROMBOCYTOPENIA IN THIRD TRIMESTER: ICD-10-CM

## 2022-01-17 DIAGNOSIS — Z34.83 PRENATAL CARE, SUBSEQUENT PREGNANCY IN THIRD TRIMESTER: Primary | ICD-10-CM

## 2022-01-17 DIAGNOSIS — Z98.891 PREVIOUS CESAREAN SECTION: ICD-10-CM

## 2022-01-17 DIAGNOSIS — O99.113 BENIGN GESTATIONAL THROMBOCYTOPENIA IN THIRD TRIMESTER: ICD-10-CM

## 2022-01-17 DIAGNOSIS — Z13.89 SCREENING FOR BLOOD OR PROTEIN IN URINE: ICD-10-CM

## 2022-01-17 LAB
BILIRUB BLD-MCNC: NEGATIVE MG/DL
GLUCOSE UR STRIP-MCNC: NEGATIVE MG/DL
KETONES UR QL: NEGATIVE
LEUKOCYTE EST, POC: NEGATIVE
NITRITE UR-MCNC: NEGATIVE MG/ML
PH UR: 6.5 [PH] (ref 5–8)
PROT UR STRIP-MCNC: NEGATIVE MG/DL
RBC # UR STRIP: ABNORMAL /UL
SP GR UR: 1.02 (ref 1–1.03)
UROBILINOGEN UR QL: NORMAL

## 2022-01-17 PROCEDURE — 0502F SUBSEQUENT PRENATAL CARE: CPT | Performed by: OBSTETRICS & GYNECOLOGY

## 2022-01-17 NOTE — PROGRESS NOTES
Patient reports back pain   Denies ctx, loss of fluid or vag bleeding  +FM   RLTCS 1/26/22  GBS neg   Labor warnings/FKC/PreE warnings

## 2022-01-22 ENCOUNTER — HOSPITAL ENCOUNTER (EMERGENCY)
Facility: HOSPITAL | Age: 27
Discharge: HOME | End: 2022-01-22
Attending: OBSTETRICS & GYNECOLOGY | Admitting: OBSTETRICS & GYNECOLOGY

## 2022-01-22 VITALS
RESPIRATION RATE: 16 BRPM | BODY MASS INDEX: 45.32 KG/M2 | HEIGHT: 64 IN | DIASTOLIC BLOOD PRESSURE: 66 MMHG | HEART RATE: 83 BPM | OXYGEN SATURATION: 99 % | TEMPERATURE: 98 F | SYSTOLIC BLOOD PRESSURE: 123 MMHG

## 2022-01-22 PROCEDURE — 99284 EMERGENCY DEPT VISIT MOD MDM: CPT | Performed by: OBSTETRICS & GYNECOLOGY

## 2022-01-22 PROCEDURE — 59025 FETAL NON-STRESS TEST: CPT

## 2022-01-22 RX ORDER — ACETAMINOPHEN 325 MG/1
650 TABLET ORAL ONCE
Status: COMPLETED | OUTPATIENT
Start: 2022-01-22 | End: 2022-01-22

## 2022-01-22 RX ADMIN — ACETAMINOPHEN 650 MG: 325 TABLET, FILM COATED ORAL at 14:51

## 2022-01-22 NOTE — OBED NOTES
[facility]  Lavonne Lan  : 1995  MRN: [id number]  CSN: [id number]    MOODY History and Physical    Subjective   Chief Complaint   Patient presents with   • Contractions     MOODY, contractions on and off since wednesday, 3 mins apart, DFM today, no vaginal bleeding or LOF     Lavonne Lan is a 26 y.o. year old  with an Estimated Date of Delivery: 22 currently at 38w4d presenting with contractions since this morning. She denies vaginal bleeding or ROM. She is scheduled for repeat C/S 2022.    Prenatal care has been with Dr Ursula Mixon.  It has been complicated by previous C/S and Gestational HTN.    OB History    Para Term  AB Living   3 1 1 0 1 1   SAB IAB Ectopic Molar Multiple Live Births   1 0 0 0 0 1      # Outcome Date GA Lbr Efe/2nd Weight Sex Delivery Anes PTL Lv   3 Current            2 Term 19 37w1d  2620 g (5 lb 12.4 oz) F CS-LTranv EPI N ALEJANDRO      Birth Comments: Panda OR 1      Complications: Dysfunctional Labor, Malpresentation of fetus, Gestational hypertension      Name: Tati      Apgar1: 9  Apgar5: 9   1 SAB 10/2017 9w3d             Obstetric Comments   21 - IUP at 8-6 weeks lmp - EVA 22 - [facility]     21 - 19-6 weeks - Normal but incomplete anatomy - [facility]    10/13/21 - Completed anatomy - EFW 40%, AC 25% - [facility]    21 - 29-3 weeks - EFW 60%, AC 83% - [facility]    21 - 33-6 weeks - EFW 47% AC 71% - [facility]    22 - 37-6 weeks - EFW 56% , AC 25% - [facility]      Past Medical History:   Diagnosis Date   • Klebsiella cystitis 2017   • Anxiety    • Arthritis    • Chronic bilateral low back pain with sciatica    • Depression    • GERD (gastroesophageal reflux disease)    • Gestational hypertension    • PCOS (polycystic ovarian syndrome)    • Polycystic ovary syndrome      Past Surgical History:   Procedure Laterality Date   • DILATATION AND CURETTAGE  10/2017   • D & C WITH SUCTION N/A 10/12/2017    Procedure: DILATATION AND CURETTAGE WITH  SUCTION;  Surgeon: Yomi Telles MD;  Location: [facility] MAIN OR;  Service:    •  SECTION N/A 2019    Procedure:  SECTION PRIMARY;  Surgeon: Annette Gomez MD;  Location: [facility] LABOR DELIVERY;  Service: Obstetrics/Gynecology   • WISDOM TOOTH EXTRACTION       No current facility-administered medications for this encounter.    Facility-Administered Medications Ordered in Other Encounters:   •  perflutren (DEFINITY) 8.476 mg in sodium chloride 0.9 % 10 mL injection, 3 mL, Intravenous, Once, Nickolas Lr DO    Allergies   Allergen Reactions   • Amoxicillin Rash   • Buspar [Buspirone] Rash   • Penicillins Rash     Social History    Tobacco Use      Smoking status: Former Smoker        Packs/day: 0.50        Years: 6.00        Pack years: 3        Types: Cigarettes        Start date:         Quit date: 2018        Years since quitting: 3.7      Smokeless tobacco: Never Used    Review of Systems Negative except for HPI      Objective   LMP 2021   General: WD, WN, NAD     Abdomen: Soft, nontender   FHT's: 140 baseline, Cat I      Cervix: .5cm/40%/-3 (unchanged after an hour)   Presentation: vtx   Contractions: Occasional contraction noted   Back: wnl     Prenatal Labs  Lab Results   Component Value Date    HGB 12.1 01/10/2022    RUBELLAABIGG 4.07 2021    HEPBSAG Negative 2021    ABSCRN Negative 2021    KFG7KTS6 Non Reactive 2021    HEPCVIRUSABY <0.1 2021     2021    STREPGPB Negative 2022    URINECX No growth 2021    CHLAMNAA Negative 2021    NGONORRHON Negative 2021       Current Labs Reviewed          Assessment   1. IUP at 38w4d  2. Previous C/S  3. False Labor     Plan   1. Discharge home with precautions for labor and when to return  2. Keep OB appointment in 2 days  3. Return to L&D is contractions are noted    Val Carrasco MD   OB Hospitalist on call  2022

## 2022-01-24 ENCOUNTER — HOSPITAL ENCOUNTER (INPATIENT)
Facility: HOSPITAL | Age: 27
LOS: 2 days | Discharge: HOME | End: 2022-01-26
Attending: OBSTETRICS & GYNECOLOGY | Admitting: OBSTETRICS & GYNECOLOGY

## 2022-01-24 ENCOUNTER — ANESTHESIA (OUTPATIENT)
Dept: LABOR AND DELIVERY | Facility: HOSPITAL | Age: 27
End: 2022-01-24

## 2022-01-24 ENCOUNTER — ROUTINE PRENATAL (OUTPATIENT)
Dept: OBSTETRICS AND GYNECOLOGY | Age: 27
End: 2022-01-24

## 2022-01-24 ENCOUNTER — ANESTHESIA EVENT (OUTPATIENT)
Dept: LABOR AND DELIVERY | Facility: HOSPITAL | Age: 27
End: 2022-01-24

## 2022-01-24 VITALS — DIASTOLIC BLOOD PRESSURE: 70 MMHG | WEIGHT: 266 LBS | BODY MASS INDEX: 45.66 KG/M2 | SYSTOLIC BLOOD PRESSURE: 132 MMHG

## 2022-01-24 DIAGNOSIS — E66.01 MORBID OBESITY WITH BMI OF 40.0-44.9, ADULT: ICD-10-CM

## 2022-01-24 DIAGNOSIS — O41.03X0 OLIGOHYDRAMNIOS IN THIRD TRIMESTER, SINGLE OR UNSPECIFIED FETUS: ICD-10-CM

## 2022-01-24 DIAGNOSIS — Z98.891 PREVIOUS CESAREAN SECTION: ICD-10-CM

## 2022-01-24 DIAGNOSIS — O09.299 PREVIOUS PREGNANCY WITH CONGENITAL HEART DEFECT, CURRENTLY PREGNANT: ICD-10-CM

## 2022-01-24 DIAGNOSIS — Z87.59 HISTORY OF GESTATIONAL HYPERTENSION: ICD-10-CM

## 2022-01-24 DIAGNOSIS — Z13.89 SCREENING FOR BLOOD OR PROTEIN IN URINE: ICD-10-CM

## 2022-01-24 DIAGNOSIS — O36.8130 DECREASED FETAL MOVEMENTS IN THIRD TRIMESTER, SINGLE OR UNSPECIFIED FETUS: ICD-10-CM

## 2022-01-24 DIAGNOSIS — O99.891 HISTORY OF POSTPARTUM DEPRESSION, CURRENTLY PREGNANT: ICD-10-CM

## 2022-01-24 DIAGNOSIS — Z86.59 HISTORY OF POSTPARTUM DEPRESSION, CURRENTLY PREGNANT: ICD-10-CM

## 2022-01-24 DIAGNOSIS — Z34.83 PRENATAL CARE, SUBSEQUENT PREGNANCY IN THIRD TRIMESTER: Primary | ICD-10-CM

## 2022-01-24 DIAGNOSIS — D69.6 BENIGN GESTATIONAL THROMBOCYTOPENIA IN THIRD TRIMESTER: ICD-10-CM

## 2022-01-24 DIAGNOSIS — O99.113 BENIGN GESTATIONAL THROMBOCYTOPENIA IN THIRD TRIMESTER: ICD-10-CM

## 2022-01-24 PROBLEM — O34.219 PREVIOUS CESAREAN SECTION COMPLICATING PREGNANCY: Status: ACTIVE | Noted: 2022-01-24

## 2022-01-24 LAB
ABO GROUP BLD: NORMAL
BILIRUB BLD-MCNC: NEGATIVE MG/DL
BLD GP AB SCN SERPL QL: NEGATIVE
DEPRECATED RDW RBC AUTO: 40.1 FL (ref 37–54)
ERYTHROCYTE [DISTWIDTH] IN BLOOD BY AUTOMATED COUNT: 13.9 % (ref 12.3–15.4)
GLUCOSE UR STRIP-MCNC: NEGATIVE MG/DL
HCT VFR BLD AUTO: 35 % (ref 34–46.6)
HGB BLD-MCNC: 11.8 G/DL (ref 12–15.9)
KETONES UR QL: ABNORMAL
LEUKOCYTE EST, POC: NEGATIVE
MCH RBC QN AUTO: 27.3 PG (ref 26.6–33)
MCHC RBC AUTO-ENTMCNC: 33.7 G/DL (ref 31.5–35.7)
MCV RBC AUTO: 81 FL (ref 79–97)
NITRITE UR-MCNC: NEGATIVE MG/ML
PH UR: 6.5 [PH] (ref 5–8)
PLATELET # BLD AUTO: 137 10*3/MM3 (ref 140–450)
PMV BLD AUTO: 13.2 FL (ref 6–12)
PROT UR STRIP-MCNC: NEGATIVE MG/DL
RBC # BLD AUTO: 4.32 10*6/MM3 (ref 3.77–5.28)
RBC # UR STRIP: NEGATIVE /UL
RH BLD: POSITIVE
SARS-COV-2 RNA RESP QL NAA+PROBE: NOT DETECTED
SP GR UR: 1.02 (ref 1–1.03)
T&S EXPIRATION DATE: NORMAL
UROBILINOGEN UR QL: NORMAL
WBC NRBC COR # BLD: 9.9 10*3/MM3 (ref 3.4–10.8)

## 2022-01-24 PROCEDURE — 0502F SUBSEQUENT PRENATAL CARE: CPT | Performed by: OBSTETRICS & GYNECOLOGY

## 2022-01-24 PROCEDURE — 88307 TISSUE EXAM BY PATHOLOGIST: CPT

## 2022-01-24 PROCEDURE — 86901 BLOOD TYPING SEROLOGIC RH(D): CPT | Performed by: OBSTETRICS & GYNECOLOGY

## 2022-01-24 PROCEDURE — 25010000002 MORPHINE PER 10 MG: Performed by: ANESTHESIOLOGY

## 2022-01-24 PROCEDURE — 25010000002 ONDANSETRON PER 1 MG: Performed by: ANESTHESIOLOGY

## 2022-01-24 PROCEDURE — 25010000002 FENTANYL CITRATE (PF) 50 MCG/ML SOLUTION: Performed by: ANESTHESIOLOGY

## 2022-01-24 PROCEDURE — 25010000002 GENTAMICIN PER 80 MG: Performed by: OBSTETRICS & GYNECOLOGY

## 2022-01-24 PROCEDURE — U0003 INFECTIOUS AGENT DETECTION BY NUCLEIC ACID (DNA OR RNA); SEVERE ACUTE RESPIRATORY SYNDROME CORONAVIRUS 2 (SARS-COV-2) (CORONAVIRUS DISEASE [COVID-19]), AMPLIFIED PROBE TECHNIQUE, MAKING USE OF HIGH THROUGHPUT TECHNOLOGIES AS DESCRIBED BY CMS-2020-01-R: HCPCS | Performed by: OBSTETRICS & GYNECOLOGY

## 2022-01-24 PROCEDURE — 59510 CESAREAN DELIVERY: CPT | Performed by: OBSTETRICS & GYNECOLOGY

## 2022-01-24 PROCEDURE — 86850 RBC ANTIBODY SCREEN: CPT | Performed by: OBSTETRICS & GYNECOLOGY

## 2022-01-24 PROCEDURE — 86900 BLOOD TYPING SEROLOGIC ABO: CPT | Performed by: OBSTETRICS & GYNECOLOGY

## 2022-01-24 PROCEDURE — 85027 COMPLETE CBC AUTOMATED: CPT | Performed by: OBSTETRICS & GYNECOLOGY

## 2022-01-24 RX ORDER — ONDANSETRON 2 MG/ML
4 INJECTION INTRAMUSCULAR; INTRAVENOUS ONCE AS NEEDED
Status: COMPLETED | OUTPATIENT
Start: 2022-01-24 | End: 2022-01-24

## 2022-01-24 RX ORDER — OXYTOCIN-SODIUM CHLORIDE 0.9% IV SOLN 30 UNIT/500ML 30-0.9/5 UT/ML-%
999 SOLUTION INTRAVENOUS ONCE
Status: COMPLETED | OUTPATIENT
Start: 2022-01-24 | End: 2022-01-24

## 2022-01-24 RX ORDER — ONDANSETRON 2 MG/ML
4 INJECTION INTRAMUSCULAR; INTRAVENOUS EVERY 6 HOURS PRN
Status: DISCONTINUED | OUTPATIENT
Start: 2022-01-24 | End: 2022-01-24 | Stop reason: HOSPADM

## 2022-01-24 RX ORDER — DIPHENHYDRAMINE HCL 25 MG
25 CAPSULE ORAL EVERY 4 HOURS PRN
Status: DISCONTINUED | OUTPATIENT
Start: 2022-01-24 | End: 2022-01-26 | Stop reason: HOSPADM

## 2022-01-24 RX ORDER — IBUPROFEN 600 MG/1
600 TABLET ORAL EVERY 6 HOURS PRN
Status: DISCONTINUED | OUTPATIENT
Start: 2022-01-24 | End: 2022-01-26 | Stop reason: HOSPADM

## 2022-01-24 RX ORDER — LIDOCAINE HYDROCHLORIDE 10 MG/ML
5 INJECTION, SOLUTION EPIDURAL; INFILTRATION; INTRACAUDAL; PERINEURAL AS NEEDED
Status: DISCONTINUED | OUTPATIENT
Start: 2022-01-24 | End: 2022-01-24 | Stop reason: HOSPADM

## 2022-01-24 RX ORDER — FENTANYL CITRATE 50 UG/ML
INJECTION, SOLUTION INTRAMUSCULAR; INTRAVENOUS
Status: COMPLETED | OUTPATIENT
Start: 2022-01-24 | End: 2022-01-24

## 2022-01-24 RX ORDER — HYDROMORPHONE HYDROCHLORIDE 1 MG/ML
0.5 INJECTION, SOLUTION INTRAMUSCULAR; INTRAVENOUS; SUBCUTANEOUS
Status: DISCONTINUED | OUTPATIENT
Start: 2022-01-24 | End: 2022-01-24 | Stop reason: HOSPADM

## 2022-01-24 RX ORDER — LIDOCAINE HYDROCHLORIDE 10 MG/ML
5 INJECTION, SOLUTION EPIDURAL; INFILTRATION; INTRACAUDAL; PERINEURAL AS NEEDED
Status: DISCONTINUED | OUTPATIENT
Start: 2022-01-24 | End: 2022-01-25

## 2022-01-24 RX ORDER — SODIUM CHLORIDE 0.9 % (FLUSH) 0.9 %
10 SYRINGE (ML) INJECTION EVERY 12 HOURS SCHEDULED
Status: DISCONTINUED | OUTPATIENT
Start: 2022-01-24 | End: 2022-01-25

## 2022-01-24 RX ORDER — ONDANSETRON 2 MG/ML
4 INJECTION INTRAMUSCULAR; INTRAVENOUS ONCE AS NEEDED
Status: COMPLETED | OUTPATIENT
Start: 2022-01-24 | End: 2022-01-25

## 2022-01-24 RX ORDER — MORPHINE SULFATE 2 MG/ML
2 INJECTION, SOLUTION INTRAMUSCULAR; INTRAVENOUS
Status: ACTIVE | OUTPATIENT
Start: 2022-01-24 | End: 2022-01-25

## 2022-01-24 RX ORDER — EPHEDRINE SULFATE 50 MG/ML
INJECTION, SOLUTION INTRAVENOUS AS NEEDED
Status: DISCONTINUED | OUTPATIENT
Start: 2022-01-24 | End: 2022-01-24 | Stop reason: SURG

## 2022-01-24 RX ORDER — DIPHENHYDRAMINE HYDROCHLORIDE 50 MG/ML
25 INJECTION INTRAMUSCULAR; INTRAVENOUS EVERY 4 HOURS PRN
Status: DISCONTINUED | OUTPATIENT
Start: 2022-01-24 | End: 2022-01-26 | Stop reason: HOSPADM

## 2022-01-24 RX ORDER — FAMOTIDINE 10 MG/ML
20 INJECTION, SOLUTION INTRAVENOUS ONCE AS NEEDED
Status: COMPLETED | OUTPATIENT
Start: 2022-01-24 | End: 2022-01-24

## 2022-01-24 RX ORDER — OXYTOCIN-SODIUM CHLORIDE 0.9% IV SOLN 30 UNIT/500ML 30-0.9/5 UT/ML-%
125 SOLUTION INTRAVENOUS CONTINUOUS PRN
Status: COMPLETED | OUTPATIENT
Start: 2022-01-24 | End: 2022-01-24

## 2022-01-24 RX ORDER — MORPHINE SULFATE 1 MG/ML
INJECTION, SOLUTION EPIDURAL; INTRATHECAL; INTRAVENOUS
Status: COMPLETED | OUTPATIENT
Start: 2022-01-24 | End: 2022-01-24

## 2022-01-24 RX ORDER — CLINDAMYCIN PHOSPHATE 900 MG/50ML
900 INJECTION INTRAVENOUS ONCE
Status: COMPLETED | OUTPATIENT
Start: 2022-01-24 | End: 2022-01-24

## 2022-01-24 RX ORDER — BUPIVACAINE HYDROCHLORIDE 7.5 MG/ML
INJECTION, SOLUTION EPIDURAL; RETROBULBAR AS NEEDED
Status: DISCONTINUED | OUTPATIENT
Start: 2022-01-24 | End: 2022-01-24 | Stop reason: SURG

## 2022-01-24 RX ORDER — METHYLERGONOVINE MALEATE 0.2 MG/ML
200 INJECTION INTRAVENOUS AS NEEDED
Status: DISCONTINUED | OUTPATIENT
Start: 2022-01-24 | End: 2022-01-24 | Stop reason: HOSPADM

## 2022-01-24 RX ORDER — OXYTOCIN-SODIUM CHLORIDE 0.9% IV SOLN 30 UNIT/500ML 30-0.9/5 UT/ML-%
250 SOLUTION INTRAVENOUS CONTINUOUS PRN
Status: ACTIVE | OUTPATIENT
Start: 2022-01-24 | End: 2022-01-24

## 2022-01-24 RX ORDER — CARBOPROST TROMETHAMINE 250 UG/ML
250 INJECTION, SOLUTION INTRAMUSCULAR AS NEEDED
Status: DISCONTINUED | OUTPATIENT
Start: 2022-01-24 | End: 2022-01-24 | Stop reason: HOSPADM

## 2022-01-24 RX ORDER — ONDANSETRON 4 MG/1
4 TABLET, FILM COATED ORAL EVERY 6 HOURS PRN
Status: DISCONTINUED | OUTPATIENT
Start: 2022-01-24 | End: 2022-01-24 | Stop reason: HOSPADM

## 2022-01-24 RX ORDER — SODIUM CHLORIDE 0.9 % (FLUSH) 0.9 %
10 SYRINGE (ML) INJECTION AS NEEDED
Status: DISCONTINUED | OUTPATIENT
Start: 2022-01-24 | End: 2022-01-25

## 2022-01-24 RX ORDER — SODIUM CHLORIDE, SODIUM LACTATE, POTASSIUM CHLORIDE, CALCIUM CHLORIDE 600; 310; 30; 20 MG/100ML; MG/100ML; MG/100ML; MG/100ML
125 INJECTION, SOLUTION INTRAVENOUS CONTINUOUS
Status: DISCONTINUED | OUTPATIENT
Start: 2022-01-24 | End: 2022-01-25

## 2022-01-24 RX ORDER — OXYCODONE HYDROCHLORIDE AND ACETAMINOPHEN 5; 325 MG/1; MG/1
2 TABLET ORAL EVERY 4 HOURS PRN
Status: DISCONTINUED | OUTPATIENT
Start: 2022-01-24 | End: 2022-01-26 | Stop reason: HOSPADM

## 2022-01-24 RX ORDER — ACETAMINOPHEN 500 MG
1000 TABLET ORAL ONCE
Status: COMPLETED | OUTPATIENT
Start: 2022-01-24 | End: 2022-01-24

## 2022-01-24 RX ORDER — SODIUM CHLORIDE 0.9 % (FLUSH) 0.9 %
10 SYRINGE (ML) INJECTION AS NEEDED
Status: DISCONTINUED | OUTPATIENT
Start: 2022-01-24 | End: 2022-01-24 | Stop reason: HOSPADM

## 2022-01-24 RX ADMIN — ACETAMINOPHEN 1000 MG: 500 TABLET ORAL at 19:39

## 2022-01-24 RX ADMIN — GENTAMICIN SULFATE 400 MG: 40 INJECTION, SOLUTION INTRAMUSCULAR; INTRAVENOUS at 19:39

## 2022-01-24 RX ADMIN — MORPHINE SULFATE 250 MCG: 1 INJECTION, SOLUTION EPIDURAL; INTRATHECAL; INTRAVENOUS at 20:02

## 2022-01-24 RX ADMIN — EPHEDRINE SULFATE 10 MG: 50 INJECTION INTRAVENOUS at 20:12

## 2022-01-24 RX ADMIN — FENTANYL CITRATE 20 MCG: 0.05 INJECTION, SOLUTION INTRAMUSCULAR; INTRAVENOUS at 20:02

## 2022-01-24 RX ADMIN — EPHEDRINE SULFATE 10 MG: 50 INJECTION INTRAVENOUS at 20:06

## 2022-01-24 RX ADMIN — SODIUM CHLORIDE, POTASSIUM CHLORIDE, SODIUM LACTATE AND CALCIUM CHLORIDE 125 ML/HR: 600; 310; 30; 20 INJECTION, SOLUTION INTRAVENOUS at 19:07

## 2022-01-24 RX ADMIN — BUPIVACAINE HYDROCHLORIDE 1.6 ML: 7.5 INJECTION, SOLUTION EPIDURAL; RETROBULBAR at 20:02

## 2022-01-24 RX ADMIN — FAMOTIDINE 20 MG: 10 INJECTION, SOLUTION INTRAVENOUS at 19:39

## 2022-01-24 RX ADMIN — OXYTOCIN 125 ML/HR: 10 INJECTION INTRAVENOUS at 21:52

## 2022-01-24 RX ADMIN — EPHEDRINE SULFATE 10 MG: 50 INJECTION INTRAVENOUS at 20:16

## 2022-01-24 RX ADMIN — OXYTOCIN-SODIUM CHLORIDE 0.9% IV SOLN 30 UNIT/500ML 999 ML/HR: 30-0.9/5 SOLUTION at 20:22

## 2022-01-24 RX ADMIN — ONDANSETRON 4 MG: 2 INJECTION INTRAMUSCULAR; INTRAVENOUS at 19:39

## 2022-01-24 RX ADMIN — EPHEDRINE SULFATE 10 MG: 50 INJECTION INTRAVENOUS at 20:23

## 2022-01-24 RX ADMIN — CLINDAMYCIN PHOSPHATE 900 MG: 18 INJECTION, SOLUTION INTRAMUSCULAR; INTRAVENOUS at 20:07

## 2022-01-24 RX ADMIN — EPHEDRINE SULFATE 10 MG: 50 INJECTION INTRAVENOUS at 20:32

## 2022-01-24 RX ADMIN — SODIUM CHLORIDE, POTASSIUM CHLORIDE, SODIUM LACTATE AND CALCIUM CHLORIDE 1000 ML: 600; 310; 30; 20 INJECTION, SOLUTION INTRAVENOUS at 18:17

## 2022-01-24 NOTE — ANESTHESIA PREPROCEDURE EVALUATION
Anesthesia Evaluation     no history of anesthetic complications:  NPO Solid Status: > 8 hours  NPO Liquid Status: > 2 hours           Airway   no difficulty expected  Dental - normal exam     Pulmonary - normal exam   (+) a smoker Former,   (-) COPD, asthma, sleep apnea    PE comment: nonlabored  Cardiovascular - normal exam    Rhythm: regular  Rate: normal    (+) hypertension,   (-) valvular problems/murmurs, past MI, CAD, dysrhythmias, angina      Neuro/Psych  (+) psychiatric history Anxiety and Depression,     (-) seizures, TIA, CVA  GI/Hepatic/Renal/Endo    (+) morbid obesity, GERD,    (-) liver disease, no renal disease, diabetes, no thyroid disorder    Musculoskeletal     (+) back pain, chronic pain,   Abdominal    Substance History      OB/GYN    (+) Pregnant (@38wks 6days), pregnancy induced hypertension    Comment: Previous ;  PCOS      Other   arthritis, blood dyscrasia (Plt 135) thrombocytopenia,     ROS/Med Hx Other: COVID(+)                Anesthesia Plan    ASA 3     spinal       Anesthetic plan, all risks, benefits, and alternatives have been provided, discussed and informed consent has been obtained with: patient.        CODE STATUS:

## 2022-01-24 NOTE — PROGRESS NOTES
Patient denies complaints but reports decreased FM   BPP today 8/8 - SOWMYA 5 cm   With oligo and decreased FM will move RLTCS to tonight - patient agrees with POC - To L&D

## 2022-01-25 LAB
BASOPHILS # BLD AUTO: 0.03 10*3/MM3 (ref 0–0.2)
BASOPHILS NFR BLD AUTO: 0.3 % (ref 0–1.5)
DEPRECATED RDW RBC AUTO: 44.7 FL (ref 37–54)
EOSINOPHIL # BLD AUTO: 0.04 10*3/MM3 (ref 0–0.4)
EOSINOPHIL NFR BLD AUTO: 0.5 % (ref 0.3–6.2)
ERYTHROCYTE [DISTWIDTH] IN BLOOD BY AUTOMATED COUNT: 14.3 % (ref 12.3–15.4)
HCT VFR BLD AUTO: 33.3 % (ref 34–46.6)
HGB BLD-MCNC: 10.6 G/DL (ref 12–15.9)
IMM GRANULOCYTES # BLD AUTO: 0.03 10*3/MM3 (ref 0–0.05)
IMM GRANULOCYTES NFR BLD AUTO: 0.3 % (ref 0–0.5)
LYMPHOCYTES # BLD AUTO: 0.82 10*3/MM3 (ref 0.7–3.1)
LYMPHOCYTES NFR BLD AUTO: 9.3 % (ref 19.6–45.3)
MCH RBC QN AUTO: 27 PG (ref 26.6–33)
MCHC RBC AUTO-ENTMCNC: 31.8 G/DL (ref 31.5–35.7)
MCV RBC AUTO: 84.9 FL (ref 79–97)
MONOCYTES # BLD AUTO: 0.45 10*3/MM3 (ref 0.1–0.9)
MONOCYTES NFR BLD AUTO: 5.1 % (ref 5–12)
NEUTROPHILS NFR BLD AUTO: 7.43 10*3/MM3 (ref 1.7–7)
NEUTROPHILS NFR BLD AUTO: 84.5 % (ref 42.7–76)
NRBC BLD AUTO-RTO: 0 /100 WBC (ref 0–0.2)
PLATELET # BLD AUTO: 119 10*3/MM3 (ref 140–450)
PMV BLD AUTO: 13 FL (ref 6–12)
RBC # BLD AUTO: 3.92 10*6/MM3 (ref 3.77–5.28)
WBC NRBC COR # BLD: 8.8 10*3/MM3 (ref 3.4–10.8)

## 2022-01-25 PROCEDURE — 25010000002 ENOXAPARIN PER 10 MG: Performed by: OBSTETRICS & GYNECOLOGY

## 2022-01-25 PROCEDURE — 25010000002 ONDANSETRON PER 1 MG: Performed by: ANESTHESIOLOGY

## 2022-01-25 PROCEDURE — 25010000002 ONDANSETRON PER 1 MG: Performed by: OBSTETRICS & GYNECOLOGY

## 2022-01-25 PROCEDURE — 85025 COMPLETE CBC W/AUTO DIFF WBC: CPT | Performed by: OBSTETRICS & GYNECOLOGY

## 2022-01-25 RX ORDER — PRENATAL VIT/IRON FUM/FOLIC AC 27MG-0.8MG
1 TABLET ORAL DAILY
Status: DISCONTINUED | OUTPATIENT
Start: 2022-01-25 | End: 2022-01-26 | Stop reason: HOSPADM

## 2022-01-25 RX ORDER — PROMETHAZINE HYDROCHLORIDE 12.5 MG/1
12.5 TABLET ORAL EVERY 4 HOURS PRN
Status: DISCONTINUED | OUTPATIENT
Start: 2022-01-25 | End: 2022-01-26 | Stop reason: HOSPADM

## 2022-01-25 RX ORDER — ONDANSETRON 4 MG/1
4 TABLET, FILM COATED ORAL EVERY 8 HOURS PRN
Status: DISCONTINUED | OUTPATIENT
Start: 2022-01-25 | End: 2022-01-26 | Stop reason: HOSPADM

## 2022-01-25 RX ORDER — NALOXONE HCL 0.4 MG/ML
0.2 VIAL (ML) INJECTION
Status: DISCONTINUED | OUTPATIENT
Start: 2022-01-25 | End: 2022-01-26 | Stop reason: HOSPADM

## 2022-01-25 RX ORDER — ONDANSETRON 2 MG/ML
4 INJECTION INTRAMUSCULAR; INTRAVENOUS EVERY 6 HOURS PRN
Status: DISCONTINUED | OUTPATIENT
Start: 2022-01-25 | End: 2022-01-26 | Stop reason: HOSPADM

## 2022-01-25 RX ORDER — AMOXICILLIN 250 MG
1 CAPSULE ORAL 2 TIMES DAILY
Status: DISCONTINUED | OUTPATIENT
Start: 2022-01-25 | End: 2022-01-26 | Stop reason: HOSPADM

## 2022-01-25 RX ORDER — SIMETHICONE 80 MG
80 TABLET,CHEWABLE ORAL 4 TIMES DAILY PRN
Status: DISCONTINUED | OUTPATIENT
Start: 2022-01-25 | End: 2022-01-26 | Stop reason: HOSPADM

## 2022-01-25 RX ADMIN — ONDANSETRON 4 MG: 2 INJECTION INTRAMUSCULAR; INTRAVENOUS at 07:53

## 2022-01-25 RX ADMIN — ONDANSETRON 4 MG: 2 INJECTION INTRAMUSCULAR; INTRAVENOUS at 01:38

## 2022-01-25 RX ADMIN — DOCUSATE SODIUM 50MG AND SENNOSIDES 8.6MG 1 TABLET: 8.6; 5 TABLET, FILM COATED ORAL at 20:48

## 2022-01-25 RX ADMIN — DOCUSATE SODIUM 50MG AND SENNOSIDES 8.6MG 1 TABLET: 8.6; 5 TABLET, FILM COATED ORAL at 09:52

## 2022-01-25 RX ADMIN — ENOXAPARIN SODIUM 40 MG: 100 INJECTION SUBCUTANEOUS at 20:50

## 2022-01-25 RX ADMIN — IBUPROFEN 600 MG: 600 TABLET, FILM COATED ORAL at 14:17

## 2022-01-25 RX ADMIN — IBUPROFEN 600 MG: 600 TABLET, FILM COATED ORAL at 20:48

## 2022-01-25 RX ADMIN — Medication 1 TABLET: at 09:52

## 2022-01-25 RX ADMIN — Medication 1 APPLICATION: at 10:48

## 2022-01-25 NOTE — ANESTHESIA PROCEDURE NOTES
Spinal Block      Patient reassessed immediately prior to procedure    Performed By  Anesthesiologist: Zbigniew Franks MD  Preanesthetic Checklist  Completed: patient identified, risks and benefits discussed, surgical consent, monitors and equipment checked, pre-op evaluation and timeout performed  Spinal Block Prep:  Patient Position:sitting  Sterile Tech:cap, gloves, mask and sterile barriers  Prep:Chloraprep  Patient Monitoring:blood pressure monitoring, continuous pulse oximetry and EKG  Spinal Block Procedure  Approach:midline  Guidance:landmark technique  Location:L3-L4  Needle Type:Pencan  Needle Gauge:24  Paresthesia: no  Fluid Appearance:clear  Medications: fentaNYL citrate (PF) (SUBLIMAZE) injection, 20 mcg  Morphine PF injection, 250 mcg  Med Administered at 1/24/2022 8:02 PM   Post Assessment  Patient Tolerance:patient tolerated the procedure well with no apparent complications  Complications no  Additional Notes  Negative for heme, negative aspiration, positive CSF

## 2022-01-25 NOTE — ANESTHESIA POSTPROCEDURE EVALUATION
Patient: Lavonne Lan    Procedure Summary     Date: 22 Room / Location:  [facility]    Anesthesia Start:  Anesthesia Stop:     Procedure:  SECTION REPEAT (N/A Abdomen) Diagnosis:       Morbid obesity with BMI of 40.0-44.9, adult ([facility])      History of gestational hypertension      Previous  section      Previous pregnancy with congenital heart defect, currently pregnant      Chronic bilateral low back pain with sciatica, sciatica laterality unspecified      (Morbid obesity with BMI of 40.0-44.9, adult (HCC) [E66.01, Z68.41])      (History of gestational hypertension [Z87.59])      (Previous  section [Z98.891])      (Previous pregnancy with congenital heart defect, currently pregnant [O09.299])      (Chronic bilateral low back pain with sciatica, sciatica laterality unspecified [M54.40, G89.29])    Surgeons: Ursula Mixon MD Provider: Zbigniew Franks MD    Anesthesia Type: spinal ASA Status: 3          Anesthesia Type: spinal    Vitals  Vitals Value Taken Time   /54 22   Temp 36.4 °C (97.6 °F) 22 210   Pulse 78 22   Resp 16 22 2215   SpO2 98 % 22   Vitals shown include unvalidated device data.        Post Anesthesia Care and Evaluation    Patient location during evaluation: bedside  Pain management: adequate  Airway patency: patent  Anesthetic complications: No anesthetic complications    Cardiovascular status: acceptable  Respiratory status: acceptable  Hydration status: acceptable

## 2022-01-26 VITALS
BODY MASS INDEX: 45.41 KG/M2 | DIASTOLIC BLOOD PRESSURE: 72 MMHG | HEART RATE: 76 BPM | SYSTOLIC BLOOD PRESSURE: 113 MMHG | WEIGHT: 266 LBS | HEIGHT: 64 IN | OXYGEN SATURATION: 100 % | TEMPERATURE: 98.1 F | RESPIRATION RATE: 16 BRPM

## 2022-01-26 PROCEDURE — 0503F POSTPARTUM CARE VISIT: CPT | Performed by: OBSTETRICS & GYNECOLOGY

## 2022-01-26 RX ORDER — OXYCODONE HYDROCHLORIDE AND ACETAMINOPHEN 5; 325 MG/1; MG/1
2 TABLET ORAL EVERY 4 HOURS PRN
Qty: 20 TABLET | Refills: 0 | Status: SHIPPED | OUTPATIENT
Start: 2022-01-26 | End: 2022-01-31

## 2022-01-26 RX ORDER — IBUPROFEN 600 MG/1
600 TABLET ORAL EVERY 6 HOURS PRN
Qty: 30 TABLET | Refills: 3 | Status: SHIPPED | OUTPATIENT
Start: 2022-01-26 | End: 2022-03-21 | Stop reason: SDUPTHER

## 2022-01-26 RX ADMIN — Medication 1 TABLET: at 10:29

## 2022-01-26 RX ADMIN — IBUPROFEN 600 MG: 600 TABLET, FILM COATED ORAL at 05:00

## 2022-01-26 RX ADMIN — IBUPROFEN 600 MG: 600 TABLET, FILM COATED ORAL at 10:29

## 2022-01-26 RX ADMIN — DOCUSATE SODIUM 50MG AND SENNOSIDES 8.6MG 1 TABLET: 8.6; 5 TABLET, FILM COATED ORAL at 10:29

## 2022-02-01 ENCOUNTER — POSTPARTUM VISIT (OUTPATIENT)
Dept: OBSTETRICS AND GYNECOLOGY | Age: 27
End: 2022-02-01

## 2022-02-01 VITALS
SYSTOLIC BLOOD PRESSURE: 132 MMHG | DIASTOLIC BLOOD PRESSURE: 82 MMHG | HEIGHT: 64 IN | WEIGHT: 250 LBS | BODY MASS INDEX: 42.68 KG/M2

## 2022-02-01 DIAGNOSIS — Z13.89 SCREENING FOR BLOOD OR PROTEIN IN URINE: Primary | ICD-10-CM

## 2022-02-01 DIAGNOSIS — R30.0 DYSURIA: ICD-10-CM

## 2022-02-01 LAB
BILIRUB BLD-MCNC: NEGATIVE MG/DL
CLARITY, POC: CLEAR
COLOR UR: YELLOW
GLUCOSE UR STRIP-MCNC: NEGATIVE MG/DL
KETONES UR QL: NEGATIVE
LEUKOCYTE EST, POC: ABNORMAL
NITRITE UR-MCNC: NEGATIVE MG/ML
PH UR: 6 [PH] (ref 5–8)
PROT UR STRIP-MCNC: NEGATIVE MG/DL
RBC # UR STRIP: ABNORMAL /UL
SP GR UR: 1.02 (ref 1–1.03)
UROBILINOGEN UR QL: NORMAL

## 2022-02-01 PROCEDURE — 0503F POSTPARTUM CARE VISIT: CPT | Performed by: PHYSICIAN ASSISTANT

## 2022-02-01 RX ORDER — CEPHALEXIN 500 MG/1
500 CAPSULE ORAL 4 TIMES DAILY
Qty: 40 CAPSULE | Refills: 0 | Status: SHIPPED | OUTPATIENT
Start: 2022-02-01 | End: 2022-02-11

## 2022-02-01 RX ORDER — DIAPER,BRIEF,INFANT-TODD,DISP
EACH MISCELLANEOUS
Qty: 30 G | Refills: 1 | Status: SHIPPED | OUTPATIENT
Start: 2022-02-01 | End: 2022-02-15

## 2022-02-01 NOTE — PROGRESS NOTES
Patient presents for a postpartum visit. She is 8 days postpartum following a low cervical transverse  section. I have fully reviewed the prenatal and intrapartum course. The delivery was at 38 gestational weeks. Outcome: repeat  section, low transverse incision. Anesthesia: spinal. Postpartum course has been good. Baby's course has been good. Baby is feeding by breast. Bleeding staining only. Bowel function is normal. Bladder function is abnormal: hesitancy and retention. Patient is not sexually active. Contraception method is abstinence. Postpartum depression screening: negative.    She is doing well  But is noting a rash on her belly  Onset after she was discharged home  It's bumpy and hasn't used anything yet  She was worried about using medication while BF so hasn't used topical or oral medication    She has had issues with urinating and is noting burning with it  Also feels like she is not emptying   Did have a catheter placed    Bowels are getting better  Was slightly constipated but r/s her stool softener    She is BF  It is going well  Happy she is able to get     The following portions of the patient's history were reviewed and updated as appropriate: allergies, current medications, past family history, past medical history, past social history, past surgical history and problem list.    Review of Systems  Genitourinary:positive for hesitancy        Vitals:    22 1418   BP: 132/82       General:  alert, appears stated age and cooperative    Breasts:  na   Lungs: na   Heart:  na   Abdomen: abnormal findings:  incision well healed. non tender. rash noted primarily in stretch marks, mild erythema noted    Vulva:  normal   Vagina: not evaluated   Cervix:  na   Corpus: not examined   Adnexa:  not evaluated   Rectal Exam: external hemorrhoids         8 day problem postpartum exam. Pap smear not done at today's visit.    1. Contraception: abstinence  2. Enc to start benadryl 25 mg po q  4-6 hours prn itch and can take 2 at night. Monitor for fatigue. Also sent in topical hydrocortisone for pt to use prn itch. Avoid hot water and use cool compresses. Suspect PUPPS and will subside with time  3. Follow up in: 5 weeks or as needed.    Start keflex for possible UTI. PCN allergy but has taken keflex in past with no issues. Will send culture as well

## 2022-02-05 LAB
BACTERIA UR CULT: ABNORMAL
OTHER ANTIBIOTIC SUSC ISLT: ABNORMAL

## 2022-02-07 ENCOUNTER — POSTPARTUM VISIT (OUTPATIENT)
Dept: OBSTETRICS AND GYNECOLOGY | Age: 27
End: 2022-02-07

## 2022-02-07 ENCOUNTER — TELEPHONE (OUTPATIENT)
Dept: OBSTETRICS AND GYNECOLOGY | Age: 27
End: 2022-02-07

## 2022-02-07 VITALS
HEIGHT: 64 IN | WEIGHT: 245 LBS | DIASTOLIC BLOOD PRESSURE: 84 MMHG | BODY MASS INDEX: 41.83 KG/M2 | SYSTOLIC BLOOD PRESSURE: 132 MMHG

## 2022-02-07 PROBLEM — O99.119 GESTATIONAL THROMBOCYTOPENIA (HCC): Status: RESOLVED | Noted: 2021-12-30 | Resolved: 2022-02-07

## 2022-02-07 PROBLEM — Z98.891 PREVIOUS CESAREAN SECTION: Status: RESOLVED | Noted: 2021-07-28 | Resolved: 2022-02-07

## 2022-02-07 PROBLEM — B96.89 BV (BACTERIAL VAGINOSIS): Status: RESOLVED | Noted: 2021-07-21 | Resolved: 2022-02-07

## 2022-02-07 PROBLEM — D69.6 GESTATIONAL THROMBOCYTOPENIA (HCC): Status: RESOLVED | Noted: 2021-12-30 | Resolved: 2022-02-07

## 2022-02-07 PROBLEM — N76.0 BV (BACTERIAL VAGINOSIS): Status: RESOLVED | Noted: 2021-07-21 | Resolved: 2022-02-07

## 2022-02-07 PROBLEM — O09.299 PREVIOUS PREGNANCY WITH CONGENITAL HEART DEFECT, CURRENTLY PREGNANT: Status: RESOLVED | Noted: 2021-07-28 | Resolved: 2022-02-07

## 2022-02-07 PROBLEM — R12 HEARTBURN: Status: RESOLVED | Noted: 2021-11-03 | Resolved: 2022-02-07

## 2022-02-07 PROBLEM — Z87.59 HISTORY OF GESTATIONAL HYPERTENSION: Status: RESOLVED | Noted: 2021-07-28 | Resolved: 2022-02-07

## 2022-02-07 PROBLEM — K64.9 HEMORRHOIDS: Status: RESOLVED | Noted: 2019-05-20 | Resolved: 2022-02-07

## 2022-02-07 PROBLEM — Z86.59 HISTORY OF POSTPARTUM DEPRESSION, CURRENTLY PREGNANT: Status: RESOLVED | Noted: 2019-05-01 | Resolved: 2022-02-07

## 2022-02-07 PROBLEM — O21.9 NAUSEA AND VOMITING DURING PREGNANCY: Status: RESOLVED | Noted: 2021-06-17 | Resolved: 2022-02-07

## 2022-02-07 PROBLEM — O34.219 PREVIOUS CESAREAN SECTION COMPLICATING PREGNANCY: Status: RESOLVED | Noted: 2022-01-24 | Resolved: 2022-02-07

## 2022-02-07 PROBLEM — O36.8130 DECREASED FETAL MOVEMENTS IN THIRD TRIMESTER: Status: RESOLVED | Noted: 2022-01-24 | Resolved: 2022-02-07

## 2022-02-07 PROBLEM — O41.03X0 OLIGOHYDRAMNIOS IN THIRD TRIMESTER: Status: RESOLVED | Noted: 2022-01-24 | Resolved: 2022-02-07

## 2022-02-07 PROBLEM — O09.299 HISTORY OF MISCARRIAGE, CURRENTLY PREGNANT: Status: RESOLVED | Noted: 2021-06-28 | Resolved: 2022-02-07

## 2022-02-07 PROBLEM — E66.01 MORBID OBESITY WITH BMI OF 40.0-44.9, ADULT: Status: RESOLVED | Noted: 2021-06-28 | Resolved: 2022-02-07

## 2022-02-07 PROBLEM — O99.891 HISTORY OF POSTPARTUM DEPRESSION, CURRENTLY PREGNANT: Status: RESOLVED | Noted: 2019-05-01 | Resolved: 2022-02-07

## 2022-02-07 PROCEDURE — 99213 OFFICE O/P EST LOW 20 MIN: CPT | Performed by: OBSTETRICS & GYNECOLOGY

## 2022-02-07 PROCEDURE — 0503F POSTPARTUM CARE VISIT: CPT | Performed by: OBSTETRICS & GYNECOLOGY

## 2022-02-07 NOTE — TELEPHONE ENCOUNTER
----- Message from BRAD Olivera sent at 2/7/2022  8:28 AM EST -----  Notify patient that her urine culture returned positive for infection. Bianca started her on keflex which should take care of the infection. Hopefully she is feeling better.

## 2022-02-07 NOTE — PROGRESS NOTES
"Subjective   Lavonne Lan is a 26 y.o. female who presents for a postpartum visit. She is 2 weeks postpartum following a low cervical transverse  section. I have fully reviewed the prenatal and intrapartum course. The delivery was at 38-6 gestational weeks. Outcome: repeat  section, low transverse incision. Anesthesia: spinal. Postpartum course has been uncomplicated. Baby's course has been uncomplicated. Baby is feeding by breast. Bleeding thin lochia. Bowel function is normal. Bladder function is normal. Patient is not sexually active. Contraception method is oral progesterone-only contraceptive. Postpartum depression screening: positive.    The following portions of the patient's history were reviewed and updated as appropriate: allergies, current medications, past family history, past medical history, past social history, past surgical history and problem list.    Review of Systems  Pertinent items are noted in HPI.    Objective   /84   Ht 162.6 cm (64\")   Wt 111 kg (245 lb)   Breastfeeding Yes   BMI 42.05 kg/m²    General:  alert, appears stated age and cooperative   Abdomen: soft, non-tender; bowel sounds normal; no masses,  no organomegaly and Inc C/D/I      Assessment/Plan   postpartum exam. Pap smear not done at today's visit.    1. Contraception: oral progesterone-only contraceptive  2. PPD - start Zoloft   3. Follow up in: 4 weeks or as needed.           "

## 2022-03-07 ENCOUNTER — TELEPHONE (OUTPATIENT)
Dept: OBSTETRICS AND GYNECOLOGY | Age: 27
End: 2022-03-07

## 2022-03-07 NOTE — TELEPHONE ENCOUNTER
Called to check on patient - 6 wks pp - zoloft works really well - ppd . She will follow up next week 3/14/22.

## 2022-03-07 NOTE — TELEPHONE ENCOUNTER
PT unable to come in for pp check today, please advise where to r/s or if ok to schedule with NP, next available with Dr Mixon in April

## 2022-03-14 ENCOUNTER — POSTPARTUM VISIT (OUTPATIENT)
Dept: OBSTETRICS AND GYNECOLOGY | Age: 27
End: 2022-03-14

## 2022-03-14 VITALS
HEIGHT: 64 IN | DIASTOLIC BLOOD PRESSURE: 70 MMHG | WEIGHT: 228 LBS | SYSTOLIC BLOOD PRESSURE: 124 MMHG | BODY MASS INDEX: 38.93 KG/M2

## 2022-03-14 PROCEDURE — 0503F POSTPARTUM CARE VISIT: CPT | Performed by: OBSTETRICS & GYNECOLOGY

## 2022-03-14 NOTE — PROGRESS NOTES
"Subjective   Lavonne Lan is a 26 y.o. female who presents for a postpartum visit. She is 6 weeks postpartum following a low cervical transverse  section. I have fully reviewed the prenatal and intrapartum course. The delivery was at 38-6 gestational weeks. Outcome: repeat  section, low transverse incision. Anesthesia: spinal. Postpartum course has been uncomplicated. Baby's course has been uncomplicated. Baby is feeding by breast. Bleeding no bleeding. Bowel function is normal. Bladder function is normal. Patient is not sexually active. Contraception method is oral progesterone-only contraceptive. Postpartum depression screening: positive.    The following portions of the patient's history were reviewed and updated as appropriate: allergies, current medications, past family history, past medical history, past social history, past surgical history and problem list.    Review of Systems  Pertinent items are noted in HPI.    Objective   /70   Ht 162.6 cm (64\")   Wt 103 kg (228 lb)   LMP  (LMP Unknown)   Breastfeeding Yes   BMI 39.14 kg/m²    General:  alert, appears stated age and cooperative   Abdomen: soft, non-tender; bowel sounds normal; no masses,  no organomegaly and Inc C/D/I       Assessment/Plan    postpartum exam. Pap smear not done at today's visit.    1. Contraception: oral progesterone-only contraceptive  2. PPD - stable on Zoloft   3. Follow up in: 3 months or as needed.           "

## 2022-03-19 DIAGNOSIS — M54.50 CHRONIC BILATERAL LOW BACK PAIN WITHOUT SCIATICA: ICD-10-CM

## 2022-03-19 DIAGNOSIS — G89.29 CHRONIC BILATERAL LOW BACK PAIN WITHOUT SCIATICA: ICD-10-CM

## 2022-03-19 DIAGNOSIS — M79.10 MYALGIA: ICD-10-CM

## 2022-03-21 RX ORDER — IBUPROFEN 800 MG/1
TABLET ORAL
Qty: 90 TABLET | Refills: 4 | Status: SHIPPED | OUTPATIENT
Start: 2022-03-21

## 2022-04-08 ENCOUNTER — OFFICE VISIT (OUTPATIENT)
Dept: FAMILY MEDICINE CLINIC | Facility: CLINIC | Age: 27
End: 2022-04-08

## 2022-04-08 VITALS
HEART RATE: 85 BPM | DIASTOLIC BLOOD PRESSURE: 76 MMHG | HEIGHT: 64 IN | WEIGHT: 250 LBS | OXYGEN SATURATION: 99 % | SYSTOLIC BLOOD PRESSURE: 110 MMHG | BODY MASS INDEX: 42.68 KG/M2

## 2022-04-08 DIAGNOSIS — E66.01 MORBID (SEVERE) OBESITY DUE TO EXCESS CALORIES: ICD-10-CM

## 2022-04-08 DIAGNOSIS — Z00.00 WELLNESS EXAMINATION: Primary | ICD-10-CM

## 2022-04-08 PROCEDURE — 99395 PREV VISIT EST AGE 18-39: CPT | Performed by: FAMILY MEDICINE

## 2022-04-08 RX ORDER — FLUTICASONE PROPIONATE 50 MCG
2 SPRAY, SUSPENSION (ML) NASAL DAILY
Qty: 48 G | Refills: 3 | Status: SHIPPED | OUTPATIENT
Start: 2022-04-08

## 2022-04-08 NOTE — PROGRESS NOTES
Subjective   Lavonne Lan is a 26 y.o. female. Presents today for   Chief Complaint   Patient presents with   • Annual Exam     Wellness         History of Present Illness  Patient here for wellness exam;  Has chronic low back pain;  Doing well on iburofen;  No cp/soa;  Baby is healthy as had end of January.   Sees Gyn for wellness exam;  Reports struggling with weight gain since had baby;  Is breast feeding.      Review of Systems   Respiratory: Negative for shortness of breath.    Cardiovascular: Negative for chest pain and palpitations.   Gastrointestinal: Negative for abdominal pain.       Patient Active Problem List   Diagnosis   • Bulging lumbar disc   • Degeneration of intervertebral disc of lumbar region   • Lumbar facet arthropathy   • Lumbar radiculitis   • Chronic bilateral low back pain with sciatica   • Postpartum depression       Social History     Socioeconomic History   • Marital status:      Spouse name: BOBBY   • Number of children: 0   • Years of education: 12   Tobacco Use   • Smoking status: Former Smoker     Packs/day: 0.50     Years: 6.00     Pack years: 3.00     Types: Cigarettes     Start date: 2010     Quit date: 5/7/2018     Years since quitting: 3.9   • Smokeless tobacco: Never Used   Vaping Use   • Vaping Use: Former   Substance and Sexual Activity   • Alcohol use: No   • Drug use: No   • Sexual activity: Yes     Partners: Male     Birth control/protection: None     Comment: spouse = BOBBY       Allergies   Allergen Reactions   • Amoxicillin Rash   • Buspar [Buspirone] Rash   • Penicillins Rash       Current Outpatient Medications on File Prior to Visit   Medication Sig Dispense Refill   • ibuprofen (ADVIL,MOTRIN) 800 MG tablet TAKE 1 TABLET BY MOUTH EVERY 8 HOURS AS NEEDED FOR BACK PAIN 90 tablet 4   • Prenatal Vit-Fe Fumarate-FA (prenatal vitamin 27-0.8) 27-0.8 MG tablet tablet Take  by mouth Daily.     • sertraline (Zoloft) 50 MG tablet Take 1 tablet by mouth Daily. 90  "tablet 3     Current Facility-Administered Medications on File Prior to Visit   Medication Dose Route Frequency Provider Last Rate Last Admin   • perflutren (DEFINITY) 8.476 mg in sodium chloride 0.9 % 10 mL injection  3 mL Intravenous Once Nickolas Lr DO           Objective   Vitals:    04/08/22 1422   BP: 110/76   Pulse: 85   SpO2: 99%   Weight: 113 kg (250 lb)   Height: 162.6 cm (64\")     Body mass index is 42.91 kg/m².    Physical Exam  Vitals and nursing note reviewed.   Constitutional:       Appearance: She is well-developed.   HENT:      Head: Normocephalic and atraumatic.   Neck:      Thyroid: No thyromegaly.      Vascular: No JVD.   Cardiovascular:      Rate and Rhythm: Normal rate and regular rhythm.      Heart sounds: Normal heart sounds. No murmur heard.    No friction rub. No gallop.   Pulmonary:      Effort: Pulmonary effort is normal. No respiratory distress.      Breath sounds: Normal breath sounds. No wheezing or rales.   Abdominal:      General: Bowel sounds are normal. There is no distension.      Palpations: Abdomen is soft.      Tenderness: There is no abdominal tenderness. There is no guarding or rebound.   Musculoskeletal:      Cervical back: Neck supple.   Skin:     General: Skin is warm and dry.   Neurological:      Mental Status: She is alert.   Psychiatric:         Behavior: Behavior normal.         Assessment/Plan   Diagnoses and all orders for this visit:    1. Wellness examination (Primary)    2. Morbid (severe) obesity due to excess calories (HCC)    counseled on diet and exercise         -Follow up: 12 months and prn   "

## 2022-04-08 NOTE — PATIENT INSTRUCTIONS
"\"4-1-3-Almost None!\"  Healthy Habits Start Early    EAT 5 OR MORE SERVINGS OF VEGETABLES AND FRUITS EVERY DAY.    Help Lavonne get three vegetables and two fruits each day. Red, green, yellow, orange...encourage them to try all the colors so they can enjoy different flavors and get more vitamins.    How can I help Lavonne do this?  ---------------------------------------------  -BE PATIENT WITH Lavonne, remember it may take 10 times before they start to like new food. So, start with small bites and just keep trying.  -Serve at least one vegetable or fruit at every meal. Even try two. Remember, portions do not have to be as big as you think.  -Encourage eating fruits and vegetables instead of drinking them..it's a better way to get fiber and vitamins..so limit the amount of juice to 1/2 cup per day for children 1-6 years and one cup per day for children 7-18 years of age. Try using 1/2 part water and 1/2 part juice.    Spend less than two hours per day watching television and other screen media. Screen media includes video games, movies and computer use for entertainment.    How can I help Lavonne do this?  -Turn off the TV at dinner. Dinner is the best time to hang out with your kids and just talk, learn about their day, and tell them about your day. Your kids have a lot to learn from you and dinner is a great time to share.  "

## 2022-05-07 ENCOUNTER — TELEPHONE (OUTPATIENT)
Dept: OBSTETRICS AND GYNECOLOGY | Age: 27
End: 2022-05-07

## 2022-05-07 NOTE — TELEPHONE ENCOUNTER
Pt contacted MD on call to discuss medication safety while breastfeeding. She was given topical silver sulfadiazine for a burn on her hand. The prescribing MD advised her it is safe with lactation but she has read other things on the internet. Reviewed drug information from various references. Risk noted if applied to extensive areas due to potential for some systemic absorption. Discussed low risk with small application. Patient concerned and plans to discontinue use.

## 2022-05-12 RX ORDER — SERTRALINE HYDROCHLORIDE 100 MG/1
100 TABLET, FILM COATED ORAL DAILY
Qty: 90 TABLET | Refills: 3 | Status: SHIPPED | OUTPATIENT
Start: 2022-05-12 | End: 2023-05-12

## 2022-05-12 RX ORDER — SERTRALINE HYDROCHLORIDE 100 MG/1
100 TABLET, FILM COATED ORAL DAILY
Qty: 90 TABLET | Refills: 3 | Status: SHIPPED | OUTPATIENT
Start: 2022-05-12 | End: 2022-05-12 | Stop reason: SDUPTHER

## 2022-05-12 NOTE — TELEPHONE ENCOUNTER
----- Message from Lavonne Riky sent at 5/12/2022  2:19 PM EDT -----  Regarding: Zoloft increase   Hi, Dr Mixon has me on 50mg of Zoloft. This has been helping but now that I am back at work I feel an increase in my anxiety and wondered if I was able to increase my Zoloft.   Thank you

## 2022-05-31 RX ORDER — OFLOXACIN 3 MG/ML
5 SOLUTION AURICULAR (OTIC) DAILY
Qty: 5 ML | Refills: 0 | Status: SHIPPED | OUTPATIENT
Start: 2022-05-31 | End: 2022-10-03

## 2022-07-06 DIAGNOSIS — M54.42 CHRONIC BILATERAL LOW BACK PAIN WITH BILATERAL SCIATICA: Primary | ICD-10-CM

## 2022-07-06 DIAGNOSIS — G89.29 CHRONIC BILATERAL LOW BACK PAIN WITH BILATERAL SCIATICA: Primary | ICD-10-CM

## 2022-07-06 DIAGNOSIS — M54.41 CHRONIC BILATERAL LOW BACK PAIN WITH BILATERAL SCIATICA: Primary | ICD-10-CM

## 2022-07-07 ENCOUNTER — HOSPITAL ENCOUNTER (OUTPATIENT)
Dept: GENERAL RADIOLOGY | Facility: HOSPITAL | Age: 27
Discharge: HOME | End: 2022-07-07
Admitting: FAMILY MEDICINE

## 2022-07-07 DIAGNOSIS — M54.42 CHRONIC BILATERAL LOW BACK PAIN WITH BILATERAL SCIATICA: ICD-10-CM

## 2022-07-07 DIAGNOSIS — M54.41 CHRONIC BILATERAL LOW BACK PAIN WITH BILATERAL SCIATICA: ICD-10-CM

## 2022-07-07 DIAGNOSIS — G89.29 CHRONIC BILATERAL LOW BACK PAIN WITH BILATERAL SCIATICA: ICD-10-CM

## 2022-07-07 PROCEDURE — 72114 X-RAY EXAM L-S SPINE BENDING: CPT

## 2022-08-02 ENCOUNTER — TRANSCRIBE ORDERS (OUTPATIENT)
Dept: ADMINISTRATIVE | Facility: HOSPITAL | Age: 27
End: 2022-08-02

## 2022-08-02 DIAGNOSIS — M54.50 LOW BACK PAIN, UNSPECIFIED BACK PAIN LATERALITY, UNSPECIFIED CHRONICITY, UNSPECIFIED WHETHER SCIATICA PRESENT: Primary | ICD-10-CM

## 2022-08-18 ENCOUNTER — APPOINTMENT (OUTPATIENT)
Dept: MRI IMAGING | Facility: HOSPITAL | Age: 27
End: 2022-08-18

## 2022-08-18 ENCOUNTER — HOSPITAL ENCOUNTER (OUTPATIENT)
Dept: MRI IMAGING | Facility: HOSPITAL | Age: 27
Discharge: HOME | End: 2022-08-18
Admitting: NURSE PRACTITIONER

## 2022-08-18 DIAGNOSIS — M54.50 LOW BACK PAIN, UNSPECIFIED BACK PAIN LATERALITY, UNSPECIFIED CHRONICITY, UNSPECIFIED WHETHER SCIATICA PRESENT: ICD-10-CM

## 2022-08-18 PROCEDURE — 72148 MRI LUMBAR SPINE W/O DYE: CPT

## 2022-10-03 ENCOUNTER — TELEMEDICINE (OUTPATIENT)
Dept: FAMILY MEDICINE CLINIC | Facility: TELEHEALTH | Age: 27
End: 2022-10-03

## 2022-10-03 DIAGNOSIS — J01.90 ACUTE NON-RECURRENT SINUSITIS, UNSPECIFIED LOCATION: ICD-10-CM

## 2022-10-03 DIAGNOSIS — R05.9 COUGH, UNSPECIFIED TYPE: ICD-10-CM

## 2022-10-03 DIAGNOSIS — H10.9 CONJUNCTIVITIS OF BOTH EYES, UNSPECIFIED CONJUNCTIVITIS TYPE: Primary | ICD-10-CM

## 2022-10-03 PROCEDURE — 99213 OFFICE O/P EST LOW 20 MIN: CPT | Performed by: NURSE PRACTITIONER

## 2022-10-03 RX ORDER — CEFDINIR 300 MG/1
300 CAPSULE ORAL 2 TIMES DAILY
Qty: 20 CAPSULE | Refills: 0 | Status: SHIPPED | OUTPATIENT
Start: 2022-10-03 | End: 2022-10-13

## 2022-10-03 RX ORDER — POLYMYXIN B SULFATE AND TRIMETHOPRIM 1; 10000 MG/ML; [USP'U]/ML
1 SOLUTION OPHTHALMIC EVERY 4 HOURS
Qty: 10 ML | Refills: 0 | Status: SHIPPED | OUTPATIENT
Start: 2022-10-03 | End: 2022-11-06

## 2022-10-03 RX ORDER — ALBUTEROL SULFATE 90 UG/1
2 AEROSOL, METERED RESPIRATORY (INHALATION) EVERY 4 HOURS PRN
Qty: 6.7 G | Refills: 0 | Status: SHIPPED | OUTPATIENT
Start: 2022-10-03 | End: 2022-12-12

## 2022-10-03 NOTE — PATIENT INSTRUCTIONS
Continue Flonase nasal spray.   You may use plain Dextromethorphan cough syrup as needed.   Alternate tylenol and motrin for pain and/or fever, stay hydrated and rest.     You may use cool compresses as needed for comfort.  Wash pillowcases and discard any eye makeup such as mascara or eyeliner.  Discard contact lenses if used during this time and wear glasses until treatment is complete.   Avoid rubbing eyes and use frequent hand hygiene.     If symptoms worsen or do not improve follow up with your PCP or visit your nearest Urgent Care Center or ER.

## 2022-10-03 NOTE — PROGRESS NOTES
Subjective   Chief Complaint   Patient presents with   • URI   • Conjunctivitis       Lavonne Lan is a 27 y.o. female.     History of Present Illness  Patient reports cough, congestion, intermittent sore throat, postnasal drainage and earache off and on for few weeks.  Sputum is brown and nasal drainage is bright green.  She does have some mild shortness of breath and respiratory tightness.  She has been limited to what medicine she can take over-the-counter for her symptoms because she is breast-feeding.  She has taken several COVID test that have been negative over the past few weeks.    She also reports having bilateral eye itching, crusting and drainage for about 2 days.  Symptoms began with the left eye and moved into the right.  She does not wear contact lenses.  URI   This is a new problem. Episode onset: 3 weeks. The problem has been unchanged. There has been no fever. Associated symptoms include congestion, coughing, ear pain and a sore throat. Pertinent negatives include no chest pain, diarrhea, headaches, nausea, sinus pain, swollen glands, vomiting or wheezing.   Conjunctivitis   The current episode started 2 days ago. The onset was gradual. The problem has been gradually worsening. Associated symptoms include eye itching, congestion, ear pain, sore throat, cough, URI, eye discharge and eye redness. Pertinent negatives include no fever, no decreased vision, no double vision, no photophobia, no diarrhea, no nausea, no vomiting, no ear discharge, no headaches, no hearing loss, no mouth sores, no stridor, no swollen glands, no wheezing and no eye pain. Both eyes are affected.The eyelid exhibits no abnormality.        Allergies   Allergen Reactions   • Amoxicillin Rash   • Buspar [Buspirone] Rash   • Penicillins Rash       Past Medical History:   Diagnosis Date   • Anxiety    • Arthritis    • Chronic bilateral low back pain with sciatica    • Depression    • GERD (gastroesophageal reflux disease)    •  Gestational hypertension    • Klebsiella cystitis 2017   • PCOS (polycystic ovarian syndrome)    • Polycystic ovary syndrome        Past Surgical History:   Procedure Laterality Date   •  SECTION N/A 2019    Procedure:  SECTION PRIMARY;  Surgeon: Annette Gomez MD;  Location: [facility] LABOR DELIVERY;  Service: Obstetrics/Gynecology   •  SECTION N/A 2022    Procedure:  SECTION REPEAT;  Surgeon: Ursula Mixon MD;  Location: [facility] LABOR DELIVERY;  Service: Obstetrics/Gynecology;  Laterality: N/A;   • D & C WITH SUCTION N/A 10/12/2017    Procedure: DILATATION AND CURETTAGE WITH SUCTION;  Surgeon: Yomi Telles MD;  Location: [facility] MAIN OR;  Service:    • DILATATION AND CURETTAGE  10/2017   • WISDOM TOOTH EXTRACTION         Social History     Socioeconomic History   • Marital status:      Spouse name: BOBBY   • Number of children: 0   • Years of education: 12   Tobacco Use   • Smoking status: Former Smoker     Packs/day: 0.50     Years: 6.00     Pack years: 3.00     Types: Cigarettes     Start date:      Quit date: 2018     Years since quittin.4   • Smokeless tobacco: Never Used   Vaping Use   • Vaping Use: Former   Substance and Sexual Activity   • Alcohol use: No   • Drug use: No   • Sexual activity: Yes     Partners: Male     Birth control/protection: None     Comment: spouse = BOBBY       Family History   Problem Relation Age of Onset   • Diabetes Maternal Grandfather    • Diabetes Paternal Grandmother    • Arthritis Paternal Grandfather    • Breast cancer Neg Hx    • Ovarian cancer Neg Hx    • Uterine cancer Neg Hx    • Colon cancer Neg Hx    • Deep vein thrombosis Neg Hx    • Pulmonary embolism Neg Hx    • Malig Hyperthermia Neg Hx          Current Outpatient Medications:   •  albuterol sulfate  (90 Base) MCG/ACT inhaler, Inhale 2 puffs Every 4 (Four) Hours As Needed for Wheezing or Shortness of Air., Disp: 6.7 g, Rfl: 0  •   cefdinir (OMNICEF) 300 MG capsule, Take 1 capsule by mouth 2 (Two) Times a Day for 10 days., Disp: 20 capsule, Rfl: 0  •  fluticasone (Flonase) 50 MCG/ACT nasal spray, Use 2 sprays in each nostril as directed by provider Daily., Disp: 48 g, Rfl: 3  •  ibuprofen (ADVIL,MOTRIN) 800 MG tablet, TAKE 1 TABLET BY MOUTH EVERY 8 HOURS AS NEEDED FOR BACK PAIN, Disp: 90 tablet, Rfl: 4  •  Prenatal Vit-Fe Fumarate-FA (prenatal vitamin 27-0.8) 27-0.8 MG tablet tablet, Take  by mouth Daily., Disp: , Rfl:   •  sertraline (Zoloft) 100 MG tablet, Take 1 tablet by mouth Daily., Disp: 90 tablet, Rfl: 3  •  trimethoprim-polymyxin b (Polytrim) 29134-1.1 UNIT/ML-% ophthalmic solution, Administer 1 drop to both eyes Every 4 (Four) Hours for 7 days., Disp: 10 mL, Rfl: 0  No current facility-administered medications for this visit.    Facility-Administered Medications Ordered in Other Visits:   •  perflutren (DEFINITY) 8.476 mg in sodium chloride 0.9 % 10 mL injection, 3 mL, Intravenous, Once, Nickolas Lr DO      Review of Systems   Constitutional: Negative for chills, diaphoresis, fatigue and fever.   HENT: Positive for congestion, ear pain, postnasal drip, sinus pressure and sore throat. Negative for ear discharge, hearing loss, mouth sores and swollen glands.    Eyes: Positive for discharge, redness and itching. Negative for blurred vision, double vision, photophobia, pain and visual disturbance.   Respiratory: Positive for cough, chest tightness and shortness of breath. Negative for wheezing and stridor.    Cardiovascular: Negative for chest pain and palpitations.   Gastrointestinal: Negative for diarrhea, nausea and vomiting.   Musculoskeletal: Negative for myalgias.   Neurological: Negative for headache.        There were no vitals filed for this visit.    Objective   Physical Exam  Constitutional:       General: She is not in acute distress.     Appearance: Normal appearance. She is not ill-appearing, toxic-appearing or  diaphoretic.   HENT:      Head: Normocephalic.      Nose: Congestion present.      Right Sinus: Maxillary sinus tenderness present.      Left Sinus: Maxillary sinus tenderness present.      Comments: Per pt       Mouth/Throat:      Lips: Pink.      Mouth: Mucous membranes are moist.   Eyes:      General: Lids are normal.      Conjunctiva/sclera:      Right eye: Right conjunctiva is injected.      Left eye: Left conjunctiva is injected.   Pulmonary:      Effort: Pulmonary effort is normal.   Neurological:      Mental Status: She is alert and oriented to person, place, and time.   Psychiatric:         Mood and Affect: Mood normal.         Behavior: Behavior normal.          Procedures     Assessment & Plan   Diagnoses and all orders for this visit:    1. Conjunctivitis of both eyes, unspecified conjunctivitis type (Primary)  -     trimethoprim-polymyxin b (Polytrim) 10210-0.1 UNIT/ML-% ophthalmic solution; Administer 1 drop to both eyes Every 4 (Four) Hours for 7 days.  Dispense: 10 mL; Refill: 0    2. Acute non-recurrent sinusitis, unspecified location  -     cefdinir (OMNICEF) 300 MG capsule; Take 1 capsule by mouth 2 (Two) Times a Day for 10 days.  Dispense: 20 capsule; Refill: 0    3. Cough, unspecified type  -     albuterol sulfate  (90 Base) MCG/ACT inhaler; Inhale 2 puffs Every 4 (Four) Hours As Needed for Wheezing or Shortness of Air.  Dispense: 6.7 g; Refill: 0    Continue Flonase nasal spray.   You may use plain Dextromethorphan cough syrup as needed.   Alternate tylenol and motrin for pain and/or fever, stay hydrated and rest.     You may use cool compresses as needed for comfort.  Wash pillowcases and discard any eye makeup such as mascara or eyeliner.  Discard contact lenses if used during this time and wear glasses until treatment is complete.   Avoid rubbing eyes and use frequent hand hygiene.     If symptoms worsen or do not improve follow up with your PCP or visit your nearest Urgent Care  Center or ER.        PLAN: Discussed dosing, side effects, recommended other symptomatic care.  Patient should follow up with primary care provider, Urgent Care or ER if symptoms worsen, fail to resolve or other symptoms need attention. Patient/family agree to the above.         BRAD Gandhi     The use of a video visit has been reviewed with the patient and verbal informed consent has been obtained. Myself and Lavonne Riky participated in this visit. The patient is located at [address] [place] [address]. I am located in [place], [place]. Mychart and Zoom were utilized.        This visit was performed via Telehealth.  This patient has been instructed to follow-up with their primary care provider if their symptoms worsen or the treatment provided does not resolve their illness.

## 2022-11-07 ENCOUNTER — TELEPHONE (OUTPATIENT)
Dept: OBSTETRICS AND GYNECOLOGY | Age: 27
End: 2022-11-07

## 2022-11-07 NOTE — TELEPHONE ENCOUNTER
Caller: Lavonne Lan    Relationship to patient: Self    Best call back number: [phone]    Chief complaint: PATIENT WAS SCHEDULED FOR TODAY BUT TESTED POSITIVE FOR COVID//FIRST AVAL ISNT UNTIL 3/6/22//PATIENT REQ TO BE SEEN SOONER THAN THAT BECAUSE ALONG WITH ANNUAL ITS CHECK-UP ON POSTPARTUM DEPRESSION    Type of visit: ANNUAL    Requested date: ASAP    If rescheduling, when is the original appointment: 11/7/22    Additional notes:TRIED TO WARM TRANSFER BUT THERE WASN'T ANYTHING AVAL THIS MN

## 2022-11-07 NOTE — TELEPHONE ENCOUNTER
Provider: DR HASTINGS  Caller: MASON KELLOGG  Relationship to Patient: SELF  Phone Number: [phone]  Reason for Call: RESCHEDULE APPOINTMENT TEST POSITIVE FOR COVID 11/5/22   When was the patient last seen: 05/22    **SENDING ENCOUNTER TO SCHEDULING TO SEE IF CAN BE SEEN BEFORE MARCH

## 2022-11-14 ENCOUNTER — TELEPHONE (OUTPATIENT)
Dept: FAMILY MEDICINE CLINIC | Facility: CLINIC | Age: 27
End: 2022-11-14

## 2022-11-14 NOTE — TELEPHONE ENCOUNTER
Caller: Lavonne Lan    Relationship: Self    Best call back number: [phone]    What is the best time to reach you: ANY    Who are you requesting to speak with (clinical staff, provider,  specific staff member): CLINICAL    What was the call regarding: PATIENT NEEDS A FLU SHOT DELAY FORM. SHE STATES THAT SHE HAS COVID RIGHT NOW, AND NEEDS TO HAVE A DELAY HER FLU SHOT. SHE IS A [employer] EMPLOYEE AND SHE ONLY HAS TODAY TO GET THIS FORM SINCE [employer] REQUIRES THEIR EMPLOYEES TO HAVE A FLU SHOT DONE. PLEASE ADVISE PATIENT.     Do you require a callback: YES

## 2022-12-12 ENCOUNTER — OFFICE VISIT (OUTPATIENT)
Dept: OBSTETRICS AND GYNECOLOGY | Age: 27
End: 2022-12-12

## 2022-12-12 VITALS
WEIGHT: 260 LBS | SYSTOLIC BLOOD PRESSURE: 160 MMHG | BODY MASS INDEX: 44.39 KG/M2 | HEIGHT: 64 IN | DIASTOLIC BLOOD PRESSURE: 90 MMHG

## 2022-12-12 DIAGNOSIS — Z13.89 SCREENING FOR BLOOD OR PROTEIN IN URINE: ICD-10-CM

## 2022-12-12 DIAGNOSIS — Z01.419 WELL WOMAN EXAM WITH ROUTINE GYNECOLOGICAL EXAM: Primary | ICD-10-CM

## 2022-12-12 DIAGNOSIS — Z11.51 SPECIAL SCREENING EXAMINATION FOR HUMAN PAPILLOMAVIRUS (HPV): ICD-10-CM

## 2022-12-12 DIAGNOSIS — Z12.4 SCREENING FOR MALIGNANT NEOPLASM OF THE CERVIX: ICD-10-CM

## 2022-12-12 PROCEDURE — 81002 URINALYSIS NONAUTO W/O SCOPE: CPT | Performed by: OBSTETRICS & GYNECOLOGY

## 2022-12-12 PROCEDURE — 99395 PREV VISIT EST AGE 18-39: CPT | Performed by: OBSTETRICS & GYNECOLOGY

## 2022-12-12 NOTE — PROGRESS NOTES
"Subjective   Lavonne Lan is a 27 y.o. female presents for annual and f/u PP depression-pt reports thinking it is okay, more so anxiety. Zoloft has helped a ton, pt concerned that Zoloft might be causing weight gain.  Discussed diet/nutrition - plans to stop breastfeeding and do Keto in a month.  Declines BC.       History of Present Illness    The following portions of the patient's history were reviewed and updated as appropriate: allergies, current medications, past family history, past medical history, past social history, past surgical history and problem list.    Review of Systems   Constitutional: Negative for chills, fatigue and fever.   Gastrointestinal: Negative for abdominal distention and abdominal pain.   Genitourinary: Negative for dyspareunia, dysuria, pelvic pain, vaginal bleeding, vaginal discharge and vaginal pain.   All other systems reviewed and are negative.    /90   Ht 162.6 cm (64\")   Wt 118 kg (260 lb)   LMP 11/24/2022 (Exact Date)   Breastfeeding Yes   BMI 44.63 kg/m²     Objective   Physical Exam  Vitals and nursing note reviewed.   Constitutional:       Appearance: Normal appearance. She is well-developed. She is obese.   Neck:      Thyroid: No thyromegaly.   Pulmonary:      Effort: Pulmonary effort is normal.   Chest:   Breasts:     Right: No mass, nipple discharge, skin change or tenderness.      Left: No mass, nipple discharge, skin change or tenderness.   Abdominal:      General: There is no distension.      Palpations: Abdomen is soft.      Tenderness: There is no abdominal tenderness.   Genitourinary:     General: Normal vulva.      Exam position: Lithotomy position.      Labia:         Right: No rash or lesion.         Left: No rash or lesion.       Vagina: Normal. No vaginal discharge or bleeding.      Cervix: No friability, lesion or cervical bleeding.      Uterus: Not enlarged and not tender.       Adnexa:         Right: No mass or tenderness.          Left: No mass " or tenderness.     Musculoskeletal:         General: Normal range of motion.      Cervical back: Normal range of motion.   Skin:     General: Skin is warm and dry.      Findings: No rash.   Neurological:      Mental Status: She is alert and oriented to person, place, and time.   Psychiatric:         Mood and Affect: Mood normal.         Behavior: Behavior normal.           Assessment & Plan   Diagnoses and all orders for this visit:    1. Well woman exam with routine gynecological exam (Primary)  -     IGP, Rfx Aptima HPV ASCU    2. Screening for malignant neoplasm of the cervix  -     IGP, Rfx Aptima HPV ASCU    3. Special screening examination for human papillomavirus (HPV)  -     IGP, Rfx Aptima HPV ASCU    4. Screening for blood or protein in urine  -     POC Urinalysis Dipstick    5. Postpartum depression      Counseling was given to patient for the following topics: instructions for management, risks and benefits of treatment options, importance of treatment compliance and jalyn-breast exams  .   Return in about 1 year (around 12/12/2023) for Annual physical.

## 2022-12-17 LAB
CONV .: NORMAL
CYTOLOGIST CVX/VAG CYTO: NORMAL
CYTOLOGY CVX/VAG DOC CYTO: NORMAL
CYTOLOGY CVX/VAG DOC THIN PREP: NORMAL
DX ICD CODE: NORMAL
HIV 1 & 2 AB SER-IMP: NORMAL
OTHER STN SPEC: NORMAL
STAT OF ADQ CVX/VAG CYTO-IMP: NORMAL

## 2023-01-19 ENCOUNTER — TELEMEDICINE (OUTPATIENT)
Dept: FAMILY MEDICINE CLINIC | Facility: CLINIC | Age: 28
End: 2023-01-19
Payer: COMMERCIAL

## 2023-01-19 DIAGNOSIS — E66.01 MORBID (SEVERE) OBESITY DUE TO EXCESS CALORIES: Primary | ICD-10-CM

## 2023-01-19 DIAGNOSIS — E28.2 PCOS (POLYCYSTIC OVARIAN SYNDROME): ICD-10-CM

## 2023-01-19 PROCEDURE — 99214 OFFICE O/P EST MOD 30 MIN: CPT | Performed by: FAMILY MEDICINE

## 2023-01-19 RX ORDER — SEMAGLUTIDE 0.5 MG/.5ML
0.5 INJECTION, SOLUTION SUBCUTANEOUS WEEKLY
Qty: 2 ML | Refills: 0 | Status: SHIPPED | OUTPATIENT
Start: 2023-01-19 | End: 2023-01-22 | Stop reason: SDUPTHER

## 2023-01-19 RX ORDER — SEMAGLUTIDE 0.25 MG/.5ML
0.25 INJECTION, SOLUTION SUBCUTANEOUS WEEKLY
Qty: 2 ML | Refills: 0 | Status: SHIPPED | OUTPATIENT
Start: 2023-01-19 | End: 2023-01-22 | Stop reason: SDUPTHER

## 2023-01-19 NOTE — PROGRESS NOTES
Subjective   Lavonne Lan is a 27 y.o. female. Presents today for   Chief Complaint   Patient presents with   • Obesity        Patient VIDEO VISIT, she gave consent to treat.  She was at home and I was in my office.     History of Present Illness  Patient 28 y/o reports weigh tup to 270 lbs;  Struggling to lose weight, has chronic back pain and hard to exercise though going to gym;  Since weight gain more unwanted hair, night sweats and symptoms of PCOS again.   Had palpitations with phentermine in past.      Review of Systems   Respiratory: Negative for shortness of breath.    Cardiovascular: Negative for chest pain.   Musculoskeletal: Positive for back pain.       Patient Active Problem List   Diagnosis   • Bulging lumbar disc   • Degeneration of intervertebral disc of lumbar region   • Lumbar facet arthropathy   • Lumbar radiculitis   • Chronic bilateral low back pain with sciatica   • Postpartum depression       Social History     Socioeconomic History   • Marital status:      Spouse name: BOBBY   • Number of children: 0   • Years of education: 12   Tobacco Use   • Smoking status: Former     Packs/day: 0.50     Years: 6.00     Pack years: 3.00     Types: Cigarettes     Start date:      Quit date: 2018     Years since quittin.7   • Smokeless tobacco: Never   Vaping Use   • Vaping Use: Former   Substance and Sexual Activity   • Alcohol use: No   • Drug use: No   • Sexual activity: Yes     Partners: Male     Birth control/protection: None     Comment: spouse = BOBBY       Allergies   Allergen Reactions   • Amoxicillin Rash   • Buspar [Buspirone] Rash   • Penicillins Rash       Current Outpatient Medications on File Prior to Visit   Medication Sig Dispense Refill   • fluticasone (FLONASE) 50 MCG/ACT nasal spray Use 2 sprays in each nostril as directed by provider Daily. 48 g 3   • ibuprofen (ADVIL,MOTRIN) 800 MG tablet TAKE 1 TABLET BY MOUTH EVERY 8 HOURS AS NEEDED FOR BACK PAIN 90 tablet 4   •  Prenatal Vit-Fe Fumarate-FA (prenatal vitamin 27-0.8) 27-0.8 MG tablet tablet Take  by mouth Daily.     • sertraline (Zoloft) 100 MG tablet Take 1 tablet by mouth Daily. 90 tablet 3     Current Facility-Administered Medications on File Prior to Visit   Medication Dose Route Frequency Provider Last Rate Last Admin   • perflutren (DEFINITY) 8.476 mg in sodium chloride 0.9 % 10 mL injection  3 mL Intravenous Once Nickolas Lr DO           Objective   There were no vitals filed for this visit.  There is no height or weight on file to calculate BMI.    Physical Exam  Vitals and nursing note reviewed.   Constitutional:       Appearance: Normal appearance. She is not toxic-appearing or diaphoretic.   HENT:      Head: Normocephalic and atraumatic.   Musculoskeletal:      Cervical back: Neck supple.   Neurological:      Mental Status: She is alert.   Psychiatric:         Mood and Affect: Mood normal.         Behavior: Behavior normal.         Assessment & Plan   Diagnoses and all orders for this visit:    1. Morbid (severe) obesity due to excess calories (HCC) (Primary)  -     Semaglutide-Weight Management (Wegovy) 0.25 MG/0.5ML solution auto-injector; Inject 0.25 mg under the skin into the appropriate area as directed 1 (One) Time Per Week. Start after finiish breasth feeing use for X 4 weeks  Dispense: 2 mL; Refill: 0  -     Semaglutide-Weight Management (Wegovy) 0.5 MG/0.5ML solution auto-injector; Inject 0.5 mL under the skin into the appropriate area as directed 1 (One) Time Per Week. X 4 weeks.  (prior to running out, call for next dose)  Dispense: 2 mL; Refill: 0    2. PCOS (polycystic ovarian syndrome)  -     Semaglutide-Weight Management (Wegovy) 0.25 MG/0.5ML solution auto-injector; Inject 0.25 mg under the skin into the appropriate area as directed 1 (One) Time Per Week. Start after finiish breasth feeing use for X 4 weeks  Dispense: 2 mL; Refill: 0  -     Semaglutide-Weight Management (Wegovy) 0.5 MG/0.5ML  solution auto-injector; Inject 0.5 mL under the skin into the appropriate area as directed 1 (One) Time Per Week. X 4 weeks.  (prior to running out, call for next dose)  Dispense: 2 mL; Refill: 0    counsleed on diet and exercise;  Will try wegovy as good candidate and weight loss will help PCOS;  Counseled on side effects;  Once at two weeks 0.5mg dose, call for dose increase if doing ok         -Follow up: 3months andp rn

## 2023-01-20 ENCOUNTER — TELEPHONE (OUTPATIENT)
Dept: FAMILY MEDICINE CLINIC | Facility: CLINIC | Age: 28
End: 2023-01-20
Payer: COMMERCIAL

## 2023-01-20 RX ORDER — METHYLPREDNISOLONE 4 MG/1
TABLET ORAL
Qty: 21 TABLET | Refills: 0 | Status: SHIPPED | OUTPATIENT
Start: 2023-01-20 | End: 2023-03-17

## 2023-01-20 RX ORDER — TIZANIDINE 4 MG/1
4 TABLET ORAL EVERY 6 HOURS PRN
Qty: 45 TABLET | Refills: 0 | Status: SHIPPED | OUTPATIENT
Start: 2023-01-20 | End: 2023-03-17

## 2023-01-20 NOTE — TELEPHONE ENCOUNTER
Caller: Lavonne Lan    Relationship: Self    Best call back number:[phone]    What medication are you requesting: SOMETHING FOR BACK PAIN     What are your current symptoms: BACK PAIN    How long have you been experiencing symptoms:  STARTED 01/18/23    Have you had these symptoms before:    [] Yes  [x] No    Have you been treated for these symptoms before:   [] Yes  [x] No    If a prescription is needed, what is your preferred pharmacy and phone number: [facility] - [place]     Additional notes:PATIENT CALLING STATING THAT SHE LEFT A MESSAGE FOR  THE PROVIDER HASNT GOTTEN BACK WITH HER SHE WOULD LIKE TO GET SOMETHING TO HELP WITH THE FLARE UP.    PATIENT STATES THAT SHE HAS TAKEN OTC PAIN RELIEVERS THAT HASN'T HELPED

## 2023-01-22 DIAGNOSIS — E28.2 PCOS (POLYCYSTIC OVARIAN SYNDROME): ICD-10-CM

## 2023-01-22 DIAGNOSIS — E66.01 MORBID (SEVERE) OBESITY DUE TO EXCESS CALORIES: ICD-10-CM

## 2023-01-22 RX ORDER — SEMAGLUTIDE 0.25 MG/.5ML
0.25 INJECTION, SOLUTION SUBCUTANEOUS WEEKLY
Qty: 2 ML | Refills: 0 | Status: SHIPPED | OUTPATIENT
Start: 2023-01-22 | End: 2023-03-06

## 2023-01-22 RX ORDER — SEMAGLUTIDE 0.5 MG/.5ML
0.5 INJECTION, SOLUTION SUBCUTANEOUS WEEKLY
Qty: 2 ML | Refills: 0 | Status: SHIPPED | OUTPATIENT
Start: 2023-01-22 | End: 2023-03-06

## 2023-03-06 DIAGNOSIS — E66.01 MORBID (SEVERE) OBESITY DUE TO EXCESS CALORIES: Primary | ICD-10-CM

## 2023-03-06 DIAGNOSIS — E28.2 PCOS (POLYCYSTIC OVARIAN SYNDROME): ICD-10-CM

## 2023-03-06 RX ORDER — SEMAGLUTIDE 1 MG/.5ML
1 INJECTION, SOLUTION SUBCUTANEOUS WEEKLY
Qty: 2 ML | Refills: 2 | Status: SHIPPED | OUTPATIENT
Start: 2023-03-06

## 2023-03-15 NOTE — PROGRESS NOTES
The patient has a pain history of the following:  Low back pain  Lumbar spondylosis  Lumbar radiculopathy    Previous interventions that the patient has received include:   Lumbar Medial branch blocks steroid injection 8/18/16 - worsened pain    Pain medications include:  Ibuprofen  Tizanidine    Previously: Norco - no improvement, Medrol dose pack (worsens pain), Gabapentin - helped    Other conservative modalities which the patient reports using include:  Physical Therapy: yes - slight improvement  Chiropractor: no  Massage Therapy: no  TENS: yes  Neck or back surgery: no  Past pain management: yes  Heat  Dry needling  Cupping    Past Significant Surgical History:  None     HPI:       CHIEF COMPLAINT: Back Pain    Lavonne Lan is a 27 y.o. female referred here by Jimmie Thomas MD. Lavonne Lan presents to the office for evaluation and treatment of Back Pain      History of Present Illness  Onset:  Years ago (as a child)  Inciting Event:  Nothing in particular  Location:  Low back  Pain: Pain described as  burning, jolting that radiates down her bilateral legs with occasional numbness in her legs and toes.  Sometimes her legs feel very heavy.   Severity:  Pain rated as a 5 /10.  Symptoms have been episodic.  Exacerbation:  Standing or sitting too long.   Bending.  Alleviation:  Gabapentin helped, time.  Ambulates: Without assistive device    She states that she has flare ups at least 2 times a week.  She states that usually ibuprofen takes care of these.  Every now and then she has a flare that makes her almost bedridden.  She wanted to come here today to establish care for whenever she has these episodes.  In the past she has been given steroids during these flares, and she feels that it actually makes the pain worse.       PEG Assessment   What number best describes your pain on average in the past week?5  What number best describes how, during the past week, pain has interfered with your enjoyment of  life?0  What number best describes how, during the past week, pain has interfered with your general activity?  5        Current Outpatient Medications:   •  fluticasone (FLONASE) 50 MCG/ACT nasal spray, Use 2 sprays in each nostril as directed by provider Daily., Disp: 48 g, Rfl: 3  •  ibuprofen (ADVIL,MOTRIN) 800 MG tablet, TAKE 1 TABLET BY MOUTH EVERY 8 HOURS AS NEEDED FOR BACK PAIN, Disp: 90 tablet, Rfl: 4  •  Prenatal Vit-Fe Fumarate-FA (prenatal vitamin 27-0.8) 27-0.8 MG tablet tablet, Take  by mouth Daily., Disp: , Rfl:   •  Semaglutide-Weight Management (Wegovy) 1 MG/0.5ML solution auto-injector, Inject 1 pen under the skin into the appropriate area as directed 1 (One) Time Per Week.  After 4 weeks if tolerating can call for next dose up., Disp: 2 mL, Rfl: 2  •  sertraline (Zoloft) 100 MG tablet, Take 1 tablet by mouth Daily., Disp: 90 tablet, Rfl: 3  No current facility-administered medications for this visit.    Facility-Administered Medications Ordered in Other Visits:   •  perflutren (DEFINITY) 8.476 mg in sodium chloride 0.9 % 10 mL injection, 3 mL, Intravenous, Once, Nickolas Lr DO    The following portions of the patient's history were reviewed and updated as appropriate: allergies, current medications, past family history, past medical history, past social history, past surgical history and problem list.      REVIEW OF PERTINENT MEDICAL DATA    8/18/22 PROCEDURE:  MRI LUMBAR SPINE WO CONTRAST     COMPARISON: [place], CT, LUMBAR SPINE W/O CONTRAST, 12/15/2020, 11:34.     INDICATIONS:  LBP WITH BLE NUMBNESS POSITIONALLY     TECHNIQUE:    A variety of imaging planes and parameters were utilized for visualization of suspected   pathology.     FINDINGS:          Five lumbar vertebral bodies are identified and appear in anatomic alignment.  There is mild   narrowing of the L3-L4, L4-L5 and L5-S1 discs.  There is desiccation of the posterior L3-L4 disc   and diffuse desiccation of  the L4-L5 and L5-S1 discs.  Vertebral bodies maintain normal height.    There is no focal bone marrow edema or evidence of a marrow replacing process. The distal spinal   cord and conus medullaris appear unremarkable terminating at the L1 level. Limited view of the   retroperitoneal structures is unremarkable. Paraspinal musculature is well preserved.     L1-L2: The disc is normal. Facet joints appear unremarkable. There is no significant neuroforaminal   or spinal canal narrowing.     L2-L3: The disc is normal. Facet joints appear unremarkable. There is no significant neuroforaminal   or spinal canal narrowing.     L3-L4:  There is a small central disc protrusion, which appears decreased in size from 2020. There   is mild facet and ligamentum flavum hypertrophy.  No neuroforaminal or spinal canal narrowing.       L4-L5:  There is concentric disc bulge with a moderate-sized central disc protrusion.  There is   mild facet and ligamentum flavum hypertrophy.  There is mild right neural foraminal narrowing and   mild narrowing of the spinal canal with partial effacement of the lateral recesses.     L5-S1:  There is concentric disc bulge with a small central disc protrusion, not significantly   changed.  There is mild left facet hypertrophy.  There is no significant neural foraminal or spinal   canal narrowing.        IMPRESSION:                 1. Thigh mild lower lumbar disc and facet joint degeneration with mild spinal canal narrowing at   the L4-L5 level and mild right neural foraminal narrowing.  2. The central disc protrusion at L3-L4 appears improved from 2020. Disc protrusion at L4-L5   appears to have progressed and and L5-S1 central disc protrusion appears relatively unchanged.                 LIZ POWELL MD         Electronically Signed and Approved By: LIZ POWELL MD on 8/19/2022 at 5:09          7/29/22 XR L Spine Ap & Lateral-Standing  Order: [id number]  Narrative    REVIEWING YOUR TEST RESULTS IN  [facility] IS NOT A SUBSTITUTE FOR DISCUSSING THOSE RESULTS WITH YOUR HEALTH CARE PROVIDER.   PLEASE CONTACT YOUR PROVIDER VIA [facility] TO DISCUSS ANY QUESTIONS OR CONCERNS YOU MAY HAVE REGARDING THESE TEST RESULTS.     RADIOLOGY REPORT     FACILITY:  [facility]   UNIT/AGE/GENDER: SHIMONNLS-D  OP      AGE:26 Y          SEX:F   PATIENT NAME/:  MASON KELLOGG P    1995   UNIT NUMBER:  [id number]   ACCOUNT NUMBER:  [id number]   ACCESSION NUMBER:  [id number]     XR L SPINE AP AND LATERAL-STANDING 2022 11:15 AM     HISTORY: Lower back pain.     TECHNIQUE: 2 views lumbar spine.     COMPARISON: 2020.     FINDINGS: Examination is limited by patient body habitus. There is a mild lumbar dextrocurvature. Lumbar lordotic alignment is maintained. Vertebral body heights are preserved. No acute fracture is identified. There is mild disc space narrowing at L5-S1.     IMPRESSION: Mild L5-S1 degenerative disc disease without acute radiographic abnormality identified.     Dictated by: Maranda Rothman M.D.     Images and Report reviewed and interpreted by: Maranda Rothman M.D.     <PS><Electronically signed by: Maranda Rothman M.D.>   2022 1153     D: 2022 1152   T: 2022 1152  Exam End: 22 11:14    Specimen Collected: 22 11:52 Last          21 Creatinine 0.36    22 Platelets 119 (10*3)    Review of Systems   Constitutional: Positive for activity change (decreased) and fatigue. Negative for chills and fever.   HENT: Negative for congestion.    Eyes: Negative for visual disturbance.   Respiratory: Negative for chest tightness and shortness of breath.    Cardiovascular: Negative for chest pain.   Gastrointestinal: Negative for abdominal pain, constipation and diarrhea.   Genitourinary: Negative for difficulty urinating, dyspareunia and dysuria.   Musculoskeletal: Positive for back pain.   Neurological: Negative for dizziness, weakness,  "light-headedness, numbness and headaches.   Psychiatric/Behavioral: Positive for sleep disturbance. Negative for agitation, self-injury and suicidal ideas. The patient is not nervous/anxious.      I have reviewed and confirmed the accuracy of the ROS as documented by the MA/LPN/RN Janelle Ray MD      Vitals:    03/17/23 0804   BP: 123/78   Pulse: 85   Temp: 97.3 °F (36.3 °C)   SpO2: 96%   Weight: 112 kg (246 lb 6.4 oz)   Height: 162.6 cm (64\")   PainSc:   5   PainLoc: Back         Objective   Physical Exam  Vitals reviewed.   Constitutional:       General: She is not in acute distress.  Pulmonary:      Effort: Pulmonary effort is normal. No respiratory distress.   Musculoskeletal:      Comments: Ambulation: Without assistive device   Lumbar Exam:  Appearance: Scoliotic curve absent and scarring absent  Able to toe, and heel walk: Yes  Palpated over lumbosacral paravertebral regions and transverse processes with positive tenderness appreciated, Bilateral.   Sacroiliac joints are tender, Bilateral.  Trochanteric bursa are not tender, Bilateral.  Straight leg raise is positive for back pain, Bilateral.  Facet loading is positive for pain, Bilateral.  Paraspinal/adjacent lumbar musculature are not tender to palpation, Bilateral.  Dipesh Krunal's test is negative sacroiliac pain, Bilateral.   Skin:     General: Skin is warm and dry.   Neurological:      General: No focal deficit present.      Mental Status: She is alert.      Deep Tendon Reflexes:      Reflex Scores:       Patellar reflexes are 2+ on the right side and 2+ on the left side.       Achilles reflexes are 2+ on the right side and 2+ on the left side.     Comments: Negative clonus bilaterally   Psychiatric:         Mood and Affect: Mood normal.         Thought Content: Thought content normal.         Assessment & Plan   Diagnoses and all orders for this visit:    1. Lumbar radiculitis (Primary)    2. Displacement of lumbar intervertebral disc without " myelopathy    3. Chronic bilateral low back pain with sciatica, sciatica laterality unspecified        -Pertinent labs reviewed.  -Pertinent imaging reviewed.  -Agree with plan for weight loss and exercise.  Counseled on core strength and exercise such as yoga.  -She will call us if she has another flare.  At that time we will consider restarting gabapentin for her and possibly consider a lumbar epidural steroid injection.  - Lavonne Lan reports a pain score of 5.  Given her pain assessment as noted, treatment options were discussed and the following options were decided upon as a follow-up plan to address the patient's pain: continuation of current treatment plan for pain.    --- Follow-up PRN            While examining this patient, I wore protective equipment including a mask and gloves.  I washed my hands before and after this patient encounter.  The patient wore a mask throughout the visit as well.     Janelle Ray MD  Pain Management    EMR Dragon/Transcription disclaimer:   Much of this encounter note is an electronic transcription/translation of spoken language to printed text. The electronic translation of spoken language may permit erroneous, or at times, nonsensical words or phrases to be inadvertently transcribed; Although I have reviewed the note for such errors, some may still exist.

## 2023-03-17 ENCOUNTER — OFFICE VISIT (OUTPATIENT)
Dept: PAIN MEDICINE | Facility: CLINIC | Age: 28
End: 2023-03-17
Payer: COMMERCIAL

## 2023-03-17 VITALS
DIASTOLIC BLOOD PRESSURE: 78 MMHG | TEMPERATURE: 97.3 F | OXYGEN SATURATION: 96 % | SYSTOLIC BLOOD PRESSURE: 123 MMHG | HEART RATE: 85 BPM | WEIGHT: 246.4 LBS | HEIGHT: 64 IN | BODY MASS INDEX: 42.07 KG/M2

## 2023-03-17 DIAGNOSIS — M54.16 LUMBAR RADICULITIS: Primary | ICD-10-CM

## 2023-03-17 DIAGNOSIS — G89.29 CHRONIC BILATERAL LOW BACK PAIN WITH SCIATICA, SCIATICA LATERALITY UNSPECIFIED: ICD-10-CM

## 2023-03-17 DIAGNOSIS — M54.40 CHRONIC BILATERAL LOW BACK PAIN WITH SCIATICA, SCIATICA LATERALITY UNSPECIFIED: ICD-10-CM

## 2023-03-17 DIAGNOSIS — M51.26 DISPLACEMENT OF LUMBAR INTERVERTEBRAL DISC WITHOUT MYELOPATHY: ICD-10-CM

## 2023-03-17 PROCEDURE — 99203 OFFICE O/P NEW LOW 30 MIN: CPT | Performed by: ANESTHESIOLOGY

## 2023-04-13 NOTE — PLAN OF CARE
Problem: Patient Care Overview  Goal: Plan of Care Review  Outcome: Ongoing (interventions implemented as appropriate)   19 1530   Coping/Psychosocial   Plan of Care Reviewed With patient   Plan of Care Review   Progress improving   OTHER   Outcome Summary pain well controlled, ambulating well, breastfeeding, pumping and supplementing     Goal: Individualization and Mutuality  Outcome: Ongoing (interventions implemented as appropriate)    Goal: Discharge Needs Assessment  Outcome: Ongoing (interventions implemented as appropriate)    Goal: Interprofessional Rounds/Family Conf  Outcome: Ongoing (interventions implemented as appropriate)      Problem: Breastfeeding (Adult,Obstetrics,Pediatric)  Goal: Signs and Symptoms of Listed Potential Problems Will be Absent, Minimized or Managed (Breastfeeding)  Outcome: Ongoing (interventions implemented as appropriate)   19 1530   Goal/Outcome Evaluation   Problems Assessed (Breastfeeding) all   Problems Present (Breastfeeding) none       Problem: Postpartum ( Delivery) (Adult,Obstetrics,Pediatric)  Goal: Signs and Symptoms of Listed Potential Problems Will be Absent, Minimized or Managed (Postpartum)  Outcome: Ongoing (interventions implemented as appropriate)    Goal: Anesthesia/Sedation Recovery  Outcome: Outcome(s) achieved Date Met: 19         Admission

## 2023-04-17 DIAGNOSIS — E28.2 PCOS (POLYCYSTIC OVARIAN SYNDROME): ICD-10-CM

## 2023-04-17 DIAGNOSIS — E66.01 MORBID (SEVERE) OBESITY DUE TO EXCESS CALORIES: Primary | ICD-10-CM

## 2023-04-17 RX ORDER — SEMAGLUTIDE 1.7 MG/.75ML
1.7 INJECTION, SOLUTION SUBCUTANEOUS
Qty: 6 ML | Refills: 1 | Status: SHIPPED | OUTPATIENT
Start: 2023-04-17

## 2023-04-24 DIAGNOSIS — M79.10 MYALGIA: ICD-10-CM

## 2023-04-24 DIAGNOSIS — Z00.00 WELLNESS EXAMINATION: ICD-10-CM

## 2023-04-24 DIAGNOSIS — E28.2 PCOS (POLYCYSTIC OVARIAN SYNDROME): ICD-10-CM

## 2023-04-25 ENCOUNTER — LAB (OUTPATIENT)
Dept: LAB | Facility: HOSPITAL | Age: 28
End: 2023-04-25
Payer: COMMERCIAL

## 2023-04-25 LAB
ALBUMIN SERPL-MCNC: 4.5 G/DL (ref 3.5–5.2)
ALBUMIN/GLOB SERPL: 2 G/DL
ALP SERPL-CCNC: 103 U/L (ref 39–117)
ALT SERPL W P-5'-P-CCNC: 31 U/L (ref 1–33)
ANION GAP SERPL CALCULATED.3IONS-SCNC: 11 MMOL/L (ref 5–15)
AST SERPL-CCNC: 16 U/L (ref 1–32)
BASOPHILS # BLD AUTO: 0.03 10*3/MM3 (ref 0–0.2)
BASOPHILS NFR BLD AUTO: 0.6 % (ref 0–1.5)
BILIRUB SERPL-MCNC: 0.3 MG/DL (ref 0–1.2)
BUN SERPL-MCNC: 11 MG/DL (ref 6–20)
BUN/CREAT SERPL: 16.4 (ref 7–25)
CALCIUM SPEC-SCNC: 9.3 MG/DL (ref 8.6–10.5)
CHLORIDE SERPL-SCNC: 107 MMOL/L (ref 98–107)
CK SERPL-CCNC: 45 U/L (ref 20–180)
CO2 SERPL-SCNC: 26 MMOL/L (ref 22–29)
CREAT SERPL-MCNC: 0.67 MG/DL (ref 0.57–1)
DEPRECATED RDW RBC AUTO: 41.4 FL (ref 37–54)
EGFRCR SERPLBLD CKD-EPI 2021: 121.5 ML/MIN/1.73
EOSINOPHIL # BLD AUTO: 0.52 10*3/MM3 (ref 0–0.4)
EOSINOPHIL NFR BLD AUTO: 10 % (ref 0.3–6.2)
ERYTHROCYTE [DISTWIDTH] IN BLOOD BY AUTOMATED COUNT: 12.8 % (ref 12.3–15.4)
FERRITIN SERPL-MCNC: 70.1 NG/ML (ref 13–150)
FOLATE SERPL-MCNC: 10.7 NG/ML (ref 4.78–24.2)
GLOBULIN UR ELPH-MCNC: 2.2 GM/DL
GLUCOSE SERPL-MCNC: 92 MG/DL (ref 65–99)
HCT VFR BLD AUTO: 44 % (ref 34–46.6)
HGB BLD-MCNC: 14.3 G/DL (ref 12–15.9)
IMM GRANULOCYTES # BLD AUTO: 0 10*3/MM3 (ref 0–0.05)
IMM GRANULOCYTES NFR BLD AUTO: 0 % (ref 0–0.5)
IRON 24H UR-MRATE: 72 MCG/DL (ref 37–145)
IRON SATN MFR SERPL: 16 % (ref 20–50)
LYMPHOCYTES # BLD AUTO: 1.34 10*3/MM3 (ref 0.7–3.1)
LYMPHOCYTES NFR BLD AUTO: 25.7 % (ref 19.6–45.3)
MAGNESIUM SERPL-MCNC: 1.8 MG/DL (ref 1.6–2.6)
MCH RBC QN AUTO: 28.3 PG (ref 26.6–33)
MCHC RBC AUTO-ENTMCNC: 32.5 G/DL (ref 31.5–35.7)
MCV RBC AUTO: 87.1 FL (ref 79–97)
MONOCYTES # BLD AUTO: 0.39 10*3/MM3 (ref 0.1–0.9)
MONOCYTES NFR BLD AUTO: 7.5 % (ref 5–12)
NEUTROPHILS NFR BLD AUTO: 2.93 10*3/MM3 (ref 1.7–7)
NEUTROPHILS NFR BLD AUTO: 56.2 % (ref 42.7–76)
PLATELET # BLD AUTO: 188 10*3/MM3 (ref 140–450)
PMV BLD AUTO: 12.4 FL (ref 6–12)
POTASSIUM SERPL-SCNC: 4.2 MMOL/L (ref 3.5–5.2)
PROT SERPL-MCNC: 6.7 G/DL (ref 6–8.5)
RBC # BLD AUTO: 5.05 10*6/MM3 (ref 3.77–5.28)
SODIUM SERPL-SCNC: 144 MMOL/L (ref 136–145)
TIBC SERPL-MCNC: 451 MCG/DL (ref 298–536)
TRANSFERRIN SERPL-MCNC: 303 MG/DL (ref 200–360)
TSH SERPL DL<=0.05 MIU/L-ACNC: 0.94 UIU/ML (ref 0.27–4.2)
VIT B12 BLD-MCNC: 692 PG/ML (ref 211–946)
WBC NRBC COR # BLD: 5.21 10*3/MM3 (ref 3.4–10.8)

## 2023-04-25 PROCEDURE — 82550 ASSAY OF CK (CPK): CPT | Performed by: FAMILY MEDICINE

## 2023-04-25 PROCEDURE — 83540 ASSAY OF IRON: CPT | Performed by: FAMILY MEDICINE

## 2023-04-25 PROCEDURE — 82746 ASSAY OF FOLIC ACID SERUM: CPT | Performed by: FAMILY MEDICINE

## 2023-04-25 PROCEDURE — 83735 ASSAY OF MAGNESIUM: CPT | Performed by: FAMILY MEDICINE

## 2023-04-25 PROCEDURE — 80050 GENERAL HEALTH PANEL: CPT | Performed by: FAMILY MEDICINE

## 2023-04-25 PROCEDURE — 82607 VITAMIN B-12: CPT | Performed by: FAMILY MEDICINE

## 2023-04-25 PROCEDURE — 82728 ASSAY OF FERRITIN: CPT | Performed by: FAMILY MEDICINE

## 2023-04-25 PROCEDURE — 36415 COLL VENOUS BLD VENIPUNCTURE: CPT | Performed by: FAMILY MEDICINE

## 2023-04-25 PROCEDURE — 84466 ASSAY OF TRANSFERRIN: CPT | Performed by: FAMILY MEDICINE

## 2023-04-30 NOTE — PROGRESS NOTES
Call results to patient.  Magnesium normal, but low normal, which could cause muscle spasms;  I would take magnesium oxide 400mg 1 po daily  Blood counts, kidney and liver function normal  Thyroid normal  B12 and folate normal

## 2023-05-01 RX ORDER — LANOLIN ALCOHOL/MO/W.PET/CERES
400 CREAM (GRAM) TOPICAL DAILY
Qty: 90 TABLET | Refills: 1 | Status: SHIPPED | OUTPATIENT
Start: 2023-05-01

## 2023-05-09 ENCOUNTER — OFFICE VISIT (OUTPATIENT)
Dept: FAMILY MEDICINE CLINIC | Facility: CLINIC | Age: 28
End: 2023-05-09
Payer: COMMERCIAL

## 2023-05-09 VITALS
DIASTOLIC BLOOD PRESSURE: 70 MMHG | HEART RATE: 64 BPM | OXYGEN SATURATION: 98 % | WEIGHT: 237 LBS | BODY MASS INDEX: 40.46 KG/M2 | SYSTOLIC BLOOD PRESSURE: 130 MMHG | HEIGHT: 64 IN

## 2023-05-09 DIAGNOSIS — H65.92 LEFT OTITIS MEDIA WITH EFFUSION: Primary | ICD-10-CM

## 2023-05-09 DIAGNOSIS — E66.01 MORBID (SEVERE) OBESITY DUE TO EXCESS CALORIES: ICD-10-CM

## 2023-05-09 DIAGNOSIS — E83.42 HYPOMAGNESEMIA: ICD-10-CM

## 2023-05-09 RX ORDER — METHYLPREDNISOLONE 4 MG/1
TABLET ORAL
Qty: 21 TABLET | Refills: 0 | Status: SHIPPED | OUTPATIENT
Start: 2023-05-09

## 2023-05-09 RX ORDER — MAGNESIUM OXIDE 400 MG/1
400 TABLET ORAL DAILY
Qty: 90 TABLET | Refills: 0 | Status: SHIPPED | OUTPATIENT
Start: 2023-05-09

## 2023-05-09 NOTE — PROGRESS NOTES
Subjective   Lavonne Lan is a 27 y.o. female. Presents today for   Chief Complaint   Patient presents with   • Ear Drainage     Pt feels like she is getting an ear infection   • Obesity   • Anxiety       History of Present Illness  Patient 26 y/o here for follow-up on weight;   Doing great lost over 30 lbs;  Ear feels full, feels wet drainage;  No uri symnptoms;  Hsa postpartum depression, stable on sertraline and would liek me take over now past past immediate post-partum period;  No si;   Has had low mag, needs refills magnesium    Review of Systems   HENT: Positive for ear pain. Negative for sinus pressure and sinus pain.    Cardiovascular: Negative for chest pain.   Gastrointestinal: Negative for abdominal pain.       Patient Active Problem List   Diagnosis   • Bulging lumbar disc   • Degeneration of intervertebral disc of lumbar region   • Lumbar facet arthropathy   • Lumbar radiculitis   • Chronic bilateral low back pain with sciatica   • Postpartum depression       Social History     Socioeconomic History   • Marital status:      Spouse name: BOBBY   • Number of children: 0   • Years of education: 12   Tobacco Use   • Smoking status: Former     Packs/day: 0.50     Years: 6.00     Pack years: 3.00     Types: Cigarettes     Start date:      Quit date: 2018     Years since quittin.0   • Smokeless tobacco: Never   Vaping Use   • Vaping Use: Former   Substance and Sexual Activity   • Alcohol use: Yes     Comment: occ   • Drug use: No   • Sexual activity: Yes     Partners: Male     Birth control/protection: None     Comment: spouse = BOBBY       Allergies   Allergen Reactions   • Amoxicillin Rash   • Buspar [Buspirone] Rash   • Penicillins Rash       Current Outpatient Medications on File Prior to Visit   Medication Sig Dispense Refill   • ibuprofen (ADVIL,MOTRIN) 800 MG tablet TAKE 1 TABLET BY MOUTH EVERY 8 HOURS AS NEEDED FOR BACK PAIN 90 tablet 4   • Magnesium Oxide 400 (240 Mg) MG tablet  "Take 1 tablet by mouth Daily. 90 tablet 1   • Prenatal Vit-Fe Fumarate-FA (prenatal vitamin 27-0.8) 27-0.8 MG tablet tablet Take  by mouth Daily.       Current Facility-Administered Medications on File Prior to Visit   Medication Dose Route Frequency Provider Last Rate Last Admin   • perflutren (DEFINITY) 8.476 mg in sodium chloride 0.9 % 10 mL injection  3 mL Intravenous Once Nickolas Lr DO           Objective   Vitals:    05/09/23 1325   BP: 130/70   BP Location: Left arm   Patient Position: Sitting   Cuff Size: Adult   Pulse: 64   SpO2: 98%   Weight: 108 kg (237 lb)   Height: 162.6 cm (64\")     Body mass index is 40.68 kg/m².    Physical Exam  Vitals and nursing note reviewed.   Constitutional:       Appearance: She is well-developed.   HENT:      Right Ear: Tympanic membrane normal.      Left Ear: A middle ear effusion is present.   Skin:     General: Skin is warm and dry.   Neurological:      Mental Status: She is alert and oriented to person, place, and time.   Psychiatric:         Behavior: Behavior normal.       Component      Latest Ref Rng 4/25/2023   Iron      37 - 145 mcg/dL 72    Iron Saturation      20 - 50 % 16 (L)    Transferrin      200 - 360 mg/dL 303    TIBC      298 - 536 mcg/dL 451    Ferritin      13.00 - 150.00 ng/mL 70.10    Magnesium      1.6 - 2.6 mg/dL 1.8    TSH Baseline      0.270 - 4.200 uIU/mL 0.937    Vitamin B-12      211 - 946 pg/mL 692    Folate      4.78 - 24.20 ng/mL 10.70    Creatine Kinase      20 - 180 U/L 45       (L) Low    Assessment & Plan   Diagnoses and all orders for this visit:    1. Left otitis media with effusion (Primary)  -     methylPREDNISolone (MEDROL) 4 MG dose pack; Take by mouth as directed on package instructions.  Dispense: 21 tablet; Refill: 0    2. Morbid (severe) obesity due to excess calories  -     Semaglutide-Weight Management 2.4 MG/0.75ML solution auto-injector; Inject 2.4 mg under the skin into the appropriate area as directed 1 (One) Time " Per Week.  Dispense: 9 mL; Refill: 3    3. Postpartum depression  -     sertraline (Zoloft) 50 MG tablet; Take 1 tablet by mouth Daily.  Dispense: 90 tablet; Refill: 3    4. Hypomagnesemia  -     magnesium oxide (MAG-OX) 400 MG tablet; Take 1 tablet by mouth Daily.  Dispense: 90 tablet; Refill: 0          Doing great on wegovy, will continue max dose  Continue zoloft as doing well  steorid pack for ear  Refilled magnesium           Class 3 Severe Obesity (BMI >=40). Obesity-related health conditions include the following: none. Obesity is improving with treatment. BMI is is above average; BMI management plan is completed. We discussed portion control and increasing exercise.     -Follow up: 6 months and prn

## 2023-05-10 RX ORDER — FLUTICASONE PROPIONATE 50 MCG
2 SPRAY, SUSPENSION (ML) NASAL DAILY
Qty: 48 G | Refills: 3 | Status: SHIPPED | OUTPATIENT
Start: 2023-05-10

## 2023-08-31 ENCOUNTER — OFFICE VISIT (OUTPATIENT)
Dept: OBSTETRICS AND GYNECOLOGY | Age: 28
End: 2023-08-31
Payer: COMMERCIAL

## 2023-08-31 VITALS
WEIGHT: 215 LBS | DIASTOLIC BLOOD PRESSURE: 76 MMHG | HEIGHT: 64 IN | SYSTOLIC BLOOD PRESSURE: 122 MMHG | BODY MASS INDEX: 36.7 KG/M2

## 2023-08-31 DIAGNOSIS — N64.4 BREAST PAIN, LEFT: Primary | ICD-10-CM

## 2023-08-31 PROCEDURE — 99213 OFFICE O/P EST LOW 20 MIN: CPT | Performed by: PHYSICIAN ASSISTANT

## 2023-08-31 NOTE — PROGRESS NOTES
"Subjective     Chief Complaint   Patient presents with    Breast Problem     Breast check, c/o left breast discomfort. (Stopped breastfeeding in January and thought at first it was a clogged duct but it still there and getting more tender.)       Lavonne Lan is a 27 y.o.  whose LMP is Patient's last menstrual period was 2023 (exact date). presents with left breast pain    Stopped BF in January and started with a lump and pain in her left breast about 3 wks later  She did try wamr compresses and pumping but no change in size  Did decrease in pain after her cycle  Past 3-4 months she has noted increase in the pain and it is causing nausea  Feels something small     She is using semaglutide and is working on weight loss  Has lost 64 lbs and is feeling good  Joints feel better, sleeps better  Cut out her occl glass of red wine    Not currently using BC  Would be interested in starting something  Had SE with BCP and unsure if she wants to try that again  Sister had to have her iud surgically removed so concerned about that as well    Daughter is good, almost 2, oldest is 4    No Additional Complaints Reported    The following portions of the patient's history were reviewed and updated as appropriate:no additional history reviewed, vital signs, allergies, current medications, past medical history, past social history, past surgical history, and problem list      Review of Systems   Integument/breast: positive for breast tenderness     Objective      /76   Ht 162.6 cm (64\")   Wt 97.5 kg (215 lb)   LMP 2023 (Exact Date)   Breastfeeding No   BMI 36.90 kg/mý     Physical Exam    General:   alert, comfortable, and no distress   Heart: Not performed today   Lungs: Not performed today.   Breast: Inspection negative, No nipple retraction or dimpling, No nipple discharge or bleeding, No axillary or supraclavicular adenopathy, Normal to palpation without dominant masses, left breast with dense tissue " and discomfort noted around the 1 o'clock area, no discrete masses noted. No skin changes   Neck: Not performed today   Abdomen: Not performed today   CVA: Not performed today   Pelvis: Not performed today   Extremities: Not performed today   Neurologic: negative   Psychiatric: Normal affect, judgement, and mood       Lab Review   Labs: No data reviewed    Imaging   No data reviewed    Assessment & Plan     ASSESSMENT  1. Breast pain, left          PLAN  1.   Orders Placed This Encounter   Procedures    US Breast Bilateral Complete    Mammo Diagnostic Bilateral With CAD       2. Imaging ordered. If wnl, would suggest f/u with breast specialist.     3. Disc BC. Gave sample of lo loestrin. She will call if she desires an rx. Also discussed kyleena. She will let us know    Follow up: SALUD Muller  8/31/2023

## 2023-09-05 ENCOUNTER — TELEPHONE (OUTPATIENT)
Dept: FAMILY MEDICINE CLINIC | Facility: CLINIC | Age: 28
End: 2023-09-05

## 2023-09-05 ENCOUNTER — OFFICE VISIT (OUTPATIENT)
Dept: FAMILY MEDICINE CLINIC | Facility: CLINIC | Age: 28
End: 2023-09-05
Payer: COMMERCIAL

## 2023-09-05 VITALS
SYSTOLIC BLOOD PRESSURE: 132 MMHG | HEIGHT: 64 IN | OXYGEN SATURATION: 98 % | WEIGHT: 212 LBS | HEART RATE: 71 BPM | DIASTOLIC BLOOD PRESSURE: 68 MMHG | BODY MASS INDEX: 36.19 KG/M2

## 2023-09-05 DIAGNOSIS — H60.312 ACUTE DIFFUSE OTITIS EXTERNA OF LEFT EAR: Primary | ICD-10-CM

## 2023-09-05 PROCEDURE — 99213 OFFICE O/P EST LOW 20 MIN: CPT | Performed by: FAMILY MEDICINE

## 2023-09-05 RX ORDER — OFLOXACIN 3 MG/ML
5 SOLUTION AURICULAR (OTIC) DAILY
Qty: 10 ML | Refills: 0 | Status: SHIPPED | OUTPATIENT
Start: 2023-09-05

## 2023-09-05 RX ORDER — DOXYCYCLINE HYCLATE 100 MG/1
100 CAPSULE ORAL 2 TIMES DAILY
Qty: 20 CAPSULE | Refills: 0 | Status: SHIPPED | OUTPATIENT
Start: 2023-09-05

## 2023-09-05 NOTE — PROGRESS NOTES
Subjective   Lavonne Lan is a 27 y.o. female. Presents today for   Chief Complaint   Patient presents with    Earache     Bilateral   Blood in left ear       History of Present Illness  Patient 26 y/o was cleaning ear on left as felt full and got blood return.   Used ear camera and saw fresh blood, showed me pic.  Has been hurting and feels like swollen more inside.   No uri symptoms.     Review of Systems   HENT:  Positive for ear pain. Negative for ear discharge.      Patient Active Problem List   Diagnosis    Bulging lumbar disc    Degeneration of intervertebral disc of lumbar region    Lumbar facet arthropathy    Lumbar radiculitis    Chronic bilateral low back pain with sciatica    Postpartum depression       Social History     Socioeconomic History    Marital status:      Spouse name: BOBBY    Number of children: 0    Years of education: 12   Tobacco Use    Smoking status: Former     Packs/day: 0.50     Years: 6.00     Pack years: 3.00     Types: Cigarettes     Start date:      Quit date: 2018     Years since quittin.3     Passive exposure: Past    Smokeless tobacco: Never   Vaping Use    Vaping Use: Former   Substance and Sexual Activity    Alcohol use: Yes     Comment: occ    Drug use: No    Sexual activity: Yes     Partners: Male     Birth control/protection: None     Comment: spouse = BOBBY       Allergies   Allergen Reactions    Amoxicillin Rash    Buspar [Buspirone] Rash    Penicillins Rash       Current Outpatient Medications on File Prior to Visit   Medication Sig Dispense Refill    fluticasone (FLONASE) 50 MCG/ACT nasal spray Use 2 sprays in each nostril as directed by provider Daily. 48 g 3    ibuprofen (ADVIL,MOTRIN) 800 MG tablet TAKE 1 TABLET BY MOUTH EVERY 8 HOURS AS NEEDED FOR BACK PAIN 90 tablet 4    levonorgestrel (Kyleena) 19.5 MG intrauterine device IUD To be inserted by intrauterine route one time by prescriber. 1 each 0    magnesium oxide (MAG-OX) 400 MG tablet Take  "1 tablet by mouth Daily. 90 tablet 0    Magnesium Oxide 400 (240 Mg) MG tablet Take 1 tablet by mouth Daily. 90 tablet 1    Semaglutide-Weight Management 2.4 MG/0.75ML solution auto-injector Inject 2.4 mg under the skin into the appropriate area as directed 1 (One) Time Per Week. 9 mL 3    sertraline (Zoloft) 50 MG tablet Take 1 tablet by mouth Daily. 90 tablet 3    spironolactone (ALDACTONE) 25 MG tablet Take 1 tablet by mouth Daily for 14 days, THEN increase to 2 tablets Daily. Take blood pressure after increasing dose 60 tablet 2     Current Facility-Administered Medications on File Prior to Visit   Medication Dose Route Frequency Provider Last Rate Last Admin    perflutren (DEFINITY) 8.476 mg in sodium chloride 0.9 % 10 mL injection  3 mL Intravenous Once Nickolas Lr DO           Objective   Vitals:    09/05/23 1512   BP: 132/68   Pulse: 71   SpO2: 98%   Weight: 96.2 kg (212 lb)   Height: 162.6 cm (64\")     Body mass index is 36.39 kg/m².    Physical Exam  Vitals and nursing note reviewed.   Constitutional:       Appearance: Normal appearance. She is not toxic-appearing or diaphoretic.   HENT:      Head: Normocephalic and atraumatic.      Right Ear: Tympanic membrane normal.      Ears:      Comments: Left Tm intact and clear;  EAC noted abrasion, dried blood, canal swollen and injected surrounding.  Musculoskeletal:      Cervical back: Neck supple.   Skin:     General: Skin is warm and dry.      Capillary Refill: Capillary refill takes less than 2 seconds.   Neurological:      Mental Status: She is alert.   Psychiatric:         Mood and Affect: Mood normal.         Behavior: Behavior normal.       Assessment & Plan   Diagnoses and all orders for this visit:    1. Acute diffuse otitis externa of left ear (Primary)  -     doxycycline (VIBRAMYCIN) 100 MG capsule; Take 1 capsule by mouth 2 (Two) Times a Day.  Dispense: 20 capsule; Refill: 0  -     ofloxacin (FLOXIN) 0.3 % otic solution; Administer 5 drops " into the left ear Daily.  Dispense: 10 mL; Refill: 0    Will start otic and oral abx  Prn - RTC if worse or no improvement.                   -Follow up:

## 2023-09-05 NOTE — TELEPHONE ENCOUNTER
----- Message from Layne Chu sent at 9/5/2023  9:44 AM EDT -----  Pts ear is bleeding and thinks she has an infection. . No appts available..Please advise

## 2023-09-08 ENCOUNTER — APPOINTMENT (OUTPATIENT)
Dept: WOMENS IMAGING | Facility: HOSPITAL | Age: 28
End: 2023-09-08
Payer: COMMERCIAL

## 2023-09-08 PROCEDURE — 77066 DX MAMMO INCL CAD BI: CPT | Performed by: RADIOLOGY

## 2023-09-08 PROCEDURE — 76642 ULTRASOUND BREAST LIMITED: CPT | Performed by: RADIOLOGY

## 2023-09-08 PROCEDURE — G0279 TOMOSYNTHESIS, MAMMO: HCPCS | Performed by: RADIOLOGY

## 2023-09-08 PROCEDURE — 77062 BREAST TOMOSYNTHESIS BI: CPT | Performed by: RADIOLOGY

## 2023-11-05 DIAGNOSIS — R53.83 OTHER FATIGUE: ICD-10-CM

## 2023-11-05 DIAGNOSIS — D64.9 ANEMIA, UNSPECIFIED TYPE: Primary | ICD-10-CM

## 2023-11-13 ENCOUNTER — TELEMEDICINE (OUTPATIENT)
Dept: FAMILY MEDICINE CLINIC | Facility: CLINIC | Age: 28
End: 2023-11-13
Payer: COMMERCIAL

## 2023-11-13 DIAGNOSIS — B96.89 ACUTE BACTERIAL SINUSITIS: Primary | ICD-10-CM

## 2023-11-13 DIAGNOSIS — J01.90 ACUTE BACTERIAL SINUSITIS: Primary | ICD-10-CM

## 2023-11-13 DIAGNOSIS — J02.9 SORE THROAT: ICD-10-CM

## 2023-11-13 DIAGNOSIS — R05.1 ACUTE COUGH: ICD-10-CM

## 2023-11-13 PROCEDURE — 99213 OFFICE O/P EST LOW 20 MIN: CPT | Performed by: NURSE PRACTITIONER

## 2023-11-13 RX ORDER — DOXYCYCLINE HYCLATE 100 MG/1
100 CAPSULE ORAL 2 TIMES DAILY
Qty: 20 CAPSULE | Refills: 0 | Status: SHIPPED | OUTPATIENT
Start: 2023-11-13 | End: 2023-11-23

## 2023-11-13 RX ORDER — BENZONATATE 100 MG/1
100 CAPSULE ORAL
Qty: 10 CAPSULE | Refills: 1 | Status: SHIPPED | OUTPATIENT
Start: 2023-11-13

## 2023-11-13 RX ORDER — PREDNISONE 10 MG/1
TABLET ORAL
Qty: 20 TABLET | Refills: 0 | Status: SHIPPED | OUTPATIENT
Start: 2023-11-13 | End: 2023-11-21

## 2023-11-13 NOTE — PROGRESS NOTES
Subjective   Lavonne Lan is a 28 y.o. female.     Lavonne was at home, in her home office, and this provider was in her office on [facility].  Call Began at 1140  Call Ended at 1150:  Lavonne gave verbal consent for video visit.  Her children were at home in the background during visit.   Lavonne resents today for uri symptoms.  She and her children have been having URI symptoms for about 2 weeks, they were better around 5-7 days, then over the weekend the symptoms came back with a vengeance, head congestion, fever, sore throat, headache.  She has intermittent laryngitis and feels like she has Covid.She reports feeling pressure in her maxillary sinuses and ears.  She feels she may have Covid and a Covid test is being delivered to her today at 2.        History Of Present Illness  URI symptoms x 2 weeks, got better for 5 days, then worsening of symptoms with headache, fever, sinus pressure, sore throat, ear pressure.  She has taken tylenol, Dayquil and throat lozenges.  Warm beverages also help.  She will Covid test at 2 pm today.  Once resulted she will call the office and I will proceed as appropriate with a treatment plan.      Patient Active Problem List   Diagnosis    Bulging lumbar disc    Degeneration of intervertebral disc of lumbar region    Lumbar facet arthropathy    Lumbar radiculitis    Chronic bilateral low back pain with sciatica    Postpartum depression       Social History     Socioeconomic History    Marital status:      Spouse name: BOBBY    Number of children: 0    Years of education: 12   Tobacco Use    Smoking status: Former     Packs/day: 0.50     Years: 6.00     Additional pack years: 0.00     Total pack years: 3.00     Types: Cigarettes     Start date:      Quit date: 2018     Years since quittin.5     Passive exposure: Past    Smokeless tobacco: Never   Vaping Use    Vaping Use: Former   Substance and Sexual Activity    Alcohol use: Yes     Comment: occ    Drug use: No     Sexual activity: Yes     Partners: Male     Birth control/protection: None     Comment: spouse = BOBBY       Allergies   Allergen Reactions    Amoxicillin Rash    Buspar [Buspirone] Rash    Penicillins Rash       Current Outpatient Medications on File Prior to Visit   Medication Sig Dispense Refill    fluticasone (FLONASE) 50 MCG/ACT nasal spray Use 2 sprays in each nostril as directed by provider Daily. 48 g 3    ibuprofen (ADVIL,MOTRIN) 800 MG tablet TAKE 1 TABLET BY MOUTH EVERY 8 HOURS AS NEEDED FOR BACK PAIN 90 tablet 4    levonorgestrel (Kyleena) 19.5 MG intrauterine device IUD To be inserted by intrauterine route one time by prescriber. 1 each 0    magnesium oxide (MAG-OX) 400 MG tablet Take 1 tablet by mouth Daily. 90 tablet 0    Magnesium Oxide 400 (240 Mg) MG tablet Take 1 tablet by mouth Daily. 90 tablet 1    ofloxacin (FLOXIN) 0.3 % otic solution Administer 5 drops into the left ear Daily. 10 mL 0    Semaglutide-Weight Management 2.4 MG/0.75ML solution auto-injector Inject 2.4 mg under the skin into the appropriate area as directed 1 (One) Time Per Week. 9 mL 3    sertraline (Zoloft) 50 MG tablet Take 1 tablet by mouth Daily. 90 tablet 3    spironolactone (ALDACTONE) 25 MG tablet Take 1 tablet by mouth Daily for 14 days, THEN increase to 2 tablets Daily. Take blood pressure after increasing dose 60 tablet 2    [DISCONTINUED] doxycycline (VIBRAMYCIN) 100 MG capsule Take 1 capsule by mouth 2 (Two) Times a Day. 20 capsule 0     Current Facility-Administered Medications on File Prior to Visit   Medication Dose Route Frequency Provider Last Rate Last Admin    perflutren (DEFINITY) 8.476 mg in sodium chloride 0.9 % 10 mL injection  3 mL Intravenous Once Nickolas Lr R, DO           Objective   There were no vitals filed for this visit.  There is no height or weight on file to calculate BMI.    Physical Exam    Waived due to Video Visit.    Procedures     Assessment & Plan   Diagnoses and all orders for this  visit:    1. Acute bacterial sinusitis (Primary)  -     doxycycline (VIBRAMYCIN) 100 MG capsule; Take 1 capsule by mouth 2 (Two) Times a Day for 10 days.  Dispense: 20 capsule; Refill: 0  -     benzonatate (Tessalon Perles) 100 MG capsule; Take 1 capsule by mouth every night at bedtime.  Dispense: 10 capsule; Refill: 1  -     predniSONE (DELTASONE) 10 MG tablet; 4 po daily x 2d, 3 po daily x 2d, 2 po daily x2d 10mg po daily 2d then stop  Dispense: 20 tablet; Refill: 0    2. Sore throat    3. Acute cough  -     benzonatate (Tessalon Perles) 100 MG capsule; Take 1 capsule by mouth every night at bedtime.  Dispense: 10 capsule; Refill: 1            Discussed Care Gaps, ordered referrals and encouraged vaccination updates.       - Pt agrees with plan of care and denies further questions/concerns today  - This document is intended for medical expert use only. Persons  reading this document without medical staff guidance may result in misinterpretation and unintended morbidity     Go to the ER for any possible life-threatening symptoms such as chest pain or shortness of air.      Please allow 3-5 business days for recommendations based on new results      I personally spent time with this patient, preparing for the visit, reviewing tests, obtaining and/or reviewing a separately obtained history, performing a medically appropriate examination and/or evaluation, counseling and educating the patient/family/caregiver, ordering medications,  documenting information in the medical record and indepentently interpreting results.               Return if symptoms worsen or fail to improve.

## 2023-11-27 ENCOUNTER — LAB (OUTPATIENT)
Dept: LAB | Facility: HOSPITAL | Age: 28
End: 2023-11-27
Payer: COMMERCIAL

## 2023-11-27 LAB
ANION GAP SERPL CALCULATED.3IONS-SCNC: 11 MMOL/L (ref 5–15)
BASOPHILS # BLD AUTO: 0.02 10*3/MM3 (ref 0–0.2)
BASOPHILS NFR BLD AUTO: 0.3 % (ref 0–1.5)
BUN SERPL-MCNC: 9 MG/DL (ref 6–20)
BUN/CREAT SERPL: 15.5 (ref 7–25)
CALCIUM SPEC-SCNC: 9.2 MG/DL (ref 8.6–10.5)
CHLORIDE SERPL-SCNC: 103 MMOL/L (ref 98–107)
CO2 SERPL-SCNC: 26 MMOL/L (ref 22–29)
CREAT SERPL-MCNC: 0.58 MG/DL (ref 0.57–1)
DEPRECATED RDW RBC AUTO: 41.5 FL (ref 37–54)
EGFRCR SERPLBLD CKD-EPI 2021: 125 ML/MIN/1.73
EOSINOPHIL # BLD AUTO: 0.24 10*3/MM3 (ref 0–0.4)
EOSINOPHIL NFR BLD AUTO: 3.8 % (ref 0.3–6.2)
ERYTHROCYTE [DISTWIDTH] IN BLOOD BY AUTOMATED COUNT: 12.8 % (ref 12.3–15.4)
FERRITIN SERPL-MCNC: 110 NG/ML (ref 13–150)
GLUCOSE SERPL-MCNC: 82 MG/DL (ref 65–99)
HCT VFR BLD AUTO: 41.3 % (ref 34–46.6)
HGB BLD-MCNC: 13.7 G/DL (ref 12–15.9)
IMM GRANULOCYTES # BLD AUTO: 0 10*3/MM3 (ref 0–0.05)
IMM GRANULOCYTES NFR BLD AUTO: 0 % (ref 0–0.5)
IRON 24H UR-MRATE: 89 MCG/DL (ref 37–145)
IRON SATN MFR SERPL: 22 % (ref 20–50)
LYMPHOCYTES # BLD AUTO: 1.84 10*3/MM3 (ref 0.7–3.1)
LYMPHOCYTES NFR BLD AUTO: 28.9 % (ref 19.6–45.3)
MCH RBC QN AUTO: 28.8 PG (ref 26.6–33)
MCHC RBC AUTO-ENTMCNC: 33.2 G/DL (ref 31.5–35.7)
MCV RBC AUTO: 86.9 FL (ref 79–97)
MONOCYTES # BLD AUTO: 0.44 10*3/MM3 (ref 0.1–0.9)
MONOCYTES NFR BLD AUTO: 6.9 % (ref 5–12)
NEUTROPHILS NFR BLD AUTO: 3.83 10*3/MM3 (ref 1.7–7)
NEUTROPHILS NFR BLD AUTO: 60.1 % (ref 42.7–76)
PLATELET # BLD AUTO: 174 10*3/MM3 (ref 140–450)
PMV BLD AUTO: 12.8 FL (ref 6–12)
POTASSIUM SERPL-SCNC: 4.5 MMOL/L (ref 3.5–5.2)
RBC # BLD AUTO: 4.75 10*6/MM3 (ref 3.77–5.28)
SODIUM SERPL-SCNC: 140 MMOL/L (ref 136–145)
TIBC SERPL-MCNC: 404 MCG/DL (ref 298–536)
TRANSFERRIN SERPL-MCNC: 271 MG/DL (ref 200–360)
TSH SERPL DL<=0.05 MIU/L-ACNC: 0.92 UIU/ML (ref 0.27–4.2)
WBC NRBC COR # BLD AUTO: 6.37 10*3/MM3 (ref 3.4–10.8)

## 2023-11-27 PROCEDURE — 84466 ASSAY OF TRANSFERRIN: CPT | Performed by: FAMILY MEDICINE

## 2023-11-27 PROCEDURE — 83540 ASSAY OF IRON: CPT | Performed by: FAMILY MEDICINE

## 2023-11-27 PROCEDURE — 85025 COMPLETE CBC W/AUTO DIFF WBC: CPT | Performed by: FAMILY MEDICINE

## 2023-11-27 PROCEDURE — 80048 BASIC METABOLIC PNL TOTAL CA: CPT | Performed by: FAMILY MEDICINE

## 2023-11-27 PROCEDURE — 82728 ASSAY OF FERRITIN: CPT | Performed by: FAMILY MEDICINE

## 2023-11-27 PROCEDURE — 84443 ASSAY THYROID STIM HORMONE: CPT | Performed by: FAMILY MEDICINE

## 2024-01-03 ENCOUNTER — TELEPHONE (OUTPATIENT)
Dept: OBSTETRICS AND GYNECOLOGY | Age: 29
End: 2024-01-03

## 2024-01-03 NOTE — TELEPHONE ENCOUNTER
Caller: Lavonne Lan    Relationship: Self    Best call back number: [phone]    What was the call regarding: PT GOING ON A 7 DAY CRUISE, LEAVES ON 01/13. PT REQUESTING MEDICATION TO DELAY HER PERIOD.     CONFIRMED THE PHARMACY ON FILE.    no

## 2024-01-05 RX ORDER — ACETAMINOPHEN AND CODEINE PHOSPHATE 120; 12 MG/5ML; MG/5ML
1 SOLUTION ORAL DAILY
Qty: 84 TABLET | Refills: 3 | Status: SHIPPED | OUTPATIENT
Start: 2024-01-05 | End: 2025-01-04

## 2024-01-05 NOTE — TELEPHONE ENCOUNTER
Pt requesting Northidrone. Pt due to start period on 1/16, wants to start pill 1 week prior so she doesn't start period

## 2024-01-16 RX ORDER — ONDANSETRON 4 MG/1
4 TABLET, ORALLY DISINTEGRATING ORAL EVERY 8 HOURS PRN
Qty: 24 TABLET | Refills: 0 | Status: SHIPPED | OUTPATIENT
Start: 2024-01-16

## 2024-03-27 ENCOUNTER — TELEMEDICINE (OUTPATIENT)
Dept: FAMILY MEDICINE CLINIC | Facility: TELEHEALTH | Age: 29
End: 2024-03-27
Payer: COMMERCIAL

## 2024-03-27 ENCOUNTER — TELEPHONE (OUTPATIENT)
Dept: FAMILY MEDICINE CLINIC | Facility: CLINIC | Age: 29
End: 2024-03-27
Payer: COMMERCIAL

## 2024-03-27 DIAGNOSIS — H10.31 ACUTE BACTERIAL CONJUNCTIVITIS OF RIGHT EYE: Primary | ICD-10-CM

## 2024-03-27 RX ORDER — TOBRAMYCIN 3 MG/ML
1 SOLUTION/ DROPS OPHTHALMIC EVERY 8 HOURS SCHEDULED
Qty: 5 ML | Refills: 0 | Status: SHIPPED | OUTPATIENT
Start: 2024-03-27

## 2024-03-27 RX ORDER — SERTRALINE HYDROCHLORIDE 25 MG/1
TABLET, FILM COATED ORAL
Qty: 15 TABLET | Refills: 0 | Status: SHIPPED | OUTPATIENT
Start: 2024-03-27 | End: 2024-04-11

## 2024-03-27 NOTE — PROGRESS NOTES
You have chosen to receive care through a telehealth visit.  Do you consent to use a video/audio connection for your medical care today? Yes     CHIEF COMPLAINT  No chief complaint on file.        HPI  Lavonne Lan is a 28 y.o. female  presents with complaint of 4 day history of right eye redness, matting and crusting, mild swelling of eyelid.  Denies cold symptoms.  Daughter has pink eye currently.     Review of Systems  See HPI    Past Medical History:   Diagnosis Date    Anxiety     Arthritis     Chronic bilateral low back pain with sciatica     Depression     GERD (gastroesophageal reflux disease)     Gestational hypertension     Klebsiella cystitis 2017    PCOS (polycystic ovarian syndrome)     Polycystic ovary syndrome        Family History   Problem Relation Age of Onset    Diabetes Maternal Grandfather     Diabetes Paternal Grandmother     Arthritis Paternal Grandfather     Breast cancer Neg Hx     Ovarian cancer Neg Hx     Uterine cancer Neg Hx     Colon cancer Neg Hx     Deep vein thrombosis Neg Hx     Pulmonary embolism Neg Hx     Malig Hyperthermia Neg Hx        Social History     Socioeconomic History    Marital status:      Spouse name: BOBBY    Number of children: 0    Years of education: 12   Tobacco Use    Smoking status: Former     Current packs/day: 0.00     Average packs/day: 0.5 packs/day for 8.3 years (4.2 ttl pk-yrs)     Types: Cigarettes     Start date:      Quit date: 2018     Years since quittin.8     Passive exposure: Past    Smokeless tobacco: Never   Vaping Use    Vaping status: Former   Substance and Sexual Activity    Alcohol use: Yes     Comment: occ    Drug use: No    Sexual activity: Yes     Partners: Male     Birth control/protection: None     Comment: spouse = BOBBY Lan  reports that she quit smoking about 5 years ago. Her smoking use included cigarettes. She started smoking about 14 years ago. She has a 4.2 pack-year smoking history. She  has been exposed to tobacco smoke. She has never used smokeless tobacco.               There were no vitals taken for this visit.    PHYSICAL EXAM  Physical Exam   Constitutional: She is oriented to person, place, and time. She appears well-developed and well-nourished. She does not have a sickly appearance. She does not appear ill.   HENT:   Head: Normocephalic and atraumatic.   Eyes: Right eye exhibits discharge and edema. Right conjunctiva is injected. Left conjunctiva is injected.   Pulmonary/Chest: Effort normal.  No respiratory distress.  Neurological: She is alert and oriented to person, place, and time.           Diagnoses and all orders for this visit:    1. Acute bacterial conjunctivitis of right eye (Primary)  -     tobramycin (Tobrex) 0.3 % solution ophthalmic solution; Administer 1 drop to both eyes Every 8 (Eight) Hours.  Dispense: 5 mL; Refill: 0    --use eye drops as prescribed  --wash hands frequently  --f/u in 3-5 days if no improvement      FOLLOW-UP  As discussed during visit with PCP/[facility] if no improvement or Urgent Care/Emergency Department if worsening of symptoms    Patient verbalizes understanding of medication dosage, comfort measures, instructions for treatment and follow-up.    Fauzia Cabrera, BRAD  03/27/2024  09:20 EDT    The use of a video visit has been reviewed with the patient and verbal informed consent has been obtained. Myself and Lavonne Lan participated in this visit. The patient is located in [address] [place] [address].    I am located in [place], [place]. Mychart and Twilio were utilized. I spent 8 minutes in the patient's chart for this visit.      Note Disclaimer: At [facility], we believe that sharing information builds trust and better   relationships. You are receiving this note because you recently visited [facility]. It is possible you   will see health information before a provider has talked with you about it. This kind of information  can   be easy to misunderstand. To help you fully understand what it means for your health, we urge you to   discuss this note with your provider.

## 2024-03-27 NOTE — TELEPHONE ENCOUNTER
Dr. Lr,     50 mg of Zoloft was sent in and pharmacy is stating it looks like it needs to be 25mg. Please clarify. Thanks   [facility]- medial branch block    After injection:  Have someone available to drive you home and stay with you for about 4 hours or until you have no weakness or numbness  in your limbs (which may happen from local anesthetic and may last for a few hours).  Usually we use local anesthetic only without sedation. If you have IV sedation, do not drive or sign legal documents for 24 hours.  Your blood pressure may go up temporarily for several days following cortisone injection and should come back down on its own.   If you are diabetic, your blood sugar may go up temporarily for several days following cortisone injection and should come back down on its own. If your blood sugar goes above 300, you should contact your primary care physician or your endocrinologist who is managing your diabetes.  If you have severe congestive heart failure, the cortisone may worsen the condition temporarily.  If you do develop allergic reaction to any of the medications, it could be skin rash and/or itching and you may take a Benadryl 25 mg tablet, which is over the counter. If that does not help or if you develop any sensation of swelling in the tongue or throat or difficulty in breathing, you should seek immediate medical attention either through Urgent Care or Emergency Room, or call 911.   Be aware of possible side effects including, but not limited to transient increase in pain, infection, headache, nausea, vomiting, weakness and delayed adverse medication effects.    Discharge Medication Instructions Post Pain Procedure:  If you were on any of the following blood thinning medications:  · Not applicable.  I have not changed any of your self-reported or prescribed medications except as above. If you have questions regarding these medications, please contact the provider who prescribed each medication.  In case you have questions, call the [facility] program at [phone].    Ovi CASTRO     Unknown

## 2024-04-04 DIAGNOSIS — E66.01 MORBID (SEVERE) OBESITY DUE TO EXCESS CALORIES: ICD-10-CM

## 2024-04-04 RX ORDER — SEMAGLUTIDE 2.4 MG/.75ML
2.4 INJECTION, SOLUTION SUBCUTANEOUS WEEKLY
Qty: 9 ML | Refills: 1 | Status: SHIPPED | OUTPATIENT
Start: 2024-04-04

## 2024-04-12 ENCOUNTER — TELEMEDICINE (OUTPATIENT)
Dept: FAMILY MEDICINE CLINIC | Facility: CLINIC | Age: 29
End: 2024-04-12
Payer: COMMERCIAL

## 2024-04-12 DIAGNOSIS — R16.1 SPLENOMEGALY: ICD-10-CM

## 2024-04-12 DIAGNOSIS — R16.0 HEPATOMEGALY: ICD-10-CM

## 2024-04-12 DIAGNOSIS — R11.2 NAUSEA AND VOMITING, UNSPECIFIED VOMITING TYPE: Primary | ICD-10-CM

## 2024-04-12 DIAGNOSIS — K76.0 FATTY LIVER: ICD-10-CM

## 2024-04-12 DIAGNOSIS — K21.00 GASTROESOPHAGEAL REFLUX DISEASE WITH ESOPHAGITIS, UNSPECIFIED WHETHER HEMORRHAGE: ICD-10-CM

## 2024-04-12 PROCEDURE — 99214 OFFICE O/P EST MOD 30 MIN: CPT | Performed by: FAMILY MEDICINE

## 2024-04-12 RX ORDER — OMEPRAZOLE 40 MG/1
40 CAPSULE, DELAYED RELEASE ORAL DAILY
Qty: 90 CAPSULE | Refills: 3 | Status: SHIPPED | OUTPATIENT
Start: 2024-04-12

## 2024-04-12 NOTE — PROGRESS NOTES
Subjective   Lavonne Lan is a 28 y.o. female. Presents today for No chief complaint on file.     Patient VIDEO VISIT, she gave consent to treat.    Patient at home and I was in my office.   History of Present Illness  Patient with n/v and gerd symptoms;   Prior enlarged spleen/liver;  due check;  no black or bloody stools;  no abd pain, +dyspepsia  Review of Systems   Gastrointestinal:  Positive for nausea and vomiting. Negative for abdominal pain.       Patient Active Problem List   Diagnosis    Bulging lumbar disc    Degeneration of intervertebral disc of lumbar region    Lumbar facet arthropathy    Lumbar radiculitis    Chronic bilateral low back pain with sciatica    Postpartum depression       Social History     Socioeconomic History    Marital status:      Spouse name: BOBBY    Number of children: 0    Years of education: 12   Tobacco Use    Smoking status: Former     Current packs/day: 0.00     Average packs/day: 0.5 packs/day for 8.3 years (4.2 ttl pk-yrs)     Types: Cigarettes     Start date:      Quit date: 2018     Years since quittin.9     Passive exposure: Past    Smokeless tobacco: Never   Vaping Use    Vaping status: Former   Substance and Sexual Activity    Alcohol use: Yes     Comment: occ    Drug use: No    Sexual activity: Yes     Partners: Male     Birth control/protection: None     Comment: spouse = BOBBY       Allergies   Allergen Reactions    Amoxicillin Rash    Buspar [Buspirone] Rash    Penicillins Rash       Current Outpatient Medications on File Prior to Visit   Medication Sig Dispense Refill    benzonatate (Tessalon Perles) 100 MG capsule Take 1 capsule by mouth every night at bedtime. 10 capsule 1    fluticasone (FLONASE) 50 MCG/ACT nasal spray Use 2 sprays in each nostril as directed by provider Daily. 48 g 3    magnesium oxide (MAG-OX) 400 MG tablet Take 1 tablet by mouth Daily. 90 tablet 0    Magnesium Oxide 400 (240 Mg) MG tablet Take 1 tablet by mouth Daily.  90 tablet 1    norethindrone (MICRONOR) 0.35 MG tablet Take 1 tablet by mouth Daily. 84 tablet 3    ondansetron ODT (ZOFRAN-ODT) 4 MG disintegrating tablet Place 1 tablet on the tongue Every 8 (Eight) Hours As Needed for Nausea or Vomiting. 24 tablet 0    Semaglutide-Weight Management (Wegovy) 2.4 MG/0.75ML solution auto-injector Inject 2.4 mg under the skin into the appropriate area as directed 1 (One) Time Per Week. 9 mL 1    sertraline (Zoloft) 25 MG tablet Take 1 & 1/2 tablets by mouth Daily for 5 days, THEN 1 tablet Daily for 5 days, THEN 1/2 tablet Daily for 5 days. 15 tablet 0    tobramycin (Tobrex) 0.3 % solution ophthalmic solution Administer 1 drop to both eyes Every 8 (Eight) Hours. 5 mL 0     Current Facility-Administered Medications on File Prior to Visit   Medication Dose Route Frequency Provider Last Rate Last Admin    perflutren (DEFINITY) 8.476 mg in sodium chloride 0.9 % 10 mL injection  3 mL Intravenous Once Nickolas Lr, DO           Objective   There were no vitals filed for this visit.  There is no height or weight on file to calculate BMI.    Physical Exam  Vitals and nursing note reviewed.   Constitutional:       Appearance: She is well-developed.   Neurological:      Mental Status: She is alert and oriented to person, place, and time.   Psychiatric:         Behavior: Behavior normal.         Assessment & Plan   Diagnoses and all orders for this visit:    1. Nausea and vomiting, unspecified vomiting type (Primary)  -     omeprazole (priLOSEC) 40 MG capsule; Take 1 capsule by mouth Daily.  Dispense: 90 capsule; Refill: 3    2. Gastroesophageal reflux disease with esophagitis, unspecified whether hemorrhage  -     omeprazole (priLOSEC) 40 MG capsule; Take 1 capsule by mouth Daily.  Dispense: 90 capsule; Refill: 3    3. Fatty liver  -     US Abdomen Complete; Future    4. Splenomegaly  -     US Abdomen Complete; Future    5. Hepatomegaly  -     US Abdomen Complete; Future    Gerd - will  start ppi  Check us spleen and liver                 -Follow up: 3 months and prn

## 2024-05-06 ENCOUNTER — HOSPITAL ENCOUNTER (OUTPATIENT)
Dept: ULTRASOUND IMAGING | Facility: HOSPITAL | Age: 29
Discharge: HOME | End: 2024-05-06
Admitting: FAMILY MEDICINE
Payer: COMMERCIAL

## 2024-05-06 DIAGNOSIS — R16.1 SPLENOMEGALY: ICD-10-CM

## 2024-05-06 DIAGNOSIS — R16.0 HEPATOMEGALY: ICD-10-CM

## 2024-05-06 DIAGNOSIS — K76.0 FATTY LIVER: ICD-10-CM

## 2024-05-06 PROCEDURE — 76700 US EXAM ABDOM COMPLETE: CPT

## 2024-05-12 DIAGNOSIS — Z11.4 ENCOUNTER FOR SCREENING FOR HIV: ICD-10-CM

## 2024-05-12 DIAGNOSIS — D69.6 THROMBOCYTOPENIA: Primary | ICD-10-CM

## 2024-05-12 DIAGNOSIS — R16.1 SPLENOMEGALY: ICD-10-CM

## 2024-05-12 DIAGNOSIS — R53.83 OTHER FATIGUE: ICD-10-CM

## 2024-05-12 DIAGNOSIS — Z11.59 NEED FOR HEPATITIS C SCREENING TEST: ICD-10-CM

## 2024-05-28 ENCOUNTER — LAB (OUTPATIENT)
Dept: LAB | Facility: HOSPITAL | Age: 29
End: 2024-05-28
Payer: COMMERCIAL

## 2024-05-28 ENCOUNTER — TELEMEDICINE (OUTPATIENT)
Dept: FAMILY MEDICINE CLINIC | Facility: TELEHEALTH | Age: 29
End: 2024-05-28
Payer: COMMERCIAL

## 2024-05-28 DIAGNOSIS — B96.89 BACTERIAL SINUSITIS: Primary | ICD-10-CM

## 2024-05-28 DIAGNOSIS — J32.9 BACTERIAL SINUSITIS: Primary | ICD-10-CM

## 2024-05-28 LAB
ALBUMIN SERPL-MCNC: 4.2 G/DL (ref 3.5–5.2)
ALBUMIN/GLOB SERPL: 2.1 G/DL
ALP SERPL-CCNC: 85 U/L (ref 39–117)
ALT SERPL W P-5'-P-CCNC: 16 U/L (ref 1–33)
ANION GAP SERPL CALCULATED.3IONS-SCNC: 7.8 MMOL/L (ref 5–15)
AST SERPL-CCNC: 10 U/L (ref 1–32)
BASOPHILS # BLD AUTO: 0.02 10*3/MM3 (ref 0–0.2)
BASOPHILS # BLD AUTO: 0.04 10*3/MM3 (ref 0–0.2)
BASOPHILS NFR BLD AUTO: 0.4 % (ref 0–1.5)
BASOPHILS NFR BLD AUTO: 0.8 % (ref 0–1.5)
BILIRUB SERPL-MCNC: 0.4 MG/DL (ref 0–1.2)
BUN SERPL-MCNC: 8 MG/DL (ref 6–20)
BUN/CREAT SERPL: 12.3 (ref 7–25)
CALCIUM SPEC-SCNC: 9.1 MG/DL (ref 8.6–10.5)
CHLORIDE SERPL-SCNC: 105 MMOL/L (ref 98–107)
CO2 SERPL-SCNC: 26.2 MMOL/L (ref 22–29)
CREAT SERPL-MCNC: 0.65 MG/DL (ref 0.57–1)
DEPRECATED RDW RBC AUTO: 38.1 FL (ref 37–54)
DEPRECATED RDW RBC AUTO: 38.8 FL (ref 37–54)
EGFRCR SERPLBLD CKD-EPI 2021: 121.6 ML/MIN/1.73
EOSINOPHIL # BLD AUTO: 0.31 10*3/MM3 (ref 0–0.4)
EOSINOPHIL # BLD AUTO: 0.36 10*3/MM3 (ref 0–0.4)
EOSINOPHIL NFR BLD AUTO: 6.5 % (ref 0.3–6.2)
EOSINOPHIL NFR BLD AUTO: 7.3 % (ref 0.3–6.2)
ERYTHROCYTE [DISTWIDTH] IN BLOOD BY AUTOMATED COUNT: 12.3 % (ref 12.3–15.4)
ERYTHROCYTE [DISTWIDTH] IN BLOOD BY AUTOMATED COUNT: 12.3 % (ref 12.3–15.4)
FERRITIN SERPL-MCNC: 66 NG/ML (ref 13–150)
FOLATE SERPL-MCNC: 9.79 NG/ML (ref 4.78–24.2)
GLOBULIN UR ELPH-MCNC: 2 GM/DL
GLUCOSE SERPL-MCNC: 83 MG/DL (ref 65–99)
HCT VFR BLD AUTO: 40.4 % (ref 34–46.6)
HCT VFR BLD AUTO: 41.5 % (ref 34–46.6)
HCV AB SER QL: NORMAL
HGB BLD-MCNC: 13.9 G/DL (ref 12–15.9)
HGB BLD-MCNC: 14 G/DL (ref 12–15.9)
HIV 1+2 AB+HIV1 P24 AG SERPL QL IA: NORMAL
IMM GRANULOCYTES # BLD AUTO: 0.01 10*3/MM3 (ref 0–0.05)
IMM GRANULOCYTES # BLD AUTO: 0.02 10*3/MM3 (ref 0–0.05)
IMM GRANULOCYTES NFR BLD AUTO: 0.2 % (ref 0–0.5)
IMM GRANULOCYTES NFR BLD AUTO: 0.4 % (ref 0–0.5)
IRON 24H UR-MRATE: 92 MCG/DL (ref 37–145)
IRON SATN MFR SERPL: 22 % (ref 20–50)
LYMPHOCYTES # BLD AUTO: 1.26 10*3/MM3 (ref 0.7–3.1)
LYMPHOCYTES # BLD AUTO: 1.35 10*3/MM3 (ref 0.7–3.1)
LYMPHOCYTES NFR BLD AUTO: 26.6 % (ref 19.6–45.3)
LYMPHOCYTES NFR BLD AUTO: 27.4 % (ref 19.6–45.3)
MCH RBC QN AUTO: 29.1 PG (ref 26.6–33)
MCH RBC QN AUTO: 29.3 PG (ref 26.6–33)
MCHC RBC AUTO-ENTMCNC: 33.7 G/DL (ref 31.5–35.7)
MCHC RBC AUTO-ENTMCNC: 34.4 G/DL (ref 31.5–35.7)
MCV RBC AUTO: 85.1 FL (ref 79–97)
MCV RBC AUTO: 86.3 FL (ref 79–97)
MONOCYTES # BLD AUTO: 0.35 10*3/MM3 (ref 0.1–0.9)
MONOCYTES # BLD AUTO: 0.35 10*3/MM3 (ref 0.1–0.9)
MONOCYTES NFR BLD AUTO: 7.1 % (ref 5–12)
MONOCYTES NFR BLD AUTO: 7.4 % (ref 5–12)
NEUTROPHILS NFR BLD AUTO: 2.77 10*3/MM3 (ref 1.7–7)
NEUTROPHILS NFR BLD AUTO: 2.82 10*3/MM3 (ref 1.7–7)
NEUTROPHILS NFR BLD AUTO: 57.4 % (ref 42.7–76)
NEUTROPHILS NFR BLD AUTO: 58.5 % (ref 42.7–76)
NRBC BLD AUTO-RTO: 0 /100 WBC (ref 0–0.2)
PATHOLOGY REVIEW: YES
PLATELET # BLD AUTO: 169 10*3/MM3 (ref 140–450)
PLATELET # BLD AUTO: 174 10*3/MM3 (ref 140–450)
PMV BLD AUTO: 13.4 FL (ref 6–12)
PMV BLD AUTO: 13.9 FL (ref 6–12)
POTASSIUM SERPL-SCNC: 4.2 MMOL/L (ref 3.5–5.2)
PROT SERPL-MCNC: 6.2 G/DL (ref 6–8.5)
RBC # BLD AUTO: 4.75 10*6/MM3 (ref 3.77–5.28)
RBC # BLD AUTO: 4.81 10*6/MM3 (ref 3.77–5.28)
RETICS # AUTO: 0.1 10*6/MM3 (ref 0.02–0.13)
RETICS/RBC NFR AUTO: 2.04 % (ref 0.7–1.9)
SODIUM SERPL-SCNC: 139 MMOL/L (ref 136–145)
TIBC SERPL-MCNC: 425 MCG/DL (ref 298–536)
TRANSFERRIN SERPL-MCNC: 285 MG/DL (ref 200–360)
TSH SERPL DL<=0.05 MIU/L-ACNC: 0.7 UIU/ML (ref 0.27–4.2)
VIT B12 BLD-MCNC: 413 PG/ML (ref 211–946)
WBC NRBC COR # BLD AUTO: 4.74 10*3/MM3 (ref 3.4–10.8)
WBC NRBC COR # BLD AUTO: 4.92 10*3/MM3 (ref 3.4–10.8)

## 2024-05-28 PROCEDURE — G0432 EIA HIV-1/HIV-2 SCREEN: HCPCS | Performed by: FAMILY MEDICINE

## 2024-05-28 PROCEDURE — 82728 ASSAY OF FERRITIN: CPT | Performed by: FAMILY MEDICINE

## 2024-05-28 PROCEDURE — 84466 ASSAY OF TRANSFERRIN: CPT | Performed by: FAMILY MEDICINE

## 2024-05-28 PROCEDURE — 99213 OFFICE O/P EST LOW 20 MIN: CPT | Performed by: NURSE PRACTITIONER

## 2024-05-28 PROCEDURE — 83521 IG LIGHT CHAINS FREE EACH: CPT | Performed by: FAMILY MEDICINE

## 2024-05-28 PROCEDURE — 85045 AUTOMATED RETICULOCYTE COUNT: CPT | Performed by: FAMILY MEDICINE

## 2024-05-28 PROCEDURE — 82607 VITAMIN B-12: CPT | Performed by: FAMILY MEDICINE

## 2024-05-28 PROCEDURE — 84155 ASSAY OF PROTEIN SERUM: CPT | Performed by: FAMILY MEDICINE

## 2024-05-28 PROCEDURE — 82784 ASSAY IGA/IGD/IGG/IGM EACH: CPT | Performed by: FAMILY MEDICINE

## 2024-05-28 PROCEDURE — 85025 COMPLETE CBC W/AUTO DIFF WBC: CPT | Performed by: FAMILY MEDICINE

## 2024-05-28 PROCEDURE — 80050 GENERAL HEALTH PANEL: CPT | Performed by: FAMILY MEDICINE

## 2024-05-28 PROCEDURE — 86803 HEPATITIS C AB TEST: CPT | Performed by: FAMILY MEDICINE

## 2024-05-28 PROCEDURE — 84165 PROTEIN E-PHORESIS SERUM: CPT | Performed by: FAMILY MEDICINE

## 2024-05-28 PROCEDURE — 82746 ASSAY OF FOLIC ACID SERUM: CPT | Performed by: FAMILY MEDICINE

## 2024-05-28 PROCEDURE — 86334 IMMUNOFIX E-PHORESIS SERUM: CPT | Performed by: FAMILY MEDICINE

## 2024-05-28 PROCEDURE — 83540 ASSAY OF IRON: CPT | Performed by: FAMILY MEDICINE

## 2024-05-28 RX ORDER — DEXTROMETHORPHAN HYDROBROMIDE AND PROMETHAZINE HYDROCHLORIDE 15; 6.25 MG/5ML; MG/5ML
5 SYRUP ORAL 4 TIMES DAILY PRN
Qty: 120 ML | Refills: 0 | Status: SHIPPED | OUTPATIENT
Start: 2024-05-28 | End: 2024-06-07

## 2024-05-28 RX ORDER — DOXYCYCLINE HYCLATE 100 MG/1
100 CAPSULE ORAL 2 TIMES DAILY
Qty: 14 CAPSULE | Refills: 0 | Status: SHIPPED | OUTPATIENT
Start: 2024-05-28 | End: 2024-06-04

## 2024-05-28 NOTE — PATIENT INSTRUCTIONS
Sinus Infection, Adult  A sinus infection is soreness and swelling (inflammation) of your sinuses. Sinuses are hollow spaces in the bones around your face. They are located:  Around your eyes.  In the middle of your forehead.  Behind your nose.  In your cheekbones.  Your sinuses and nasal passages are lined with a fluid called mucus. Mucus drains out of your sinuses. Swelling can trap mucus in your sinuses. This lets germs (bacteria, virus, or fungus) grow, which leads to infection. Most of the time, this condition is caused by a virus.  What are the causes?  Allergies.  Asthma.  Germs.  Things that block your nose or sinuses.  Growths in the nose (nasal polyps).  Chemicals or irritants in the air.  A fungus. This is rare.  What increases the risk?  Having a weak body defense system (immune system).  Doing a lot of swimming or diving.  Using nasal sprays too much.  Smoking.  What are the signs or symptoms?  The main symptoms of this condition are pain and a feeling of pressure around the sinuses. Other symptoms include:  Stuffy nose (congestion). This may make it hard to breathe through your nose.  Runny nose (drainage).  Soreness, swelling, and warmth in the sinuses.  A cough that may get worse at night.  Being unable to smell and taste.  Mucus that collects in the throat or the back of the nose (postnasal drip). This may cause a sore throat or bad breath.  Being very tired (fatigued).  A fever.  How is this diagnosed?  Your symptoms.  Your medical history.  A physical exam.  Tests to find out if your condition is short-term (acute) or long-term (chronic). Your doctor may:  Check your nose for growths (polyps).  Check your sinuses using a tool that has a light on one end (endoscope).  Check for allergies or germs.  Do imaging tests, such as an MRI or CT scan.  How is this treated?  Treatment for this condition depends on the cause and whether it is short-term or long-term.  If caused by a virus, your symptoms  should go away on their own within 10 days. You may be given medicines to relieve symptoms. They include:  Medicines that shrink swollen tissue in the nose.  A spray that treats swelling of the nostrils.  Rinses that help get rid of thick mucus in your nose (nasal saline washes).  Medicines that treat allergies (antihistamines).  Over-the-counter pain relievers.  If caused by bacteria, your doctor may wait to see if you will get better without treatment. You may be given antibiotic medicine if you have:  A very bad infection.  A weak body defense system.  If caused by growths in the nose, surgery may be needed.  Follow these instructions at home:  Medicines  Take, use, or apply over-the-counter and prescription medicines only as told by your doctor. These may include nasal sprays.  If you were prescribed an antibiotic medicine, take it as told by your doctor. Do not stop taking it even if you start to feel better.  Hydrate and humidify    Drink enough water to keep your pee (urine) pale yellow.  Use a cool mist humidifier to keep the humidity level in your home above 50%.  Breathe in steam for 10-15 minutes, 3-4 times a day, or as told by your doctor. You can do this in the bathroom while a hot shower is running.  Try not to spend time in cool or dry air.  Rest  Rest as much as you can.  Sleep with your head raised (elevated).  Make sure you get enough sleep each night.  General instructions    Put a warm, moist washcloth on your face 3-4 times a day, or as often as told by your doctor.  Use nasal saline washes as often as told by your doctor.  Wash your hands often with soap and water. If you cannot use soap and water, use hand .  Do not smoke. Avoid being around people who are smoking (secondhand smoke).  Keep all follow-up visits.  Contact a doctor if:  You have a fever.  Your symptoms get worse.  Your symptoms do not get better within 10 days.  Get help right away if:  You have a very bad headache.  You  cannot stop vomiting.  You have very bad pain or swelling around your face or eyes.  You have trouble seeing.  You feel confused.  Your neck is stiff.  You have trouble breathing.  These symptoms may be an emergency. Get help right away. Call 911.  Do not wait to see if the symptoms will go away.  Do not drive yourself to the hospital.  Summary  A sinus infection is swelling of your sinuses. Sinuses are hollow spaces in the bones around your face.  This condition is caused by tissues in your nose that become inflamed or swollen. This traps germs. These can lead to infection.  If you were prescribed an antibiotic medicine, take it as told by your doctor. Do not stop taking it even if you start to feel better.  Keep all follow-up visits.  This information is not intended to replace advice given to you by your health care provider. Make sure you discuss any questions you have with your health care provider.  Document Revised: 11/22/2022 Document Reviewed: 11/22/2022  [employer] Patient Education © 2024 [employer]

## 2024-05-28 NOTE — PROGRESS NOTES
Chief Complaint   Patient presents with    Cough    Nasal Congestion       Video Visit Reason:   Free Text Description: Congestion, fatigue, cough  Subjective   Lavonne Lan is a 28 y.o. female.     History of Present Illness  Congestion for about 2 weeks with increasing postnasal drainage and cough that has become more productive.  Cough  Associated symptoms include chills, ear congestion, headaches, nasal congestion, postnasal drip, rhinorrhea and wheezing. Pertinent negatives include no chest pain, ear pain, fever or shortness of breath.       The following portions of the patient's history were reviewed and updated as appropriate: allergies, current medications, past medical history, and problem list.      Past Medical History:   Diagnosis Date    Anxiety     Arthritis     Chronic bilateral low back pain with sciatica     Depression     GERD (gastroesophageal reflux disease)     Gestational hypertension     Klebsiella cystitis 2017    PCOS (polycystic ovarian syndrome)     Polycystic ovary syndrome      Social History     Socioeconomic History    Marital status:      Spouse name: BOBBY    Number of children: 0    Years of education: 12   Tobacco Use    Smoking status: Former     Current packs/day: 0.00     Average packs/day: 0.5 packs/day for 8.3 years (4.2 ttl pk-yrs)     Types: Cigarettes     Start date:      Quit date: 2018     Years since quittin.0     Passive exposure: Past    Smokeless tobacco: Never   Vaping Use    Vaping status: Former   Substance and Sexual Activity    Alcohol use: Yes     Comment: occ    Drug use: No    Sexual activity: Yes     Partners: Male     Birth control/protection: None     Comment: spouse = BOBBY     medication documentation: reviewed and updated with patient and   Current Outpatient Medications:     doxycycline (VIBRAMYCIN) 100 MG capsule, Take 1 capsule by mouth 2 (Two) Times a Day for 7 days., Disp: 14 capsule, Rfl: 0    fluticasone (FLONASE) 50  MCG/ACT nasal spray, Use 2 sprays in each nostril as directed by provider Daily., Disp: 48 g, Rfl: 3    magnesium oxide (MAG-OX) 400 MG tablet, Take 1 tablet by mouth Daily., Disp: 90 tablet, Rfl: 0    Magnesium Oxide 400 (240 Mg) MG tablet, Take 1 tablet by mouth Daily., Disp: 90 tablet, Rfl: 1    omeprazole (priLOSEC) 40 MG capsule, Take 1 capsule by mouth Daily., Disp: 90 capsule, Rfl: 3    promethazine-dextromethorphan (PROMETHAZINE-DM) 6.25-15 MG/5ML syrup, Take 5 mL by mouth 4 (Four) Times a Day As Needed for Cough for up to 10 days., Disp: 120 mL, Rfl: 0    Semaglutide-Weight Management (Wegovy) 2.4 MG/0.75ML solution auto-injector, Inject 2.4 mg under the skin into the appropriate area as directed 1 (One) Time Per Week., Disp: 9 mL, Rfl: 1  No current facility-administered medications for this visit.    Facility-Administered Medications Ordered in Other Visits:     perflutren (DEFINITY) 8.476 mg in sodium chloride 0.9 % 10 mL injection, 3 mL, Intravenous, Once, Nickolas Lr DO  Review of Systems   Constitutional:  Positive for chills and fatigue. Negative for fever.   HENT:  Positive for congestion, postnasal drip, rhinorrhea, sinus pressure, sinus pain and voice change. Negative for ear pain.    Respiratory:  Positive for cough and wheezing. Negative for shortness of breath.    Cardiovascular:  Negative for chest pain.   Neurological:  Positive for headaches. Negative for dizziness.       Objective   Physical Exam  Constitutional:       Appearance: Normal appearance. She is well-developed.   HENT:      Nose: Congestion present.      Right Sinus: Maxillary sinus tenderness present.      Left Sinus: Maxillary sinus tenderness present.   Eyes:      Conjunctiva/sclera: Conjunctivae normal.   Pulmonary:      Effort: Pulmonary effort is normal.   Lymphadenopathy:      Head:      Right side of head: No tonsillar, preauricular or posterior auricular adenopathy.      Left side of head: No tonsillar,  preauricular or posterior auricular adenopathy.      Cervical: No cervical adenopathy (patient guided exam).   Psychiatric:         Speech: Speech normal.         Assessment & Plan   Diagnoses and all orders for this visit:    1. Bacterial sinusitis (Primary)  -     doxycycline (VIBRAMYCIN) 100 MG capsule; Take 1 capsule by mouth 2 (Two) Times a Day for 7 days.  Dispense: 14 capsule; Refill: 0  -     promethazine-dextromethorphan (PROMETHAZINE-DM) 6.25-15 MG/5ML syrup; Take 5 mL by mouth 4 (Four) Times a Day As Needed for Cough for up to 10 days.  Dispense: 120 mL; Refill: 0                    Follow Up:  If your symptoms are not resolving by the completion of your treatment or are worsening, see your primary care provider for follow up. If you don't have a primary care provider, you may go to any Urgent Care for re-evaluation. If you develop any life threatening symptoms, go to the nearest Emergency Department immediately or call EMS.               The use of  Video Visit was utilized during this visit, using both [employer] and [employer]. The use of a video visit has been reviewed with the patient and verbal informed consent has been obtained. No technical difficulties occurred during the visit.    is located at [address] [place] [address]  Provider is located at [place], [place]

## 2024-05-29 LAB
LAB AP CASE REPORT: NORMAL
PATH REPORT.FINAL DX SPEC: NORMAL

## 2024-05-30 LAB
ALBUMIN SERPL ELPH-MCNC: 3.6 G/DL (ref 2.9–4.4)
ALBUMIN/GLOB SERPL: 1.4 {RATIO} (ref 0.7–1.7)
ALPHA1 GLOB SERPL ELPH-MCNC: 0.2 G/DL (ref 0–0.4)
ALPHA2 GLOB SERPL ELPH-MCNC: 0.8 G/DL (ref 0.4–1)
B-GLOBULIN SERPL ELPH-MCNC: 0.9 G/DL (ref 0.7–1.3)
GAMMA GLOB SERPL ELPH-MCNC: 0.8 G/DL (ref 0.4–1.8)
GLOBULIN SER-MCNC: 2.7 G/DL (ref 2.2–3.9)
IGA SERPL-MCNC: 218 MG/DL (ref 87–352)
IGG SERPL-MCNC: 739 MG/DL (ref 586–1602)
IGM SERPL-MCNC: 81 MG/DL (ref 26–217)
INTERPRETATION SERPL IEP-IMP: ABNORMAL
KAPPA LC FREE SER-MCNC: 23.5 MG/L (ref 3.3–19.4)
KAPPA LC FREE/LAMBDA FREE SER: 1.56 {RATIO} (ref 0.26–1.65)
LABORATORY COMMENT REPORT: ABNORMAL
LAMBDA LC FREE SERPL-MCNC: 15.1 MG/L (ref 5.7–26.3)
M PROTEIN SERPL ELPH-MCNC: ABNORMAL G/DL
PROT SERPL-MCNC: 6.3 G/DL (ref 6–8.5)

## 2024-05-31 ENCOUNTER — TELEPHONE (OUTPATIENT)
Dept: FAMILY MEDICINE CLINIC | Facility: CLINIC | Age: 29
End: 2024-05-31

## 2024-05-31 NOTE — TELEPHONE ENCOUNTER
Caller: AlojoshuaLavonne    Relationship: Self    Best call back number: [phone]*    What is the best time to reach you: ANYTIME    Who are you requesting to speak with (clinical staff, provider,  specific staff member): CLINICAL    What was the call regarding: PATIENT CALLING AND HAS CONCERNS OVER SOME LAB RESULTS OF HER MOST RECENT LABS. PATIENT REQUEST A CALL BACK TO GO OVER THE LAB RESULTS.    Is it okay if the provider responds through [facility]: NO

## 2024-07-31 RX ORDER — FLUTICASONE PROPIONATE 50 MCG
2 SPRAY, SUSPENSION (ML) NASAL DAILY
Qty: 48 G | Refills: 3 | OUTPATIENT
Start: 2024-07-31

## 2024-08-23 ENCOUNTER — TELEPHONE (OUTPATIENT)
Dept: SURGERY | Facility: CLINIC | Age: 29
End: 2024-08-23
Payer: COMMERCIAL

## 2024-10-11 RX ORDER — FLUTICASONE PROPIONATE 50 UG/1
2 SPRAY, METERED NASAL DAILY
Qty: 48 G | Refills: 3 | Status: SHIPPED | OUTPATIENT
Start: 2024-10-11

## 2024-12-03 RX ORDER — FLUCONAZOLE 150 MG/1
TABLET ORAL
Qty: 2 TABLET | Refills: 0 | Status: SHIPPED | OUTPATIENT
Start: 2024-12-03

## 2024-12-03 RX ORDER — DOXYCYCLINE 100 MG/1
100 CAPSULE ORAL 2 TIMES DAILY
Qty: 20 CAPSULE | Refills: 0 | Status: SHIPPED | OUTPATIENT
Start: 2024-12-03

## 2024-12-23 ENCOUNTER — APPOINTMENT (OUTPATIENT)
Dept: CT IMAGING | Facility: HOSPITAL | Age: 29
End: 2024-12-23
Payer: COMMERCIAL

## 2024-12-23 ENCOUNTER — HOSPITAL ENCOUNTER (EMERGENCY)
Facility: HOSPITAL | Age: 29
Discharge: HOME | End: 2024-12-23
Attending: EMERGENCY MEDICINE | Admitting: EMERGENCY MEDICINE
Payer: COMMERCIAL

## 2024-12-23 VITALS
WEIGHT: 203 LBS | HEIGHT: 64 IN | BODY MASS INDEX: 34.66 KG/M2 | HEART RATE: 75 BPM | SYSTOLIC BLOOD PRESSURE: 160 MMHG | OXYGEN SATURATION: 100 % | TEMPERATURE: 97.5 F | RESPIRATION RATE: 18 BRPM | DIASTOLIC BLOOD PRESSURE: 84 MMHG

## 2024-12-23 DIAGNOSIS — G43.109 OCULAR MIGRAINE: Primary | ICD-10-CM

## 2024-12-23 LAB
ALBUMIN SERPL-MCNC: 4.5 G/DL (ref 3.5–5.2)
ALBUMIN/GLOB SERPL: 1.7 G/DL
ALP SERPL-CCNC: 71 U/L (ref 39–117)
ALT SERPL W P-5'-P-CCNC: 18 U/L (ref 1–33)
ANION GAP SERPL CALCULATED.3IONS-SCNC: 11.1 MMOL/L (ref 5–15)
AST SERPL-CCNC: 15 U/L (ref 1–32)
BASOPHILS # BLD AUTO: 0.06 10*3/MM3 (ref 0–0.2)
BASOPHILS NFR BLD AUTO: 1 % (ref 0–1.5)
BILIRUB SERPL-MCNC: 0.5 MG/DL (ref 0–1.2)
BUN SERPL-MCNC: 9 MG/DL (ref 6–20)
BUN/CREAT SERPL: 12.5 (ref 7–25)
CALCIUM SPEC-SCNC: 9.1 MG/DL (ref 8.6–10.5)
CHLORIDE SERPL-SCNC: 103 MMOL/L (ref 98–107)
CO2 SERPL-SCNC: 22.9 MMOL/L (ref 22–29)
CREAT SERPL-MCNC: 0.72 MG/DL (ref 0.57–1)
DEPRECATED RDW RBC AUTO: 39.1 FL (ref 37–54)
EGFRCR SERPLBLD CKD-EPI 2021: 114.8 ML/MIN/1.73
EOSINOPHIL # BLD AUTO: 0.5 10*3/MM3 (ref 0–0.4)
EOSINOPHIL NFR BLD AUTO: 8 % (ref 0.3–6.2)
ERYTHROCYTE [DISTWIDTH] IN BLOOD BY AUTOMATED COUNT: 12.5 % (ref 12.3–15.4)
GLOBULIN UR ELPH-MCNC: 2.7 GM/DL
GLUCOSE SERPL-MCNC: 113 MG/DL (ref 65–99)
HCG SERPL QL: NEGATIVE
HCT VFR BLD AUTO: 45.1 % (ref 34–46.6)
HGB BLD-MCNC: 15.4 G/DL (ref 12–15.9)
IMM GRANULOCYTES # BLD AUTO: 0.01 10*3/MM3 (ref 0–0.05)
IMM GRANULOCYTES NFR BLD AUTO: 0.2 % (ref 0–0.5)
LYMPHOCYTES # BLD AUTO: 1.89 10*3/MM3 (ref 0.7–3.1)
LYMPHOCYTES NFR BLD AUTO: 30.1 % (ref 19.6–45.3)
MCH RBC QN AUTO: 29.7 PG (ref 26.6–33)
MCHC RBC AUTO-ENTMCNC: 34.1 G/DL (ref 31.5–35.7)
MCV RBC AUTO: 87.1 FL (ref 79–97)
MONOCYTES # BLD AUTO: 0.5 10*3/MM3 (ref 0.1–0.9)
MONOCYTES NFR BLD AUTO: 8 % (ref 5–12)
NEUTROPHILS NFR BLD AUTO: 3.31 10*3/MM3 (ref 1.7–7)
NEUTROPHILS NFR BLD AUTO: 52.7 % (ref 42.7–76)
PLATELET # BLD AUTO: 197 10*3/MM3 (ref 140–450)
PMV BLD AUTO: 13.1 FL (ref 6–12)
POTASSIUM SERPL-SCNC: 3.6 MMOL/L (ref 3.5–5.2)
PROT SERPL-MCNC: 7.2 G/DL (ref 6–8.5)
QT INTERVAL: 385 MS
QTC INTERVAL: 419 MS
RBC # BLD AUTO: 5.18 10*6/MM3 (ref 3.77–5.28)
SODIUM SERPL-SCNC: 137 MMOL/L (ref 136–145)
WBC NRBC COR # BLD AUTO: 6.27 10*3/MM3 (ref 3.4–10.8)

## 2024-12-23 PROCEDURE — 93005 ELECTROCARDIOGRAM TRACING: CPT | Performed by: PHYSICIAN ASSISTANT

## 2024-12-23 PROCEDURE — 99285 EMERGENCY DEPT VISIT HI MDM: CPT

## 2024-12-23 PROCEDURE — 70496 CT ANGIOGRAPHY HEAD: CPT

## 2024-12-23 PROCEDURE — 80053 COMPREHEN METABOLIC PANEL: CPT | Performed by: PHYSICIAN ASSISTANT

## 2024-12-23 PROCEDURE — 25510000001 IOPAMIDOL PER 1 ML: Performed by: EMERGENCY MEDICINE

## 2024-12-23 PROCEDURE — 85025 COMPLETE CBC W/AUTO DIFF WBC: CPT | Performed by: PHYSICIAN ASSISTANT

## 2024-12-23 PROCEDURE — 70498 CT ANGIOGRAPHY NECK: CPT

## 2024-12-23 PROCEDURE — 84703 CHORIONIC GONADOTROPIN ASSAY: CPT | Performed by: PHYSICIAN ASSISTANT

## 2024-12-23 RX ORDER — IOPAMIDOL 755 MG/ML
100 INJECTION, SOLUTION INTRAVASCULAR
Status: COMPLETED | OUTPATIENT
Start: 2024-12-23 | End: 2024-12-23

## 2024-12-23 RX ORDER — SODIUM CHLORIDE 0.9 % (FLUSH) 0.9 %
10 SYRINGE (ML) INJECTION AS NEEDED
Status: DISCONTINUED | OUTPATIENT
Start: 2024-12-23 | End: 2024-12-23 | Stop reason: HOSPADM

## 2024-12-23 RX ADMIN — IOPAMIDOL 95 ML: 755 INJECTION, SOLUTION INTRAVENOUS at 09:55

## 2024-12-23 NOTE — ED NOTES
Patient to ER via car from home for L side vision loss aprox 20 minutes ago    No other s/s of stroke

## 2024-12-23 NOTE — ED PROVIDER NOTES
MD ATTESTATION NOTE    The ALVINO and I have discussed this patient's history, physical exam, and treatment plan.  I have reviewed the documentation and personally had a face to face interaction with the patient. I affirm the documentation and agree with the treatment and plan.  The attached note describes my personal findings.      I provided a substantive portion of the care of the patient.  I personally performed the physical exam in its entirety, and below are my findings.        Brief HPI: This patient is a 29-year-old female without significant past medical history presenting to the emergency room today with reported sudden onset left-sided visual field loss.  She states that the visual field deficit was on the upper and lower quadrants of the left lateral eye and feels as though a stream of water was over her eye.  She does report some improvement in those symptoms thus far.  She denies headache, chest pain, unilateral weakness, unilateral numbness, facial drooping, or speech abnormalities.      PHYSICAL EXAM  ED Triage Vitals   Temp Heart Rate Resp BP SpO2   12/23/24 0904 12/23/24 0904 12/23/24 0904 12/23/24 0906 12/23/24 0904   97.5 °F (36.4 °C) (!) 121 16 159/95 100 %      Temp src Heart Rate Source Patient Position BP Location FiO2 (%)   -- -- -- -- --                GENERAL: Resting comfortably and in no acute distress, nontoxic in appearance  HENT: nares patent  EYES: no scleral icterus  CV: regular rhythm, normal rate, no M/R/G  RESPIRATORY: normal effort, lungs clear bilaterally  ABDOMEN: soft, nontender, no rebound or guarding  MUSCULOSKELETAL: no deformity, no edema  NEURO: alert, moves all extremities, follows commands, NIH is 0  PSYCH:  calm, cooperative  SKIN: warm, dry    Vital signs and nursing notes reviewed.      Differential diagnosis includes but is not limited to acute CVA, TIA, intracranial hemorrhage, intracranial mass effect, ocular migraine, hypertensive urgency, hypertensive emergency,  acute anemia, or electrolyte disturbance.       Plan: We will discuss the patient's presentation with stroke neurology as we began with labs as well as a CTA head and neck for further evaluation.  We will monitor and reassess following results.      CT independently interpreted by myself with my interpretation showing no area of acute ischemia/infarct, hemorrhage, or mass effect.       Kalpesh Sequeira MD  12/23/24 4056

## 2024-12-23 NOTE — ED PROVIDER NOTES
EMERGENCY DEPARTMENT ENCOUNTER    Room Number:  03/03  Date of encounter:  12/23/2024  PCP: Nickolas Lr DO  Historian: Patient  Chronic or social conditions impacting care (social determinants of health): None    HPI:  Chief Complaint: Visual field deficits  A complete HPI/ROS/PMH/PSH/SH/FH are unobtainable due to: Nothing    Context: Lavonne Lan is a 29 y.o. female with a history of DHD, GERD, who presents to the ED c/o acute vision loss in the left eye on the lateral side.  Patient states this started approximately 20 minutes ago.  Symptoms seem to be improving however have not resolved.  Patient describes a wavy, felt like disturbance in the left lateral field of vision of the left eye only.  She states that this time the  disturbance seems to be settling in the left lower quadrant.  The right eye is unaffected.  She denies any headache, nausea, vomiting, dysarthria, aphasia, unilateral weakness.  Denies any history of migraines.  Patient mildly hypertensive here.    Review of prior external notes (non-ED):   I reviewed telemedicine office visit from 5/20/2024.  Patient seen for sinusitis.    Review of prior external test results outside of this encounter:  I reviewed a CMP from 5/20/2024.  Creatinine 0.65, creatinine 4.2    PAST MEDICAL HISTORY  Active Ambulatory Problems     Diagnosis Date Noted    Bulging lumbar disc 02/15/2016    Degeneration of intervertebral disc of lumbar region 02/15/2016    Lumbar facet arthropathy 09/01/2016    Lumbar radiculitis 09/01/2016    Chronic bilateral low back pain with sciatica 10/26/2016    Postpartum depression 02/07/2022     Resolved Ambulatory Problems     Diagnosis Date Noted    Acute otitis media 02/15/2016    Chronic pain 02/15/2016    Depression 02/15/2016    Gastroesophageal reflux disease 02/15/2016    Hyperesthesia 02/15/2016    Joint stiffness 02/15/2016    Tick bite 02/15/2016    Vertigo 02/15/2016    Fibromyalgia 02/15/2016    Fatigue 02/15/2016     Dizziness 02/15/2016    Diplopia 02/15/2016    PCOS (polycystic ovarian syndrome) 2017    Dysmenorrhea 2017    Klebsiella cystitis 2017    Missed  10/10/2017    Missed ab 10/10/2017    Borderline hypertension 10/18/2017    Obesity in pregnancy 2018    Nausea and vomiting of pregnancy, antepartum 2018    Evaluate anatomy not seen on prior sonogram 2018    Hypertension in pregnancy, essential, antepartum 2018    GERD without esophagitis 2018    Hemorrhoids during pregnancy in second trimester 2018    Constipation during pregnancy in second trimester 2018    Pregnancy 10/24/2018    Thrombocytopenia affecting pregnancy 10/24/2018    Cramping affecting pregnancy, antepartum 10/24/2018    SGA (small for gestational age) 10/26/2018    Poor fetal growth affecting management of mother in third trimester 11/10/2018    Anemia affecting pregnancy in third trimester 2018    Hypertension affecting pregnancy 2018    Group B Streptococcus carrier, +RV culture, currently pregnant 2018    Elevated blood pressure affecting pregnancy in third trimester, antepartum 2018    History of postpartum depression, currently pregnant 2019    Hemorrhoids 2019    Abdominal pain 2021    Nausea and vomiting during pregnancy 2021    Nausea and vomiting 2021    Morbid obesity with BMI of 40.0-44.9, adult 2021    History of miscarriage, currently pregnant 2021    BV (bacterial vaginosis) 2021    Previous  section 2021    History of gestational hypertension 2021    Previous pregnancy with congenital heart defect, currently pregnant 2021    Incomplete fetal anatomy 2021    Heartburn 2021    Gestational thrombocytopenia 2021    Previous  section complicating pregnancy 2022    Oligohydramnios in third trimester 2022    Decreased fetal movements in third  trimester 2022     Past Medical History:   Diagnosis Date    Anxiety     Arthritis     GERD (gastroesophageal reflux disease)     Gestational hypertension     Polycystic ovary syndrome          PAST SURGICAL HISTORY  Past Surgical History:   Procedure Laterality Date     SECTION N/A 2019    Procedure:  SECTION PRIMARY;  Surgeon: Annette Gomez MD;  Location: [facility] LABOR DELIVERY;  Service: Obstetrics/Gynecology     SECTION N/A 2022    Procedure:  SECTION REPEAT;  Surgeon: Ursula Mixon MD;  Location: [facility] LABOR DELIVERY;  Service: Obstetrics/Gynecology;  Laterality: N/A;    D & C WITH SUCTION N/A 10/12/2017    Procedure: DILATATION AND CURETTAGE WITH SUCTION;  Surgeon: Yomi Telles MD;  Location: [facility] MAIN OR;  Service:     DILATATION AND CURETTAGE  10/2017    WISDOM TOOTH EXTRACTION           FAMILY HISTORY  Family History   Problem Relation Age of Onset    Diabetes Maternal Grandfather     Diabetes Paternal Grandmother     Arthritis Paternal Grandfather     Breast cancer Neg Hx     Ovarian cancer Neg Hx     Uterine cancer Neg Hx     Colon cancer Neg Hx     Deep vein thrombosis Neg Hx     Pulmonary embolism Neg Hx     Malig Hyperthermia Neg Hx          SOCIAL HISTORY  Social History     Socioeconomic History    Marital status:      Spouse name: BOBBY    Number of children: 0    Years of education: 12   Tobacco Use    Smoking status: Former     Current packs/day: 0.00     Average packs/day: 0.5 packs/day for 8.3 years (4.2 ttl pk-yrs)     Types: Cigarettes     Start date:      Quit date: 2018     Years since quittin.6     Passive exposure: Past    Smokeless tobacco: Never   Vaping Use    Vaping status: Former   Substance and Sexual Activity    Alcohol use: Yes     Comment: occ    Drug use: No    Sexual activity: Yes     Partners: Male     Birth control/protection: None     Comment: spouse = BOBBY         ALLERGIES  Amoxicillin,  Buspar [buspirone], and Penicillins        REVIEW OF SYSTEMS  All systems reviewed and negative except for those discussed in HPI.       PHYSICAL EXAM    I have reviewed the triage vital signs and nursing notes.    ED Triage Vitals   Temp Heart Rate Resp BP SpO2   12/23/24 0904 12/23/24 0904 12/23/24 0904 12/23/24 0906 12/23/24 0904   97.5 °F (36.4 °C) (!) 121 16 159/95 100 %      Temp src Heart Rate Source Patient Position BP Location FiO2 (%)   -- -- -- -- --              Physical Exam  GENERAL:  Alert, oriented, well-appearing, not distressed  HENT: head atraumatic, no nuchal rigidity  EYES: no scleral icterus, EOMI  CV: regular rhythm, regular rate, no murmur  RESPIRATORY: normal effort, CTA  ABDOMEN: soft, nontender  MUSCULOSKELETAL: no deformity, FROM, no calf swelling or tenderness  NEURO: alert, normal speech, normal cerebellar function.  Visual fields intact bilaterally  SKIN: warm, dry        LAB RESULTS  Recent Results (from the past 24 hours)   Comprehensive Metabolic Panel    Collection Time: 12/23/24  9:19 AM    Specimen: Blood   Result Value Ref Range    Glucose 113 (H) 65 - 99 mg/dL    BUN 9 6 - 20 mg/dL    Creatinine 0.72 0.57 - 1.00 mg/dL    Sodium 137 136 - 145 mmol/L    Potassium 3.6 3.5 - 5.2 mmol/L    Chloride 103 98 - 107 mmol/L    CO2 22.9 22.0 - 29.0 mmol/L    Calcium 9.1 8.6 - 10.5 mg/dL    Total Protein 7.2 6.0 - 8.5 g/dL    Albumin 4.5 3.5 - 5.2 g/dL    ALT (SGPT) 18 1 - 33 U/L    AST (SGOT) 15 1 - 32 U/L    Alkaline Phosphatase 71 39 - 117 U/L    Total Bilirubin 0.5 0.0 - 1.2 mg/dL    Globulin 2.7 gm/dL    A/G Ratio 1.7 g/dL    BUN/Creatinine Ratio 12.5 7.0 - 25.0    Anion Gap 11.1 5.0 - 15.0 mmol/L    eGFR 114.8 >60.0 mL/min/1.73   hCG, Serum, Qualitative    Collection Time: 12/23/24  9:19 AM    Specimen: Blood   Result Value Ref Range    HCG Qualitative Negative Negative   CBC Auto Differential    Collection Time: 12/23/24  9:19 AM    Specimen: Blood   Result Value Ref Range    WBC  6.27 3.40 - 10.80 10*3/mm3    RBC 5.18 3.77 - 5.28 10*6/mm3    Hemoglobin 15.4 12.0 - 15.9 g/dL    Hematocrit 45.1 34.0 - 46.6 %    MCV 87.1 79.0 - 97.0 fL    MCH 29.7 26.6 - 33.0 pg    MCHC 34.1 31.5 - 35.7 g/dL    RDW 12.5 12.3 - 15.4 %    RDW-SD 39.1 37.0 - 54.0 fl    MPV 13.1 (H) 6.0 - 12.0 fL    Platelets 197 140 - 450 10*3/mm3    Neutrophil % 52.7 42.7 - 76.0 %    Lymphocyte % 30.1 19.6 - 45.3 %    Monocyte % 8.0 5.0 - 12.0 %    Eosinophil % 8.0 (H) 0.3 - 6.2 %    Basophil % 1.0 0.0 - 1.5 %    Immature Grans % 0.2 0.0 - 0.5 %    Neutrophils, Absolute 3.31 1.70 - 7.00 10*3/mm3    Lymphocytes, Absolute 1.89 0.70 - 3.10 10*3/mm3    Monocytes, Absolute 0.50 0.10 - 0.90 10*3/mm3    Eosinophils, Absolute 0.50 (H) 0.00 - 0.40 10*3/mm3    Basophils, Absolute 0.06 0.00 - 0.20 10*3/mm3    Immature Grans, Absolute 0.01 0.00 - 0.05 10*3/mm3   ECG 12 Lead Stroke Evaluation    Collection Time: 12/23/24 10:15 AM   Result Value Ref Range    QT Interval 385 ms    QTC Interval 419 ms       Ordered the above labs and independently reviewed the results.        RADIOLOGY  CT Angiogram Head, CT Angiogram Neck    Result Date: 12/23/2024  CT ANGIOGRAM NECK AND HEAD WITH CONTRAST  HISTORY: Left-sided hemianopsia.  COMPARISON: None.  FINDINGS: Initially, a noncontrasted CT examination of the brain was performed. The left lateral ventricle is slightly larger than that of the right. This is within normal limits in terms of variance. There is no evidence of intracranial hemorrhage, hydrocephalus or of acute infarction. A partially empty sella is noted. A CT angiogram of the neck and head was then performed. Multiplanar as well as three-dimensional reconstructions were generated.  The great vessels are arranged in a classic configuration. There is 0% stenosis of the internal carotid arteries using NASCET criteria. The distal aspects of the internal carotid arteries and the proximal aspects of the anterior and middle cerebral arteries  appear unremarkable. Both vertebral arteries were opacified. The right vertebral artery is dominant. The left vertebral artery proximally is partially obscured by streak artifact related to adjacent dense venous opacification. The left vertebral artery effectively terminates as the posterior inferior cerebellar artery. The basilar artery appears unremarkable. The P1 segments are markedly hypoplastic. Fetal origins of the posterior cerebral arteries are noted bilaterally.      Note: This is a preliminary report. The 3-dimensional reconstructions are not yet available for review. 1.  There is no evidence of acute infarction or intracranial hemorrhage. Further evaluation could be performed with MRI examination of the brain.  2. A partially empty sella is noted. 3. There is 0% stenosis of the internal carotid arteries. There is a relatively isolated posterior circulation. The P1 segments are markedly hypoplastic bilaterally. The left vertebral artery is markedly hypoplastic and appears to terminate as the posterior inferior cerebral artery.  CHECK CHECK   Radiation dose reduction techniques were utilized, including automated exposure control and exposure modulation based on body size.        I ordered the above noted radiological studies. Reviewed by me and discussed with radiologist.  See dictation for official radiology interpretation.      MEDICATIONS GIVEN IN ER    Medications   iopamidol (ISOVUE-370) 76 % injection 100 mL (95 mL Intravenous Given by Other 12/23/24 5963)         ADDITIONAL ORDERS CONSIDERED BUT NOT ORDERED:  Admission was considered but after careful review of the patient's presentation, physical examination, diagnostic results, and response to treatment the patient may be safely discharged with outpatient follow-up.       PROGRESS, DATA ANALYSIS, CONSULTS, AND MEDICAL DECISION MAKING    All labs have been independently interpreted by myself.  All radiology studies have been independently  interpreted by myself and discussed with radiologist dictating the report.   EKGs independently interpreted by myself.  Discussion below represents my analysis of pertinent findings related to patient's condition, differential diagnosis, treatment plan and final disposition.    I have discussed case with Dr. Sequeira, emergency room physician.  He has performed his own bedside examination and agrees with treatment plan.    ED Course as of 12/23/24 1525   Mon Dec 23, 2024   0922 Patient presents with a now improving episode of hemianopsia on the left out of the left eye.  No other focal deficits.  Symptoms seem to be improving.  Plan to obtain CTAs of the head and neck, discussed with stroke neurology. [EE]   0950 Recheck of patient.  Patient still complains of cloudy vision in the left lower quadrant of the left eye.  She now is reporting a throbbing, global headache. [EE]   0951 Creatinine: 0.72 [EE]   0953 I discussed the case with Dr. Azul, stroke neurology.  He suspects likely ocular migraine.  If CTA is negative, he asks that we give a migraine cocktail. [EE]   1015 Updated patient on discussion with stroke neurology.  She states her symptoms are improving and she would rather hold off on migraine medications.  We will continue to monitor. [EE]   1035 EKG independently interpreted myself.  Time 1015.  Sinus rhythm, 71 bpm.  Normal P/DUGLAS.  QRS normal with normal axis.  No significant ST abnormalities.  Similar to previous EKG from 7/2/2015. [EE]   1045 CT imaging of the brain independently interpreted myself shows no evidence of acute hemorrhage or aneurysm [EE]   1216 Shared decision making with the patient.  Discussed possible admission for MRI and further neuro consult.  She declines.  She states her headache has essentially resolved.  Her vision is much improved.  Again she declines any migraine medications.  I believe that her symptoms are consistent with ocular migraine.  No evidence of stroke.  We will  discharge. [EE]      ED Course User Index  [EE] Shahzad Chatterjee PA       AS OF 15:25 EST VITALS:    BP - 160/84  HR - 75  TEMP - 97.5 °F (36.4 °C)  O2 SATS - 100%        DIAGNOSIS  Final diagnoses:   Ocular migraine         DISPOSITION  Discharged    Admission was considered but after careful review of the patient's presentation, physical examination, diagnostic results, and response to treatment the patient may be safely discharged with outpatient follow-up.         Dictated utilizing [employer] dictation     Shahzad Chatterjee PA  12/23/24 1525

## 2024-12-26 ENCOUNTER — TELEPHONE (OUTPATIENT)
Dept: FAMILY MEDICINE CLINIC | Facility: CLINIC | Age: 29
End: 2024-12-26

## 2024-12-26 DIAGNOSIS — E04.1 THYROID NODULE: Primary | ICD-10-CM

## 2024-12-26 NOTE — TELEPHONE ENCOUNTER
Caller: Lavonne Lan    Relationship: Self    Best call back number: [phone]    What is the best time to reach you: ANYTIME     Who are you requesting to speak with (clinical staff, provider,  specific staff member): DR. SALDANA    Do you know the name of the person who called: PATIENT     What was the call regarding: CT RESULTS FROM HER ER VISIT ON MONDAY     Is it okay if the provider responds through MyChart: NO

## 2025-01-02 ENCOUNTER — HOSPITAL ENCOUNTER (OUTPATIENT)
Dept: ULTRASOUND IMAGING | Facility: HOSPITAL | Age: 30
Discharge: HOME | End: 2025-01-02
Admitting: FAMILY MEDICINE
Payer: COMMERCIAL

## 2025-01-02 PROCEDURE — 76536 US EXAM OF HEAD AND NECK: CPT

## 2025-01-05 DIAGNOSIS — E04.1 THYROID NODULE: Primary | ICD-10-CM

## 2025-01-07 DIAGNOSIS — E04.1 THYROID NODULE: Primary | ICD-10-CM

## 2025-01-22 ENCOUNTER — HOSPITAL ENCOUNTER (OUTPATIENT)
Dept: ULTRASOUND IMAGING | Facility: HOSPITAL | Age: 30
Discharge: HOME | End: 2025-01-22
Admitting: OTOLARYNGOLOGY
Payer: COMMERCIAL

## 2025-01-22 DIAGNOSIS — E04.1 THYROID NODULE: ICD-10-CM

## 2025-01-22 PROCEDURE — 88172 CYTP DX EVAL FNA 1ST EA SITE: CPT | Performed by: OTOLARYNGOLOGY

## 2025-01-22 PROCEDURE — 88305 TISSUE EXAM BY PATHOLOGIST: CPT | Performed by: OTOLARYNGOLOGY

## 2025-01-22 PROCEDURE — 88173 CYTOPATH EVAL FNA REPORT: CPT | Performed by: OTOLARYNGOLOGY

## 2025-01-22 PROCEDURE — 25010000002 LIDOCAINE 1 % SOLUTION: Performed by: OTOLARYNGOLOGY

## 2025-01-22 RX ORDER — LIDOCAINE HYDROCHLORIDE 10 MG/ML
5 INJECTION, SOLUTION INFILTRATION; PERINEURAL ONCE
Status: COMPLETED | OUTPATIENT
Start: 2025-01-22 | End: 2025-01-22

## 2025-01-22 RX ORDER — SODIUM BICARBONATE 42 MG/ML
0.5 INJECTION, SOLUTION INTRAVENOUS ONCE
Status: COMPLETED | OUTPATIENT
Start: 2025-01-22 | End: 2025-01-22

## 2025-01-22 RX ADMIN — SODIUM BICARBONATE 0.5 MEQ: 42 INJECTION, SOLUTION INTRAVENOUS at 11:15

## 2025-01-22 RX ADMIN — LIDOCAINE HYDROCHLORIDE 5 ML: 10 INJECTION, SOLUTION INFILTRATION; PERINEURAL at 11:15

## 2025-01-23 LAB
CYTO UR: NORMAL
DX PRELIMINARY: NORMAL
LAB AP CASE REPORT: NORMAL
LAB AP CLINICAL INFORMATION: NORMAL
LAB AP NON-GYN SPECIMEN ADEQUACY: NORMAL
PATH REPORT.FINAL DX SPEC: NORMAL
PATH REPORT.GROSS SPEC: NORMAL

## 2025-01-28 DIAGNOSIS — R53.83 OTHER FATIGUE: Primary | ICD-10-CM

## 2025-04-11 ENCOUNTER — LAB (OUTPATIENT)
Dept: LAB | Facility: HOSPITAL | Age: 30
End: 2025-04-11
Payer: COMMERCIAL

## 2025-04-11 LAB
ALBUMIN SERPL-MCNC: 4.1 G/DL (ref 3.5–5.2)
ALBUMIN/GLOB SERPL: 1.6 G/DL
ALP SERPL-CCNC: 83 U/L (ref 39–117)
ALT SERPL W P-5'-P-CCNC: 23 U/L (ref 1–33)
ANION GAP SERPL CALCULATED.3IONS-SCNC: 10 MMOL/L (ref 5–15)
AST SERPL-CCNC: 14 U/L (ref 1–32)
BASOPHILS # BLD AUTO: 0.02 10*3/MM3 (ref 0–0.2)
BASOPHILS NFR BLD AUTO: 0.4 % (ref 0–1.5)
BILIRUB SERPL-MCNC: 0.3 MG/DL (ref 0–1.2)
BUN SERPL-MCNC: 11 MG/DL (ref 6–20)
BUN/CREAT SERPL: 15.5 (ref 7–25)
CALCIUM SPEC-SCNC: 9.3 MG/DL (ref 8.6–10.5)
CHLORIDE SERPL-SCNC: 104 MMOL/L (ref 98–107)
CO2 SERPL-SCNC: 25 MMOL/L (ref 22–29)
CREAT SERPL-MCNC: 0.71 MG/DL (ref 0.57–1)
DEPRECATED RDW RBC AUTO: 41.2 FL (ref 37–54)
EGFRCR SERPLBLD CKD-EPI 2021: 116.7 ML/MIN/1.73
EOSINOPHIL # BLD AUTO: 0.24 10*3/MM3 (ref 0–0.4)
EOSINOPHIL NFR BLD AUTO: 5.1 % (ref 0.3–6.2)
ERYTHROCYTE [DISTWIDTH] IN BLOOD BY AUTOMATED COUNT: 12.6 % (ref 12.3–15.4)
GLOBULIN UR ELPH-MCNC: 2.6 GM/DL
GLUCOSE SERPL-MCNC: 90 MG/DL (ref 65–99)
HCT VFR BLD AUTO: 39.8 % (ref 34–46.6)
HGB BLD-MCNC: 13.3 G/DL (ref 12–15.9)
IMM GRANULOCYTES # BLD AUTO: 0.01 10*3/MM3 (ref 0–0.05)
IMM GRANULOCYTES NFR BLD AUTO: 0.2 % (ref 0–0.5)
LYMPHOCYTES # BLD AUTO: 1.24 10*3/MM3 (ref 0.7–3.1)
LYMPHOCYTES NFR BLD AUTO: 26.1 % (ref 19.6–45.3)
MCH RBC QN AUTO: 29.7 PG (ref 26.6–33)
MCHC RBC AUTO-ENTMCNC: 33.4 G/DL (ref 31.5–35.7)
MCV RBC AUTO: 88.8 FL (ref 79–97)
MONOCYTES # BLD AUTO: 0.33 10*3/MM3 (ref 0.1–0.9)
MONOCYTES NFR BLD AUTO: 6.9 % (ref 5–12)
NEUTROPHILS NFR BLD AUTO: 2.91 10*3/MM3 (ref 1.7–7)
NEUTROPHILS NFR BLD AUTO: 61.3 % (ref 42.7–76)
PLATELET # BLD AUTO: 162 10*3/MM3 (ref 140–450)
PMV BLD AUTO: 12.4 FL (ref 6–12)
POTASSIUM SERPL-SCNC: 4.4 MMOL/L (ref 3.5–5.2)
PROT SERPL-MCNC: 6.7 G/DL (ref 6–8.5)
RBC # BLD AUTO: 4.48 10*6/MM3 (ref 3.77–5.28)
SODIUM SERPL-SCNC: 139 MMOL/L (ref 136–145)
TSH SERPL DL<=0.05 MIU/L-ACNC: 0.97 UIU/ML (ref 0.27–4.2)
VIT B12 BLD-MCNC: 441 PG/ML (ref 211–946)
WBC NRBC COR # BLD AUTO: 4.75 10*3/MM3 (ref 3.4–10.8)

## 2025-04-11 PROCEDURE — 80050 GENERAL HEALTH PANEL: CPT | Performed by: FAMILY MEDICINE

## 2025-04-11 PROCEDURE — 82607 VITAMIN B-12: CPT | Performed by: FAMILY MEDICINE

## 2025-04-13 ENCOUNTER — RESULTS FOLLOW-UP (OUTPATIENT)
Dept: FAMILY MEDICINE CLINIC | Facility: CLINIC | Age: 30
End: 2025-04-13
Payer: COMMERCIAL

## 2025-04-13 NOTE — LETTER
Lavonne Lan  [address] [place] [id number]    April 13, 2025     Dear Ms. Lan:    Below are the results from your recent visit:    Blood counts, thyroid, kidney and liver function normal  Blood sugar normal  Protein levels normal  Electrolytes all normal  B12 normal    Resulted Orders   Comprehensive Metabolic Panel   Result Value Ref Range    Glucose 90 65 - 99 mg/dL    BUN 11 6 - 20 mg/dL    Creatinine 0.71 0.57 - 1.00 mg/dL    Sodium 139 136 - 145 mmol/L    Potassium 4.4 3.5 - 5.2 mmol/L    Chloride 104 98 - 107 mmol/L    CO2 25.0 22.0 - 29.0 mmol/L    Calcium 9.3 8.6 - 10.5 mg/dL    Total Protein 6.7 6.0 - 8.5 g/dL    Albumin 4.1 3.5 - 5.2 g/dL    ALT (SGPT) 23 1 - 33 U/L    AST (SGOT) 14 1 - 32 U/L    Alkaline Phosphatase 83 39 - 117 U/L    Total Bilirubin 0.3 0.0 - 1.2 mg/dL    Globulin 2.6 gm/dL    A/G Ratio 1.6 g/dL    BUN/Creatinine Ratio 15.5 7.0 - 25.0    Anion Gap 10.0 5.0 - 15.0 mmol/L    eGFR 116.7 >60.0 mL/min/1.73   TSH   Result Value Ref Range    TSH 0.967 0.270 - 4.200 uIU/mL   Vitamin B12   Result Value Ref Range    Vitamin B-12 441 211 - 946 pg/mL   CBC Auto Differential   Result Value Ref Range    WBC 4.75 3.40 - 10.80 10*3/mm3    RBC 4.48 3.77 - 5.28 10*6/mm3    Hemoglobin 13.3 12.0 - 15.9 g/dL    Hematocrit 39.8 34.0 - 46.6 %    MCV 88.8 79.0 - 97.0 fL    MCH 29.7 26.6 - 33.0 pg    MCHC 33.4 31.5 - 35.7 g/dL    RDW 12.6 12.3 - 15.4 %    RDW-SD 41.2 37.0 - 54.0 fl    MPV 12.4 (H) 6.0 - 12.0 fL    Platelets 162 140 - 450 10*3/mm3    Neutrophil % 61.3 42.7 - 76.0 %    Lymphocyte % 26.1 19.6 - 45.3 %    Monocyte % 6.9 5.0 - 12.0 %    Eosinophil % 5.1 0.3 - 6.2 %    Basophil % 0.4 0.0 - 1.5 %    Immature Grans % 0.2 0.0 - 0.5 %    Neutrophils, Absolute 2.91 1.70 - 7.00 10*3/mm3    Lymphocytes, Absolute 1.24 0.70 - 3.10 10*3/mm3    Monocytes, Absolute 0.33 0.10 - 0.90 10*3/mm3    Eosinophils, Absolute 0.24 0.00 - 0.40 10*3/mm3    Basophils, Absolute 0.02 0.00 - 0.20 10*3/mm3     Immature Grans, Absolute 0.01 0.00 - 0.05 10*3/mm3     If you have any questions or concerns, please don't hesitate to call.    Sincerely,        Nickolas Lr, DO

## 2025-04-13 NOTE — PROGRESS NOTES
Mail copy of results to patient.  Blood counts, thyroid, kidney and liver function normal  Blood sugar normal  Protein levels normal  Electrolytes all normal  B12 normal

## 2025-06-05 ENCOUNTER — TELEPHONE (OUTPATIENT)
Dept: FAMILY MEDICINE CLINIC | Facility: CLINIC | Age: 30
End: 2025-06-05
Payer: COMMERCIAL

## 2025-06-05 DIAGNOSIS — F17.210 CIGARETTE NICOTINE DEPENDENCE WITHOUT COMPLICATION: Primary | ICD-10-CM

## 2025-06-05 RX ORDER — VARENICLINE TARTRATE 1 MG/1
1 TABLET, FILM COATED ORAL 2 TIMES DAILY
Qty: 56 TABLET | Refills: 3 | Status: SHIPPED | OUTPATIENT
Start: 2025-06-05

## 2025-06-05 RX ORDER — VARENICLINE TARTRATE 0.5 (11)-1
KIT ORAL
Qty: 53 EACH | Refills: 0 | Status: SHIPPED | OUTPATIENT
Start: 2025-06-05

## 2025-06-05 NOTE — TELEPHONE ENCOUNTER
Caller: Lavonne Lan    Relationship: Self    Best call back number: [phone]     What medication are you requesting: CHANTIX    If a prescription is needed, what is your preferred pharmacy and phone number: [facility] [place]     Additional notes: PATIENT STATED SHE IS GOING TO HAVE BACK SURGERY, HOWEVER HAS BEEN ADVISED SHE MUST BE NICOTINE FREE FOR 4 WEEKS PRIOR TO SURGERY.    PATIENT STATED SHE HAS BEEN HAVING TROUBLE WITH THIS, AND IS REQUESTING CHANTIX BE PRESCRIBED TO HELP.    PATIENT IS REQUESTING TO START THIS ASAP SO THE SURGERY IS NOT PUSHED ANY LATER.

## 2025-07-01 DIAGNOSIS — M51.362 DEGENERATION OF INTERVERTEBRAL DISC OF LUMBAR REGION WITH DISCOGENIC BACK PAIN AND LOWER EXTREMITY PAIN: Primary | ICD-10-CM

## 2025-07-01 DIAGNOSIS — M54.16 LUMBAR RADICULITIS: ICD-10-CM

## 2025-07-11 ENCOUNTER — TELEMEDICINE (OUTPATIENT)
Dept: FAMILY MEDICINE CLINIC | Facility: CLINIC | Age: 30
End: 2025-07-11
Payer: COMMERCIAL

## 2025-07-11 DIAGNOSIS — E28.2 PCOS (POLYCYSTIC OVARIAN SYNDROME): Primary | ICD-10-CM

## 2025-07-11 PROCEDURE — 99214 OFFICE O/P EST MOD 30 MIN: CPT | Performed by: FAMILY MEDICINE

## 2025-07-11 RX ORDER — METFORMIN HYDROCHLORIDE 500 MG/1
500 TABLET, EXTENDED RELEASE ORAL
Qty: 90 TABLET | Refills: 1 | Status: SHIPPED | OUTPATIENT
Start: 2025-07-11

## 2025-07-11 RX ORDER — SEMAGLUTIDE 0.25 MG/.5ML
0.25 INJECTION, SOLUTION SUBCUTANEOUS WEEKLY
Qty: 2 ML | Refills: 2 | Status: SHIPPED | OUTPATIENT
Start: 2025-07-11

## 2025-07-11 NOTE — PATIENT INSTRUCTIONS
Calorie Counting for Weight Loss  Calories are units of energy. Your body needs a certain number of calories from food to keep going throughout the day. When you eat or drink more calories than your body needs, your body stores the extra calories mostly as fat. When you eat or drink fewer calories than your body needs, your body burns fat to get the energy it needs.  Calorie counting means keeping track of how many calories you eat and drink each day. Calorie counting can be helpful if you need to lose weight. If you eat fewer calories than your body needs, you should lose weight. Ask your health care provider what a healthy weight is for you.  For calorie counting to work, you will need to eat the right number of calories each day to lose a healthy amount of weight per week. A dietitian can help you figure out how many calories you need in a day and will suggest ways to reach your calorie goal.  A healthy amount of weight to lose each week is usually 1-2 lb (0.5-0.9 kg). This usually means that your daily calorie intake should be reduced by 500-750 calories.  Eating 1,200-1,500 calories a day can help most women lose weight.  Eating 1,500-1,800 calories a day can help most men lose weight.  What do I need to know about calorie counting?  Work with your health care provider or dietitian to determine how many calories you should get each day. To meet your daily calorie goal, you will need to:  Find out how many calories are in each food that you would like to eat. Try to do this before you eat.  Decide how much of the food you plan to eat.  Keep a food log. Do this by writing down what you ate and how many calories it had.  To successfully lose weight, it is important to balance calorie counting with a healthy lifestyle that includes regular activity.  Where do I find calorie information?  The number of calories in a food can be found on a Nutrition Facts label. If a food does not have a Nutrition Facts label, try to  look up the calories online or ask your dietitian for help.  Remember that calories are listed per serving. If you choose to have more than one serving of a food, you will have to multiply the calories per serving by the number of servings you plan to eat. For example, the label on a package of bread might say that a serving size is 1 slice and that there are 90 calories in a serving. If you eat 1 slice, you will have eaten 90 calories. If you eat 2 slices, you will have eaten 180 calories.  How do I keep a food log?  After each time that you eat, record the following in your food log as soon as possible:  What you ate. Be sure to include toppings, sauces, and other extras on the food.  How much you ate. This can be measured in cups, ounces, or number of items.  How many calories were in each food and drink.  The total number of calories in the food you ate.  Keep your food log near you, such as in a pocket-sized notebook or on an maikol or website on your mobile phone. Some programs will calculate calories for you and show you how many calories you have left to meet your daily goal.  What are some portion-control tips?  Know how many calories are in a serving. This will help you know how many servings you can have of a certain food.  Use a measuring cup to measure serving sizes. You could also try weighing out portions on a kitchen scale. With time, you will be able to estimate serving sizes for some foods.  Take time to put servings of different foods on your favorite plates or in your favorite bowls and cups so you know what a serving looks like.  Try not to eat straight from a food's packaging, such as from a bag or box. Eating straight from the package makes it hard to see how much you are eating and can lead to overeating. Put the amount you would like to eat in a cup or on a plate to make sure you are eating the right portion.  Use smaller plates, glasses, and bowls for smaller portions and to prevent  overeating.  Try not to multitask. For example, avoid watching TV or using your computer while eating. If it is time to eat, sit down at a table and enjoy your food. This will help you recognize when you are full. It will also help you be more mindful of what and how much you are eating.  What are tips for following this plan?  Reading food labels  Check the calorie count compared with the serving size. The serving size may be smaller than what you are used to eating.  Check the source of the calories. Try to choose foods that are high in protein, fiber, and vitamins, and low in saturated fat, trans fat, and sodium.  Shopping  Read nutrition labels while you shop. This will help you make healthy decisions about which foods to buy.  Pay attention to nutrition labels for low-fat or fat-free foods. These foods sometimes have the same number of calories or more calories than the full-fat versions. They also often have added sugar, starch, or salt to make up for flavor that was removed with the fat.  Make a grocery list of lower-calorie foods and stick to it.  Cooking  Try to cook your favorite foods in a healthier way. For example, try baking instead of frying.  Use low-fat dairy products.  Meal planning  Use more fruits and vegetables. One-half of your plate should be fruits and vegetables.  Include lean proteins, such as chicken, turkey, and fish.  Lifestyle  Each week, aim to do one of the followin minutes of moderate exercise, such as walking.  75 minutes of vigorous exercise, such as running.  General information  Know how many calories are in the foods you eat most often. This will help you calculate calorie counts faster.  Find a way of tracking calories that works for you. Get creative. Try different apps or programs if writing down calories does not work for you.  What foods should I eat?    Eat nutritious foods. It is better to have a nutritious, high-calorie food, such as an avocado, than a food with  few nutrients, such as a bag of potato chips.  Use your calories on foods and drinks that will fill you up and will not leave you hungry soon after eating.  Examples of foods that fill you up are nuts and nut butters, vegetables, lean proteins, and high-fiber foods such as whole grains. High-fiber foods are foods with more than 5 g of fiber per serving.  Pay attention to calories in drinks. Low-calorie drinks include water and unsweetened drinks.  The items listed above may not be a complete list of foods and beverages you can eat. Contact a dietitian for more information.  What foods should I limit?  Limit foods or drinks that are not good sources of vitamins, minerals, or protein or that are high in unhealthy fats. These include:  Candy.  Other sweets.  Sodas, specialty coffee drinks, alcohol, and juice.  The items listed above may not be a complete list of foods and beverages you should avoid. Contact a dietitian for more information.  How do I count calories when eating out?  Pay attention to portions. Often, portions are much larger when eating out. Try these tips to keep portions smaller:  Consider sharing a meal instead of getting your own.  If you get your own meal, eat only half of it. Before you start eating, ask for a container and put half of your meal into it.  When available, consider ordering smaller portions from the menu instead of full portions.  Pay attention to your food and drink choices. Knowing the way food is cooked and what is included with the meal can help you eat fewer calories.  If calories are listed on the menu, choose the lower-calorie options.  Choose dishes that include vegetables, fruits, whole grains, low-fat dairy products, and lean proteins.  Choose items that are boiled, broiled, grilled, or steamed. Avoid items that are buttered, battered, fried, or served with cream sauce. Items labeled as crispy are usually fried, unless stated otherwise.  Choose water, low-fat milk,  unsweetened iced tea, or other drinks without added sugar. If you want an alcoholic beverage, choose a lower-calorie option, such as a glass of wine or light beer.  Ask for dressings, sauces, and syrups on the side. These are usually high in calories, so you should limit the amount you eat.  If you want a salad, choose a garden salad and ask for grilled meats. Avoid extra toppings such as tapia, cheese, or fried items. Ask for the dressing on the side, or ask for olive oil and vinegar or lemon to use as dressing.  Estimate how many servings of a food you are given. Knowing serving sizes will help you be aware of how much food you are eating at restaurants.  Where to find more information  Centers for Disease Control and Prevention: www.cdc.gov  [place] Department of Agriculture: myplate.gov  Summary  Calorie counting means keeping track of how many calories you eat and drink each day. If you eat fewer calories than your body needs, you should lose weight.  A healthy amount of weight to lose per week is usually 1-2 lb (0.5-0.9 kg). This usually means reducing your daily calorie intake by 500-750 calories.  The number of calories in a food can be found on a Nutrition Facts label. If a food does not have a Nutrition Facts label, try to look up the calories online or ask your dietitian for help.  Use smaller plates, glasses, and bowls for smaller portions and to prevent overeating.  Use your calories on foods and drinks that will fill you up and not leave you hungry shortly after a meal.  This information is not intended to replace advice given to you by your health care provider. Make sure you discuss any questions you have with your health care provider.  Document Revised: 01/28/2021 Document Reviewed: 01/28/2021  [employer]vier Patient Education © 2022 [employer]    Exercising to Lose Weight  Getting regular exercise is important for everyone. It is especially important if you are overweight. Being overweight increases  your risk of heart disease, stroke, diabetes, high blood pressure, and several types of cancer. Exercising, and reducing the calories you consume, can help you lose weight and improve fitness and health.  Exercise can be moderate or vigorous intensity. To lose weight, most people need to do a certain amount of moderate or vigorous-intensity exercise each week.  How can exercise affect me?  You lose weight when you exercise enough to burn more calories than you eat. Exercise also reduces body fat and builds muscle. The more muscle you have, the more calories you burn. Exercise also:  Improves mood.  Reduces stress and tension.  Improves your overall fitness, flexibility, and endurance.  Increases bone strength.  Moderate-intensity exercise  Moderate-intensity exercise is any activity that gets you moving enough to burn at least three times more energy (calories) than if you were sitting.  Examples of moderate exercise include:  Walking a mile in 15 minutes.  Doing light yard work.  Biking at an easy pace.  Most people should get at least 150 minutes of moderate-intensity exercise a week to maintain their body weight.  Vigorous-intensity exercise  Vigorous-intensity exercise is any activity that gets you moving enough to burn at least six times more calories than if you were sitting. When you exercise at this intensity, you should be working hard enough that you are not able to carry on a conversation.  Examples of vigorous exercise include:  Running.  Playing a team sport, such as football, basketball, and soccer.  Jumping rope.  Most people should get at least 75 minutes a week of vigorous exercise to maintain their body weight.  What actions can I take to lose weight?  The amount of exercise you need to lose weight depends on:  Your age.  The type of exercise.  Any health conditions you have.  Your overall physical ability.  Talk to your health care provider about how much exercise you need and what types of  activities are safe for you.  Nutrition    Make changes to your diet as told by your health care provider or diet and nutrition specialist (dietitian). This may include:  Eating fewer calories.  Eating more protein.  Eating less unhealthy fats.  Eating a diet that includes fresh fruits and vegetables, whole grains, low-fat dairy products, and lean protein.  Avoiding foods with added fat, salt, and sugar.  Drink plenty of water while you exercise to prevent dehydration or heat stroke.  Activity  Choose an activity that you enjoy and set realistic goals. Your health care provider can help you make an exercise plan that works for you.  Exercise at a moderate or vigorous intensity most days of the week.  The intensity of exercise may vary from person to person. You can tell how intense a workout is for you by paying attention to your breathing and heartbeat. Most people will notice their breathing and heartbeat get faster with more intense exercise.  Do resistance training twice each week, such as:  Push-ups.  Sit-ups.  Lifting weights.  Using resistance bands.  Getting short amounts of exercise can be just as helpful as long, structured periods of exercise. If you have trouble finding time to exercise, try doing these things as part of your daily routine:  Get up, stretch, and walk around every 30 minutes throughout the day.  Go for a walk during your lunch break.  Park your car farther away from your destination.  If you take public transportation, get off one stop early and walk the rest of the way.  Make phone calls while standing up and walking around.  Take the stairs instead of elevators or escalators.  Wear comfortable clothes and shoes with good support.  Do not exercise so much that you hurt yourself, feel dizzy, or get very short of breath.  Where to find more information  [place] Department of Health and Human Services: www.hhs.gov  Centers for Disease Control and Prevention: www.cdc.gov  Contact a health care  provider:  Before starting a new exercise program.  If you have questions or concerns about your weight.  If you have a medical problem that keeps you from exercising.  Get help right away if:  You have any of the following while exercising:  Injury.  Dizziness.  Difficulty breathing or shortness of breath that does not go away when you stop exercising.  Chest pain.  Rapid heartbeat.  These symptoms may represent a serious problem that is an emergency. Do not wait to see if the symptoms will go away. Get medical help right away. Call your local emergency services (911 in the [place] Do not drive yourself to the hospital.  Summary  Getting regular exercise is especially important if you are overweight.  Being overweight increases your risk of heart disease, stroke, diabetes, high blood pressure, and several types of cancer.  Losing weight happens when you burn more calories than you eat.  Reducing the amount of calories you eat, and getting regular moderate or vigorous exercise each week, helps you lose weight.  This information is not intended to replace advice given to you by your health care provider. Make sure you discuss any questions you have with your health care provider.  Document Revised: 02/13/2022 Document Reviewed: 02/13/2022  [employer]vier Patient Education © 2022 [employer]

## 2025-07-22 DIAGNOSIS — E28.2 PCOS (POLYCYSTIC OVARIAN SYNDROME): ICD-10-CM

## 2025-07-22 RX ORDER — SEMAGLUTIDE 0.25 MG/.5ML
0.25 INJECTION, SOLUTION SUBCUTANEOUS WEEKLY
Qty: 2 ML | Refills: 2 | Status: SHIPPED | OUTPATIENT
Start: 2025-07-22

## (undated) DEVICE — DRAPE,REIN 53X77,STERILE: Brand: MEDLINE

## (undated) DEVICE — 3M™ STERI-STRIP™ COMPOUND BENZOIN TINCTURE 40 BAGS/CARTON 4 CARTONS/CASE C1544: Brand: 3M™ STERI-STRIP™

## (undated) DEVICE — LOU D & C HYSTEROSCOPY: Brand: MEDLINE INDUSTRIES, INC.

## (undated) DEVICE — ANTIBACTERIAL UNDYED BRAIDED (POLYGLACTIN 910), SYNTHETIC ABSORBABLE SUTURE: Brand: COATED VICRYL

## (undated) DEVICE — ST COL BERKELY TBG

## (undated) DEVICE — TBG W FLTR FOR BERKELEY SYSTEM

## (undated) DEVICE — PAD SANI MAXI W/ADHS SNG WRP 11IN

## (undated) DEVICE — UNDYED BRAIDED (POLYGLACTIN 910), SYNTHETIC ABSORBABLE SUTURE: Brand: COATED VICRYL

## (undated) DEVICE — SUT VIC 2/0 CT1 36IN

## (undated) DEVICE — SUT VIC 0 CT 36IN J958H

## (undated) DEVICE — SOL IRR H2O BTL 1000ML STRL

## (undated) DEVICE — SUT MNCRYL 0/0 CTX 36IN Y398H

## (undated) DEVICE — SUT VIC 3/0 CTI 36IN J944H

## (undated) DEVICE — CANN VAC GYN VACURETTE BERKELEY CRV 8MM 1P/U STRL

## (undated) DEVICE — SACK GZ PERM

## (undated) DEVICE — GLV SURG BIOGEL LTX PF 6 1/2

## (undated) DEVICE — 3M(TM) TEGADERM(TM) TRANSPARENT FILM DRESSING FRAME STYLE 1627: Brand: 3M™ TEGADERM™

## (undated) DEVICE — HOSE BT TO BT VAC CURETTAGE SNGL PT USE

## (undated) DEVICE — Device

## (undated) DEVICE — 3M™ STERI-STRIP™ REINFORCED ADHESIVE SKIN CLOSURES, R1547, 1/2 IN X 4 IN (12 MM X 100 MM), 6 STRIPS/ENVELOPE: Brand: 3M™ STERI-STRIP™

## (undated) DEVICE — SUT MNCRYL 3/0 CT1 36 IN Y944H

## (undated) DEVICE — STRAP STIRUP SLP RNG 19X3.5IN DISP

## (undated) DEVICE — CANSTR SXN UTER NO FLTR SURG

## (undated) DEVICE — GLV SURG BIOGEL LTX PF 7 1/2